# Patient Record
Sex: FEMALE | Race: WHITE | NOT HISPANIC OR LATINO | Employment: OTHER | URBAN - NONMETROPOLITAN AREA
[De-identification: names, ages, dates, MRNs, and addresses within clinical notes are randomized per-mention and may not be internally consistent; named-entity substitution may affect disease eponyms.]

---

## 2017-01-06 ENCOUNTER — OFFICE VISIT (OUTPATIENT)
Dept: CARDIOLOGY | Facility: CLINIC | Age: 82
End: 2017-01-06

## 2017-01-06 VITALS
DIASTOLIC BLOOD PRESSURE: 71 MMHG | WEIGHT: 158.6 LBS | SYSTOLIC BLOOD PRESSURE: 124 MMHG | OXYGEN SATURATION: 90 % | BODY MASS INDEX: 28.1 KG/M2 | HEART RATE: 66 BPM | HEIGHT: 63 IN

## 2017-01-06 DIAGNOSIS — I25.10 CORONARY ARTERY DISEASE INVOLVING NATIVE CORONARY ARTERY OF NATIVE HEART WITHOUT ANGINA PECTORIS: Primary | ICD-10-CM

## 2017-01-06 DIAGNOSIS — J44.9 CHRONIC OBSTRUCTIVE PULMONARY DISEASE, UNSPECIFIED COPD TYPE (HCC): ICD-10-CM

## 2017-01-06 DIAGNOSIS — I10 ESSENTIAL HYPERTENSION: ICD-10-CM

## 2017-01-06 DIAGNOSIS — E78.2 MIXED HYPERLIPIDEMIA: ICD-10-CM

## 2017-01-06 DIAGNOSIS — Z95.0 PACEMAKER: ICD-10-CM

## 2017-01-06 PROCEDURE — 99212 OFFICE O/P EST SF 10 MIN: CPT | Performed by: INTERNAL MEDICINE

## 2017-01-06 NOTE — MR AVS SNAPSHOT
Cici Gooded   1/6/2017 12:20 PM   Office Visit    Dept Phone:  [phone]   Encounter #:  [id number]    Provider:  Tejinder Cash MD   Department:  [facility] CARDIOLOGY                Your Full Care Plan              Today's Medication Changes          These changes are accurate as of: 1/6/17  1:28 PM.  If you have any questions, ask your nurse or doctor.               Stop taking medication(s)listed here:     amLODIPine 5 MG tablet   Commonly known as:  NORVASC   Stopped by:  Tejinder Cash MD                      Your Updated Medication List          This list is accurate as of: 1/6/17  1:28 PM.  Always use your most recent med list.                aspirin  MG tablet       * budesonide 0.25 MG/2ML nebulizer solution   Commonly known as:  PULMICORT       * PULMICORT FLEXHALER 90 MCG/ACT inhaler   Generic drug:  budesonide   Inhale 1 puff 2 (Two) Times a Day.       bumetanide 0.5 MG tablet   Commonly known as:  BUMEX   Take 1 tablet by mouth daily.       CALCIUM + D PO       cetirizine 10 MG tablet   Commonly known as:  zyrTEC   Take 1 tablet by mouth daily.       EYE VITAMINS PO       * gabapentin 100 MG capsule   Commonly known as:  NEURONTIN   Take 3 capsules by mouth every night.       * gabapentin 300 MG capsule   Commonly known as:  NEURONTIN   Take 1 capsule by mouth every night.       meclizine 25 MG tablet   Commonly known as:  ANTIVERT       montelukast 10 MG tablet   Commonly known as:  SINGULAIR   Take 1 tablet by mouth Every Night.       MULTI-VITAMIN DAILY PO       nitroglycerin 0.4 MG SL tablet   Commonly known as:  NITROSTAT       pravastatin 40 MG tablet   Commonly known as:  PRAVACHOL       * Notice:  This list has 4 medication(s) that are the same as other medications prescribed for you. Read the directions carefully, and ask your doctor or other care provider to review them with you.            Medications to be Given to You by a  "Medical Professional     Due       Frequency    11/1/2016 carbamide peroxide (DEBROX) 6.5 % otic solution 5 drop  2 Times Daily      Instructions     None    Patient Instructions History      Upcoming Appointments     Visit Type Date Time Department    FOLLOW UP 1/6/2017 12:20 PM [facility]    LAB 1/12/2017  8:50 AM [facility] CARDIOLOGY [facility]    FOLLOW UP 1/13/2017  9:45 AM [facility] CARDIOLOG[facility]    FOLLOW UP 1/17/2017  9:15 AM [facility] CARDIOLOGY [facility]      MyChart Signup     Our records indicate that you have declined [facility] MyChart signup. If you would like to sign up for [employer], please email [email or web address] or call [phone] to obtain an activation code.             Other Info from Your Visit           Your Appointments     Jan 12, 2017  8:50 AM EST   Lab with LABWORK, CARD COR   [facility] CARDIOLOGY (--)    [address] [place] [address]   [phone]            Jan 13, 2017  9:45 AM EST   Follow Up with PACEMAKER COR CARD   [facility] CARDIOLOGY (--)    [address] [place] [address]   [phone]           Arrive 15 minutes prior to appointment.            Jan 17, 2017  9:15 AM EST   Follow Up with Emily Cleary MD   [facility] CARDIOLOGY (--)    [address] [place] [address]   [phone]           Arrive 15 minutes prior to appointment.              Allergies     Amoxil [Amoxicillin]      Codeine      Penicillins      Tetracyclines & Related        Reason for Visit     Follow-up     Hypotension     Fatigue           Vital Signs     Blood Pressure Pulse Height Weight Oxygen Saturation Body Mass Index    124/71 (BP Location: Right arm) 66 63\" (160 cm) 158 lb 9.6 oz (71.9 kg) 90% 28.09 kg/m2    Smoking Status                   Never Smoker             "

## 2017-01-06 NOTE — PROGRESS NOTES
Subjective   Cici Veliz is a 89 y.o. female.     Chief Complaint   Patient presents with   • Follow-up   • Hypotension   • Fatigue       History of Present Illness     Cici is a pleasant 89-year-old woman who presents for an acute appointment due to continued episodes of hypotension and near syncope.  She is typically followed by Dr. Cleary however, he is not available today and as such she presented here for an acute appointment.  She notes that her blood pressures have been persistently low with systolics less than 90 and with this she has associated near syncope.  She currently denies any angina or anginal-like symptoms.  She denies any heart failure symptoms.  She denies any palpitations, near syncope or syncopal symptoms.  She did undergo cardiac catheterization in 2015 which was relatively unremarkable.  She denies any heart failure symptoms.  She denies any palpitations or errol syncope.    The following portions of the patient's history were reviewed and updated as appropriate: allergies, current medications, past family history, past medical history, past social history, past surgical history and problem list.    Review of Systems   Constitutional: Positive for activity change and fatigue. Negative for appetite change.   HENT: Positive for congestion. Negative for tinnitus.    Eyes: Negative for visual disturbance.   Respiratory: Positive for cough, chest tightness and shortness of breath.    Cardiovascular: Positive for leg swelling. Negative for chest pain and palpitations.   Gastrointestinal: Negative for blood in stool, nausea and vomiting.   Endocrine: Negative for cold intolerance, heat intolerance and polyuria.   Genitourinary: Positive for dysuria.   Musculoskeletal: Negative for myalgias and neck pain.   Skin: Negative for rash.   Neurological: Positive for weakness. Negative for dizziness, syncope and light-headedness.   Hematological: Does not bruise/bleed easily.    Psychiatric/Behavioral: Negative for confusion and sleep disturbance.       Objective   Physical Exam   Constitutional: She is oriented to person, place, and time. She appears well-developed and well-nourished. No distress.   HENT:   Head: Normocephalic and atraumatic.   Nose: Nose normal.   Mouth/Throat: Oropharynx is clear and moist.   Eyes: Conjunctivae and EOM are normal. Right eye exhibits no discharge. Left eye exhibits no discharge. No scleral icterus.   Neck: Normal range of motion. No hepatojugular reflux and no JVD present. Carotid bruit is not present. No tracheal deviation present.   Cardiovascular: Normal rate, regular rhythm, S1 normal, S2 normal and intact distal pulses.  PMI is not displaced.  Exam reveals no gallop, no S3, no S4 and no friction rub.    No murmur heard.  Pulmonary/Chest: Effort normal and breath sounds normal. No accessory muscle usage. No respiratory distress. She has no wheezes. She has no rales. She exhibits no tenderness.   Abdominal: Soft. Bowel sounds are normal. She exhibits no distension. There is no tenderness.   Musculoskeletal: Normal range of motion. She exhibits no edema or tenderness.       Vascular Status -  Her exam exhibits no right foot edema. Her exam exhibits no left foot edema.  Neurological: She is alert and oriented to person, place, and time. No cranial nerve deficit. Coordination normal.   Skin: Skin is warm and dry. She is not diaphoretic.   Nursing note and vitals reviewed.      Procedures    Assessment/Plan   Hypotension  At this point in time I have simply gone ahead and discontinued her amlodipine.  I've asked her to maintain a home blood pressure diary.  This appears to be a relatively chronic problem.  I have asked her to follow-up with her primary cardiologist.    In regard to her history of bradycardia and known coronary artery disease as well as hyperlipidemia again, she is typically followed by Dr. Cleary and has a pending appointment with him.   Again, as she is not having any symptoms regarding these medical problems at this time I have deferred further care of them to her primary cardiologist Dr. Cleary

## 2017-01-12 ENCOUNTER — LAB (OUTPATIENT)
Dept: CARDIOLOGY | Facility: CLINIC | Age: 82
End: 2017-01-12

## 2017-01-12 DIAGNOSIS — I50.30 CONGESTIVE HEART FAILURE WITH LV DIASTOLIC DYSFUNCTION, NYHA CLASS 1 (HCC): ICD-10-CM

## 2017-01-12 DIAGNOSIS — E78.2 MIXED HYPERLIPIDEMIA: ICD-10-CM

## 2017-01-12 LAB
ALBUMIN SERPL-MCNC: 4.3 G/DL (ref 3.4–4.8)
ALBUMIN/GLOB SERPL: 1.5 G/DL (ref 1.5–2.5)
ALP SERPL-CCNC: 79 U/L (ref 46–116)
ALT SERPL W P-5'-P-CCNC: 10 U/L (ref 10–36)
ANION GAP SERPL CALCULATED.3IONS-SCNC: 5.7 MMOL/L (ref 3.6–11.2)
AST SERPL-CCNC: 21 U/L (ref 10–30)
BASOPHILS # BLD AUTO: 0.02 10*3/MM3 (ref 0–0.3)
BASOPHILS NFR BLD AUTO: 0.3 % (ref 0–2)
BILIRUB SERPL-MCNC: 0.4 MG/DL (ref 0.2–1.8)
BNP SERPL-MCNC: 304 PG/ML (ref 0–100)
BUN BLD-MCNC: 34 MG/DL (ref 7–21)
BUN/CREAT SERPL: 21.3 (ref 7–25)
CALCIUM SPEC-SCNC: 9.9 MG/DL (ref 7.7–10)
CHLORIDE SERPL-SCNC: 109 MMOL/L (ref 99–112)
CHOLEST SERPL-MCNC: 241 MG/DL (ref 0–200)
CK SERPL-CCNC: 69 U/L (ref 24–173)
CO2 SERPL-SCNC: 29.3 MMOL/L (ref 24.3–31.9)
CREAT BLD-MCNC: 1.6 MG/DL (ref 0.43–1.29)
DEPRECATED RDW RBC AUTO: 46.2 FL (ref 37–54)
EOSINOPHIL # BLD AUTO: 0.12 10*3/MM3 (ref 0–0.7)
EOSINOPHIL NFR BLD AUTO: 1.7 % (ref 0–7)
ERYTHROCYTE [DISTWIDTH] IN BLOOD BY AUTOMATED COUNT: 14.5 % (ref 11.5–14.5)
GFR SERPL CREATININE-BSD FRML MDRD: 30 ML/MIN/1.73
GLOBULIN UR ELPH-MCNC: 2.9 GM/DL
GLUCOSE BLD-MCNC: 98 MG/DL (ref 70–110)
HCT VFR BLD AUTO: 43 % (ref 37–47)
HDLC SERPL-MCNC: 60 MG/DL (ref 60–100)
HGB BLD-MCNC: 13.1 G/DL (ref 12–16)
IMM GRANULOCYTES # BLD: 0 10*3/MM3 (ref 0–0.03)
IMM GRANULOCYTES NFR BLD: 0 % (ref 0–0.5)
LDLC SERPL CALC-MCNC: 165 MG/DL (ref 0–100)
LDLC/HDLC SERPL: 2.74 {RATIO}
LYMPHOCYTES # BLD AUTO: 1.5 10*3/MM3 (ref 1–3)
LYMPHOCYTES NFR BLD AUTO: 20.8 % (ref 16–46)
MCH RBC QN AUTO: 27.3 PG (ref 27–33)
MCHC RBC AUTO-ENTMCNC: 30.5 G/DL (ref 33–37)
MCV RBC AUTO: 89.6 FL (ref 80–94)
MONOCYTES # BLD AUTO: 0.66 10*3/MM3 (ref 0.1–0.9)
MONOCYTES NFR BLD AUTO: 9.2 % (ref 0–12)
NEUTROPHILS # BLD AUTO: 4.91 10*3/MM3 (ref 1.4–6.5)
NEUTROPHILS NFR BLD AUTO: 68 % (ref 40–75)
OSMOLALITY SERPL CALC.SUM OF ELEC: 294.4 MOSM/KG (ref 273–305)
PLATELET # BLD AUTO: 228 10*3/MM3 (ref 130–400)
PMV BLD AUTO: 11.3 FL (ref 6–10)
POTASSIUM BLD-SCNC: 5.5 MMOL/L (ref 3.5–5.3)
PROT SERPL-MCNC: 7.2 G/DL (ref 6–8)
RBC # BLD AUTO: 4.8 10*6/MM3 (ref 4.2–5.4)
SODIUM BLD-SCNC: 144 MMOL/L (ref 135–153)
TRIGL SERPL-MCNC: 82 MG/DL (ref 0–150)
VLDLC SERPL-MCNC: 16.4 MG/DL
WBC NRBC COR # BLD: 7.21 10*3/MM3 (ref 4.5–12.5)

## 2017-01-12 PROCEDURE — 80061 LIPID PANEL: CPT | Performed by: INTERNAL MEDICINE

## 2017-01-12 PROCEDURE — 80053 COMPREHEN METABOLIC PANEL: CPT | Performed by: INTERNAL MEDICINE

## 2017-01-12 PROCEDURE — 83880 ASSAY OF NATRIURETIC PEPTIDE: CPT | Performed by: INTERNAL MEDICINE

## 2017-01-12 PROCEDURE — 82550 ASSAY OF CK (CPK): CPT | Performed by: INTERNAL MEDICINE

## 2017-01-12 PROCEDURE — 85025 COMPLETE CBC W/AUTO DIFF WBC: CPT | Performed by: INTERNAL MEDICINE

## 2017-01-13 ENCOUNTER — HOSPITAL ENCOUNTER (OUTPATIENT)
Facility: HOSPITAL | Age: 82
Setting detail: OBSERVATION
Discharge: HOME | End: 2017-01-14
Attending: EMERGENCY MEDICINE | Admitting: INTERNAL MEDICINE

## 2017-01-13 ENCOUNTER — OFFICE VISIT (OUTPATIENT)
Dept: CARDIOLOGY | Facility: CLINIC | Age: 82
End: 2017-01-13

## 2017-01-13 ENCOUNTER — APPOINTMENT (OUTPATIENT)
Dept: GENERAL RADIOLOGY | Facility: HOSPITAL | Age: 82
End: 2017-01-13

## 2017-01-13 DIAGNOSIS — I49.5 SSS (SICK SINUS SYNDROME) (HCC): ICD-10-CM

## 2017-01-13 DIAGNOSIS — I20.8 ANGINA AT REST (HCC): Primary | ICD-10-CM

## 2017-01-13 LAB
ALBUMIN SERPL-MCNC: 4 G/DL (ref 3.4–4.8)
ALBUMIN/GLOB SERPL: 1.5 G/DL (ref 1.5–2.5)
ALP SERPL-CCNC: 72 U/L (ref 46–116)
ALT SERPL W P-5'-P-CCNC: 13 U/L (ref 10–36)
ANION GAP SERPL CALCULATED.3IONS-SCNC: 9.6 MMOL/L (ref 3.6–11.2)
APTT PPP: 25.2 SECONDS (ref 24.4–31)
AST SERPL-CCNC: 27 U/L (ref 10–30)
BASOPHILS # BLD AUTO: 0.02 10*3/MM3 (ref 0–0.3)
BASOPHILS NFR BLD AUTO: 0.3 % (ref 0–2)
BILIRUB SERPL-MCNC: 0.2 MG/DL (ref 0.2–1.8)
BUN BLD-MCNC: 33 MG/DL (ref 7–21)
BUN/CREAT SERPL: 23.4 (ref 7–25)
CALCIUM SPEC-SCNC: 9.1 MG/DL (ref 7.7–10)
CHLORIDE SERPL-SCNC: 106 MMOL/L (ref 99–112)
CO2 SERPL-SCNC: 27.4 MMOL/L (ref 24.3–31.9)
CREAT BLD-MCNC: 1.41 MG/DL (ref 0.43–1.29)
DEPRECATED RDW RBC AUTO: 46.4 FL (ref 37–54)
EOSINOPHIL # BLD AUTO: 0.17 10*3/MM3 (ref 0–0.7)
EOSINOPHIL NFR BLD AUTO: 2.7 % (ref 0–7)
ERYTHROCYTE [DISTWIDTH] IN BLOOD BY AUTOMATED COUNT: 14.4 % (ref 11.5–14.5)
GFR SERPL CREATININE-BSD FRML MDRD: 35 ML/MIN/1.73
GLOBULIN UR ELPH-MCNC: 2.6 GM/DL
GLUCOSE BLD-MCNC: 126 MG/DL (ref 70–110)
HCT VFR BLD AUTO: 37.9 % (ref 37–47)
HGB BLD-MCNC: 12.4 G/DL (ref 12–16)
IMM GRANULOCYTES # BLD: 0.01 10*3/MM3 (ref 0–0.03)
IMM GRANULOCYTES NFR BLD: 0.2 % (ref 0–0.5)
INR PPP: 0.92 (ref 0.8–1.1)
LIPASE SERPL-CCNC: 45 U/L (ref 13–60)
LYMPHOCYTES # BLD AUTO: 2.13 10*3/MM3 (ref 1–3)
LYMPHOCYTES NFR BLD AUTO: 33.9 % (ref 16–46)
MCH RBC QN AUTO: 28.9 PG (ref 27–33)
MCHC RBC AUTO-ENTMCNC: 32.7 G/DL (ref 33–37)
MCV RBC AUTO: 88.3 FL (ref 80–94)
MONOCYTES # BLD AUTO: 0.75 10*3/MM3 (ref 0.1–0.9)
MONOCYTES NFR BLD AUTO: 11.9 % (ref 0–12)
NEUTROPHILS # BLD AUTO: 3.2 10*3/MM3 (ref 1.4–6.5)
NEUTROPHILS NFR BLD AUTO: 51 % (ref 40–75)
OSMOLALITY SERPL CALC.SUM OF ELEC: 293.8 MOSM/KG (ref 273–305)
PLATELET # BLD AUTO: 177 10*3/MM3 (ref 130–400)
PMV BLD AUTO: 10.7 FL (ref 6–10)
POTASSIUM BLD-SCNC: 4.2 MMOL/L (ref 3.5–5.3)
PROT SERPL-MCNC: 6.6 G/DL (ref 6–8)
PROTHROMBIN TIME: 10.4 SECONDS (ref 9.8–11.9)
RBC # BLD AUTO: 4.29 10*6/MM3 (ref 4.2–5.4)
SODIUM BLD-SCNC: 143 MMOL/L (ref 135–153)
TROPONIN I SERPL-MCNC: 0.06 NG/ML
WBC NRBC COR # BLD: 6.28 10*3/MM3 (ref 4.5–12.5)

## 2017-01-13 PROCEDURE — 99284 EMERGENCY DEPT VISIT MOD MDM: CPT

## 2017-01-13 PROCEDURE — 85610 PROTHROMBIN TIME: CPT | Performed by: EMERGENCY MEDICINE

## 2017-01-13 PROCEDURE — 93005 ELECTROCARDIOGRAM TRACING: CPT | Performed by: EMERGENCY MEDICINE

## 2017-01-13 PROCEDURE — 85730 THROMBOPLASTIN TIME PARTIAL: CPT | Performed by: EMERGENCY MEDICINE

## 2017-01-13 PROCEDURE — 71010 XR CHEST 1 VW: CPT | Performed by: RADIOLOGY

## 2017-01-13 PROCEDURE — 83690 ASSAY OF LIPASE: CPT | Performed by: EMERGENCY MEDICINE

## 2017-01-13 PROCEDURE — 80053 COMPREHEN METABOLIC PANEL: CPT | Performed by: EMERGENCY MEDICINE

## 2017-01-13 PROCEDURE — 84484 ASSAY OF TROPONIN QUANT: CPT | Performed by: EMERGENCY MEDICINE

## 2017-01-13 PROCEDURE — 85025 COMPLETE CBC W/AUTO DIFF WBC: CPT | Performed by: EMERGENCY MEDICINE

## 2017-01-13 PROCEDURE — 71010 HC CHEST PA OR AP: CPT

## 2017-01-13 PROCEDURE — 93010 ELECTROCARDIOGRAM REPORT: CPT | Performed by: INTERNAL MEDICINE

## 2017-01-13 PROCEDURE — 93288 INTERROG EVL PM/LDLS PM IP: CPT | Performed by: INTERNAL MEDICINE

## 2017-01-13 NOTE — IP AVS SNAPSHOT
AFTER VISIT SUMMARY             Cici Veliz           About your hospitalization     You were admitted on:  January 14, 2017 You last received care in the:  [address]       Procedures & Surgeries         Medications    If you or your caregiver advised us that you are currently taking a medication and that medication is marked below as “Resume”, this simply indicates that we have reviewed those medications to make sure our new therapy recommendations do not interfere.  If you have concerns about medications other than those new ones which we are prescribing today, please consult the physician who prescribed them (or your primary physician).  Our review of your home medications is not meant to indicate that we are directing their use.             Your Medications      START taking these medications     amLODIPine 5 MG tablet   Take 1 tablet by mouth Daily.   Last time this was given:  1/14/2017  2:47 PM   Commonly known as:  NORVASC           metoprolol tartrate 25 MG tablet   Take 1 tablet by mouth Every 12 (Twelve) Hours.   Last time this was given:  1/14/2017  2:46 PM   Commonly known as:  LOPRESSOR             CHANGE how you take these medications     gabapentin 300 MG capsule   Take 1 capsule by mouth every night.   According to our records, you may have been taking this medication differently.   Commonly known as:  NEURONTIN   What changed:    - when to take this  - reasons to take this             CONTINUE taking these medications     aspirin  MG tablet   Take 325 mg by mouth Daily. Typically takes in the morning but has one last night as well.   Last time this was given:  1/14/2017  2:46 PM           calcium carbonate-cholecalciferol 500-400 MG-UNIT tablet tablet   Take 1 tablet by mouth 2 (Two) Times a Day.           cetirizine 10 MG tablet   Take 1 tablet by mouth daily.   Last time this was given:  1/14/2017  2:46 PM   Commonly known as:  zyrTEC           EYE VITAMINS &  MINERALS PO   Take 1 tablet by mouth Every Night.           montelukast 10 MG tablet   Take 1 tablet by mouth Every Night.   Commonly known as:  SINGULAIR           multivitamin tablet tablet   Take 1 tablet by mouth Daily.   Last time this was given:  1/14/2017  2:46 PM           nitroglycerin 0.4 MG SL tablet   Place 0.4 mg under the tongue every 5 (five) minutes as needed for chest pain. Take no more than 3 doses in 15 minutes.   Last time this was given:  1/14/2017  1:02 AM   Commonly known as:  NITROSTAT           PULMICORT FLEXHALER 90 MCG/ACT inhaler   Inhale 1 puff 2 (Two) Times a Day.   Generic drug:  budesonide                Where to Get Your Medications      You can get these medications from any pharmacy     Bring a paper prescription for each of these medications     amLODIPine 5 MG tablet    metoprolol tartrate 25 MG tablet                  Your Medications      Your Medication List           Morning Noon Evening Bedtime As Needed    amLODIPine 5 MG tablet   Take 1 tablet by mouth Daily.   Commonly known as:  NORVASC                                   aspirin  MG tablet   Take 325 mg by mouth Daily. Typically takes in the morning but has one last night as well.                                   calcium carbonate-cholecalciferol 500-400 MG-UNIT tablet tablet   Take 1 tablet by mouth 2 (Two) Times a Day.                                      cetirizine 10 MG tablet   Take 1 tablet by mouth daily.   Commonly known as:  zyrTEC                                   EYE VITAMINS & MINERALS PO   Take 1 tablet by mouth Every Night.                                   gabapentin 300 MG capsule   Take 1 capsule by mouth every night.   Commonly known as:  NEURONTIN                                   metoprolol tartrate 25 MG tablet   Take 1 tablet by mouth Every 12 (Twelve) Hours.   Commonly known as:  LOPRESSOR                                      montelukast 10 MG tablet   Take 1 tablet by mouth Every Night.    Commonly known as:  SINGULAIR                                   multivitamin tablet tablet   Take 1 tablet by mouth Daily.                                   nitroglycerin 0.4 MG SL tablet   Place 0.4 mg under the tongue every 5 (five) minutes as needed for chest pain. Take no more than 3 doses in 15 minutes.   Commonly known as:  NITROSTAT                                   PULMICORT FLEXHALER 90 MCG/ACT inhaler   Inhale 1 puff 2 (Two) Times a Day.   Generic drug:  budesonide                                               Instructions for After Discharge        Activity Instructions     Activity as Tolerated           Diet Instructions     Cardiac Diet           Discharge References/Attachments     ANGINA PECTORIS, EASY-TO-READ (ENGLISH)       Follow-ups for After Discharge        Follow-up Information     Follow up with Emily Cleary MD .    Specialty:  Cardiology    Contact information:    [address] [place] [address]  [phone]        Referrals and Follow-ups to Schedule     Follow-Up    As directed    Follow up with PCP Dr. Cleary as previously scheduled on 1/17/17.             Scheduled Appointments     Jan 17, 2017  9:15 AM EST   Follow Up with Emily Cleary MD   [facility] CARDIOLOGY (--)    [address] [place] [address]   [phone]           Arrive 15 minutes prior to appointment.              MyChart Signup     Our records indicate that you have declined [facility] signup. If you would like to sign up for [employer] [email or web address], please email [email or web address] or call [phone] to obtain an activation code.         Summary of Your Hospitalization        Reason for Hospitalization     Your primary diagnosis was:  Angina At Rest    Your diagnoses also included:  Chronic Back Pain, Ckd (Chronic Kidney Disease) Stage 3, Gfr 30-59 Ml/Min, Heart Failure, Elevated Troponin Level, Chronic Airway Obstruction, High Blood Pressure, High Cholesterol Or  Triglycerides, Hyperglycemia      Care Providers     Provider Service Role Specialty    Lisa Whitt,  Medicine Attending Provider Hospitalist    Lisa Whitt, DO Medicine Consulting Physician  Hospitalist    Chung Rivas MD Cardiology Consulting Physician  Cardiology       Your Allergies  Date Reviewed: 1/14/2017    Allergen Reactions    Amoxil (Amoxicillin) Not Noted         Bee Venom Not Noted         Codeine Not Noted         Penicillins Not Noted         Tetracyclines & Related Not Noted      Pending Labs     Order Current Status    Streptococcus Pneumoniae Ag In process      Patient Belongings Returned     Document Return of Belongings Flowsheet     Were the patient bedside belongings sent home?   Yes   Belongings Retrieved from Security & Sent Home   N/A    Belongings Sent to Safe   --   Medications Retrieved from Pharmacy & Sent Home   N/A              More Information      Angina Pectoris  Angina pectoris is a very bad feeling in the chest, neck, or arm. Your doctor may call it angina. There are four types of angina. Angina is caused by a lack of blood in the middle and thickest layer of the heart wall (myocardium). Angina may feel like a crushing or squeezing pain in the chest. It may feel like tightness or heavy pressure in the chest. Some people say it feels like gas, heartburn, or indigestion. Some people have symptoms other than pain. These include:  · Shortness of breath.  · Cold sweats.  · Feeling sick to your stomach (nausea).  · Feeling light-headed.  Many women have chest discomfort and some of the other symptoms. However, women often have different symptoms, such as:  · Feeling tired (fatigue).  · Feeling nervous for no reason.  · Feeling weak for no reason.  · Dizziness or fainting.  Women may have angina without any symptoms.  HOME CARE  · Take medicines only as told by your doctor.  · Take care of other health issues as told by your doctor. These include:    High blood pressure  (hypertension).    Diabetes.  · Follow a heart-healthy diet. Your doctor can help you to choose healthy food options and make changes.  · Talk to your doctor to learn more about healthy cooking methods and use them. These include:    Roasting.    Grilling.    Broiling.    Baking.    Poaching.    Steaming.    Stir-frying.  · Follow an exercise program approved by your doctor.  · Keep a healthy weight. Lose weight as told by your doctor.  · Rest when you are tired.  · Learn to manage stress.  · Do not use any tobacco, such as cigarettes, chewing tobacco, or electronic cigarettes. If you need help quitting, ask your doctor.  · If you drink alcohol, and your doctor says it is okay, limit yourself to no more than 1 drink per day. One drink equals 12 ounces of beer, 5 ounces of wine, or 1½ ounces of hard liquor.  · Stop illegal drug use.  · Keep all follow-up visits as told by your doctor. This is important.  Do not take these medicines unless your doctor says that you can:  · Nonsteroidal anti-inflammatory drugs (NSAIDs). These include:    Ibuprofen.    Naproxen.    Celecoxib.  · Vitamin supplements that have vitamin A, vitamin E, or both.  · Hormone therapy that contains estrogen with or without progestin.  GET HELP RIGHT AWAY IF:  · You have pain in your chest, neck, arm, jaw, stomach, or back that:    Lasts more than a few minutes.    Comes back.    Does not get better after you take medicine under your tongue (sublingual nitroglycerin).  · You have any of these symptoms for no reason:    Gas, heartburn, or indigestion.    Sweating a lot.    Shortness of breath or trouble breathing.    Feeling sick to your stomach or throwing up.    Feeling more tired than usual.    Feeling nervous or worrying more than usual.    Feeling weak.    Diarrhea.  · You are suddenly dizzy or light-headed.  · You faint or pass out.  These symptoms may be an emergency. Do not wait to see if the symptoms will go away. Get medical help right  away. Call your local emergency services (911 in the [place] Do not drive yourself to the hospital.     This information is not intended to replace advice given to you by your health care provider. Make sure you discuss any questions you have with your health care provider.     Document Released: 06/05/2009 Document Revised: 05/03/2016 Document Reviewed: 04/21/2015  [employer] Patient Education ©2016 Elsevier Inc.            SYMPTOMS OF A STROKE    Call 911 or have someone take you to the Emergency Department if you have any of the following:    · Sudden numbness or weakness of your face, arm or leg especially on one side of the body  · Sudden confusion, diffiiculty speaking or trouble understanding   · Changes in your vision or loss of sight in one eye  · Sudden severe headache with no known cause  · sudden dizziness, trouble walking, loss of balance or coordination    It is important to seek emergency care right away if you have further stroke symptoms. If you get emergency help quickly, the powerful clot-dissolving medicines can reduce the disabilities caused by a stroke.     For more information:    American Stroke Association  1-600-6-STROKE  www.strokeassociation.org           IF YOU SMOKE OR USE TOBACCO PLEASE READ THE FOLLOWING:    Why is smoking bad for me?  Smoking increases the risk of heart disease, lung disease, vascular disease, stroke, and cancer.     If you smoke, STOP!    If you would like more information on quitting smoking, please visit the [employer] website: www.[employer]/TrialReach/healthier-together/smoke   This link will provide additional resources including the QUIT line and the Beat the Pack support groups.     For more information:    American Cancer Society  (948) 411-6527    American Heart Association  1-142.378.4541               YOU ARE THE MOST IMPORTANT FACTOR IN YOUR RECOVERY.     Follow all instructions carefully.     I have reviewed my discharge  instructions with my nurse, including the following information, if applicable:     Information about my illness and diagnosis   Follow up appointments (including lab draws)   Wound Care   Equipment Needs   Medications (new and continuing) along with side effects   Preventative information such as vaccines and smoking cessations   Diet   Pain   I know when to contact my Doctor's office or seek emergency care      I want my nurse to describe the side effects of my medications: YES NO   If the answer is no, I understand the side effects of my medications: YES NO   My nurse described the side effects of my medications in a way that I could understand: YES NO   I have taken my personal belongings and my own medications with me at discharge: YES NO            I have received this information and my questions have been answered. I have discussed any concerns I see with this plan with the nurse or physician. I understand these instructions.    Signature of Patient or Responsible Person: _____________________________________    Date: _________________  Time: __________________    Signature of Healthcare Provider: _______________________________________  Date: _________________  Time: __________________

## 2017-01-13 NOTE — MR AVS SNAPSHOT
Cici Veliz   1/13/2017 9:45 AM   Appointment    Dept Phone:  [phone]   Encounter #:  [id number]    Provider:  ARIE BYERS   Department:  [facility] CARDIOLOGY                Your Full Care Plan              Your Updated Medication List          This list is accurate as of: 1/13/17 11:47 AM.  Always use your most recent med list.                aspirin  MG tablet       * budesonide 0.25 MG/2ML nebulizer solution   Commonly known as:  PULMICORT       * PULMICORT FLEXHALER 90 MCG/ACT inhaler   Generic drug:  budesonide   Inhale 1 puff 2 (Two) Times a Day.       bumetanide 0.5 MG tablet   Commonly known as:  BUMEX   Take 1 tablet by mouth daily.       CALCIUM + D PO       cetirizine 10 MG tablet   Commonly known as:  zyrTEC   Take 1 tablet by mouth daily.       EYE VITAMINS PO       * gabapentin 100 MG capsule   Commonly known as:  NEURONTIN   Take 3 capsules by mouth every night.       * gabapentin 300 MG capsule   Commonly known as:  NEURONTIN   Take 1 capsule by mouth every night.       meclizine 25 MG tablet   Commonly known as:  ANTIVERT       montelukast 10 MG tablet   Commonly known as:  SINGULAIR   Take 1 tablet by mouth Every Night.       MULTI-VITAMIN DAILY PO       nitroglycerin 0.4 MG SL tablet   Commonly known as:  NITROSTAT       pravastatin 40 MG tablet   Commonly known as:  PRAVACHOL       * Notice:  This list has 4 medication(s) that are the same as other medications prescribed for you. Read the directions carefully, and ask your doctor or other care provider to review them with you.            Medications to be Given to You by a Medical Professional     Due       Frequency    11/1/2016 carbamide peroxide (DEBROX) 6.5 % otic solution 5 drop  2 Times Daily      Instructions     None    Patient Instructions History      Upcoming Appointments     Visit Type Date Time Department    FOLLOW UP 1/13/2017  9:45 AM [facility] C[facility]IOLOGY [facility] [place]  UP 1/17/2017  9:15 AM [facility] CARDIOLOGY [facility] [place] Signup     Our records indicate that you have declined [facility] Hosp[facility] signup. If you would like to sign up for Pural[employer]hart, please email [email or web address] or call [phone] to obtain an activation code.             Other Info from Your Visit           Your Appointments     Jan 17, 2017  9:15 AM EST   Follow Up with Emily Cleary MD   [facility] CARDIOLOGY (--)    [address] [place] [address]   [phone]           Arrive 15 minutes prior to appointment.              Allergies     Amoxil [Amoxicillin]      Codeine      Penicillins      Tetracyclines & Related        Vital Signs     Smoking Status                   Never Smoker

## 2017-01-13 NOTE — Clinical Note
Level of Care: Telemetry [5]   Admitting Physician: TIANA JORGENSEN [[id number]]   Attending Physician: TIANA JORGENSEN [[id number]]   Patient Class: Inpatient [101]

## 2017-01-14 ENCOUNTER — APPOINTMENT (OUTPATIENT)
Dept: CARDIOLOGY | Facility: HOSPITAL | Age: 82
End: 2017-01-14
Attending: INTERNAL MEDICINE

## 2017-01-14 ENCOUNTER — APPOINTMENT (OUTPATIENT)
Dept: NUCLEAR MEDICINE | Facility: HOSPITAL | Age: 82
End: 2017-01-14
Attending: INTERNAL MEDICINE

## 2017-01-14 VITALS
BODY MASS INDEX: 29.44 KG/M2 | OXYGEN SATURATION: 92 % | HEART RATE: 73 BPM | HEIGHT: 62 IN | SYSTOLIC BLOOD PRESSURE: 142 MMHG | TEMPERATURE: 97.9 F | RESPIRATION RATE: 20 BRPM | DIASTOLIC BLOOD PRESSURE: 78 MMHG | WEIGHT: 160 LBS

## 2017-01-14 PROBLEM — I20.8 ANGINA AT REST (HCC): Status: ACTIVE | Noted: 2017-01-14

## 2017-01-14 PROBLEM — R73.9 HYPERGLYCEMIA: Status: ACTIVE | Noted: 2017-01-14

## 2017-01-14 PROBLEM — R77.8 TROPONIN LEVEL ELEVATED: Status: ACTIVE | Noted: 2017-01-14

## 2017-01-14 LAB
ALBUMIN SERPL-MCNC: 3.9 G/DL (ref 3.4–4.8)
ALBUMIN/GLOB SERPL: 1.4 G/DL (ref 1.5–2.5)
ALP SERPL-CCNC: 71 U/L (ref 46–116)
ALT SERPL W P-5'-P-CCNC: 8 U/L (ref 10–36)
ANION GAP SERPL CALCULATED.3IONS-SCNC: 8.2 MMOL/L (ref 3.6–11.2)
AST SERPL-CCNC: 25 U/L (ref 10–30)
BASOPHILS # BLD AUTO: 0.01 10*3/MM3 (ref 0–0.3)
BASOPHILS NFR BLD AUTO: 0.2 % (ref 0–2)
BH CV ECHO MEAS - % IVS THICK: -2.4 %
BH CV ECHO MEAS - % LVPW THICK: 14.3 %
BH CV ECHO MEAS - ACS: 1.1 CM
BH CV ECHO MEAS - AO ROOT AREA (BSA CORRECTED): 1.5
BH CV ECHO MEAS - AO ROOT AREA: 5.2 CM^2
BH CV ECHO MEAS - AO ROOT DIAM: 2.6 CM
BH CV ECHO MEAS - BSA(HAYCOCK): 1.8 M^2
BH CV ECHO MEAS - BSA: 1.8 M^2
BH CV ECHO MEAS - BZI_BMI: 28.5 KILOGRAMS/M^2
BH CV ECHO MEAS - BZI_METRIC_HEIGHT: 160 CM
BH CV ECHO MEAS - BZI_METRIC_WEIGHT: 73 KG
BH CV ECHO MEAS - CONTRAST EF 4CH: 60 ML/M^2
BH CV ECHO MEAS - EDV(CUBED): 81 ML
BH CV ECHO MEAS - EDV(MOD-SP4): 40 ML
BH CV ECHO MEAS - EDV(TEICH): 84.3 ML
BH CV ECHO MEAS - EF(CUBED): 71.1 %
BH CV ECHO MEAS - EF(MOD-SP4): 60 %
BH CV ECHO MEAS - EF(TEICH): 63.1 %
BH CV ECHO MEAS - ESV(CUBED): 23.4 ML
BH CV ECHO MEAS - ESV(MOD-SP4): 16 ML
BH CV ECHO MEAS - ESV(TEICH): 31.1 ML
BH CV ECHO MEAS - FS: 33.9 %
BH CV ECHO MEAS - IVS/LVPW: 1.2
BH CV ECHO MEAS - IVSD: 1.5 CM
BH CV ECHO MEAS - IVSS: 1.5 CM
BH CV ECHO MEAS - LA DIMENSION: 3.5 CM
BH CV ECHO MEAS - LA/AO: 1.4
BH CV ECHO MEAS - LV DIASTOLIC VOL/BSA (35-75): 22.7 ML/M^2
BH CV ECHO MEAS - LV MASS(C)D: 237.3 GRAMS
BH CV ECHO MEAS - LV MASS(C)DI: 134.6 GRAMS/M^2
BH CV ECHO MEAS - LV MASS(C)S: 146 GRAMS
BH CV ECHO MEAS - LV MASS(C)SI: 82.8 GRAMS/M^2
BH CV ECHO MEAS - LV SYSTOLIC VOL/BSA (12-30): 9.1 ML/M^2
BH CV ECHO MEAS - LVIDD: 4.3 CM
BH CV ECHO MEAS - LVIDS: 2.9 CM
BH CV ECHO MEAS - LVLD AP4: 5.7 CM
BH CV ECHO MEAS - LVLS AP4: 4.6 CM
BH CV ECHO MEAS - LVOT AREA (M): 2.8 CM^2
BH CV ECHO MEAS - LVOT AREA: 2.9 CM^2
BH CV ECHO MEAS - LVOT DIAM: 1.9 CM
BH CV ECHO MEAS - LVPWD: 1.3 CM
BH CV ECHO MEAS - LVPWS: 1.5 CM
BH CV ECHO MEAS - MV DEC SLOPE: 311.1 CM/SEC^2
BH CV ECHO MEAS - MV E MAX VEL: 91.8 CM/SEC
BH CV ECHO MEAS - MV P1/2T MAX VEL: 91.8 CM/SEC
BH CV ECHO MEAS - MV P1/2T: 86.4 MSEC
BH CV ECHO MEAS - MVA P1/2T LCG: 2.4 CM^2
BH CV ECHO MEAS - MVA(P1/2T): 2.5 CM^2
BH CV ECHO MEAS - RAP SYSTOLE: 10 MMHG
BH CV ECHO MEAS - RVDD: 1.8 CM
BH CV ECHO MEAS - RVSP: 54.2 MMHG
BH CV ECHO MEAS - SI(CUBED): 32.7 ML/M^2
BH CV ECHO MEAS - SI(MOD-SP4): 13.6 ML/M^2
BH CV ECHO MEAS - SI(TEICH): 30.1 ML/M^2
BH CV ECHO MEAS - SV(CUBED): 57.6 ML
BH CV ECHO MEAS - SV(MOD-SP4): 24 ML
BH CV ECHO MEAS - SV(TEICH): 53.1 ML
BH CV ECHO MEAS - TR MAX VEL: 332.5 CM/SEC
BH CV NUCLEAR PRIOR STUDY: 1
BH CV STRESS BP STAGE 1: NORMAL
BH CV STRESS COMMENTS STAGE 1: NORMAL
BH CV STRESS DOSE REGADENOSON STAGE 1: 0.4
BH CV STRESS DURATION MIN STAGE 1: 0
BH CV STRESS DURATION SEC STAGE 1: 15
BH CV STRESS HR STAGE 1: 65
BH CV STRESS PROTOCOL 1: NORMAL
BH CV STRESS RECOVERY BP: NORMAL MMHG
BH CV STRESS RECOVERY HR: 60 BPM
BH CV STRESS STAGE 1: 1
BILIRUB SERPL-MCNC: 0.3 MG/DL (ref 0.2–1.8)
BUN BLD-MCNC: 30 MG/DL (ref 7–21)
BUN/CREAT SERPL: 21.9 (ref 7–25)
CALCIUM SPEC-SCNC: 10 MG/DL (ref 7.7–10)
CHLORIDE SERPL-SCNC: 110 MMOL/L (ref 99–112)
CHOLEST SERPL-MCNC: 202 MG/DL (ref 0–200)
CK MB SERPL-CCNC: 3 NG/ML (ref 0–5)
CK MB SERPL-CCNC: 3.49 NG/ML (ref 0–5)
CK MB SERPL-CCNC: 3.97 NG/ML (ref 0–5)
CK MB SERPL-RTO: 3.2 % (ref 0–3)
CK MB SERPL-RTO: 3.5 % (ref 0–3)
CK MB SERPL-RTO: 4.1 % (ref 0–3)
CK SERPL-CCNC: 101 U/L (ref 24–173)
CK SERPL-CCNC: 93 U/L (ref 24–173)
CK SERPL-CCNC: 97 U/L (ref 24–173)
CO2 SERPL-SCNC: 26.8 MMOL/L (ref 24.3–31.9)
CREAT BLD-MCNC: 1.37 MG/DL (ref 0.43–1.29)
DEPRECATED RDW RBC AUTO: 45.1 FL (ref 37–54)
EOSINOPHIL # BLD AUTO: 0.16 10*3/MM3 (ref 0–0.7)
EOSINOPHIL NFR BLD AUTO: 2.5 % (ref 0–7)
ERYTHROCYTE [DISTWIDTH] IN BLOOD BY AUTOMATED COUNT: 14.2 % (ref 11.5–14.5)
GFR SERPL CREATININE-BSD FRML MDRD: 36 ML/MIN/1.73
GLOBULIN UR ELPH-MCNC: 2.7 GM/DL
GLUCOSE BLD-MCNC: 98 MG/DL (ref 70–110)
HBA1C MFR BLD: 5.4 % (ref 4.5–5.7)
HCT VFR BLD AUTO: 38.3 % (ref 37–47)
HDLC SERPL-MCNC: 56 MG/DL (ref 60–100)
HGB BLD-MCNC: 12.1 G/DL (ref 12–16)
HOLD SPECIMEN: NORMAL
HOLD SPECIMEN: NORMAL
IMM GRANULOCYTES # BLD: 0.01 10*3/MM3 (ref 0–0.03)
IMM GRANULOCYTES NFR BLD: 0.2 % (ref 0–0.5)
L PNEUMO1 AG UR QL IA: NEGATIVE
LDLC SERPL CALC-MCNC: 136 MG/DL (ref 0–100)
LDLC/HDLC SERPL: 2.43 {RATIO}
LV EF NUC BP: 56 %
LYMPHOCYTES # BLD AUTO: 1.92 10*3/MM3 (ref 1–3)
LYMPHOCYTES NFR BLD AUTO: 30.6 % (ref 16–46)
M PNEUMO IGM SER QL: NEGATIVE
MAXIMAL PREDICTED HEART RATE: 129 BPM
MCH RBC QN AUTO: 27.9 PG (ref 27–33)
MCHC RBC AUTO-ENTMCNC: 31.6 G/DL (ref 33–37)
MCV RBC AUTO: 88.2 FL (ref 80–94)
MONOCYTES # BLD AUTO: 0.77 10*3/MM3 (ref 0.1–0.9)
MONOCYTES NFR BLD AUTO: 12.3 % (ref 0–12)
MYOGLOBIN SERPL-MCNC: 146 NG/ML (ref 0–109)
MYOGLOBIN SERPL-MCNC: 173 NG/ML (ref 0–109)
NEUTROPHILS # BLD AUTO: 3.41 10*3/MM3 (ref 1.4–6.5)
NEUTROPHILS NFR BLD AUTO: 54.2 % (ref 40–75)
OSMOLALITY SERPL CALC.SUM OF ELEC: 294.9 MOSM/KG (ref 273–305)
PERCENT MAX PREDICTED HR: 59.69 %
PLATELET # BLD AUTO: 198 10*3/MM3 (ref 130–400)
PMV BLD AUTO: 10.8 FL (ref 6–10)
POTASSIUM BLD-SCNC: 4.2 MMOL/L (ref 3.5–5.3)
PROT SERPL-MCNC: 6.6 G/DL (ref 6–8)
RBC # BLD AUTO: 4.34 10*6/MM3 (ref 4.2–5.4)
SODIUM BLD-SCNC: 145 MMOL/L (ref 135–153)
STRESS BASELINE BP: NORMAL MMHG
STRESS BASELINE HR: 65 BPM
STRESS PERCENT HR: 70 %
STRESS POST PEAK BP: NORMAL MMHG
STRESS POST PEAK HR: 77 BPM
STRESS TARGET HR: 110 BPM
T4 FREE SERPL-MCNC: 0.87 NG/DL (ref 0.89–1.76)
TRIGL SERPL-MCNC: 50 MG/DL (ref 0–150)
TROPONIN I SERPL-MCNC: 0.09 NG/ML
TROPONIN I SERPL-MCNC: 0.12 NG/ML
TROPONIN I SERPL-MCNC: 0.14 NG/ML
TROPONIN I SERPL-MCNC: 0.15 NG/ML
TSH SERPL DL<=0.05 MIU/L-ACNC: 1.61 MIU/ML (ref 0.55–4.78)
VLDLC SERPL-MCNC: 10 MG/DL
WBC NRBC COR # BLD: 6.28 10*3/MM3 (ref 4.5–12.5)
WHOLE BLOOD HOLD SPECIMEN: NORMAL
WHOLE BLOOD HOLD SPECIMEN: NORMAL

## 2017-01-14 PROCEDURE — G0378 HOSPITAL OBSERVATION PER HR: HCPCS

## 2017-01-14 PROCEDURE — 82553 CREATINE MB FRACTION: CPT | Performed by: INTERNAL MEDICINE

## 2017-01-14 PROCEDURE — 86403 PARTICLE AGGLUT ANTBDY SCRN: CPT | Performed by: INTERNAL MEDICINE

## 2017-01-14 PROCEDURE — 78452 HT MUSCLE IMAGE SPECT MULT: CPT | Performed by: INTERNAL MEDICINE

## 2017-01-14 PROCEDURE — 25010000002 HEPARIN (PORCINE) PER 1000 UNITS: Performed by: INTERNAL MEDICINE

## 2017-01-14 PROCEDURE — 0 TECHNETIUM SESTAMIBI: Performed by: INTERNAL MEDICINE

## 2017-01-14 PROCEDURE — 78452 HT MUSCLE IMAGE SPECT MULT: CPT

## 2017-01-14 PROCEDURE — A9500 TC99M SESTAMIBI: HCPCS | Performed by: INTERNAL MEDICINE

## 2017-01-14 PROCEDURE — 25010000002 AMINOPHYLLINE PER 250 MG: Performed by: INTERNAL MEDICINE

## 2017-01-14 PROCEDURE — 93017 CV STRESS TEST TRACING ONLY: CPT

## 2017-01-14 PROCEDURE — 83874 ASSAY OF MYOGLOBIN: CPT | Performed by: INTERNAL MEDICINE

## 2017-01-14 PROCEDURE — 83036 HEMOGLOBIN GLYCOSYLATED A1C: CPT | Performed by: INTERNAL MEDICINE

## 2017-01-14 PROCEDURE — 93018 CV STRESS TEST I&R ONLY: CPT | Performed by: INTERNAL MEDICINE

## 2017-01-14 PROCEDURE — 84443 ASSAY THYROID STIM HORMONE: CPT | Performed by: INTERNAL MEDICINE

## 2017-01-14 PROCEDURE — 96374 THER/PROPH/DIAG INJ IV PUSH: CPT

## 2017-01-14 PROCEDURE — 80061 LIPID PANEL: CPT | Performed by: INTERNAL MEDICINE

## 2017-01-14 PROCEDURE — 85025 COMPLETE CBC W/AUTO DIFF WBC: CPT | Performed by: INTERNAL MEDICINE

## 2017-01-14 PROCEDURE — 93306 TTE W/DOPPLER COMPLETE: CPT | Performed by: INTERNAL MEDICINE

## 2017-01-14 PROCEDURE — 99235 HOSP IP/OBS SAME DATE MOD 70: CPT | Performed by: INTERNAL MEDICINE

## 2017-01-14 PROCEDURE — 84484 ASSAY OF TROPONIN QUANT: CPT | Performed by: EMERGENCY MEDICINE

## 2017-01-14 PROCEDURE — 93005 ELECTROCARDIOGRAM TRACING: CPT | Performed by: INTERNAL MEDICINE

## 2017-01-14 PROCEDURE — 80053 COMPREHEN METABOLIC PANEL: CPT | Performed by: INTERNAL MEDICINE

## 2017-01-14 PROCEDURE — 84484 ASSAY OF TROPONIN QUANT: CPT | Performed by: INTERNAL MEDICINE

## 2017-01-14 PROCEDURE — 82550 ASSAY OF CK (CPK): CPT | Performed by: INTERNAL MEDICINE

## 2017-01-14 PROCEDURE — 87449 NOS EACH ORGANISM AG IA: CPT | Performed by: INTERNAL MEDICINE

## 2017-01-14 PROCEDURE — 99204 OFFICE O/P NEW MOD 45 MIN: CPT | Performed by: INTERNAL MEDICINE

## 2017-01-14 PROCEDURE — 93306 TTE W/DOPPLER COMPLETE: CPT

## 2017-01-14 PROCEDURE — 25010000002 HEPARIN (PORCINE) PER 1000 UNITS

## 2017-01-14 PROCEDURE — 84439 ASSAY OF FREE THYROXINE: CPT | Performed by: INTERNAL MEDICINE

## 2017-01-14 PROCEDURE — 25010000002 REGADENOSON 0.4 MG/5ML SOLUTION: Performed by: INTERNAL MEDICINE

## 2017-01-14 RX ORDER — MONTELUKAST SODIUM 10 MG/1
10 TABLET ORAL NIGHTLY
Status: DISCONTINUED | OUTPATIENT
Start: 2017-01-14 | End: 2017-01-14 | Stop reason: HOSPADM

## 2017-01-14 RX ORDER — METOPROLOL TARTRATE 5 MG/5ML
2.5 INJECTION INTRAVENOUS ONCE
Status: COMPLETED | OUTPATIENT
Start: 2017-01-14 | End: 2017-01-14

## 2017-01-14 RX ORDER — ASPIRIN 325 MG
325 TABLET, DELAYED RELEASE (ENTERIC COATED) ORAL DAILY
Status: CANCELLED | OUTPATIENT
Start: 2017-01-14

## 2017-01-14 RX ORDER — AMINOPHYLLINE DIHYDRATE 25 MG/ML
100 INJECTION, SOLUTION INTRAVENOUS ONCE
Status: COMPLETED | OUTPATIENT
Start: 2017-01-14 | End: 2017-01-14

## 2017-01-14 RX ORDER — MULTIVITAMIN
1 TABLET ORAL DAILY
COMMUNITY
End: 2017-11-30 | Stop reason: SDUPTHER

## 2017-01-14 RX ORDER — FAMOTIDINE 20 MG/1
20 TABLET, FILM COATED ORAL 2 TIMES DAILY
Status: DISCONTINUED | OUTPATIENT
Start: 2017-01-14 | End: 2017-01-14 | Stop reason: HOSPADM

## 2017-01-14 RX ORDER — MULTIVITAMIN
1 TABLET ORAL DAILY
Status: DISCONTINUED | OUTPATIENT
Start: 2017-01-14 | End: 2017-01-14 | Stop reason: HOSPADM

## 2017-01-14 RX ORDER — ASPIRIN 81 MG/1
81 TABLET ORAL DAILY
Status: DISCONTINUED | OUTPATIENT
Start: 2017-01-14 | End: 2017-01-14 | Stop reason: HOSPADM

## 2017-01-14 RX ORDER — AMLODIPINE BESYLATE 5 MG/1
5 TABLET ORAL
Status: DISCONTINUED | OUTPATIENT
Start: 2017-01-14 | End: 2017-01-14 | Stop reason: HOSPADM

## 2017-01-14 RX ORDER — NITROGLYCERIN 0.4 MG/1
0.4 TABLET SUBLINGUAL
Status: CANCELLED | OUTPATIENT
Start: 2017-01-14

## 2017-01-14 RX ORDER — NITROGLYCERIN 0.4 MG/1
0.4 TABLET SUBLINGUAL
Status: DISCONTINUED | OUTPATIENT
Start: 2017-01-14 | End: 2017-01-14

## 2017-01-14 RX ORDER — ASPIRIN 325 MG
325 TABLET ORAL DAILY
Status: DISCONTINUED | OUTPATIENT
Start: 2017-01-14 | End: 2017-01-14

## 2017-01-14 RX ORDER — PROMETHAZINE HYDROCHLORIDE 12.5 MG/1
12.5 TABLET ORAL EVERY 6 HOURS PRN
Status: DISCONTINUED | OUTPATIENT
Start: 2017-01-14 | End: 2017-01-14 | Stop reason: HOSPADM

## 2017-01-14 RX ORDER — GABAPENTIN 300 MG/1
300 CAPSULE ORAL NIGHTLY PRN
Status: DISCONTINUED | OUTPATIENT
Start: 2017-01-14 | End: 2017-01-14 | Stop reason: HOSPADM

## 2017-01-14 RX ORDER — I-VITE, TAB 1000-60-2MG (60/BT) 300MCG-200
1 TAB ORAL NIGHTLY
Status: DISCONTINUED | OUTPATIENT
Start: 2017-01-14 | End: 2017-01-14 | Stop reason: HOSPADM

## 2017-01-14 RX ORDER — BUDESONIDE 0.5 MG/2ML
0.5 INHALANT ORAL
Status: DISCONTINUED | OUTPATIENT
Start: 2017-01-14 | End: 2017-01-14

## 2017-01-14 RX ORDER — SODIUM CHLORIDE 0.9 % (FLUSH) 0.9 %
1-10 SYRINGE (ML) INJECTION AS NEEDED
Status: DISCONTINUED | OUTPATIENT
Start: 2017-01-14 | End: 2017-01-14 | Stop reason: HOSPADM

## 2017-01-14 RX ORDER — NITROGLYCERIN 0.4 MG/1
0.4 TABLET SUBLINGUAL
Status: DISCONTINUED | OUTPATIENT
Start: 2017-01-14 | End: 2017-01-14 | Stop reason: HOSPADM

## 2017-01-14 RX ORDER — AMLODIPINE BESYLATE 5 MG/1
5 TABLET ORAL
Qty: 30 TABLET | Refills: 0 | Status: SHIPPED | OUTPATIENT
Start: 2017-01-14 | End: 2017-03-24 | Stop reason: SDUPTHER

## 2017-01-14 RX ORDER — BUDESONIDE 0.5 MG/2ML
0.5 INHALANT ORAL
Status: DISCONTINUED | OUTPATIENT
Start: 2017-01-14 | End: 2017-01-14 | Stop reason: HOSPADM

## 2017-01-14 RX ORDER — HEPARIN SODIUM 5000 [USP'U]/ML
INJECTION, SOLUTION INTRAVENOUS; SUBCUTANEOUS
Status: COMPLETED
Start: 2017-01-14 | End: 2017-01-14

## 2017-01-14 RX ORDER — HEPARIN SODIUM 5000 [USP'U]/ML
5000 INJECTION, SOLUTION INTRAVENOUS; SUBCUTANEOUS EVERY 12 HOURS
Status: DISCONTINUED | OUTPATIENT
Start: 2017-01-14 | End: 2017-01-14 | Stop reason: HOSPADM

## 2017-01-14 RX ORDER — BUDESONIDE 0.5 MG/2ML
0.5 INHALANT ORAL
Status: CANCELLED | OUTPATIENT
Start: 2017-01-14

## 2017-01-14 RX ORDER — CETIRIZINE HYDROCHLORIDE 10 MG/1
5 TABLET ORAL DAILY
Status: DISCONTINUED | OUTPATIENT
Start: 2017-01-14 | End: 2017-01-14 | Stop reason: HOSPADM

## 2017-01-14 RX ADMIN — ASPIRIN 325 MG: 325 TABLET ORAL at 07:23

## 2017-01-14 RX ADMIN — HEPARIN SODIUM 5000 UNITS: 5000 INJECTION, SOLUTION INTRAVENOUS; SUBCUTANEOUS at 16:41

## 2017-01-14 RX ADMIN — Medication 1 TABLET: at 14:46

## 2017-01-14 RX ADMIN — METOPROLOL TARTRATE 2.5 MG: 5 INJECTION INTRAVENOUS at 07:23

## 2017-01-14 RX ADMIN — CALCIUM CARBONATE-VITAMIN D TAB 500 MG-200 UNIT 500 MG: 500-200 TAB at 14:46

## 2017-01-14 RX ADMIN — HEPARIN SODIUM 5000 UNITS: 5000 INJECTION, SOLUTION INTRAVENOUS; SUBCUTANEOUS at 04:45

## 2017-01-14 RX ADMIN — FAMOTIDINE 20 MG: 20 TABLET, FILM COATED ORAL at 07:23

## 2017-01-14 RX ADMIN — ASPIRIN 81 MG: 81 TABLET ORAL at 14:46

## 2017-01-14 RX ADMIN — NITROGLYCERIN 0.5 INCH: 20 OINTMENT TOPICAL at 02:47

## 2017-01-14 RX ADMIN — Medication 1 DOSE: at 13:35

## 2017-01-14 RX ADMIN — CALCIUM CARBONATE-VITAMIN D TAB 500 MG-200 UNIT 500 MG: 500-200 TAB at 16:41

## 2017-01-14 RX ADMIN — REGADENOSON 0.4 MG: 0.08 INJECTION, SOLUTION INTRAVENOUS at 13:35

## 2017-01-14 RX ADMIN — Medication 1 DOSE: at 11:50

## 2017-01-14 RX ADMIN — AMINOPHYLLINE 100 MG: 25 INJECTION, SOLUTION INTRAVENOUS at 13:39

## 2017-01-14 RX ADMIN — AMLODIPINE BESYLATE 5 MG: 5 TABLET ORAL at 14:47

## 2017-01-14 RX ADMIN — CETIRIZINE HYDROCHLORIDE 5 MG: 10 TABLET ORAL at 14:46

## 2017-01-14 RX ADMIN — FAMOTIDINE 20 MG: 20 TABLET, FILM COATED ORAL at 16:41

## 2017-01-14 RX ADMIN — NITROGLYCERIN 0.4 MG: 0.4 TABLET SUBLINGUAL at 01:02

## 2017-01-14 RX ADMIN — METOPROLOL TARTRATE 25 MG: 25 TABLET, FILM COATED ORAL at 14:46
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
## 2017-01-14 NOTE — PROGRESS NOTES
Upon reviewing patients prior to admission medications  (PTA Meds) and renal function, cetirizine 10 mg daily has been renally adjusted to 5 mg daily with an estimated CrCl 25.6 ml/min according to literature recommendations.     Thank you,  Snow Parker. Cedric, [facility]  01/14/17  9:11 AM

## 2017-01-14 NOTE — PROGRESS NOTES
Discharge Planning Assessment   Fabrice     Patient Name: Cici Veliz  MRN: [id number]  Today's Date: 1/14/2017    Admit Date: 1/13/2017          Discharge Needs Assessment       01/14/17 1127    Living Environment    Lives With alone    Quality Of Family Relationships supportive    Able to Return to Prior Living Arrangements yes    Discharge Needs Assessment    Equipment Currently Used at Home respiratory supplies   Pt states that she does have nebulizer that she does not use.     Transportation Available car   Pt's family plans to provide transportation for pt at discharge.             Discharge Plan       01/14/17 1129    Case Management/Social Work Plan    Plan Pt lives at home and plans to return home at discharge. Pt does not have home health services. Pt's daughter at bedside, states that she does not think that pt is eating enough at home. SS explained Meals on Wheels. SS will call on Monday to place pt on the waiting list. SS will follow and assist as needed.     Patient/Family In Agreement With Plan yes        Discharge Placement     No information found                Demographic Summary       01/14/17 1127    Referral Information    Referral Source nursing    Reason For Consult discharge planning            Functional Status     None            Psychosocial     None            Abuse/Neglect     None            Legal       01/14/17 1127    Legal    Legal Comments SS spoke with pt on this date. Pt's son Carson Veliz is POA.             Substance Abuse     None            Patient Forms     None          Colleen Billy

## 2017-01-14 NOTE — DISCHARGE SUMMARY
"Date of Admission: 1/13/2017    Date of Discharge:  1/14/2017    PCP: Emily Cleary MD    Admission Diagnosis:   Chest pain with elevated troponin  Essential HTN with elevated BP  Recent near syncope  Hyperlipidemia  CKD III  Diastolic CHF 2/2 hypertrophic cardiomyopathy-compensated  Hyperglycemia  COPD  Chronic back pain      Discharge Diagnosis:   Chest pain  ASCVD with hx of stent placement  Indeterminate range troponin elevation  SSS s/p dual-chamber pacemaker placement  Labile HTN  CKD III  Diastolic CHF-compensated  Recent near syncope  COPD-stable with no acute exacerbation  Hyperlipidemia  Hyperglycemia with HgbA1c of 5.4  Chronic back pain    Procedures Performed:  1/14/17: Nuclear stress test  · Myocardial perfusion imaging indicates a small-sized infarct located in the lateral wall with no significant ischemia noted.  · Left ventricular ejection fraction is normal (Calculated EF = 56%). with mild anterior wall dyskinesis.  · Impressions are consistent with an intermediate risk study.  · There is no prior study available for comparison.     Consults:   Consults     Date and Time Order Name Status Description    1/14/2017 0248 Inpatient Consult to Cardiology      1/14/2017 0130 Hospitalist (on-call MD unless specified)              History of Present Illness:  Cici Veliz is a 89 y.o. female who presented to [facility] ED with CC of 1-2 week hx of waxing and waning jaw and neck pain that worsened with even slight exertion. She reported radiating pain to her back and left chest. She reported associated dyspnea and chills as well. She noted that these symptoms were similar to the symptoms she experienced after her \"second heart attack.\"     In the ED, pt's BP was noted to be elevated. EKG was obtained revealing sinus rhythm with ST depression and T wave inversions. Initial troponin was in the indeterminate range. CXR was unremarkable. Nitropaste was applied in the ED.        Hospital Course  Cici DELONG" Jose Alfredo was admitted to the telemetry floor for further evaluation and treatment. Cardiology was consulted for further input.     Serial CE's were followed and remained in the indeterminate range but did trend downward. TSH was normal. Echo was obtained revealing mild LVH, EF of 46-50%, mild AS, mild TR and moderate pulmonary HTN.     Pt was evaluated by cardiology who added metoprolol and Norvasc for pt's uncontrolled HTN. Pt's chest and jaw pain resolved and cardiology recommended to proceed with a nuclear stress test. Nuclear stress test was performed on 1/14 revealing a small-sized infarct in the lateral wall with no evidence of ischemia and findings consistent with an intermediate risk study. Based on these findings, cardiology recommended to proceed with medical management.     Ms. Veliz was seen and examined following her stress test. Her BP had improved from upon admission and her symptoms had resolved. She felt ready for discharge and was deemed medically stable for discharge after discussion with cardiology. She was given prescriptions for Norvasc and metoprolol. She was instructed to follow up with her PCP Dr. Cleary as previously scheduled on 1/17/17.       Pertinent Test Results:   Transthoracic echocardiogram  · Left ventricular wall thickness is consistent with mild concentric hypertrophy.  · Left ventricular wall segments contract abnormally. Refer to wall scoring diagram for more information.  · Left ventricular function is mildly decreased.  · Estimated EF appears to be in the range of 46 - 50%.  · The aortic valve is abnormal in structure. There is calcification of the left coronary cusp and right coronary cusp present. Mild aortic valve stenosis is present  · The mitral valve is abnormal in structure. Mitral annular calcification is present. The calculated mitral valve morphology score is 8.  · The tricuspid valve is abnormal. Mild tricuspid valve regurgitation is present. Estimated right  ventricular systolic pressure from tricuspid regurgitation is moderately elevated (45-55 mmHg)  · There is no evidence of pericardial effusion.    Condition on Discharge:  Stable    Vital Signs  Vitals:    01/14/17 1527   BP: 142/78   Pulse: 73   Resp: 20   Temp: 97.9 °F (36.6 °C)   SpO2: 92%       Physical Exam:  General:    Awake, alert, in no acute distress   Heart:      Normal S1 and S2. Regular rate and rhythm. (+) MIGUEL   Lungs:     Respirations regular, even and unlabored. Lungs clear to auscultation B/L. No wheezes, rales or rhonchi.   Abdomen:   Soft and nontender. No guarding, rebound tenderness or  organomegaly noted. Bowel sounds present x 4.   Extremities:  No clubbing, cyanosis or edema noted. Moves UE and LE equally B/L. (+) varicose veins with TTP of RLE     Discharge Disposition:   home      Discharge Medications:   Cici Veliz   Home Medication Instructions PHILIP:[phone]    Printed on:01/14/17 1549   Medication Information                      amLODIPine (NORVASC) 5 MG tablet  Take 1 tablet by mouth Daily.             aspirin  MG tablet  Take 325 mg by mouth Daily. Typically takes in the morning but has one last night as well.             calcium carbonate-cholecalciferol 500-400 MG-UNIT tablet tablet  Take 1 tablet by mouth 2 (Two) Times a Day.             cetirizine (ZyrTEC) 10 MG tablet  Take 1 tablet by mouth daily.             gabapentin (NEURONTIN) 300 MG capsule  Take 1 capsule by mouth every night.             metoprolol tartrate (LOPRESSOR) 25 MG tablet  Take 1 tablet by mouth Every 12 (Twelve) Hours.             montelukast (SINGULAIR) 10 MG tablet  Take 1 tablet by mouth Every Night.             Multiple Vitamins-Minerals (EYE VITAMINS & MINERALS PO)  Take 1 tablet by mouth Every Night.             multivitamin (DAILY RITIKA) tablet tablet  Take 1 tablet by mouth Daily.             nitroglycerin (NITROSTAT) 0.4 MG SL tablet  Place 0.4 mg under the tongue every 5 (five) minutes  as needed for chest pain. Take no more than 3 doses in 15 minutes.             PULMICORT FLEXHALER 90 MCG/ACT inhaler  Inhale 1 puff 2 (Two) Times a Day.                   Discharge Diet:   cardiac diet       Dietary Orders            Start     Ordered    01/14/17 1438  Diet Regular; Cardiac  Diet Effective Now     Question Answer Comment   Diet Texture / Consistency Regular    Common Modifiers Cardiac        01/14/17 1438          Activity at Discharge:  activity as tolerated    Follow-up Appointments:  Referrals and Follow-ups to Schedule     Follow-Up    As directed    Follow up with PCP Dr. Cleary as previously scheduled on 1/17/17.             Follow-up Information     Follow up with Emily Cleary MD .    Specialty:  Cardiology    Contact information:    [address]  [place] [address]  [phone]          Scheduled Appointments     Jan 17, 2017  9:15 AM EST   Follow Up with Emily Cleary MD   [facility] CARDIOLOGY (--)    [address] [place]  [place] [address]   [phone]           Arrive 15 minutes prior to appointment.                    Test Results Pending at Discharge:  None     Bunny Miguel DO  01/14/17  5:41 PM

## 2017-01-14 NOTE — PLAN OF CARE
Problem: Patient Care Overview (Adult)  Goal: Plan of Care Review  Outcome: Ongoing (interventions implemented as appropriate)  Goal: Adult Individualization and Mutuality  Outcome: Ongoing (interventions implemented as appropriate)  Goal: Discharge Needs Assessment  Outcome: Ongoing (interventions implemented as appropriate)    Problem: Pain, Acute (Adult)  Goal: Identify Related Risk Factors and Signs and Symptoms  Outcome: Ongoing (interventions implemented as appropriate)

## 2017-01-14 NOTE — PLAN OF CARE
Problem: Pain, Acute (Adult)  Goal: Acceptable Pain Control/Comfort Level  Outcome: Ongoing (interventions implemented as appropriate)

## 2017-01-16 LAB — S PNEUM AG SPEC QL LA: NEGATIVE

## 2017-01-17 ENCOUNTER — OFFICE VISIT (OUTPATIENT)
Dept: CARDIOLOGY | Facility: CLINIC | Age: 82
End: 2017-01-17

## 2017-01-17 VITALS
SYSTOLIC BLOOD PRESSURE: 132 MMHG | BODY MASS INDEX: 29.66 KG/M2 | HEIGHT: 62 IN | DIASTOLIC BLOOD PRESSURE: 58 MMHG | WEIGHT: 161.2 LBS | OXYGEN SATURATION: 97 % | HEART RATE: 61 BPM

## 2017-01-17 DIAGNOSIS — I50.30 CONGESTIVE HEART FAILURE WITH LV DIASTOLIC DYSFUNCTION, NYHA CLASS 1 (HCC): ICD-10-CM

## 2017-01-17 DIAGNOSIS — I10 ESSENTIAL HYPERTENSION: ICD-10-CM

## 2017-01-17 DIAGNOSIS — Z95.0 PACEMAKER: ICD-10-CM

## 2017-01-17 DIAGNOSIS — I42.2: ICD-10-CM

## 2017-01-17 DIAGNOSIS — R73.9 HYPERGLYCEMIA: ICD-10-CM

## 2017-01-17 DIAGNOSIS — I25.10 CORONARY ARTERY DISEASE INVOLVING NATIVE CORONARY ARTERY OF NATIVE HEART WITHOUT ANGINA PECTORIS: ICD-10-CM

## 2017-01-17 DIAGNOSIS — E21.3 HYPERPARATHYROIDISM (HCC): ICD-10-CM

## 2017-01-17 DIAGNOSIS — N19 RENAL FAILURE: ICD-10-CM

## 2017-01-17 DIAGNOSIS — E78.2 MIXED HYPERLIPIDEMIA: Primary | ICD-10-CM

## 2017-01-17 DIAGNOSIS — I38 VALVULAR HEART DISEASE: ICD-10-CM

## 2017-01-17 DIAGNOSIS — J44.9 CHRONIC OBSTRUCTIVE PULMONARY DISEASE, UNSPECIFIED COPD TYPE (HCC): ICD-10-CM

## 2017-01-17 DIAGNOSIS — N18.30 CKD (CHRONIC KIDNEY DISEASE) STAGE 3, GFR 30-59 ML/MIN (HCC): ICD-10-CM

## 2017-01-17 DIAGNOSIS — I50.32: ICD-10-CM

## 2017-01-17 PROCEDURE — 99214 OFFICE O/P EST MOD 30 MIN: CPT | Performed by: INTERNAL MEDICINE

## 2017-01-17 RX ORDER — BUMETANIDE 0.5 MG/1
0.5 TABLET ORAL DAILY PRN
Status: ON HOLD | COMMUNITY
End: 2017-03-06

## 2017-01-17 RX ORDER — PRAVASTATIN SODIUM 40 MG
40 TABLET ORAL DAILY
Qty: 90 TABLET | Refills: 1 | Status: SHIPPED | OUTPATIENT
Start: 2017-01-17 | End: 2017-02-24 | Stop reason: ALTCHOICE

## 2017-01-17 RX ORDER — BUDESONIDE 90 UG/1
1 AEROSOL, POWDER RESPIRATORY (INHALATION) 2 TIMES DAILY
Qty: 2 INHALER | Refills: 2 | Status: SHIPPED | OUTPATIENT
Start: 2017-01-17 | End: 2017-02-16 | Stop reason: SDUPTHER
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
## 2017-01-17 NOTE — PROGRESS NOTES
subjective     Chief Complaint   Patient presents with   • Chest Pain     pt recently in [facility] [place] for angina   • Congestive Heart Failure   • Hyperlipidemia   • Hypertension     History of Present Illness     CAD  Patient has known coronary artery disease status post RCA and circumflex stent in 2015.  Patient had an episode of chest pain she went to the emergency room and was evaluated.  She underwent a stress test and echocardiogram.  Stress test was normal with no evidence of exercise-induced myocardial ischemia echocardiogram according to her was also normal.  Patient is doing very well is totally asymptomatic and is here for further evaluation.    Hypertension  Patient states that Dr. Cash had stopped her Norvasc.  Her blood pressure went quite high and Norvasc had to be restarted when she was in the hospital.  Currently she is taking Norvasc.    Hyperlipidemia  Patient has significant hyperlipidemia lab work show both cholesterol and triglyceride including LDL elevated.  She has not been taking any statin for a few months.  Patient used to take Pravachol.  She was instructed to restart Pravachol 40 mg daily.    Poor memory  Patient states that she is having difficulty remembering things.  She is 89 years old taking care of herself and is doing remarkably well but she is having some problems with the memory now.  Patient is willing to start to medications.    Renal failure  Patient's creatinine has been elevated.  She is requiring low-dose Bumex therapy.  Patient was advised to avoid NSAIDs will check renal functions again and may need to hold off on Bumex.    Patient Active Problem List   Diagnosis   • Spinal stenosis, degenerative disc disease, back pain*   • Deformity of the right Sternoclavicular joint*   • COPD (chronic obstructive pulmonary disease)   • Essential hypertension   • Hyperlipidemia   • CAD, status post RCA and circumflex stents last coronary angiography 2015 no significant disease*   •  Pacemaker*   • hx of Myocardial infarction*   • Thoracic back pain*   • Neck pain*   • Valvular heart disease mild mitral regurgitation not significant*   • Congestive heart failure with LV diastolic dysfunction, NYHA class 2*   • Atopic rhinitis   • Bilateral impacted cerumen   • Hyperparathyroidism status post parathyroidectomy   • Angina at rest   • Chronic back pain   • CKD (chronic kidney disease) stage 3, GFR 30-59 ml/min   • Diastolic heart failure secondary to hypertrophic cardiomyopathy   • Troponin level elevated   • Hyperglycemia   • Renal failure       Social History   Substance Use Topics   • Smoking status: Never Smoker   • Smokeless tobacco: Never Used   • Alcohol use No       Allergies   Allergen Reactions   • Amoxil [Amoxicillin]    • Bee Venom    • Codeine    • Penicillins    • Tetracyclines & Related          Current Outpatient Prescriptions:   •  amLODIPine (NORVASC) 5 MG tablet, Take 1 tablet by mouth Daily., Disp: 30 tablet, Rfl: 0  •  aspirin  MG tablet, Take 325 mg by mouth Daily. Typically takes in the morning but has one last night as well., Disp: , Rfl:   •  bumetanide (BUMEX) 0.5 MG tablet, Take 0.5 mg by mouth Daily As Needed., Disp: , Rfl:   •  calcium carbonate-cholecalciferol 500-400 MG-UNIT tablet tablet, Take 1 tablet by mouth 2 (Two) Times a Day., Disp: , Rfl:   •  cetirizine (ZyrTEC) 10 MG tablet, Take 1 tablet by mouth daily., Disp: 90 tablet, Rfl: 0  •  gabapentin (NEURONTIN) 300 MG capsule, Take 1 capsule by mouth every night. (Patient taking differently: Take 300 mg by mouth At Night As Needed.), Disp: 90 capsule, Rfl: 1  •  metoprolol tartrate (LOPRESSOR) 25 MG tablet, Take 1 tablet by mouth Every 12 (Twelve) Hours., Disp: 60 tablet, Rfl: 0  •  montelukast (SINGULAIR) 10 MG tablet, Take 1 tablet by mouth Every Night., Disp: 90 tablet, Rfl: 2  •  Multiple Vitamins-Minerals (EYE VITAMINS & MINERALS PO), Take 1 tablet by mouth Every Night., Disp: , Rfl:   •  multivitamin  "(DAILY RITIKA) tablet tablet, Take 1 tablet by mouth Daily., Disp: , Rfl:   •  nitroglycerin (NITROSTAT) 0.4 MG SL tablet, Place 0.4 mg under the tongue every 5 (five) minutes as needed for chest pain. Take no more than 3 doses in 15 minutes., Disp: , Rfl:   •  PULMICORT FLEXHALER 90 MCG/ACT inhaler, Inhale 1 puff 2 (Two) Times a Day., Disp: 2 inhaler, Rfl: 2  •  Memantine HCl-Donepezil HCl 7 & 14 & 21 &28 -10 MG capsule extended-release 24 hour therapy, Take 1 capsule by mouth Daily., Disp: 28 each, Rfl: 0  •  pravastatin (PRAVACHOL) 40 MG tablet, Take 1 tablet by mouth Daily., Disp: 90 tablet, Rfl: 1      The following portions of the patient's history were reviewed and updated as appropriate: allergies, current medications, past family history, past medical history, past social history, past surgical history and problem list.    Review of Systems   Constitution: Positive for weakness and malaise/fatigue.   HENT: Negative.    Eyes: Negative.    Cardiovascular: Negative.    Respiratory: Negative.    Hematologic/Lymphatic: Negative.    Skin: Negative.    Musculoskeletal: Positive for arthritis, back pain, neck pain and stiffness.   Gastrointestinal: Negative.    Genitourinary: Negative.           Objective:     Visit Vitals   • /58 (BP Location: Left arm, Patient Position: Sitting)   • Pulse 61   • Ht 62\" (157.5 cm)   • Wt 161 lb 3.2 oz (73.1 kg)   • SpO2 97%   • BMI 29.48 kg/m2     Physical Exam   Constitutional: She appears well-developed and well-nourished.   HENT:   Head: Normocephalic and atraumatic.   Mouth/Throat: Oropharynx is clear and moist.   Eyes: Conjunctivae and EOM are normal. Pupils are equal, round, and reactive to light. No scleral icterus.   Neck: Normal range of motion. Neck supple. No JVD present. No tracheal deviation present. No thyromegaly present.   Cardiovascular: Normal rate, regular rhythm, normal heart sounds and intact distal pulses.  Exam reveals no friction rub.    No murmur " heard.  Pulmonary/Chest: Effort normal and breath sounds normal. No respiratory distress. She has no wheezes. She has no rales. She exhibits no tenderness.   Abdominal: Soft. Bowel sounds are normal. She exhibits no distension and no mass. There is no tenderness. There is no rebound and no guarding.   Musculoskeletal: Normal range of motion. She exhibits no edema, tenderness or deformity.   Lymphadenopathy:     She has no cervical adenopathy.   Neurological: She is alert. She has normal reflexes. No cranial nerve deficit. She exhibits normal muscle tone. Coordination normal.   Skin: Skin is warm and dry.   Psychiatric: She has a normal mood and affect. Her behavior is normal. Judgment and thought content normal.         Lab Review  Lab Results   Component Value Date     01/14/2017    K 4.2 01/14/2017     01/14/2017    BUN 30 (H) 01/14/2017    CREATININE 1.37 (H) 01/14/2017    GLUCOSE 98 01/14/2017    CALCIUM 10.0 01/14/2017    ALT 8 (L) 01/14/2017    ALKPHOS 71 01/14/2017    LABIL2 1.4 (L) 01/14/2017     Lab Results   Component Value Date    CKTOTAL 97 01/14/2017     Lab Results   Component Value Date    WBC 6.28 01/14/2017    HGB 12.1 01/14/2017    HCT 38.3 01/14/2017     01/14/2017     Lab Results   Component Value Date    INR 0.92 01/13/2017    INR 0.95 04/18/2015    INR 0.94 04/26/2014     No results found for: MG  Lab Results   Component Value Date    TSH 1.613 01/14/2017     Lab Results   Component Value Date    .0 (H) 01/12/2017     Lab Results   Component Value Date    CHLPL 156 05/09/2016     Lab Results   Component Value Date    CHOL 202 (H) 01/14/2017    TRIG 50 01/14/2017    HDL 56 (L) 01/14/2017    LDLCALC 136 (H) 01/14/2017    VLDL 10 01/14/2017    LDLHDL 2.43 01/14/2017         Procedures     I personally viewed and interpreted the patient's LAB data         Assessment:     1. Mixed hyperlipidemia    2. Valvular heart disease mild mitral regurgitation not significant*    3.  Essential hypertension    4. Diastolic heart failure secondary to hypertrophic cardiomyopathy, chronic    5. Congestive heart failure with LV diastolic dysfunction, NYHA class 2*    6. Coronary artery disease involving native coronary artery of native heart without angina pectoris    7. Chronic obstructive pulmonary disease, unspecified COPD type    8. Hyperparathyroidism status post parathyroidectomy    9. CKD (chronic kidney disease) stage 3, GFR 30-59 ml/min    10. Pacemaker*    11. Hyperglycemia    12. Renal failure          Plan:      Hyperlipidemia  Patient was advised to restart Pravachol 40 mg daily.  Aggressive risk factor modification discussed and explained to the patient.    Coronary artery disease  Recent stress test and echocardiograms normal patient will continue current medications.    Renal failure  Patient will take Bumex 0.5 only on when necessary basis and avoid diuretics as much as possible.  She will not take any NSAIDs or other nephrotoxic drugs.    Blood pressure is doing very well with the restart of Norvasc.    Refills of medications were given.  Follow-up scheduled with lab work.          Return in about 3 months (around 4/17/2017).

## 2017-01-17 NOTE — MR AVS SNAPSHOT
Cici DELONG Jose Alfredo   1/17/2017 9:15 AM   Office Visit    Dept Phone:  [phone]   Encounter #:  [id number]    Provider:  Emily Cleary MD   Department:  [facility] CARDIOLOGY                Your Full Care Plan              Today's Medication Changes          These changes are accurate as of: 1/17/17 10:05 AM.  If you have any questions, ask your nurse or doctor.               New Medication(s)Ordered:     Memantine HCl-Donepezil HCl 7 & 14 & 21 &28 -10 MG capsule extended-release 24 hour therapy   Take 1 capsule by mouth Daily.   Started by:  Emily Cleary MD       pravastatin 40 MG tablet   Commonly known as:  PRAVACHOL   Take 1 tablet by mouth Daily.   Started by:  Emily Cleary MD            Where to Get Your Medications      These medications were sent to [facility] - [place] - [address] - [phone]  - [phone] FX  [address] [place] [address]     Phone:  [phone]     pravastatin 40 MG tablet    PULMICORT FLEXHALER 90 MCG/ACT inhaler         Information about where to get these medications is not yet available     ! Ask your nurse or doctor about these medications     Memantine HCl-Donepezil HCl 7 & 14 & 21 &28 -10 MG capsule extended-release 24 hour therapy                  Your Updated Medication List          This list is accurate as of: 1/17/17 10:05 AM.  Always use your most recent med list.                amLODIPine 5 MG tablet   Commonly known as:  NORVASC   Take 1 tablet by mouth Daily.       aspirin  MG tablet       bumetanide 0.5 MG tablet   Commonly known as:  BUMEX       calcium carbonate-cholecalciferol 500-400 MG-UNIT tablet tablet       cetirizine 10 MG tablet   Commonly known as:  zyrTEC   Take 1 tablet by mouth daily.       EYE VITAMINS & MINERALS PO       gabapentin 300 MG capsule   Commonly known as:  NEURONTIN   Take 1 capsule by mouth every night.       Memantine HCl-Donepezil HCl  7 & 14 & 21 &28 -10 MG capsule extended-release 24 hour therapy   Take 1 capsule by mouth Daily.       metoprolol tartrate 25 MG tablet   Commonly known as:  LOPRESSOR   Take 1 tablet by mouth Every 12 (Twelve) Hours.       montelukast 10 MG tablet   Commonly known as:  SINGULAIR   Take 1 tablet by mouth Every Night.       multivitamin tablet tablet       nitroglycerin 0.4 MG SL tablet   Commonly known as:  NITROSTAT       pravastatin 40 MG tablet   Commonly known as:  PRAVACHOL   Take 1 tablet by mouth Daily.       PULMICORT FLEXHALER 90 MCG/ACT inhaler   Generic drug:  budesonide   Inhale 1 puff 2 (Two) Times a Day.               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, chronic     ICD-10-CM: I50.32, I42.2  ICD-9-CM: 428.32     Congestive heart failure with LV diastolic dysfunction, NYHA class 1     ICD-10-CM: I50.30  ICD-9-CM: 428.30, 428.0     Coronary artery disease involving native coronary artery of native heart without angina pectoris     ICD-10-CM: I25.10  ICD-9-CM: 414.01     Chronic obstructive pulmonary disease, unspecified COPD type     ICD-10-CM: J44.9  ICD-9-CM: 496     Hyperparathyroidism     ICD-10-CM: E21.3  ICD-9-CM: 252.00     CKD (chronic kidney disease) stage 3, GFR 30-59 ml/min     ICD-10-CM: N18.3  ICD-9-CM: 585.3     Pacemaker     ICD-10-CM: Z95.0  ICD-9-CM: V45.01     Hyperglycemia     ICD-10-CM: R73.9  ICD-9-CM: 790.29       Instructions     None    Patient Instructions History      Upcoming Appointments     Visit Type Date Time Department    FOLLOW UP 1/17/2017  9:15 AM E CARDIOLOGY RAY    FOLLOW UP 4/4/2017  9:30 AM [facility]      MyChart Signup     Our records indicate that you have declined [facility] MyChart signup. If you would like to sign up for [facility], please email [email or web address] or call [phone] to obtain an activation code.             Other Info from Your Visit           Your Appointments     Apr 04, 2017  9:30 AM EDT   Follow Up with Emily Cleary MD   [facility] CARDIOLOGY (--)     [address] [place] [address] [phone]           Arrive 15 minutes prior to appointment.              Allergies     Amoxil [Amoxicillin]      Bee Venom Allergy     Codeine      Penicillins      Tetracyclines & Related        Reason for Visit     Chest Pain pt recently in [facility] for angina    Congestive Heart Failure     Hyperlipidemia     Hypertension           Vital Signs     Blood Pressure Pulse Height Weight Oxygen Saturation Body Mass Index    132/58 (BP Location: Left arm, Patient Position: Sitting) 61 62\" (157.5 cm) 161 lb 3.2 oz (73.1 kg) 97% 29.48 kg/m2    Smoking Status                   Never Smoker           Problems and Diagnoses Noted     Coronary heart disease    CKD (chronic kidney disease) stage 3, GFR 30-59 ml/min    Heart failure    Chronic airway obstruction    Heart failure    High blood pressure    Hyperglycemia    High cholesterol or triglycerides    Parathyroid hormone excess    Pacemaker    Valvular heart disease        "

## 2017-01-18 ENCOUNTER — TELEPHONE (OUTPATIENT)
Dept: CARDIOLOGY | Facility: CLINIC | Age: 82
End: 2017-01-18

## 2017-01-18 NOTE — TELEPHONE ENCOUNTER
Informed pt that Dr. Cleary advises you to start taking a blood thinner such as eliquis 2.5 bid and to make appointment with Dr. Cleary as soon as possible to discuss changes. She states she will come in tomorrow and see Dr. Cleary to discuss options.

## 2017-01-19 ENCOUNTER — OFFICE VISIT (OUTPATIENT)
Dept: CARDIOLOGY | Facility: CLINIC | Age: 82
End: 2017-01-19

## 2017-01-19 VITALS
HEART RATE: 84 BPM | BODY MASS INDEX: 29.66 KG/M2 | DIASTOLIC BLOOD PRESSURE: 60 MMHG | SYSTOLIC BLOOD PRESSURE: 120 MMHG | WEIGHT: 161.2 LBS | HEIGHT: 62 IN | OXYGEN SATURATION: 99 %

## 2017-01-19 DIAGNOSIS — I25.10 CORONARY ARTERY DISEASE INVOLVING NATIVE CORONARY ARTERY OF NATIVE HEART WITHOUT ANGINA PECTORIS: ICD-10-CM

## 2017-01-19 DIAGNOSIS — N18.30 CKD (CHRONIC KIDNEY DISEASE) STAGE 3, GFR 30-59 ML/MIN (HCC): ICD-10-CM

## 2017-01-19 DIAGNOSIS — I10 ESSENTIAL HYPERTENSION: ICD-10-CM

## 2017-01-19 DIAGNOSIS — I48.0 PAROXYSMAL ATRIAL FIBRILLATION (HCC): ICD-10-CM

## 2017-01-19 DIAGNOSIS — E78.2 MIXED HYPERLIPIDEMIA: Primary | ICD-10-CM

## 2017-01-19 PROCEDURE — 99213 OFFICE O/P EST LOW 20 MIN: CPT | Performed by: INTERNAL MEDICINE

## 2017-01-19 RX ORDER — ASPIRIN 81 MG/1
81 TABLET, CHEWABLE ORAL ONCE
Status: DISCONTINUED | OUTPATIENT
Start: 2017-01-19 | End: 2017-01-19

## 2017-01-19 NOTE — PROGRESS NOTES
subjective     Chief Complaint   Patient presents with   • Hypertension   • Coronary Artery Disease   • Congestive Heart Failure     History of Present Illness  Paroxysmal atrial flutter fibrillation  Patient had pacemaker checked and was found to have frequent short runs of atrial flutter fibrillation.  Patient is not aware of most of the attacks but she states that sometimes when she is resting she can feel her heart running away and it lasts for more than an hour some time  She has no neurological symptoms and no complaints.    CAD  Patient has known coronary artery disease status post RCA and circumflex stent in 2015.  Patient had an episode of chest pain she went to the emergency room and was evaluated. She underwent a stress test and echocardiogram. Stress test was normal with no evidence of exercise-induced myocardial ischemia echocardiogram according to her was also normal. Patient is doing very well is totally asymptomatic and is here for further evaluation.     Ordering physician: Chung Rivas MD Study date: 1/14/17   Patient Information   Name MRN Description   Cici Veliz [id number] 89 y.o. Female   Interpretation Summary   · Myocardial perfusion imaging indicates a small-sized infarct located in the lateral wall with no significant ischemia noted.  · Left ventricular ejection fraction is normal (Calculated EF = 56%). with mild anterior wall dyskinesis.  · Impressions are consistent with an intermediate risk study.  · There is no prior study available for comparison.           Hypertension  Patient states that Dr. Cash had stopped her Norvasc. Her blood pressure went quite high and Norvasc had to be restarted when she was in the hospital. Currently she is taking Norvasc.     Hyperlipidemia  Patient has significant hyperlipidemia lab work show both cholesterol and triglyceride including LDL elevated.  She has not been taking any statin for a few months.  Patient used to take Pravachol. She was  instructed to restart Pravachol 40 mg daily.     Poor memory  Patient states that she is having difficulty remembering things. She is 89 years old taking care of herself and is doing remarkably well but she is having some problems with the memory now. Patient is willing to start to medications.     Renal failure  Patient's creatinine has been elevated. She is requiring low-dose Bumex therapy.  Patient was advised to avoid NSAIDs will check renal functions again and may need to hold off on Bumex.  Patient Active Problem List   Diagnosis   • Spinal stenosis, degenerative disc disease, back pain*   • Deformity of the right Sternoclavicular joint*   • COPD (chronic obstructive pulmonary disease)   • Essential hypertension   • Hyperlipidemia   • CAD, status post RCA and circumflex stents last coronary angiography 2015 no significant disease*   • Pacemaker*   • hx of Myocardial infarction*   • Thoracic back pain*   • Neck pain*   • Valvular heart disease mild mitral regurgitation not significant*   • Congestive heart failure with LV diastolic dysfunction, NYHA class 2*   • Atopic rhinitis   • Bilateral impacted cerumen   • Hyperparathyroidism status post parathyroidectomy   • Angina at rest   • Chronic back pain   • CKD (chronic kidney disease) stage 3, GFR 30-59 ml/min   • Diastolic heart failure secondary to hypertrophic cardiomyopathy   • Troponin level elevated   • Hyperglycemia   • Paroxysmal atrial fibrillation       Social History   Substance Use Topics   • Smoking status: Never Smoker   • Smokeless tobacco: Never Used   • Alcohol use No       Allergies   Allergen Reactions   • Amoxil [Amoxicillin]    • Bee Venom    • Codeine    • Penicillins    • Tetracyclines & Related          Current Outpatient Prescriptions:   •  amLODIPine (NORVASC) 5 MG tablet, Take 1 tablet by mouth Daily., Disp: 30 tablet, Rfl: 0  •  bumetanide (BUMEX) 0.5 MG tablet, Take 0.5 mg by mouth Daily As Needed., Disp: , Rfl:   •  calcium  carbonate-cholecalciferol 500-400 MG-UNIT tablet tablet, Take 1 tablet by mouth 2 (Two) Times a Day., Disp: , Rfl:   •  cetirizine (ZyrTEC) 10 MG tablet, Take 1 tablet by mouth daily., Disp: 90 tablet, Rfl: 0  •  gabapentin (NEURONTIN) 300 MG capsule, Take 1 capsule by mouth every night. (Patient taking differently: Take 300 mg by mouth At Night As Needed.), Disp: 90 capsule, Rfl: 1  •  Memantine HCl-Donepezil HCl 7 & 14 & 21 &28 -10 MG capsule extended-release 24 hour therapy, Take 1 capsule by mouth Daily., Disp: 28 each, Rfl: 0  •  metoprolol tartrate (LOPRESSOR) 25 MG tablet, Take 1 tablet by mouth Every 12 (Twelve) Hours., Disp: 60 tablet, Rfl: 0  •  montelukast (SINGULAIR) 10 MG tablet, Take 1 tablet by mouth Every Night., Disp: 90 tablet, Rfl: 2  •  Multiple Vitamins-Minerals (EYE VITAMINS & MINERALS PO), Take 1 tablet by mouth Every Night., Disp: , Rfl:   •  multivitamin (DAILY RITIKA) tablet tablet, Take 1 tablet by mouth Daily., Disp: , Rfl:   •  nitroglycerin (NITROSTAT) 0.4 MG SL tablet, Place 0.4 mg under the tongue every 5 (five) minutes as needed for chest pain. Take no more than 3 doses in 15 minutes., Disp: , Rfl:   •  pravastatin (PRAVACHOL) 40 MG tablet, Take 1 tablet by mouth Daily., Disp: 90 tablet, Rfl: 1  •  PULMICORT FLEXHALER 90 MCG/ACT inhaler, Inhale 1 puff 2 (Two) Times a Day., Disp: 2 inhaler, Rfl: 2  •  apixaban (ELIQUIS) 2.5 MG tablet tablet, Take 1 tablet by mouth 2 (Two) Times a Day., Disp: 60 tablet, Rfl: 2  •  aspirin 81 MG tablet, Take 1 tablet by mouth Daily., Disp: 30 tablet, Rfl: 11      The following portions of the patient's history were reviewed and updated as appropriate: allergies, current medications, past family history, past medical history, past social history, past surgical history and problem list.    Review of Systems   Constitution: Negative.   HENT: Negative.    Eyes: Negative.    Cardiovascular: Positive for chest pain and palpitations.   Respiratory: Negative.   "  Hematologic/Lymphatic: Negative.    Musculoskeletal: Positive for arthritis.   Gastrointestinal: Negative.    Neurological: Negative.    Psychiatric/Behavioral: The patient is nervous/anxious.           Objective:     Visit Vitals   • /60 (BP Location: Right arm, Patient Position: Sitting)   • Pulse 84   • Ht 62\" (157.5 cm)   • Wt 161 lb 3.2 oz (73.1 kg)   • SpO2 99%   • BMI 29.48 kg/m2     Physical Exam   Constitutional: She appears well-developed and well-nourished.   HENT:   Head: Normocephalic and atraumatic.   Mouth/Throat: Oropharynx is clear and moist.   Eyes: Conjunctivae and EOM are normal. Pupils are equal, round, and reactive to light. No scleral icterus.   Neck: Normal range of motion. Neck supple. No JVD present. No tracheal deviation present. No thyromegaly present.   Cardiovascular: Normal rate, regular rhythm, normal heart sounds and intact distal pulses.  Exam reveals no friction rub.    No murmur heard.  Pulmonary/Chest: Effort normal and breath sounds normal. No respiratory distress. She has no wheezes. She has no rales. She exhibits no tenderness.   Abdominal: Soft. Bowel sounds are normal. She exhibits no distension and no mass. There is no tenderness. There is no rebound and no guarding.   Musculoskeletal: Normal range of motion. She exhibits no edema, tenderness or deformity.   Lymphadenopathy:     She has no cervical adenopathy.   Neurological: She is alert. She has normal reflexes. No cranial nerve deficit. She exhibits normal muscle tone. Coordination normal.   Skin: Skin is warm and dry.   Psychiatric: She has a normal mood and affect. Her behavior is normal. Judgment and thought content normal.         Lab Review  Lab Results   Component Value Date     01/14/2017    K 4.2 01/14/2017     01/14/2017    BUN 30 (H) 01/14/2017    CREATININE 1.37 (H) 01/14/2017    GLUCOSE 98 01/14/2017    CALCIUM 10.0 01/14/2017    ALT 8 (L) 01/14/2017    ALKPHOS 71 01/14/2017    LABIL2 1.4 " (L) 01/14/2017     Lab Results   Component Value Date    CKTOTAL 97 01/14/2017     Lab Results   Component Value Date    WBC 6.28 01/14/2017    HGB 12.1 01/14/2017    HCT 38.3 01/14/2017     01/14/2017     Lab Results   Component Value Date    INR 0.92 01/13/2017    INR 0.95 04/18/2015    INR 0.94 04/26/2014     No results found for: MG  Lab Results   Component Value Date    TSH 1.613 01/14/2017     Lab Results   Component Value Date    .0 (H) 01/12/2017     Lab Results   Component Value Date    CHLPL 156 05/09/2016     Lab Results   Component Value Date    CHOL 202 (H) 01/14/2017    TRIG 50 01/14/2017    HDL 56 (L) 01/14/2017    LDLCALC 136 (H) 01/14/2017    VLDL 10 01/14/2017    LDLHDL 2.43 01/14/2017         Procedures     I personally viewed and interpreted the patient's LAB data         Assessment:     1. Mixed hyperlipidemia    2. Essential hypertension    3. CKD (chronic kidney disease) stage 3, GFR 30-59 ml/min    4. Paroxysmal atrial fibrillation    5. Coronary artery disease involving native coronary artery of native heart without angina pectoris          Plan:      Patient was advised to DC aspirin  She will take eliquis 2.5 twice a day  Patient will take Pravachol 40 mg daily for hyperlipidemia    Risks for anticoagulation were discussed and explained.  His [id number] VASC score is 5.    No Follow-up on file.

## 2017-01-19 NOTE — MR AVS SNAPSHOT
Cici Gooded   1/19/2017 1:15 PM   Office Visit    Dept Phone:  [phone]   Encounter #:  [id number]    Provider:  Emily Cleary MD   Department:  [facility] CARDIOLOGY                Your Full Care Plan              Today's Medication Changes          These changes are accurate as of: 1/19/17  2:14 PM.  If you have any questions, ask your nurse or doctor.               New Medication(s)Ordered:     apixaban 2.5 MG tablet tablet   Commonly known as:  ELIQUIS   Take 1 tablet by mouth 2 (Two) Times a Day.   Started by:  Emily Cleary MD         Medication(s)that have changed:     aspirin 81 MG tablet   Take 1 tablet by mouth Daily.   What changed:  Another medication with the same name was removed. Continue taking this medication, and follow the directions you see here.   Changed by:  Emily Cleary MD            Where to Get Your Medications      These medications were sent to [facility] - [place] - [address] - [phone] [facility] [phone] FX  [address] [place] [address]     Phone:  [phone]     apixaban 2.5 MG tablet tablet                  Your Updated Medication List          This list is accurate as of: 1/19/17  2:14 PM.  Always use your most recent med list.                amLODIPine 5 MG tablet   Commonly known as:  NORVASC   Take 1 tablet by mouth Daily.       apixaban 2.5 MG tablet tablet   Commonly known as:  ELIQUIS   Take 1 tablet by mouth 2 (Two) Times a Day.       aspirin 81 MG tablet       bumetanide 0.5 MG tablet   Commonly known as:  BUMEX       calcium carbonate-cholecalciferol 500-400 MG-UNIT tablet tablet       cetirizine 10 MG tablet   Commonly known as:  zyrTEC   Take 1 tablet by mouth daily.       EYE VITAMINS & MINERALS PO       gabapentin 300 MG capsule   Commonly known as:  NEURONTIN   Take 1 capsule by mouth every night.       Memantine HCl-Donepezil HCl 7 & 14 & 21 &28 -10 MG capsule  extended-release 24 hour therapy   Take 1 capsule by mouth Daily.       metoprolol tartrate 25 MG tablet   Commonly known as:  LOPRESSOR   Take 1 tablet by mouth Every 12 (Twelve) Hours.       montelukast 10 MG tablet   Commonly known as:  SINGULAIR   Take 1 tablet by mouth Every Night.       multivitamin tablet tablet       nitroglycerin 0.4 MG SL tablet   Commonly known as:  NITROSTAT       pravastatin 40 MG tablet   Commonly known as:  PRAVACHOL   Take 1 tablet by mouth Daily.       PULMICORT FLEXHALER 90 MCG/ACT inhaler   Generic drug:  budesonide   Inhale 1 puff 2 (Two) Times a Day.               You Were Diagnosed With        Codes Comments    Mixed hyperlipidemia    -  Primary ICD-10-CM: E78.2  ICD-9-CM: 272.2     Essential hypertension     ICD-10-CM: I10  ICD-9-CM: 401.9     CKD (chronic kidney disease) stage 3, GFR 30-59 ml/min     ICD-10-CM: N18.3  ICD-9-CM: 585.3     Paroxysmal atrial fibrillation     ICD-10-CM: I48.0  ICD-9-CM: 427.31     Coronary artery disease involving native coronary artery of native heart without angina pectoris     ICD-10-CM: I25.10  ICD-9-CM: 414.01       Medications to be Given to You by a Medical Professional       Instructions     None    Patient Instructions History      Upcoming Appointments     Visit Type Date Time Department    FOLLOW UP 1/19/2017  1:15 PM [facility] CARDIOLOGY [facility]    FOLLOW UP 4/4/2017  9:30 AM [facility] CARDIOLOGY R[facility]    PACEMAKER CHECK 6/30/2017 10:30 AM [facility] CARDIOLOGY RAY      MyChart Signup     Our records indicate that you have declined [facility] signup. If you would like to sign up for [facility] [employer], please email [facility] at [email or web address] or call [phone] to obtain an activation code.             Other Info from Your Visit           Your Appointments     Apr 04, 2017  9:30 AM EDT   Follow Up with Emily Cleary MD   [facility] CARDIOLOGY (--)    [address] [place] [address]   [phone]           Arrive  "15 minutes prior to appointment.            Jun 30, 2017 10:30 AM EDT   PACEMAKER CHECK with PACEMAKER COR CARD   [facility] CARDIOLOGY (--)    [address] [place] [address] [phone]              Allergies     Amoxil [Amoxicillin]      Bee Venom Allergy     Codeine      Penicillins      Tetracyclines & Related        Reason for Visit     Hypertension     Coronary Artery Disease     Congestive Heart Failure           Vital Signs     Blood Pressure Pulse Height Weight Oxygen Saturation Body Mass Index    120/60 (BP Location: Right arm, Patient Position: Sitting) 84 62\" (157.5 cm) 161 lb 3.2 oz (73.1 kg) 99% 29.48 kg/m2    Smoking Status                   Never Smoker           Problems and Diagnoses Noted     Coronary heart disease    CKD (chronic kidney disease) stage 3, GFR 30-59 ml/min    High blood pressure    High cholesterol or triglycerides    Atrial fibrillation (irregular heartbeat)        "

## 2017-01-22 ENCOUNTER — APPOINTMENT (OUTPATIENT)
Dept: CT IMAGING | Facility: HOSPITAL | Age: 82
End: 2017-01-22

## 2017-01-22 ENCOUNTER — APPOINTMENT (OUTPATIENT)
Dept: GENERAL RADIOLOGY | Facility: HOSPITAL | Age: 82
End: 2017-01-22

## 2017-01-22 ENCOUNTER — HOSPITAL ENCOUNTER (EMERGENCY)
Facility: HOSPITAL | Age: 82
Discharge: HOME | End: 2017-01-22
Attending: FAMILY MEDICINE | Admitting: FAMILY MEDICINE

## 2017-01-22 VITALS
DIASTOLIC BLOOD PRESSURE: 83 MMHG | BODY MASS INDEX: 28.35 KG/M2 | WEIGHT: 160 LBS | SYSTOLIC BLOOD PRESSURE: 117 MMHG | HEART RATE: 75 BPM | HEIGHT: 63 IN | OXYGEN SATURATION: 98 % | TEMPERATURE: 98.3 F | RESPIRATION RATE: 15 BRPM

## 2017-01-22 DIAGNOSIS — R53.1 GENERALIZED WEAKNESS: Primary | ICD-10-CM

## 2017-01-22 LAB
ALBUMIN SERPL-MCNC: 4.2 G/DL (ref 3.4–4.8)
ALBUMIN/GLOB SERPL: 1.6 G/DL (ref 1.5–2.5)
ALP SERPL-CCNC: 79 U/L (ref 46–116)
ALT SERPL W P-5'-P-CCNC: 15 U/L (ref 10–36)
AMPHET+METHAMPHET UR QL: NEGATIVE
AMYLASE SERPL-CCNC: 98 U/L (ref 28–100)
ANION GAP SERPL CALCULATED.3IONS-SCNC: 10.6 MMOL/L (ref 3.6–11.2)
APTT PPP: 26.7 SECONDS (ref 24.4–31)
AST SERPL-CCNC: 24 U/L (ref 10–30)
BARBITURATES UR QL SCN: NEGATIVE
BASOPHILS # BLD AUTO: 0.01 10*3/MM3 (ref 0–0.3)
BASOPHILS NFR BLD AUTO: 0.1 % (ref 0–2)
BENZODIAZ UR QL SCN: NEGATIVE
BILIRUB SERPL-MCNC: 0.3 MG/DL (ref 0.2–1.8)
BILIRUB UR QL STRIP: NEGATIVE
BUN BLD-MCNC: 23 MG/DL (ref 7–21)
BUN/CREAT SERPL: 19.2 (ref 7–25)
CALCIUM SPEC-SCNC: 9.7 MG/DL (ref 7.7–10)
CANNABINOIDS SERPL QL: NEGATIVE
CHLORIDE SERPL-SCNC: 107 MMOL/L (ref 99–112)
CLARITY UR: ABNORMAL
CO2 SERPL-SCNC: 27.4 MMOL/L (ref 24.3–31.9)
COCAINE UR QL: NEGATIVE
COLOR UR: YELLOW
CREAT BLD-MCNC: 1.2 MG/DL (ref 0.43–1.29)
CRP SERPL-MCNC: <0.5 MG/DL (ref 0–0.99)
D-LACTATE SERPL-SCNC: 0.9 MMOL/L (ref 0.5–2)
DEPRECATED RDW RBC AUTO: 45.4 FL (ref 37–54)
EOSINOPHIL # BLD AUTO: 0.14 10*3/MM3 (ref 0–0.7)
EOSINOPHIL NFR BLD AUTO: 2 % (ref 0–7)
ERYTHROCYTE [DISTWIDTH] IN BLOOD BY AUTOMATED COUNT: 14.2 % (ref 11.5–14.5)
GFR SERPL CREATININE-BSD FRML MDRD: 42 ML/MIN/1.73
GLOBULIN UR ELPH-MCNC: 2.7 GM/DL
GLUCOSE BLD-MCNC: 108 MG/DL (ref 70–110)
GLUCOSE UR STRIP-MCNC: NEGATIVE MG/DL
HCT VFR BLD AUTO: 40.6 % (ref 37–47)
HGB BLD-MCNC: 12.8 G/DL (ref 12–16)
HGB UR QL STRIP.AUTO: NEGATIVE
IMM GRANULOCYTES # BLD: 0.01 10*3/MM3 (ref 0–0.03)
IMM GRANULOCYTES NFR BLD: 0.1 % (ref 0–0.5)
INR PPP: 0.94 (ref 0.8–1.1)
KETONES UR QL STRIP: NEGATIVE
LEUKOCYTE ESTERASE UR QL STRIP.AUTO: NEGATIVE
LIPASE SERPL-CCNC: 43 U/L (ref 13–60)
LYMPHOCYTES # BLD AUTO: 1.5 10*3/MM3 (ref 1–3)
LYMPHOCYTES NFR BLD AUTO: 21.6 % (ref 16–46)
MCH RBC QN AUTO: 28 PG (ref 27–33)
MCHC RBC AUTO-ENTMCNC: 31.5 G/DL (ref 33–37)
MCV RBC AUTO: 88.8 FL (ref 80–94)
METHADONE UR QL SCN: NEGATIVE
MONOCYTES # BLD AUTO: 0.67 10*3/MM3 (ref 0.1–0.9)
MONOCYTES NFR BLD AUTO: 9.7 % (ref 0–12)
NEUTROPHILS # BLD AUTO: 4.6 10*3/MM3 (ref 1.4–6.5)
NEUTROPHILS NFR BLD AUTO: 66.5 % (ref 40–75)
NITRITE UR QL STRIP: NEGATIVE
OPIATES UR QL: NEGATIVE
OSMOLALITY SERPL CALC.SUM OF ELEC: 292.9 MOSM/KG (ref 273–305)
OXYCODONE UR QL SCN: NEGATIVE
PCP UR QL SCN: NEGATIVE
PH UR STRIP.AUTO: 8.5 [PH] (ref 5–8)
PLATELET # BLD AUTO: 194 10*3/MM3 (ref 130–400)
PMV BLD AUTO: 10.5 FL (ref 6–10)
POTASSIUM BLD-SCNC: 4.5 MMOL/L (ref 3.5–5.3)
PROPOXYPH UR QL: NEGATIVE
PROT SERPL-MCNC: 6.9 G/DL (ref 6–8)
PROT UR QL STRIP: NEGATIVE
PROTHROMBIN TIME: 10.6 SECONDS (ref 9.8–11.9)
RBC # BLD AUTO: 4.57 10*6/MM3 (ref 4.2–5.4)
SODIUM BLD-SCNC: 145 MMOL/L (ref 135–153)
SP GR UR STRIP: 1.01 (ref 1–1.03)
TROPONIN I SERPL-MCNC: 0.05 NG/ML
UROBILINOGEN UR QL STRIP: ABNORMAL
WBC NRBC COR # BLD: 6.93 10*3/MM3 (ref 4.5–12.5)

## 2017-01-22 PROCEDURE — 80307 DRUG TEST PRSMV CHEM ANLYZR: CPT | Performed by: FAMILY MEDICINE

## 2017-01-22 PROCEDURE — 82150 ASSAY OF AMYLASE: CPT | Performed by: FAMILY MEDICINE

## 2017-01-22 PROCEDURE — 36415 COLL VENOUS BLD VENIPUNCTURE: CPT

## 2017-01-22 PROCEDURE — 83605 ASSAY OF LACTIC ACID: CPT | Performed by: FAMILY MEDICINE

## 2017-01-22 PROCEDURE — 80053 COMPREHEN METABOLIC PANEL: CPT | Performed by: FAMILY MEDICINE

## 2017-01-22 PROCEDURE — 85025 COMPLETE CBC W/AUTO DIFF WBC: CPT | Performed by: FAMILY MEDICINE

## 2017-01-22 PROCEDURE — 85610 PROTHROMBIN TIME: CPT | Performed by: FAMILY MEDICINE

## 2017-01-22 PROCEDURE — 86140 C-REACTIVE PROTEIN: CPT | Performed by: FAMILY MEDICINE

## 2017-01-22 PROCEDURE — 84484 ASSAY OF TROPONIN QUANT: CPT | Performed by: FAMILY MEDICINE

## 2017-01-22 PROCEDURE — 93005 ELECTROCARDIOGRAM TRACING: CPT | Performed by: FAMILY MEDICINE

## 2017-01-22 PROCEDURE — 99284 EMERGENCY DEPT VISIT MOD MDM: CPT

## 2017-01-22 PROCEDURE — 81003 URINALYSIS AUTO W/O SCOPE: CPT | Performed by: FAMILY MEDICINE

## 2017-01-22 PROCEDURE — 87086 URINE CULTURE/COLONY COUNT: CPT | Performed by: FAMILY MEDICINE

## 2017-01-22 PROCEDURE — 87040 BLOOD CULTURE FOR BACTERIA: CPT | Performed by: FAMILY MEDICINE

## 2017-01-22 PROCEDURE — 83690 ASSAY OF LIPASE: CPT | Performed by: FAMILY MEDICINE

## 2017-01-22 PROCEDURE — 93010 ELECTROCARDIOGRAM REPORT: CPT | Performed by: INTERNAL MEDICINE

## 2017-01-22 PROCEDURE — 85730 THROMBOPLASTIN TIME PARTIAL: CPT | Performed by: FAMILY MEDICINE

## 2017-01-22 RX ORDER — SODIUM CHLORIDE 0.9 % (FLUSH) 0.9 %
10 SYRINGE (ML) INJECTION AS NEEDED
Status: DISCONTINUED | OUTPATIENT
Start: 2017-01-22 | End: 2017-01-22 | Stop reason: HOSPADM

## 2017-01-22 NOTE — ED NOTES
EKG performed by [facility] at 1723 shown to Dr. thacker.     Aretha Preciado  01/22/17 1725

## 2017-01-22 NOTE — ED NOTES
Patient daughter reports that she is on her way to  patient     Aidee Bergeron, RN  01/22/17 8083
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
## 2017-01-22 NOTE — ED NOTES
Patient refuses all radiology tests. Patient is alert and oriented. Patient reports that she feels fine and that she only came to the ER because her daughter asked her to. MD made aware. Pt to be discharged home with daughter     Aidee Jennifer Bergeron RN  01/22/17 5905

## 2017-01-23 ENCOUNTER — OFFICE VISIT (OUTPATIENT)
Dept: CARDIOLOGY | Facility: CLINIC | Age: 82
End: 2017-01-23

## 2017-01-23 VITALS
BODY MASS INDEX: 27.93 KG/M2 | HEIGHT: 63 IN | DIASTOLIC BLOOD PRESSURE: 58 MMHG | SYSTOLIC BLOOD PRESSURE: 118 MMHG | OXYGEN SATURATION: 97 % | WEIGHT: 157.6 LBS | HEART RATE: 60 BPM

## 2017-01-23 DIAGNOSIS — Z95.0 PACEMAKER: ICD-10-CM

## 2017-01-23 DIAGNOSIS — Z79.01 CHRONIC ANTICOAGULATION: ICD-10-CM

## 2017-01-23 DIAGNOSIS — I10 ESSENTIAL HYPERTENSION: ICD-10-CM

## 2017-01-23 DIAGNOSIS — I48.0 PAROXYSMAL ATRIAL FIBRILLATION (HCC): ICD-10-CM

## 2017-01-23 DIAGNOSIS — R19.7 DIARRHEA IN ADULT PATIENT: Primary | ICD-10-CM

## 2017-01-23 DIAGNOSIS — E78.2 MIXED HYPERLIPIDEMIA: ICD-10-CM

## 2017-01-23 PROBLEM — I20.8 ANGINA AT REST (HCC): Status: RESOLVED | Noted: 2017-01-14 | Resolved: 2017-01-23

## 2017-01-23 PROBLEM — R77.8 TROPONIN LEVEL ELEVATED: Status: RESOLVED | Noted: 2017-01-14 | Resolved: 2017-01-23

## 2017-01-23 PROCEDURE — 99213 OFFICE O/P EST LOW 20 MIN: CPT | Performed by: INTERNAL MEDICINE

## 2017-01-23 NOTE — MR AVS SNAPSHOT
Cici BALJEET Veliz   1/23/2017 10:45 AM   Office Visit    Dept Phone:  [phone]   Encounter #:  [id number]    Provider:  Emily Cleary MD   Department:  [facility] CARDIOLOGY                Your Full Care Plan              Your Updated Medication List          This list is accurate as of: 1/23/17 11:58 AM.  Always use your most recent med list.                amLODIPine 5 MG tablet   Commonly known as:  NORVASC   Take 1 tablet by mouth Daily.       apixaban 2.5 MG tablet tablet   Commonly known as:  ELIQUIS   Take 1 tablet by mouth 2 (Two) Times a Day.       aspirin 81 MG tablet       bumetanide 0.5 MG tablet   Commonly known as:  BUMEX       calcium carbonate-cholecalciferol 500-400 MG-UNIT tablet tablet       cetirizine 10 MG tablet   Commonly known as:  zyrTEC   Take 1 tablet by mouth daily.       EYE VITAMINS & MINERALS PO       gabapentin 300 MG capsule   Commonly known as:  NEURONTIN   Take 1 capsule by mouth every night.       Memantine HCl-Donepezil HCl 7 & 14 & 21 &28 -10 MG capsule extended-release 24 hour therapy   Take 1 capsule by mouth Daily.       metoprolol tartrate 25 MG tablet   Commonly known as:  LOPRESSOR   Take 1 tablet by mouth Every 12 (Twelve) Hours.       montelukast 10 MG tablet   Commonly known as:  SINGULAIR   Take 1 tablet by mouth Every Night.       multivitamin tablet tablet       nitroglycerin 0.4 MG SL tablet   Commonly known as:  NITROSTAT       pravastatin 40 MG tablet   Commonly known as:  PRAVACHOL   Take 1 tablet by mouth Daily.       PULMICORT FLEXHALER 90 MCG/ACT inhaler   Generic drug:  budesonide   Inhale 1 puff 2 (Two) Times a Day.               Instructions     None    Patient Instructions History      Upcoming Appointments     Visit Type Date Time Department    FOLLOW UP 1/23/2017 10:45 AM E CARDIOLOGY RAY    FOLLOW UP 4/4/2017  9:30 AM E CARDIOLOGY RAY    PACEMAKER CHECK 6/30/2017 10:30 AM E CARDIOLOGY  "[facility] Signup     Our records indicate that you have declined [facility]. If you would like to sign up for [facility], please email [email or web address] or call [phone] to obtain an activation code.             Other Info from Your Visit           Your Appointments     Apr 04, 2017  9:30 AM EDT   Follow Up with Emily Cleary MD   [facility] CARDIOLOGY (--)    [address] [address]   [phone]           Arrive 15 minutes prior to appointment.            Jun 30, 2017 10:30 AM EDT   PACEMAKER CHECK with PACEMAKER [facility] CARD   [facility] CARDIOLOGY (--)    [address]  [facility] [address]   [phone]              Allergies     Amoxil [Amoxicillin]      Bee Venom Allergy     Codeine      Penicillins      Tetracyclines & Related        Reason for Visit     Follow-up PT WAS AT [facility] YESTERDAY     Weakness - Generalized           Vital Signs     Blood Pressure Pulse Height Weight Oxygen Saturation Body Mass Index    118/58 (BP Location: Right arm, Patient Position: Sitting) 60 63\" (160 cm) 157 lb 9.6 oz (71.5 kg) 97% 27.92 kg/m2    Smoking Status                   Never Smoker           Problems and Diagnoses Noted     Diarrhea in adult patient        "

## 2017-01-23 NOTE — PROGRESS NOTES
subjective     Chief Complaint   Patient presents with   • Follow-up     PT WAS AT [facility] YESTERDAY    • Weakness - Generalized     History of Present Illness    Patient states that the she developed loose bowel movements after Pravachol therapy.  According to her symptoms and happened a few years ago.  It is not actually diarrhea but some loose bowel movements.  She stopped the Pravachol and went to the emergency room.  Apparently in the emergency room today she did not mention about her bowel movements because there is no mention about bowel movement in the ER records.  Patient insists that she mentioned to them and there was the sole reason why she went there.  Emergency room record indicates fatigue and generalized weakness.    According to the patient they wanted a chest x-ray and MRI of the brain patient absolutely refused  She is feeling better bowel movements have improved as she insisted that the Pravachol therapy and does not wish to take statin therapy at all.    GI workup was discussed with the patient patient declined    Chronic anticoagulation  She was recently started on low-dose eliquis  She states that she is not having any problem with eliquis she is still taking it.    Memory loss  Patient complained of memory loss on the last visit she was started on namzaric low-dose with a titration kit.  Patient does not wish to start it.  She is afraid of the side effects.      Patient Active Problem List   Diagnosis   • Spinal stenosis, degenerative disc disease, back pain*   • Deformity of the right Sternoclavicular joint*   • COPD (chronic obstructive pulmonary disease)   • Essential hypertension   • Mixed hyperlipidemia   • CAD, status post RCA and circumflex stents last coronary angiography 2015 no significant disease*   • Pacemaker*   • hx of Myocardial infarction*   • Thoracic back pain*   • Neck pain*   • Valvular heart disease mild mitral regurgitation not significant*   • Congestive heart  failure with LV diastolic dysfunction, NYHA class 2*   • Atopic rhinitis   • Hyperparathyroidism status post parathyroidectomy   • Chronic back pain   • CKD (chronic kidney disease) stage 3, GFR 30-59 ml/min   • Diastolic heart failure secondary to hypertrophic cardiomyopathy   • Hyperglycemia   • Paroxysmal atrial fibrillation   • Diarrhea in adult patient   • Chronic anticoagulation       Social History   Substance Use Topics   • Smoking status: Never Smoker   • Smokeless tobacco: Never Used   • Alcohol use No       Allergies   Allergen Reactions   • Amoxil [Amoxicillin]    • Bee Venom    • Codeine    • Penicillins    • Tetracyclines & Related          Current Outpatient Prescriptions:   •  amLODIPine (NORVASC) 5 MG tablet, Take 1 tablet by mouth Daily., Disp: 30 tablet, Rfl: 0  •  apixaban (ELIQUIS) 2.5 MG tablet tablet, Take 1 tablet by mouth 2 (Two) Times a Day., Disp: 60 tablet, Rfl: 2  •  aspirin 81 MG tablet, Take 1 tablet by mouth Daily., Disp: 30 tablet, Rfl: 11  •  bumetanide (BUMEX) 0.5 MG tablet, Take 0.5 mg by mouth Daily As Needed., Disp: , Rfl:   •  calcium carbonate-cholecalciferol 500-400 MG-UNIT tablet tablet, Take 1 tablet by mouth 2 (Two) Times a Day., Disp: , Rfl:   •  cetirizine (ZyrTEC) 10 MG tablet, Take 1 tablet by mouth daily., Disp: 90 tablet, Rfl: 0  •  gabapentin (NEURONTIN) 300 MG capsule, Take 1 capsule by mouth every night. (Patient taking differently: Take 300 mg by mouth At Night As Needed.), Disp: 90 capsule, Rfl: 1  •  Memantine HCl-Donepezil HCl 7 & 14 & 21 &28 -10 MG capsule extended-release 24 hour therapy, Take 1 capsule by mouth Daily., Disp: 28 each, Rfl: 0  •  metoprolol tartrate (LOPRESSOR) 25 MG tablet, Take 1 tablet by mouth Every 12 (Twelve) Hours., Disp: 60 tablet, Rfl: 0  •  montelukast (SINGULAIR) 10 MG tablet, Take 1 tablet by mouth Every Night., Disp: 90 tablet, Rfl: 2  •  Multiple Vitamins-Minerals (EYE VITAMINS & MINERALS PO), Take 1 tablet by mouth Every  "Night., Disp: , Rfl:   •  multivitamin (DAILY RITIKA) tablet tablet, Take 1 tablet by mouth Daily., Disp: , Rfl:   •  nitroglycerin (NITROSTAT) 0.4 MG SL tablet, Place 0.4 mg under the tongue every 5 (five) minutes as needed for chest pain. Take no more than 3 doses in 15 minutes., Disp: , Rfl:   •  pravastatin (PRAVACHOL) 40 MG tablet, Take 1 tablet by mouth Daily., Disp: 90 tablet, Rfl: 1  •  PULMICORT FLEXHALER 90 MCG/ACT inhaler, Inhale 1 puff 2 (Two) Times a Day., Disp: 2 inhaler, Rfl: 2  No current facility-administered medications for this visit.       The following portions of the patient's history were reviewed and updated as appropriate: allergies, current medications, past family history, past medical history, past social history, past surgical history and problem list.    Review of Systems   Constitution: Negative.   HENT: Negative.    Eyes: Negative.    Cardiovascular: Negative.    Respiratory: Negative.    Hematologic/Lymphatic: Negative.    Musculoskeletal: Negative.    Gastrointestinal: Positive for diarrhea. Negative for bloating and abdominal pain.   Neurological: Negative.           Objective:     Visit Vitals   • /58 (BP Location: Right arm, Patient Position: Sitting)   • Pulse 60   • Ht 63\" (160 cm)   • Wt 157 lb 9.6 oz (71.5 kg)   • SpO2 97%   • BMI 27.92 kg/m2     Physical Exam   Constitutional: She appears well-developed and well-nourished.   HENT:   Head: Normocephalic and atraumatic.   Mouth/Throat: Oropharynx is clear and moist.   Eyes: Conjunctivae and EOM are normal. Pupils are equal, round, and reactive to light. No scleral icterus.   Neck: Normal range of motion. Neck supple. No JVD present. No tracheal deviation present. No thyromegaly present.   Cardiovascular: Normal rate, regular rhythm, normal heart sounds and intact distal pulses.  Exam reveals no friction rub.    No murmur heard.  Pulmonary/Chest: Effort normal and breath sounds normal. No respiratory distress. She has no " wheezes. She has no rales. She exhibits no tenderness.   Abdominal: Soft. Bowel sounds are normal. She exhibits no distension and no mass. There is no tenderness. There is no rebound and no guarding.   Musculoskeletal: Normal range of motion. She exhibits no edema, tenderness or deformity.   Lymphadenopathy:     She has no cervical adenopathy.   Neurological: She is alert. She has normal reflexes. No cranial nerve deficit. She exhibits normal muscle tone. Coordination normal.   Skin: Skin is warm and dry.   Psychiatric: She has a normal mood and affect. Her behavior is normal. Judgment and thought content normal.         Lab Review  Lab Results   Component Value Date     01/22/2017    K 4.5 01/22/2017     01/22/2017    BUN 23 (H) 01/22/2017    CREATININE 1.20 01/22/2017    GLUCOSE 108 01/22/2017    CALCIUM 9.7 01/22/2017    ALT 15 01/22/2017    ALKPHOS 79 01/22/2017    LABIL2 1.6 01/22/2017     Lab Results   Component Value Date    CKTOTAL 97 01/14/2017     Lab Results   Component Value Date    WBC 6.93 01/22/2017    HGB 12.8 01/22/2017    HCT 40.6 01/22/2017     01/22/2017     Lab Results   Component Value Date    INR 0.94 01/22/2017    INR 0.92 01/13/2017    INR 0.95 04/18/2015     No results found for: MG  Lab Results   Component Value Date    TSH 1.613 01/14/2017     Lab Results   Component Value Date    .0 (H) 01/12/2017     Lab Results   Component Value Date    CHLPL 156 05/09/2016     Lab Results   Component Value Date    CHOL 202 (H) 01/14/2017    TRIG 50 01/14/2017    HDL 56 (L) 01/14/2017    LDLCALC 136 (H) 01/14/2017    VLDL 10 01/14/2017    LDLHDL 2.43 01/14/2017         Procedures     I personally viewed and interpreted the patient's LAB data         Assessment:     1. Diarrhea in adult patient    2. Paroxysmal atrial fibrillation    3. Pacemaker*    4. Essential hypertension    5. Chronic anticoagulation    6. Mixed hyperlipidemia          Plan:      Patient is having loose  bowel movements but no actual diarrhea according to her.  There is no abdominal discomfort and patient is not willing to undergo GI workup.  Colonoscopy was recommended patient declined    Patient insists that that is due to Pravachol therapy which she has stopped it and is much better.  She wants to stay off of any statin therapy.    Memory loss  Patient never started namzaric and does not wish to start    Chronic anticoagulation  She is tolerating eliquis therapy very well without any problems.  Overall patient is doing well  Risks emphasized  Follow-up scheduled              No Follow-up on file.

## 2017-01-24 LAB — BACTERIA SPEC AEROBE CULT: NORMAL

## 2017-01-27 LAB
BACTERIA SPEC AEROBE CULT: NORMAL
BACTERIA SPEC AEROBE CULT: NORMAL

## 2017-02-16 RX ORDER — BUDESONIDE 90 UG/1
1 AEROSOL, POWDER RESPIRATORY (INHALATION) 2 TIMES DAILY
Qty: 2 INHALER | Refills: 2 | Status: SHIPPED | OUTPATIENT
Start: 2017-02-16 | End: 2017-09-18

## 2017-02-23 ENCOUNTER — TELEPHONE (OUTPATIENT)
Dept: CARDIOLOGY | Facility: CLINIC | Age: 82
End: 2017-02-23

## 2017-02-23 NOTE — TELEPHONE ENCOUNTER
----- Message from Emily Cleary MD sent at 2/21/2017 10:08 AM EST -----  No except eliquis Pradaxa  Xarelto or Coumadin  ----- Message -----     From: Radha Smith MA     Sent: 2/21/2017   9:29 AM       To: Emily Cleary MD    Pt states that she will start taking crestor but can not take coumadin. She states that she does not drive and would have no way to get INR checked and could not stick her finger herself at home. Is there any other option for her anticoag therapy?    ----- Message -----     From: Emily Cleary MD     Sent: 2/20/2017  11:25 AM       To: Radha Smith MA    She can take Crestor 5 mg daily.  She can take Coumadin  ----- Message -----     From: Radha Smith MA     Sent: 2/20/2017   9:40 AM       To: Emily Cleary MD    Pt states that eliquis is to expensive. Requesting to take something else or get samples?    Pt also states that she has not been taking pravastatin because it makes her sick. Wants to know if she can take anything else?

## 2017-02-24 ENCOUNTER — TELEPHONE (OUTPATIENT)
Dept: CARDIOLOGY | Facility: CLINIC | Age: 82
End: 2017-02-24

## 2017-02-24 RX ORDER — ROSUVASTATIN CALCIUM 5 MG/1
5 TABLET, COATED ORAL DAILY
Qty: 90 TABLET | Refills: 0 | Status: SHIPPED | OUTPATIENT
Start: 2017-02-24 | End: 2017-06-07 | Stop reason: SDUPTHER

## 2017-03-05 ENCOUNTER — APPOINTMENT (OUTPATIENT)
Dept: GENERAL RADIOLOGY | Facility: HOSPITAL | Age: 82
End: 2017-03-05

## 2017-03-05 ENCOUNTER — HOSPITAL ENCOUNTER (INPATIENT)
Facility: HOSPITAL | Age: 82
LOS: 2 days | Discharge: HOME HEALTH | End: 2017-03-11
Attending: EMERGENCY MEDICINE | Admitting: INTERNAL MEDICINE

## 2017-03-05 DIAGNOSIS — R07.2 PRECORDIAL PAIN: ICD-10-CM

## 2017-03-05 DIAGNOSIS — I25.110 CORONARY ARTERY DISEASE INVOLVING NATIVE CORONARY ARTERY OF NATIVE HEART WITH UNSTABLE ANGINA PECTORIS (HCC): ICD-10-CM

## 2017-03-05 DIAGNOSIS — R07.9 CHEST PAIN OF UNCERTAIN ETIOLOGY: Primary | ICD-10-CM

## 2017-03-05 DIAGNOSIS — I20.0 UNSTABLE ANGINA (HCC): ICD-10-CM

## 2017-03-05 LAB
ALBUMIN SERPL-MCNC: 3.9 G/DL (ref 3.4–4.8)
ALBUMIN/GLOB SERPL: 1.6 G/DL (ref 1.5–2.5)
ALP SERPL-CCNC: 71 U/L (ref 35–104)
ALT SERPL W P-5'-P-CCNC: 12 U/L (ref 10–36)
ANION GAP SERPL CALCULATED.3IONS-SCNC: 5.8 MMOL/L (ref 3.6–11.2)
AST SERPL-CCNC: 24 U/L (ref 10–30)
BASOPHILS # BLD AUTO: 0.01 10*3/MM3 (ref 0–0.3)
BASOPHILS NFR BLD AUTO: 0.2 % (ref 0–2)
BILIRUB SERPL-MCNC: 0.2 MG/DL (ref 0.2–1.8)
BNP SERPL-MCNC: 262 PG/ML (ref 0–100)
BUN BLD-MCNC: 31 MG/DL (ref 7–21)
BUN/CREAT SERPL: 26.1 (ref 7–25)
CALCIUM SPEC-SCNC: 9.6 MG/DL (ref 7.7–10)
CHLORIDE SERPL-SCNC: 107 MMOL/L (ref 99–112)
CK MB SERPL-CCNC: 2.16 NG/ML (ref 0–5)
CK MB SERPL-RTO: 2.7 % (ref 0–3)
CK SERPL-CCNC: 80 U/L (ref 24–173)
CO2 SERPL-SCNC: 27.2 MMOL/L (ref 24.3–31.9)
CREAT BLD-MCNC: 1.19 MG/DL (ref 0.43–1.29)
DEPRECATED RDW RBC AUTO: 46.1 FL (ref 37–54)
EOSINOPHIL # BLD AUTO: 0.11 10*3/MM3 (ref 0–0.7)
EOSINOPHIL NFR BLD AUTO: 1.9 % (ref 0–7)
ERYTHROCYTE [DISTWIDTH] IN BLOOD BY AUTOMATED COUNT: 14.2 % (ref 11.5–14.5)
GFR SERPL CREATININE-BSD FRML MDRD: 43 ML/MIN/1.73
GLOBULIN UR ELPH-MCNC: 2.5 GM/DL
GLUCOSE BLD-MCNC: 110 MG/DL (ref 70–110)
HCT VFR BLD AUTO: 37 % (ref 37–47)
HGB BLD-MCNC: 11.7 G/DL (ref 12–16)
IMM GRANULOCYTES # BLD: 0.01 10*3/MM3 (ref 0–0.03)
IMM GRANULOCYTES NFR BLD: 0.2 % (ref 0–0.5)
LYMPHOCYTES # BLD AUTO: 1.78 10*3/MM3 (ref 1–3)
LYMPHOCYTES NFR BLD AUTO: 31.4 % (ref 16–46)
MCH RBC QN AUTO: 28.1 PG (ref 27–33)
MCHC RBC AUTO-ENTMCNC: 31.6 G/DL (ref 33–37)
MCV RBC AUTO: 88.9 FL (ref 80–94)
MONOCYTES # BLD AUTO: 0.82 10*3/MM3 (ref 0.1–0.9)
MONOCYTES NFR BLD AUTO: 14.5 % (ref 0–12)
MYOGLOBIN SERPL-MCNC: 70 NG/ML (ref 0–109)
NEUTROPHILS # BLD AUTO: 2.94 10*3/MM3 (ref 1.4–6.5)
NEUTROPHILS NFR BLD AUTO: 51.8 % (ref 40–75)
OSMOLALITY SERPL CALC.SUM OF ELEC: 286.6 MOSM/KG (ref 273–305)
PLATELET # BLD AUTO: 201 10*3/MM3 (ref 130–400)
PMV BLD AUTO: 10 FL (ref 6–10)
POTASSIUM BLD-SCNC: 4.6 MMOL/L (ref 3.5–5.3)
PROT SERPL-MCNC: 6.4 G/DL (ref 6–8)
RBC # BLD AUTO: 4.16 10*6/MM3 (ref 4.2–5.4)
SODIUM BLD-SCNC: 140 MMOL/L (ref 135–153)
TROPONIN I SERPL-MCNC: 0.05 NG/ML
WBC NRBC COR # BLD: 5.67 10*3/MM3 (ref 4.5–12.5)

## 2017-03-05 PROCEDURE — 99285 EMERGENCY DEPT VISIT HI MDM: CPT

## 2017-03-05 PROCEDURE — G0378 HOSPITAL OBSERVATION PER HR: HCPCS

## 2017-03-05 PROCEDURE — 82550 ASSAY OF CK (CPK): CPT | Performed by: EMERGENCY MEDICINE

## 2017-03-05 PROCEDURE — 71010 XR CHEST 1 VW: CPT | Performed by: RADIOLOGY

## 2017-03-05 PROCEDURE — 96374 THER/PROPH/DIAG INJ IV PUSH: CPT

## 2017-03-05 PROCEDURE — 82553 CREATINE MB FRACTION: CPT | Performed by: EMERGENCY MEDICINE

## 2017-03-05 PROCEDURE — 84484 ASSAY OF TROPONIN QUANT: CPT | Performed by: EMERGENCY MEDICINE

## 2017-03-05 PROCEDURE — 83874 ASSAY OF MYOGLOBIN: CPT | Performed by: EMERGENCY MEDICINE

## 2017-03-05 PROCEDURE — 93010 ELECTROCARDIOGRAM REPORT: CPT | Performed by: INTERNAL MEDICINE

## 2017-03-05 PROCEDURE — 93005 ELECTROCARDIOGRAM TRACING: CPT | Performed by: EMERGENCY MEDICINE

## 2017-03-05 PROCEDURE — 96375 TX/PRO/DX INJ NEW DRUG ADDON: CPT

## 2017-03-05 PROCEDURE — 25010000002 MORPHINE SULFATE (PF) 2 MG/ML SOLUTION: Performed by: EMERGENCY MEDICINE

## 2017-03-05 PROCEDURE — 85025 COMPLETE CBC W/AUTO DIFF WBC: CPT | Performed by: EMERGENCY MEDICINE

## 2017-03-05 PROCEDURE — 25010000002 ONDANSETRON PER 1 MG: Performed by: EMERGENCY MEDICINE

## 2017-03-05 PROCEDURE — 80053 COMPREHEN METABOLIC PANEL: CPT | Performed by: EMERGENCY MEDICINE

## 2017-03-05 PROCEDURE — 83880 ASSAY OF NATRIURETIC PEPTIDE: CPT | Performed by: EMERGENCY MEDICINE

## 2017-03-05 PROCEDURE — 71010 HC CHEST PA OR AP: CPT

## 2017-03-05 RX ORDER — SODIUM CHLORIDE 0.9 % (FLUSH) 0.9 %
10 SYRINGE (ML) INJECTION AS NEEDED
Status: DISCONTINUED | OUTPATIENT
Start: 2017-03-05 | End: 2017-03-11

## 2017-03-05 RX ORDER — ONDANSETRON 2 MG/ML
4 INJECTION INTRAMUSCULAR; INTRAVENOUS ONCE
Status: COMPLETED | OUTPATIENT
Start: 2017-03-05 | End: 2017-03-05

## 2017-03-05 RX ORDER — MORPHINE SULFATE 2 MG/ML
2 INJECTION, SOLUTION INTRAMUSCULAR; INTRAVENOUS ONCE
Status: COMPLETED | OUTPATIENT
Start: 2017-03-05 | End: 2017-03-05

## 2017-03-05 RX ADMIN — ONDANSETRON 4 MG: 2 INJECTION INTRAMUSCULAR; INTRAVENOUS at 22:24

## 2017-03-05 RX ADMIN — NITROGLYCERIN 1 INCH: 20 OINTMENT TOPICAL at 21:17

## 2017-03-05 RX ADMIN — MORPHINE SULFATE 2 MG: 2 INJECTION, SOLUTION INTRAMUSCULAR; INTRAVENOUS at 22:23

## 2017-03-06 ENCOUNTER — APPOINTMENT (OUTPATIENT)
Dept: CARDIOLOGY | Facility: HOSPITAL | Age: 82
End: 2017-03-06
Attending: INTERNAL MEDICINE

## 2017-03-06 PROBLEM — R00.1 BRADYCARDIA: Status: ACTIVE | Noted: 2017-03-06

## 2017-03-06 PROBLEM — E66.9 MILD OBESITY: Status: ACTIVE | Noted: 2017-03-06

## 2017-03-06 PROBLEM — G62.9 NEUROPATHY: Status: ACTIVE | Noted: 2017-03-06

## 2017-03-06 PROBLEM — I67.9 CEREBROVASCULAR DISEASE: Status: ACTIVE | Noted: 2017-03-06

## 2017-03-06 LAB
BH CV ECHO MEAS - % IVS THICK: 15.8 %
BH CV ECHO MEAS - % LVPW THICK: 135 %
BH CV ECHO MEAS - BSA(HAYCOCK): 1.8 M^2
BH CV ECHO MEAS - BSA: 1.8 M^2
BH CV ECHO MEAS - BZI_BMI: 28.5 KILOGRAMS/M^2
BH CV ECHO MEAS - BZI_METRIC_HEIGHT: 160 CM
BH CV ECHO MEAS - BZI_METRIC_WEIGHT: 73 KG
BH CV ECHO MEAS - CONTRAST EF 4CH: 70.1 ML/M^2
BH CV ECHO MEAS - EDV(CUBED): 132.4 ML
BH CV ECHO MEAS - EDV(MOD-SP4): 87 ML
BH CV ECHO MEAS - EDV(TEICH): 123.6 ML
BH CV ECHO MEAS - EF(CUBED): 80.9 %
BH CV ECHO MEAS - EF(MOD-SP4): 70.1 %
BH CV ECHO MEAS - EF(TEICH): 73.2 %
BH CV ECHO MEAS - ESV(CUBED): 25.2 ML
BH CV ECHO MEAS - ESV(MOD-SP4): 26 ML
BH CV ECHO MEAS - ESV(TEICH): 33.1 ML
BH CV ECHO MEAS - FS: 42.4 %
BH CV ECHO MEAS - IVS/LVPW: 1.9
BH CV ECHO MEAS - IVSD: 1.4 CM
BH CV ECHO MEAS - IVSS: 1.6 CM
BH CV ECHO MEAS - LV DIASTOLIC VOL/BSA (35-75): 49.3 ML/M^2
BH CV ECHO MEAS - LV MASS(C)D: 204 GRAMS
BH CV ECHO MEAS - LV MASS(C)DI: 115.7 GRAMS/M^2
BH CV ECHO MEAS - LV MASS(C)S: 184.7 GRAMS
BH CV ECHO MEAS - LV MASS(C)SI: 104.7 GRAMS/M^2
BH CV ECHO MEAS - LV SYSTOLIC VOL/BSA (12-30): 14.7 ML/M^2
BH CV ECHO MEAS - LVIDD: 5.1 CM
BH CV ECHO MEAS - LVIDS: 2.9 CM
BH CV ECHO MEAS - LVLD AP4: 7.2 CM
BH CV ECHO MEAS - LVLS AP4: 6 CM
BH CV ECHO MEAS - LVPWD: 0.73 CM
BH CV ECHO MEAS - LVPWS: 1.7 CM
BH CV ECHO MEAS - RVDD: 1.3 CM
BH CV ECHO MEAS - SI(CUBED): 60.8 ML/M^2
BH CV ECHO MEAS - SI(MOD-SP4): 34.6 ML/M^2
BH CV ECHO MEAS - SI(TEICH): 51.3 ML/M^2
BH CV ECHO MEAS - SV(CUBED): 107.1 ML
BH CV ECHO MEAS - SV(MOD-SP4): 61 ML
BH CV ECHO MEAS - SV(TEICH): 90.5 ML
CK MB SERPL-CCNC: 2 NG/ML (ref 0–5)
CK MB SERPL-CCNC: 2.02 NG/ML (ref 0–5)
CK MB SERPL-CCNC: 2.21 NG/ML (ref 0–5)
CK MB SERPL-RTO: 2.9 % (ref 0–3)
CK MB SERPL-RTO: 2.9 % (ref 0–3)
CK MB SERPL-RTO: 3.1 % (ref 0–3)
CK SERPL-CCNC: 66 U/L (ref 24–173)
CK SERPL-CCNC: 69 U/L (ref 24–173)
CK SERPL-CCNC: 75 U/L (ref 24–173)
LV EF 2D ECHO EST: 65 %
MAGNESIUM SERPL-MCNC: 1.9 MG/DL (ref 1.7–2.6)
MYOGLOBIN SERPL-MCNC: 65 NG/ML (ref 0–109)
MYOGLOBIN SERPL-MCNC: 72 NG/ML (ref 0–109)
MYOGLOBIN SERPL-MCNC: 73 NG/ML (ref 0–109)
TROPONIN I SERPL-MCNC: 0.04 NG/ML
TROPONIN I SERPL-MCNC: 0.05 NG/ML
TROPONIN I SERPL-MCNC: 0.06 NG/ML
TROPONIN I SERPL-MCNC: 0.07 NG/ML

## 2017-03-06 PROCEDURE — 94799 UNLISTED PULMONARY SVC/PX: CPT

## 2017-03-06 PROCEDURE — G0378 HOSPITAL OBSERVATION PER HR: HCPCS

## 2017-03-06 PROCEDURE — 82553 CREATINE MB FRACTION: CPT | Performed by: INTERNAL MEDICINE

## 2017-03-06 PROCEDURE — 82550 ASSAY OF CK (CPK): CPT | Performed by: INTERNAL MEDICINE

## 2017-03-06 PROCEDURE — 93005 ELECTROCARDIOGRAM TRACING: CPT | Performed by: INTERNAL MEDICINE

## 2017-03-06 PROCEDURE — 93010 ELECTROCARDIOGRAM REPORT: CPT | Performed by: INTERNAL MEDICINE

## 2017-03-06 PROCEDURE — 84484 ASSAY OF TROPONIN QUANT: CPT | Performed by: INTERNAL MEDICINE

## 2017-03-06 PROCEDURE — 83874 ASSAY OF MYOGLOBIN: CPT | Performed by: INTERNAL MEDICINE

## 2017-03-06 PROCEDURE — 99223 1ST HOSP IP/OBS HIGH 75: CPT | Performed by: INTERNAL MEDICINE

## 2017-03-06 PROCEDURE — 94640 AIRWAY INHALATION TREATMENT: CPT

## 2017-03-06 PROCEDURE — 83735 ASSAY OF MAGNESIUM: CPT | Performed by: INTERNAL MEDICINE

## 2017-03-06 PROCEDURE — 93308 TTE F-UP OR LMTD: CPT | Performed by: INTERNAL MEDICINE

## 2017-03-06 PROCEDURE — 93308 TTE F-UP OR LMTD: CPT

## 2017-03-06 PROCEDURE — 99222 1ST HOSP IP/OBS MODERATE 55: CPT | Performed by: INTERNAL MEDICINE

## 2017-03-06 RX ORDER — ISOSORBIDE MONONITRATE 60 MG/1
60 TABLET, EXTENDED RELEASE ORAL
Status: DISCONTINUED | OUTPATIENT
Start: 2017-03-06 | End: 2017-03-11

## 2017-03-06 RX ORDER — CARVEDILOL 6.25 MG/1
6.25 TABLET ORAL EVERY 12 HOURS SCHEDULED
Status: DISCONTINUED | OUTPATIENT
Start: 2017-03-06 | End: 2017-03-11

## 2017-03-06 RX ORDER — ASPIRIN 325 MG
325 TABLET ORAL DAILY
Status: DISCONTINUED | OUTPATIENT
Start: 2017-03-06 | End: 2017-03-10

## 2017-03-06 RX ORDER — MONTELUKAST SODIUM 10 MG/1
10 TABLET ORAL NIGHTLY
Status: DISCONTINUED | OUTPATIENT
Start: 2017-03-06 | End: 2017-03-11 | Stop reason: HOSPADM

## 2017-03-06 RX ORDER — NITROGLYCERIN 0.4 MG/1
0.4 TABLET SUBLINGUAL
Status: DISCONTINUED | OUTPATIENT
Start: 2017-03-06 | End: 2017-03-10

## 2017-03-06 RX ORDER — AMLODIPINE BESYLATE 5 MG/1
5 TABLET ORAL
Status: DISCONTINUED | OUTPATIENT
Start: 2017-03-06 | End: 2017-03-06

## 2017-03-06 RX ORDER — SODIUM CHLORIDE 0.9 % (FLUSH) 0.9 %
1-10 SYRINGE (ML) INJECTION AS NEEDED
Status: DISCONTINUED | OUTPATIENT
Start: 2017-03-06 | End: 2017-03-11

## 2017-03-06 RX ORDER — GABAPENTIN 300 MG/1
300 CAPSULE ORAL NIGHTLY PRN
Status: DISCONTINUED | OUTPATIENT
Start: 2017-03-06 | End: 2017-03-06

## 2017-03-06 RX ORDER — CETIRIZINE HYDROCHLORIDE 10 MG/1
10 TABLET ORAL DAILY
Status: DISCONTINUED | OUTPATIENT
Start: 2017-03-06 | End: 2017-03-07

## 2017-03-06 RX ORDER — BUDESONIDE 0.5 MG/2ML
0.5 INHALANT ORAL
Status: DISCONTINUED | OUTPATIENT
Start: 2017-03-06 | End: 2017-03-11

## 2017-03-06 RX ORDER — ROSUVASTATIN CALCIUM 10 MG/1
5 TABLET, COATED ORAL NIGHTLY
Status: DISCONTINUED | OUTPATIENT
Start: 2017-03-06 | End: 2017-03-11 | Stop reason: HOSPADM

## 2017-03-06 RX ORDER — I-VITE, TAB 1000-60-2MG (60/BT) 300MCG-200
1 TAB ORAL NIGHTLY
Status: DISCONTINUED | OUTPATIENT
Start: 2017-03-06 | End: 2017-03-11 | Stop reason: HOSPADM

## 2017-03-06 RX ORDER — MULTIVITAMIN
1 TABLET ORAL DAILY
Status: DISCONTINUED | OUTPATIENT
Start: 2017-03-06 | End: 2017-03-11 | Stop reason: HOSPADM

## 2017-03-06 RX ADMIN — Medication 1 TABLET: at 09:16

## 2017-03-06 RX ADMIN — I-VITE, TAB 1000-60-2MG (60/BT) 1 TABLET: TAB at 20:40

## 2017-03-06 RX ADMIN — CALCIUM CARBONATE-VITAMIN D TAB 500 MG-200 UNIT 500 MG: 500-200 TAB at 09:16

## 2017-03-06 RX ADMIN — CALCIUM CARBONATE-VITAMIN D TAB 500 MG-200 UNIT 500 MG: 500-200 TAB at 17:35

## 2017-03-06 RX ADMIN — CETIRIZINE HYDROCHLORIDE 10 MG: 10 TABLET ORAL at 09:16

## 2017-03-06 RX ADMIN — MONTELUKAST SODIUM 10 MG: 10 TABLET ORAL at 20:40

## 2017-03-06 RX ADMIN — ISOSORBIDE MONONITRATE 60 MG: 60 TABLET, EXTENDED RELEASE ORAL at 12:30

## 2017-03-06 RX ADMIN — BUDESONIDE 0.5 MG: 0.5 INHALANT RESPIRATORY (INHALATION) at 18:31

## 2017-03-06 RX ADMIN — CARVEDILOL 6.25 MG: 6.25 TABLET, FILM COATED ORAL at 12:30

## 2017-03-06 RX ADMIN — CARVEDILOL 6.25 MG: 6.25 TABLET, FILM COATED ORAL at 20:40

## 2017-03-06 RX ADMIN — APIXABAN 2.5 MG: 2.5 TABLET, FILM COATED ORAL at 02:30

## 2017-03-06 RX ADMIN — ASPIRIN 325 MG: 325 TABLET ORAL at 09:16

## 2017-03-06 RX ADMIN — BUDESONIDE 0.5 MG: 0.5 INHALANT RESPIRATORY (INHALATION) at 06:56

## 2017-03-06 RX ADMIN — APIXABAN 2.5 MG: 2.5 TABLET, FILM COATED ORAL at 09:16

## 2017-03-06 RX ADMIN — APIXABAN 2.5 MG: 2.5 TABLET, FILM COATED ORAL at 20:40
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
## 2017-03-06 NOTE — ED NOTES
THIS NURSE WAS AT BEDSIDE TALKING WITH PT PT STARTED COMPLAINING OF CHEST PAIN AND GRABBED JAW AND CHEST MD MADE AWARE ORDERS RECIEVED     Mary Mendiola RN  03/05/17 [id number]

## 2017-03-06 NOTE — PLAN OF CARE
Problem: Patient Care Overview (Adult)  Goal: Plan of Care Review  Outcome: Ongoing (interventions implemented as appropriate)    Problem: Pain, Acute (Adult)  Goal: Identify Related Risk Factors and Signs and Symptoms  Outcome: Outcome(s) achieved Date Met:  03/06/17  Goal: Acceptable Pain Control/Comfort Level  Outcome: Ongoing (interventions implemented as appropriate)    Problem: Fall Risk (Adult)  Goal: Identify Related Risk Factors and Signs and Symptoms  Outcome: Ongoing (interventions implemented as appropriate)  Goal: Absence of Falls  Outcome: Ongoing (interventions implemented as appropriate)
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
## 2017-03-06 NOTE — PROGRESS NOTES
Discharge Planning Assessment   Fabrice     Patient Name: Cici Veliz  MRN: [id number]  Today's Date: 3/6/2017    Admit Date: 3/5/2017    Discharge Needs Assessment       03/06/17 1422    Living Environment    Lives With alone   Pt lives at home alone.    Quality Of Family Relationships involved;supportive    Able to Return to Prior Living Arrangements yes    Discharge Needs Assessment    Outpatient/Agency/Support Group Needs --   PCP is Dr. Cleary. Pt utilizes [facility] for prescriptions.     Community Agency Name(S) --   Pt does not utilize home health services.     Equipment Currently Used at Home respiratory supplies;commode;shower chair;walker, standard;wheelchair    Equipment Needed After Discharge none    Transportation Available car;family or friend will provide    Discharge Disposition home or self-care      Discharge Plan       03/06/17 1423    Case Management/Social Work Plan    Plan Pt lives at home alone and plans to return home at discharge. Pt does not utilize home health services. Pt utilizes a nebulizer, bed side commode, shower chair, standard walker, and wheelchair. SS will follow and assist as needed with discharge planning.    Patient/Family In Agreement With Plan yes     Demographic Summary       03/06/17 1421    Referral Information    Admission Type observation    Referral Source nursing    Reason For Consult discharge planning      Legal       03/06/17 1421    Legal    Legal Comments Pt's POA is her son, Carson. Pt states having an advance directive.      Josephine Storm

## 2017-03-06 NOTE — ED NOTES
PT STATES THAT SHE STARTED HAVING PAIN IN HER JAW AROUND 6:45 PM AND THEN WENT INTO HER CHEST AND WAS CONTINOUS AROUND 7:30 PM PT STATES THAT THIS IS HOW HER CHEST PAIN STARTS. PT DENIES NAUSEA AND VOMITING PT STATES THAT AT THE WORST THE PAIN WAS A 7 BUT NOW LYING ON THE STRETCHER SHE ISN'T HAVING ANY PAIN      Mary Mendiola RN  03/05/17 2050
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
## 2017-03-06 NOTE — PROGRESS NOTES
Follow-up on the history and physical done by Dr. Nickerson.  Troponins minimally elevated but these have been elevated chronically.  It is actually lower than it was in January.  Patient is pain-free at current time.  Patient does get jaw pain she relates this during exertion and not during eating.  She has no true jaw claudication type symptoms.  She wanted to go to get a cardiac catheterization Mickie by Dr. Orellana discussed with Mickie and after discussing the case apparently Mickie decided not to pursue this.  Recommended medical management.  This being the case medications are being adjusted, patient however is being kept nothing by mouth after midnight in case a cardiac catheterization needs to be done.  She did have a recent equivocal stress test but a cardiac catheterization within the last 2 years that did not show any significantly occlusive disease.  Patient is on aspirin, Eliquis, nitrate, statin, beta blocker.  Will follow blood pressure at times getting low I think she is at maximum medical therapy.  I will check a sedimentation rate in the a.m.  Chest x-ray negative.    Vitals noted, wide blood pressure fluctuations, telemetry is paced, lungs are clear, heart regular rhythm without murmur, abdomen is soft benign, TMJ joints nontender, no palpable temporal artery cords.  Abdomen is soft and benign.    EKG paced

## 2017-03-07 LAB
ALBUMIN SERPL-MCNC: 3.4 G/DL (ref 3.4–4.8)
ALBUMIN/GLOB SERPL: 1.5 G/DL (ref 1.5–2.5)
ALP SERPL-CCNC: 62 U/L (ref 35–104)
ALT SERPL W P-5'-P-CCNC: 12 U/L (ref 10–36)
ANION GAP SERPL CALCULATED.3IONS-SCNC: 3.5 MMOL/L (ref 3.6–11.2)
AST SERPL-CCNC: 23 U/L (ref 10–30)
BASOPHILS # BLD AUTO: 0.01 10*3/MM3 (ref 0–0.3)
BASOPHILS NFR BLD AUTO: 0.1 % (ref 0–2)
BILIRUB SERPL-MCNC: 0.3 MG/DL (ref 0.2–1.8)
BUN BLD-MCNC: 24 MG/DL (ref 7–21)
BUN/CREAT SERPL: 21.2 (ref 7–25)
CALCIUM SPEC-SCNC: 8.8 MG/DL (ref 7.7–10)
CHLORIDE SERPL-SCNC: 109 MMOL/L (ref 99–112)
CHOLEST SERPL-MCNC: 138 MG/DL (ref 0–200)
CK MB SERPL-CCNC: 1.23 NG/ML (ref 0–5)
CK MB SERPL-RTO: 2.2 % (ref 0–3)
CK SERPL-CCNC: 57 U/L (ref 24–173)
CO2 SERPL-SCNC: 26.5 MMOL/L (ref 24.3–31.9)
CREAT BLD-MCNC: 1.13 MG/DL (ref 0.43–1.29)
DEPRECATED RDW RBC AUTO: 45.8 FL (ref 37–54)
EOSINOPHIL # BLD AUTO: 0.18 10*3/MM3 (ref 0–0.7)
EOSINOPHIL NFR BLD AUTO: 2.7 % (ref 0–7)
ERYTHROCYTE [DISTWIDTH] IN BLOOD BY AUTOMATED COUNT: 14.2 % (ref 11.5–14.5)
ERYTHROCYTE [SEDIMENTATION RATE] IN BLOOD: 16 MM/HR (ref 0–30)
GFR SERPL CREATININE-BSD FRML MDRD: 45 ML/MIN/1.73
GLOBULIN UR ELPH-MCNC: 2.2 GM/DL
GLUCOSE BLD-MCNC: 90 MG/DL (ref 70–110)
HCT VFR BLD AUTO: 34.4 % (ref 37–47)
HCT VFR BLD AUTO: 35.9 % (ref 37–47)
HDLC SERPL-MCNC: 43 MG/DL (ref 60–100)
HGB BLD-MCNC: 10.6 G/DL (ref 12–16)
HGB BLD-MCNC: 11.5 G/DL (ref 12–16)
IMM GRANULOCYTES # BLD: 0.01 10*3/MM3 (ref 0–0.03)
IMM GRANULOCYTES NFR BLD: 0.1 % (ref 0–0.5)
LDLC SERPL CALC-MCNC: 83 MG/DL (ref 0–100)
LDLC/HDLC SERPL: 1.93 {RATIO}
LYMPHOCYTES # BLD AUTO: 1.65 10*3/MM3 (ref 1–3)
LYMPHOCYTES NFR BLD AUTO: 24.7 % (ref 16–46)
MCH RBC QN AUTO: 27.8 PG (ref 27–33)
MCHC RBC AUTO-ENTMCNC: 30.8 G/DL (ref 33–37)
MCV RBC AUTO: 90.3 FL (ref 80–94)
MONOCYTES # BLD AUTO: 0.76 10*3/MM3 (ref 0.1–0.9)
MONOCYTES NFR BLD AUTO: 11.4 % (ref 0–12)
NEUTROPHILS # BLD AUTO: 4.07 10*3/MM3 (ref 1.4–6.5)
NEUTROPHILS NFR BLD AUTO: 61 % (ref 40–75)
OSMOLALITY SERPL CALC.SUM OF ELEC: 281.1 MOSM/KG (ref 273–305)
PLATELET # BLD AUTO: 186 10*3/MM3 (ref 130–400)
PMV BLD AUTO: 10.8 FL (ref 6–10)
POTASSIUM BLD-SCNC: 5.1 MMOL/L (ref 3.5–5.3)
PROT SERPL-MCNC: 5.6 G/DL (ref 6–8)
RBC # BLD AUTO: 3.81 10*6/MM3 (ref 4.2–5.4)
SODIUM BLD-SCNC: 139 MMOL/L (ref 135–153)
TRIGL SERPL-MCNC: 59 MG/DL (ref 0–150)
TROPONIN I SERPL-MCNC: 0.03 NG/ML
VLDLC SERPL-MCNC: 11.8 MG/DL
WBC NRBC COR # BLD: 6.68 10*3/MM3 (ref 4.5–12.5)

## 2017-03-07 PROCEDURE — 85025 COMPLETE CBC W/AUTO DIFF WBC: CPT | Performed by: INTERNAL MEDICINE

## 2017-03-07 PROCEDURE — 93005 ELECTROCARDIOGRAM TRACING: CPT | Performed by: INTERNAL MEDICINE

## 2017-03-07 PROCEDURE — 94799 UNLISTED PULMONARY SVC/PX: CPT

## 2017-03-07 PROCEDURE — 99233 SBSQ HOSP IP/OBS HIGH 50: CPT | Performed by: INTERNAL MEDICINE

## 2017-03-07 PROCEDURE — 93010 ELECTROCARDIOGRAM REPORT: CPT | Performed by: INTERNAL MEDICINE

## 2017-03-07 PROCEDURE — 85014 HEMATOCRIT: CPT | Performed by: INTERNAL MEDICINE

## 2017-03-07 PROCEDURE — 82553 CREATINE MB FRACTION: CPT | Performed by: INTERNAL MEDICINE

## 2017-03-07 PROCEDURE — 82550 ASSAY OF CK (CPK): CPT | Performed by: INTERNAL MEDICINE

## 2017-03-07 PROCEDURE — G0378 HOSPITAL OBSERVATION PER HR: HCPCS

## 2017-03-07 PROCEDURE — 80053 COMPREHEN METABOLIC PANEL: CPT | Performed by: INTERNAL MEDICINE

## 2017-03-07 PROCEDURE — 85652 RBC SED RATE AUTOMATED: CPT | Performed by: INTERNAL MEDICINE

## 2017-03-07 PROCEDURE — 84484 ASSAY OF TROPONIN QUANT: CPT | Performed by: INTERNAL MEDICINE

## 2017-03-07 PROCEDURE — 85018 HEMOGLOBIN: CPT | Performed by: INTERNAL MEDICINE

## 2017-03-07 PROCEDURE — 99232 SBSQ HOSP IP/OBS MODERATE 35: CPT | Performed by: INTERNAL MEDICINE

## 2017-03-07 PROCEDURE — 80061 LIPID PANEL: CPT | Performed by: INTERNAL MEDICINE

## 2017-03-07 RX ORDER — SODIUM CHLORIDE 9 MG/ML
INJECTION, SOLUTION INTRAVENOUS
Status: DISPENSED
Start: 2017-03-07 | End: 2017-03-08

## 2017-03-07 RX ORDER — CETIRIZINE HYDROCHLORIDE 10 MG/1
5 TABLET ORAL DAILY
Status: DISCONTINUED | OUTPATIENT
Start: 2017-03-08 | End: 2017-03-11 | Stop reason: HOSPADM

## 2017-03-07 RX ADMIN — BUDESONIDE 0.5 MG: 0.5 INHALANT RESPIRATORY (INHALATION) at 07:00

## 2017-03-07 RX ADMIN — CARVEDILOL 6.25 MG: 6.25 TABLET, FILM COATED ORAL at 19:26

## 2017-03-07 RX ADMIN — I-VITE, TAB 1000-60-2MG (60/BT) 1 TABLET: TAB at 19:56

## 2017-03-07 RX ADMIN — CALCIUM CARBONATE-VITAMIN D TAB 500 MG-200 UNIT 500 MG: 500-200 TAB at 17:42

## 2017-03-07 RX ADMIN — Medication 1 TABLET: at 08:31

## 2017-03-07 RX ADMIN — APIXABAN 2.5 MG: 2.5 TABLET, FILM COATED ORAL at 08:31

## 2017-03-07 RX ADMIN — BUDESONIDE 0.5 MG: 0.5 INHALANT RESPIRATORY (INHALATION) at 18:54

## 2017-03-07 RX ADMIN — CARVEDILOL 6.25 MG: 6.25 TABLET, FILM COATED ORAL at 08:31

## 2017-03-07 RX ADMIN — MONTELUKAST SODIUM 10 MG: 10 TABLET ORAL at 19:56

## 2017-03-07 RX ADMIN — ASPIRIN 325 MG: 325 TABLET ORAL at 08:35

## 2017-03-07 RX ADMIN — CETIRIZINE HYDROCHLORIDE 10 MG: 10 TABLET ORAL at 08:31

## 2017-03-07 RX ADMIN — NITROGLYCERIN 0.5 INCH: 20 OINTMENT TOPICAL at 20:22

## 2017-03-07 RX ADMIN — CALCIUM CARBONATE-VITAMIN D TAB 500 MG-200 UNIT 500 MG: 500-200 TAB at 08:31

## 2017-03-07 RX ADMIN — ISOSORBIDE MONONITRATE 60 MG: 60 TABLET, EXTENDED RELEASE ORAL at 08:31

## 2017-03-07 NOTE — PLAN OF CARE
Problem: Patient Care Overview (Adult)  Goal: Plan of Care Review  Outcome: Ongoing (interventions implemented as appropriate)  Goal: Adult Individualization and Mutuality  Outcome: Ongoing (interventions implemented as appropriate)  Goal: Discharge Needs Assessment  Outcome: Ongoing (interventions implemented as appropriate)    Problem: Pain, Acute (Adult)  Goal: Acceptable Pain Control/Comfort Level  Outcome: Ongoing (interventions implemented as appropriate)    Problem: Fall Risk (Adult)  Goal: Identify Related Risk Factors and Signs and Symptoms  Outcome: Ongoing (interventions implemented as appropriate)  Goal: Absence of Falls  Outcome: Ongoing (interventions implemented as appropriate)

## 2017-03-07 NOTE — NURSING NOTE
Patient's daughter at bedside requesting to get patient out of bed for walking. Spoke with Sheree Delgado states to keep patient NPO and do not get her out of bed for walking until seen by MD.

## 2017-03-07 NOTE — PROGRESS NOTES
LOS: 0 days     Name: Cici Veliz  Age/Sex: 89 y.o. female  :  1925        PCP: Emily Cleary MD  REF: Emily Cleary, *    Principal Problem:    Chest pain    Chief complaint: Follow-up of chest and jaw pain.    Subjective  The patient is a 89-year-old white female with a history of paroxysmal atrial fibrillation on Eliquis for stroke prevention, coronary artery disease status post stent placement in the past, COPD, hypertension, dyslipidemia, sick sinus syndrome status post pacemaker placement and chronic kidney disease who comes to the hospital for an episode of chest pain. According to the patient she's been having intermittent episodes of chest pain over the last year that usually lasts a few seconds and resolve spontaneously however her last month he's been getting more frequent and are now occurring daily. There are also lasting for significantly longer periods of time. According to the patient she started having chest pain yesterday that was midsternal, oppressive, radiated to her jaw and was 8/10 on the pain scale. She reports associated dizziness and shortness of breath. She states the pain occurred at rest and denied any ameliorating or exacerbating factors. She was therefore brought to the ER for further evaluation. Serial troponins were noted to be minimally elevated and lower than the patient's baseline. However the patient continues to complain of chest pain today. I review of her EKGs showed progressive T-wave inversion and mild ST depression in lateral leads that is not present on admission. The patient was recently admitted approximately one month ago with similar complaints. She underwent stress test which showed a small sized lateral infarct with no evidence of ischemia.      Interval History: Patient had some chest pains and troponins history but feels better today.  She denies any significant dyspnea.    Review of Systems   Constitution: Negative for chills and  "fever.   HENT: Negative for headaches, nosebleeds and sore throat.    Cardiovascular: Positive for chest pain.   Respiratory: Negative for cough, hemoptysis and wheezing.    Gastrointestinal: Negative for abdominal pain, hematemesis, hematochezia, melena, nausea and vomiting.   Genitourinary: Negative for dysuria and hematuria.       Vital Signs  Temp:  [97.7 °F (36.5 °C)-98.2 °F (36.8 °C)] 98.2 °F (36.8 °C)  Heart Rate:  [60-74] 74  Resp:  [16-20] 16  BP: ()/(59-83) 135/83  Vital Signs (last 72 hrs)       03/04 0700  -  03/05 0659 03/05 0700  -  03/06 0659 03/06 0700  -  03/07 0659 03/07 0700  -  03/07 1328   Most Recent    Temp (°F)   97.5 -  98.3    97.7 -  98      98.2     98.2 (36.8)    Heart Rate   60 -  70    60 -  68      74     74    Resp   16 -  (!)71    18 -  20      16     16    BP   (!)80/48 -  (!) 192/80    96/59 -  154/63      135/83     135/83    SpO2 (%)   95 -  100    98 -  99      97     97        Body mass index is 28.61 kg/(m^2).    Intake/Output Summary (Last 24 hours) at 03/07/17 1328  Last data filed at 03/07/17 0700   Gross per 24 hour   Intake    480 ml   Output   1125 ml   Net   -645 ml     Objective:  Vital signs: (most recent): Blood pressure 135/83, pulse 74, temperature 98.2 °F (36.8 °C), temperature source Axillary, resp. rate 16, height 63\" (160 cm), weight 161 lb 8 oz (73.3 kg), SpO2 97 %.  No fever.                  Physical Exam:     General Appearance:    Alert, cooperative, in no acute distress   Head:    Normocephalic, without obvious abnormality, atraumatic   Eyes:            Conjunctivae and sclerae normal, no   icterus, no pallor, corneas clear.   Ears:    Ears appear intact with no abnormalities noted   Throat:   No oral lesions, no thrush, oral mucosa moist   Neck:   No adenopathy, supple, trachea midline, no thyromegaly, no   carotid bruit, no JVD   Back:     No kyphosis present, no scoliosis present, no skin lesions,      erythema or scars, no tenderness to " percussion or                   palpation,   range of motion normal   Lungs:     Clear to auscultation,respirations regular, even and                  unlabored    Heart:    Regular rhythm and normal rate, normal S1 and S2, no            murmur, no gallop, no rub, no click   Chest Wall:    No abnormalities observed   Abdomen:     Normal bowel sounds, no masses, no organomegaly, soft        non-tender, non-distended, no guarding, no rebound                tenderness   Rectal:     Deferred   Extremities:   Moves all extremities well, no edema, no cyanosis, no             redness   Pulses:   Pulses palpable 1+ and equal bilaterally   Skin:   No bleeding, bruising or rash       Neurologic:   Cranial nerves 2 - 12 grossly intact, sensation intact, DTR       present and equal bilaterally          Procedures    Results Review:     Results from last 7 days  Lab Units 03/07/17  0524 03/05/17  2117   WBC 10*3/mm3 6.68 5.67   HEMOGLOBIN g/dL 10.6* 11.7*   PLATELETS 10*3/mm3 186 201       Results from last 7 days  Lab Units 03/07/17  0524 03/05/17  2117   SODIUM mmol/L 139 140   POTASSIUM mmol/L 5.1 4.6   CHLORIDE mmol/L 109 107   TOTAL CO2 mmol/L 26.5 27.2   BUN mg/dL 24* 31*   CREATININE mg/dL 1.13 1.19   CALCIUM mg/dL 8.8 9.6   GLUCOSE mg/dL 90 110   ALT (SGPT) U/L 12 12   AST (SGOT) U/L 23 24       Results from last 7 days  Lab Units 03/06/17  1635 03/06/17  0849 03/06/17  0516 03/06/17  0308 03/05/17  2117   CK TOTAL U/L  --  66 75 69 80   TROPONIN I ng/mL 0.038 0.048* 0.063* 0.066* 0.046*   CKMB ng/mL  --  2.02 2.21 2.00 2.16   MYOGLOBIN ng/mL  --  65.0 72.0 73.0 70.0     Lab Results   Component Value Date    INR 0.94 01/22/2017    INR 0.92 01/13/2017    INR 0.95 04/18/2015    INR 0.94 04/26/2014     Lab Results   Component Value Date    MG 1.9 03/06/2017     Lab Results   Component Value Date    TSH 1.613 01/14/2017    CHLPL 156 05/09/2016    TRIG 59 03/07/2017    HDL 43 (L) 03/07/2017    LDL 81 05/09/2016      Lab  Results   Component Value Date    .0 (H) 03/05/2017    .0 (H) 01/12/2017     (H) 04/26/2014         ECG          ECG/EMG Results (last 24 hours)     Procedure Component Value Units Date/Time    Adult Transthoracic Echo Limited [[id number]] Collected:  03/06/17 1327     BSA 1.8 m^2 Updated:  03/06/17 1429      CV ECHO ROSALBA - RVDD 1.3 cm      IVSd 1.4 cm      IVSs 1.6 cm      LVIDd 5.1 cm      LVIDs 2.9 cm      LVPWd 0.73 cm       CV ECHO ROSALBA - LVPWS 1.7 cm      IVS/LVPW 1.9      FS 42.4 %      EDV(Teich) 123.6 ml      ESV(Teich) 33.1 ml      EF(Teich) 73.2 %     Narrative:         · The study is technically difficult for diagnosis. Limited study fro EF.  · Left ventricular function is normal. Estimated EF = 65%.  · Left ventricular wall thickness is consistent with mild concentric   hypertrophy.  · There is mild noncoronary cusp left coronary cusp right coronary cusp   calcification present within the aortic valve.  · Left atrial cavity size is moderate-to-severely dilated.  · There is no evidence of pericardial effusion.     Compared to the study of 1/14/2017, the EF appears improved from previous.          Cardiac catheterization on 4/20/2015 revealed:  FINDINGS IN DETAIL:  LEFT MAIN CORONARY ARTERY: The left main coronary is a large vessel which  bifurcates into LAD and circumflex arteries. The left main coronary is free of  atherosclerotic disease.   LEFT ANTERIOR DESCENDING ARTERY: The left anterior descending artery is a  moderate size vessel which reaches the apex of the ventricle and gives rise to  a moderate-sized diagonal branch. There is a 30% lesion in the midportion of  the LAD.   CIRCUMFLEX ARTERY: Circumflex artery is a large vessel which gives rise to 1  large obtuse marginal branch. There are previously implanted stents in the  proximal portion of the circumflex artery extending into the first obtuse  marginal branch. There is no significant in-stent restenosis noted.   RIGHT  CORONARY ARTERY: The right coronary artery is a large vessel which gives  rise to the posterior descending artery and several posterolateral branches.  There is a previously implanted stent in the proximal portion of the right  coronary artery. There is an area of 30% in-stent restenosis.      FINAL IMPRESSION AND PLAN: Overall, it is my impression that the patient does  not have hemodynamically significant coronary artery disease. She will undergo  further risk factor modification as appropriate.     Lexiscan sestamibi study on 1/14/2017 revealed:  · Myocardial perfusion imaging indicates a small-sized infarct located in the lateral wall with no significant ischemia noted.  · Left ventricular ejection fraction is normal (Calculated EF = 56%). with mild anterior wall dyskinesis.  · Impressions are consistent with an intermediate risk study.  · There is no prior study available for comparison.        Echo   Lab Results   Component Value Date    ECHOEFEST 65 03/06/2017          I reviewed the patient's new clinical results.    Telemetry: AV Paced rhythm in the 60s.       Medication Review:     apixaban 2.5 mg Oral Q12H   aspirin 325 mg Oral Daily   budesonide 0.5 mg Nebulization BID - RT   calcium-vitamin D 500 mg Oral BID   carvedilol 6.25 mg Oral Q12H   cetirizine 10 mg Oral Daily   I-shakir 1 tablet Oral Nightly   isosorbide mononitrate 60 mg Oral Q24H   montelukast 10 mg Oral Nightly   multivitamin 1 tablet Oral Daily   rosuvastatin 5 mg Oral Nightly          Assessment:    1. 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  2. Mild elevation of troponin up to 0.06 which is trending down.  3. ASCVD, status post previous stenting of the left circumflex and RCA with last data catheterization on 4/20/2015 by Dr. Cash revealing patent stents in the left circumflex and about 30% in-stent restenosis of the RCA and about 30% stenosis in the mid LAD  4. Chronic kidney disease  with an estimated GFR in the 40s.  5. Essential hypertension, controlled.  6. Paroxysmal atrial fibrillation, currently in a V sequential paced rhythm.  7. Chronic oral anticoagulation with Eliquis with no bleeding issues.  Patient received a dose early this morning.    Recommendations:    1. Given her age, I have discussed with her about the options of continued optimization of medical therapy with addition of Ranexa in addition to the beta that was started yesterday and nitrates and see how she does versus further cardiac evaluation with cardiac catheterization.  I also noted the discussion Dr. Orellana had with Dr. Angulo who also recommended continued medical therapy as well.  Patient prefers to continue to try medical therapy for now and see how she does.  If she continues to have recurrent anginal symptoms then we'll consider cardiac catheterization.   2. Add Ranexa 500 mg by mouth twice a day.  3. Decrease aspirin to 162 mg daily.      I discussed the patients findings and my recommendations with patient.        BLAIR Harry  03/07/17  1:28 PM    Dragon disclaimer:  Much of this encounter note is an electronic transcription/translation of spoken language to printed text. The electronic translation of spoken language may permit erroneous, or at times, nonsensical words or phrases to be inadvertently transcribed; Although I have reviewed the note for such errors, some may still exist.

## 2017-03-07 NOTE — PROGRESS NOTES
"  I have seen the patient in conjunction with Trina MCCURDY       History of presenting illness:  Patient states that she is feeling okay.  Her blood pressure was reported as low earlier but this was probably artifactual.  She was asymptomatic with this.  She states that earlier this morning she did have some jaw pain more described as just an \"ache\" that occurred at rest and she really did not know how long exactly lasted.  Later today however she did get up and go to the bathroom and came back to bed and she states she did not feel the pain then.  This previously had been an exertional type pain.  She has tolerated a diet today.  She's having no abdominal pain and no edema that she knows of.    Vital Signs  Temp:  [97.7 °F (36.5 °C)-98.2 °F (36.8 °C)] 97.8 °F (36.6 °C)  Heart Rate:  [60-74] 65  Resp:  [16-20] 18  BP: ()/(44-83) 100/72  Body mass index is 28.61 kg/(m^2).      Intake/Output Summary (Last 24 hours) at 03/07/17 1715  Last data filed at 03/07/17 0700   Gross per 24 hour   Intake    480 ml   Output    825 ml   Net   -345 ml     Intake & Output (last 3 days)       03/04 0701 - 03/05 0700 03/05 0701 - 03/06 0700 03/06 0701 - 03/07 0700 03/07 0701 - 03/08 0700    P.O.  120 1080     Total Intake(mL/kg)  120 (1.6) 1080 (14.7)     Urine (mL/kg/hr)  250 1925 (1.1)     Total Output   250 1925      Net   -130 -845              Unmeasured Urine Occurrence   1 x           Physical exam:  Physical Exam   Constitutional: Well-developed, well-nourished.  Pleasant.  No distress  Head: Normocephalic and atraumatic.  Hearing is intact, mucous membranes are moist.  No trigger points on her mental area this seemed to reproduce her pain, speech is normal  Eyes:  Pupils are equal, round, . No scleral icterus.   Neck: Normal range of motion. Neck supple.  No JVD is noted   Cardiovascular: Normal rate, regular rhythm and normal heart sounds.  No murmur rub or gallop  Pulmonary/Chest: Bilateral breath sounds no rhonchus rales " or wheezing heard somewhat diminished posteriorly at the bases  Abdominal: Soft, flat, bowel sounds are active, nondistended nontender.  No peritoneal signs   Musculoskeletal: Strength is symmetric, no joint effusions noted.  No edema.  Neurological: Nonfocal, alert.    Skin: Skin is warm and dry.   Psychiatric:  Normal mood and affect.     Telemetry: paced, reviewed    Results Review:  Lab Results   Component Value Date    WBC 6.68 03/07/2017    HGB 10.6 (L) 03/07/2017    HCT 34.4 (L) 03/07/2017    MCV 90.3 03/07/2017     03/07/2017     Lab Results   Component Value Date    GLUCOSE 90 03/07/2017    BUN 24 (H) 03/07/2017    CREATININE 1.13 03/07/2017    EGFRIFNONA 45 (L) 03/07/2017    EGFRIFAFRI  09/16/2016      Comment:      <15 Indicative of kidney failure.    BCR 21.2 03/07/2017    CO2 26.5 03/07/2017    CALCIUM 8.8 03/07/2017    ALBUMIN 3.40 03/07/2017    LABIL2 1.5 03/07/2017    AST 23 03/07/2017    ALT 12 03/07/2017       Imaging Results (last 72 hours)     Procedure Component Value Units Date/Time    XR Chest 1 View [[id number]] Collected:  03/06/17 0752     Updated:  03/06/17 0754    Narrative:       EXAMINATION:  XR CHEST 1 VW-      CLINICAL INDICATION:     cp     TECHNIQUE:  XR CHEST 1 VW-       COMPARISON: January 13, 2017      FINDINGS:   Coarse interstitial markings suggestive of chronic interstitial lung  disease.   Heart size is stable.   No pneumothorax.   No pleural effusion.   Bony and soft tissue structures are unremarkable.            Impression:       Stable radiographic appearance of the chest.     This report was finalized on 3/6/2017 7:52 AM by Dr. Ranjith Mejias MD.              Discussed with Dr. Rivas      Assessment/Plan     Principal Problem:    Chest pain/jaw pain.  Sedimentation rate was normal.  There was an episode where her blood pressure was read as a low however recheck this was not confirmed.  Per nursing he was about the same in each arm.  Ranexa is to be added by  cardiology.  Depending on how she does overnight if the pain has subsided with the Ranexa would consider discharge tomorrow.  Patient otherwise is on aspirin, beta blocker, Imdur, statin.    Hemoglobin drift, possibly dilutional but she is off fluids now however her BUN is coming down.  I am however recheck an H&H and she is fully anticoagulated on Eliquis.    Coronary artery disease status post stent placement with cardiac catheterization less than 2 years ago showing insignificant disease and stents patent.    Paroxysmal atrial fibrillation with underlying paced rhythm now, on Eliquis for stroke prevention.        Hamilton Chacon MD  03/07/17  5:15 PM

## 2017-03-07 NOTE — PROGRESS NOTES
Addendum, discussed with cardiology, patient did have some recurrent jaw pain when she got up to the restroom after my note was finished.  Her systolic blood pressure still 105, she has not been started on Ranexa as of yet and he is going to start this.

## 2017-03-07 NOTE — PROGRESS NOTES
Pharmacy Renal Dosing Service Note:    Based on Ms. Veliz's estimated ClCr of 28 ml/min (using serum Cr of 1.13 mg/dL), her cetirizine has been adjusted to 5 mg daily.      Thank you.  Jessica Loo, Pharm.D.  3/7/2017  3:50 PM

## 2017-03-08 LAB
ANION GAP SERPL CALCULATED.3IONS-SCNC: 2.3 MMOL/L (ref 3.6–11.2)
APTT PPP: 27.3 SECONDS (ref 24.4–31)
APTT PPP: 47.5 SECONDS (ref 24.4–31)
BASOPHILS # BLD AUTO: 0.01 10*3/MM3 (ref 0–0.3)
BASOPHILS # BLD AUTO: 0.02 10*3/MM3 (ref 0–0.3)
BASOPHILS NFR BLD AUTO: 0.2 % (ref 0–2)
BASOPHILS NFR BLD AUTO: 0.3 % (ref 0–2)
BUN BLD-MCNC: 22 MG/DL (ref 7–21)
BUN/CREAT SERPL: 21.4 (ref 7–25)
CALCIUM SPEC-SCNC: 8.7 MG/DL (ref 7.7–10)
CHLORIDE SERPL-SCNC: 109 MMOL/L (ref 99–112)
CO2 SERPL-SCNC: 26.7 MMOL/L (ref 24.3–31.9)
CREAT BLD-MCNC: 1.03 MG/DL (ref 0.43–1.29)
DEPRECATED RDW RBC AUTO: 45.7 FL (ref 37–54)
DEPRECATED RDW RBC AUTO: 45.8 FL (ref 37–54)
EOSINOPHIL # BLD AUTO: 0.16 10*3/MM3 (ref 0–0.7)
EOSINOPHIL # BLD AUTO: 0.16 10*3/MM3 (ref 0–0.7)
EOSINOPHIL NFR BLD AUTO: 2.6 % (ref 0–7)
EOSINOPHIL NFR BLD AUTO: 2.8 % (ref 0–7)
ERYTHROCYTE [DISTWIDTH] IN BLOOD BY AUTOMATED COUNT: 14.2 % (ref 11.5–14.5)
ERYTHROCYTE [DISTWIDTH] IN BLOOD BY AUTOMATED COUNT: 14.2 % (ref 11.5–14.5)
GFR SERPL CREATININE-BSD FRML MDRD: 50 ML/MIN/1.73
GLUCOSE BLD-MCNC: 97 MG/DL (ref 70–110)
HCT VFR BLD AUTO: 34.8 % (ref 37–47)
HCT VFR BLD AUTO: 38.6 % (ref 37–47)
HGB BLD-MCNC: 10.7 G/DL (ref 12–16)
HGB BLD-MCNC: 12.1 G/DL (ref 12–16)
IMM GRANULOCYTES # BLD: 0 10*3/MM3 (ref 0–0.03)
IMM GRANULOCYTES # BLD: 0.01 10*3/MM3 (ref 0–0.03)
IMM GRANULOCYTES NFR BLD: 0 % (ref 0–0.5)
IMM GRANULOCYTES NFR BLD: 0.2 % (ref 0–0.5)
INR PPP: 0.97 (ref 0.8–1.1)
LYMPHOCYTES # BLD AUTO: 1.4 10*3/MM3 (ref 1–3)
LYMPHOCYTES # BLD AUTO: 1.95 10*3/MM3 (ref 1–3)
LYMPHOCYTES NFR BLD AUTO: 22.5 % (ref 16–46)
LYMPHOCYTES NFR BLD AUTO: 34 % (ref 16–46)
MCH RBC QN AUTO: 28 PG (ref 27–33)
MCH RBC QN AUTO: 28.2 PG (ref 27–33)
MCHC RBC AUTO-ENTMCNC: 30.7 G/DL (ref 33–37)
MCHC RBC AUTO-ENTMCNC: 31.3 G/DL (ref 33–37)
MCV RBC AUTO: 90 FL (ref 80–94)
MCV RBC AUTO: 91.1 FL (ref 80–94)
MONOCYTES # BLD AUTO: 0.63 10*3/MM3 (ref 0.1–0.9)
MONOCYTES # BLD AUTO: 0.67 10*3/MM3 (ref 0.1–0.9)
MONOCYTES NFR BLD AUTO: 10.1 % (ref 0–12)
MONOCYTES NFR BLD AUTO: 11.7 % (ref 0–12)
NEUTROPHILS # BLD AUTO: 2.94 10*3/MM3 (ref 1.4–6.5)
NEUTROPHILS # BLD AUTO: 4 10*3/MM3 (ref 1.4–6.5)
NEUTROPHILS NFR BLD AUTO: 51.1 % (ref 40–75)
NEUTROPHILS NFR BLD AUTO: 64.5 % (ref 40–75)
OSMOLALITY SERPL CALC.SUM OF ELEC: 278.9 MOSM/KG (ref 273–305)
PLATELET # BLD AUTO: 181 10*3/MM3 (ref 130–400)
PLATELET # BLD AUTO: 181 10*3/MM3 (ref 130–400)
PMV BLD AUTO: 10.6 FL (ref 6–10)
PMV BLD AUTO: 10.8 FL (ref 6–10)
POTASSIUM BLD-SCNC: 4.9 MMOL/L (ref 3.5–5.3)
PROTHROMBIN TIME: 10.7 SECONDS (ref 9.8–11.9)
RBC # BLD AUTO: 3.82 10*6/MM3 (ref 4.2–5.4)
RBC # BLD AUTO: 4.29 10*6/MM3 (ref 4.2–5.4)
SODIUM BLD-SCNC: 138 MMOL/L (ref 135–153)
TROPONIN I SERPL-MCNC: 0.03 NG/ML
TROPONIN I SERPL-MCNC: 0.03 NG/ML
WBC NRBC COR # BLD: 5.74 10*3/MM3 (ref 4.5–12.5)
WBC NRBC COR # BLD: 6.21 10*3/MM3 (ref 4.5–12.5)

## 2017-03-08 PROCEDURE — 97116 GAIT TRAINING THERAPY: CPT

## 2017-03-08 PROCEDURE — G8979 MOBILITY GOAL STATUS: HCPCS

## 2017-03-08 PROCEDURE — 97162 PT EVAL MOD COMPLEX 30 MIN: CPT

## 2017-03-08 PROCEDURE — 85610 PROTHROMBIN TIME: CPT | Performed by: INTERNAL MEDICINE

## 2017-03-08 PROCEDURE — 80048 BASIC METABOLIC PNL TOTAL CA: CPT | Performed by: INTERNAL MEDICINE

## 2017-03-08 PROCEDURE — 85730 THROMBOPLASTIN TIME PARTIAL: CPT | Performed by: INTERNAL MEDICINE

## 2017-03-08 PROCEDURE — G8978 MOBILITY CURRENT STATUS: HCPCS

## 2017-03-08 PROCEDURE — 84484 ASSAY OF TROPONIN QUANT: CPT | Performed by: INTERNAL MEDICINE

## 2017-03-08 PROCEDURE — 99233 SBSQ HOSP IP/OBS HIGH 50: CPT | Performed by: INTERNAL MEDICINE

## 2017-03-08 PROCEDURE — 94799 UNLISTED PULMONARY SVC/PX: CPT

## 2017-03-08 PROCEDURE — G0378 HOSPITAL OBSERVATION PER HR: HCPCS

## 2017-03-08 PROCEDURE — 85025 COMPLETE CBC W/AUTO DIFF WBC: CPT | Performed by: INTERNAL MEDICINE

## 2017-03-08 PROCEDURE — 97530 THERAPEUTIC ACTIVITIES: CPT

## 2017-03-08 PROCEDURE — 25010000002 HEPARIN (PORCINE) PER 1000 UNITS: Performed by: INTERNAL MEDICINE

## 2017-03-08 PROCEDURE — 99232 SBSQ HOSP IP/OBS MODERATE 35: CPT | Performed by: INTERNAL MEDICINE

## 2017-03-08 RX ORDER — HEPARIN SODIUM 5000 [USP'U]/ML
30 INJECTION, SOLUTION INTRAVENOUS; SUBCUTANEOUS AS NEEDED
Status: DISCONTINUED | OUTPATIENT
Start: 2017-03-08 | End: 2017-03-10

## 2017-03-08 RX ORDER — DIAZEPAM 2 MG/1
2 TABLET ORAL ONCE
Status: CANCELLED | OUTPATIENT
Start: 2017-03-09

## 2017-03-08 RX ORDER — RANOLAZINE 500 MG/1
500 TABLET, EXTENDED RELEASE ORAL EVERY 12 HOURS SCHEDULED
Status: DISCONTINUED | OUTPATIENT
Start: 2017-03-08 | End: 2017-03-11 | Stop reason: HOSPADM

## 2017-03-08 RX ORDER — MECLIZINE HCL 12.5 MG/1
12.5 TABLET ORAL 2 TIMES DAILY PRN
Status: DISCONTINUED | OUTPATIENT
Start: 2017-03-08 | End: 2017-03-11

## 2017-03-08 RX ADMIN — CETIRIZINE HYDROCHLORIDE 5 MG: 10 TABLET ORAL at 12:38

## 2017-03-08 RX ADMIN — HEPARIN SODIUM 2150 UNITS: 5000 INJECTION, SOLUTION INTRAVENOUS; SUBCUTANEOUS at 22:29

## 2017-03-08 RX ADMIN — ASPIRIN 325 MG: 325 TABLET ORAL at 12:37

## 2017-03-08 RX ADMIN — CALCIUM CARBONATE-VITAMIN D TAB 500 MG-200 UNIT 500 MG: 500-200 TAB at 16:44

## 2017-03-08 RX ADMIN — HEPARIN SODIUM 12 UNITS/KG/HR: 10000 INJECTION, SOLUTION INTRAVENOUS at 13:52

## 2017-03-08 RX ADMIN — ROSUVASTATIN CALCIUM 5 MG: 10 TABLET, COATED ORAL at 20:00

## 2017-03-08 RX ADMIN — I-VITE, TAB 1000-60-2MG (60/BT) 1 TABLET: TAB at 20:05

## 2017-03-08 RX ADMIN — NITROGLYCERIN 0.5 INCH: 20 OINTMENT TOPICAL at 20:05

## 2017-03-08 RX ADMIN — NITROGLYCERIN 0.5 INCH: 20 OINTMENT TOPICAL at 05:07

## 2017-03-08 RX ADMIN — CALCIUM CARBONATE-VITAMIN D TAB 500 MG-200 UNIT 500 MG: 500-200 TAB at 12:37

## 2017-03-08 RX ADMIN — MONTELUKAST SODIUM 10 MG: 10 TABLET ORAL at 20:05

## 2017-03-08 RX ADMIN — RANOLAZINE 500 MG: 500 TABLET, FILM COATED, EXTENDED RELEASE ORAL at 15:33

## 2017-03-08 RX ADMIN — BUDESONIDE 0.5 MG: 0.5 INHALANT RESPIRATORY (INHALATION) at 06:20

## 2017-03-08 RX ADMIN — CARVEDILOL 6.25 MG: 6.25 TABLET, FILM COATED ORAL at 20:04

## 2017-03-08 RX ADMIN — BUDESONIDE 0.5 MG: 0.5 INHALANT RESPIRATORY (INHALATION) at 19:40

## 2017-03-08 RX ADMIN — NITROGLYCERIN 0.5 INCH: 20 OINTMENT TOPICAL at 16:44

## 2017-03-08 RX ADMIN — Medication 1 TABLET: at 12:36

## 2017-03-08 RX ADMIN — ISOSORBIDE MONONITRATE 60 MG: 60 TABLET, EXTENDED RELEASE ORAL at 12:38

## 2017-03-08 RX ADMIN — RANOLAZINE 500 MG: 500 TABLET, FILM COATED, EXTENDED RELEASE ORAL at 20:05

## 2017-03-08 RX ADMIN — CARVEDILOL 6.25 MG: 6.25 TABLET, FILM COATED ORAL at 12:40

## 2017-03-08 NOTE — PROGRESS NOTES
LOS: 0 days     Name: Cici Veliz  Age/Sex: 89 y.o. female  :  1925        PCP: Emily Cleary MD  REF: Emily Cleary, *    Principal Problem:    Chest pain        Subjective  The patient is a 89-year-old white female with a history of paroxysmal atrial fibrillation on Eliquis for stroke prevention, coronary artery disease status post stent placement in the past, COPD, hypertension, dyslipidemia, sick sinus syndrome status post pacemaker placement and chronic kidney disease who comes to the hospital for an episode of chest pain. According to the patient she's been having intermittent episodes of chest pain over the last year that usually lasts a few seconds and resolve spontaneously however her last month he's been getting more frequent and are now occurring daily. There are also lasting for significantly longer periods of time. According to the patient she started having chest pain yesterday that was midsternal, oppressive, radiated to her jaw and was 8/10 on the pain scale. She reports associated dizziness and shortness of breath. She states the pain occurred at rest and denied any ameliorating or exacerbating factors. She was therefore brought to the ER for further evaluation. Serial troponins were noted to be minimally elevated and lower than the patient's baseline. However the patient continues to complain of chest pain today. I review of her EKGs showed progressive T-wave inversion and mild ST depression in lateral leads that is not present on admission. The patient was recently admitted approximately one month ago with similar complaints. She underwent stress test which showed a small sized lateral infarct with no evidence of ischemia.      Interval History: Patient continues to have some intermittent episodes of jaw pain and some chest pain especially when she rolls over in bed.  She currently denies any chest pain or jaw pain or shortness of breath.  She complains of dizziness  on moving her head at times but denies any ringing in the ears.  Denies any focal neurological symptoms.  Denies any headaches.    ROS    Vital Signs  Temp:  [97.8 °F (36.6 °C)-98.4 °F (36.9 °C)] 98 °F (36.7 °C)  Heart Rate:  [60-74] 63  Resp:  [16-20] 20  BP: ()/(44-83) 117/64  Vital Signs (last 72 hrs)       03/05 0700  -  03/06 0659 03/06 0700  -  03/07 0659 03/07 0700  -  03/08 0659 03/08 0700  -  03/08 1134   Most Recent    Temp (°F) 97.5 -  98.3    97.7 -  98    97.8 -  98.4       98 (36.7)    Heart Rate 60 -  70    60 -  71    60 -  74       63    Resp 16 -  (!)71    16 -  20    16 -  20       20    BP (!)80/48 -  (!) 192/80    96/59 -  154/63    (!)65/44 -  164/63       117/64    SpO2 (%) 95 -  100    97 -  99    95 -  99       99        Body mass index is 28.2 kg/(m^2).    Intake/Output Summary (Last 24 hours) at 03/08/17 1134  Last data filed at 03/08/17 0900   Gross per 24 hour   Intake      0 ml   Output   1825 ml   Net  -1825 ml     Objective        Physical Exam:     General Appearance:    Alert, cooperative, in no acute distress   Head:    Normocephalic, without obvious abnormality, atraumatic   Eyes:            Conjunctivae and sclerae normal, no   icterus, no pallor, corneas clear, PERRLA   Ears:    Ears appear intact with no abnormalities noted   Throat:   No oral lesions, no thrush, oral mucosa moist   Neck:   No adenopathy, supple, trachea midline, no thyromegaly, no   carotid bruit, no JVD   Lungs:     Clear to auscultation,respirations regular, even and                  unlabored    Heart:    Regular rhythm and normal rate, normal S1 and S2, no            murmur, no gallop, no rub, no click   Chest Wall:    No abnormalities observed   Abdomen:     Normal bowel sounds, no masses, no organomegaly, soft        non-tender, non-distended, no guarding, no rebound                tenderness   Rectal:     Deferred   Extremities:   Moves all extremities well, no edema, no cyanosis, no              redness   Pulses:   Pulses palpable and equal bilaterally   Skin:   No bleeding, bruising or rash       Neurologic:   Cranial nerves 2 - 12 grossly intact, sensation intact, DTR       present and equal bilaterally          Procedures    Results Review:     Results from last 7 days  Lab Units 03/08/17  0136 03/07/17  1737 03/07/17  0524 03/05/17  2117   WBC 10*3/mm3 5.74  --  6.68 5.67   HEMOGLOBIN g/dL 10.7* 11.5* 10.6* 11.7*   PLATELETS 10*3/mm3 181  --  186 201       Results from last 7 days  Lab Units 03/08/17  0136 03/07/17  0524 03/05/17  2117   SODIUM mmol/L 138 139 140   POTASSIUM mmol/L 4.9 5.1 4.6   CHLORIDE mmol/L 109 109 107   TOTAL CO2 mmol/L 26.7 26.5 27.2   BUN mg/dL 22* 24* 31*   CREATININE mg/dL 1.03 1.13 1.19   CALCIUM mg/dL 8.7 8.8 9.6   GLUCOSE mg/dL 97 90 110   ALT (SGPT) U/L  --  12 12   AST (SGOT) U/L  --  23 24       Results from last 7 days  Lab Units 03/08/17  0815 03/08/17  0136 03/07/17  1939 03/06/17  1635 03/06/17  0849 03/06/17  0516 03/06/17  0308 03/05/17  2117   CK TOTAL U/L  --   --  57  --  66 75 69 80   TROPONIN I ng/mL 0.034 0.034 0.029 0.038 0.048* 0.063* 0.066* 0.046*   CKMB ng/mL  --   --  1.23  --  2.02 2.21 2.00 2.16   MYOGLOBIN ng/mL  --   --   --   --  65.0 72.0 73.0 70.0     Lab Results   Component Value Date    INR 0.94 01/22/2017    INR 0.92 01/13/2017    INR 0.95 04/18/2015    INR 0.94 04/26/2014     Lab Results   Component Value Date    MG 1.9 03/06/2017     Lab Results   Component Value Date    TSH 1.613 01/14/2017    CHLPL 156 05/09/2016    TRIG 59 03/07/2017    HDL 43 (L) 03/07/2017    LDL 81 05/09/2016      Lab Results   Component Value Date    .0 (H) 03/05/2017    .0 (H) 01/12/2017     (H) 04/26/2014       Echo   Lab Results   Component Value Date    ECHOEFEST 65 03/06/2017        I reviewed the patient's new clinical results.    Telemetry: Paced in sinus rhythm in the 60s and 70s.       Medication Review:     apixaban 2.5 mg Oral Q12H    aspirin 325 mg Oral Daily   budesonide 0.5 mg Nebulization BID - RT   calcium-vitamin D 500 mg Oral BID   carvedilol 6.25 mg Oral Q12H   cetirizine 5 mg Oral Daily   I-shakir 1 tablet Oral Nightly   isosorbide mononitrate 60 mg Oral Q24H   montelukast 10 mg Oral Nightly   multivitamin 1 tablet Oral Daily   nitroglycerin 0.5 inch Topical Q8H   rosuvastatin 5 mg Oral Nightly            Assessment:  1. 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  2. Mild elevation of troponin up to 0.06 which is trending down.  3. ASCVD, status post previous stenting of the left circumflex and RCA with last data catheterization on 4/20/2015 by Dr. Cash revealing patent stents in the left circumflex and about 30% in-stent restenosis of the RCA and about 30% stenosis in the mid LAD  4. Chronic kidney disease with an estimated GFR in the 40s.  5. Essential hypertension, controlled.  6. Paroxysmal atrial fibrillation, currently in a V sequential paced rhythm.  7. Chronic oral anticoagulation with Eliquis with no bleeding issues. Patient received a dose early this morning.     Recommendations:    1.  I discussed with Ms. Veliz and her daughter about continued observation versus proceeding with cardiac catheterization.  Since we just started her on Ranexa yesterday, I told him that he may want to wait at least another day to see its full effect and if she continues to have recurrent chest and jaw pain, then may have to consider cardiac catheterization.  She is agreeable to this plan.  We'll go ahead and hold her Eliquis in anticipation of cardiac catheterization.      I discussed the patients findings and my recommendations with patient and family        BLAIR Harry  03/08/17  11:34 AM    Dragon disclaimer:  Much of this encounter note is an electronic transcription/translation of spoken language to printed text. The electronic translation of spoken language may permit erroneous, or  at times, nonsensical words or phrases to be inadvertently transcribed; Although I have reviewed the note for such errors, some may still exist.

## 2017-03-08 NOTE — PLAN OF CARE
Problem: Inpatient Physical Therapy  Goal: Bed Mobility Goal LTG- PT  Outcome: Ongoing (interventions implemented as appropriate)    03/08/17 1751   Bed Mobility PT LTG   Bed Mobility PT LTG, Date Established 03/08/17   Bed Mobility PT LTG, Time to Achieve 2 - 3 days   Bed Mobility PT LTG, Activity Type all bed mobility   Bed Mobility PT LTG, [place] Level supervision required   Bed Mobility PT Goal LTG, Assist Device bed rails       Goal: Transfer Training Goal 1 LTG- PT  Outcome: Ongoing (interventions implemented as appropriate)    03/08/17 1751   Transfer Training PT LTG   Transfer Training PT LTG, Date Established 03/08/17   Transfer Training PT LTG, Time to Achieve 3 days   Transfer Training PT LTG, Activity Type all transfers   Transfer Training PT LTG, [place] Level supervision required   Transfer Training PT LTG, Assist Device walker, rolling       Goal: Gait Training Goal LTG- PT  Outcome: Ongoing (interventions implemented as appropriate)    03/08/17 1751   Gait Training PT LTG   Gait Training Goal PT LTG, Date Established 03/08/17   Gait Training Goal PT LTG, Time to Achieve 3 days   Gait Training Goal PT LTG, [place] Level supervision required   Gait Training Goal PT LTG, Assist Device walker, rolling   Gait Training Goal PT LTG, Distance to Achieve 200

## 2017-03-08 NOTE — NURSING NOTE
After reviewing Dr. Rivas note and patient C/O chest pain. Dr. Rivas on unit and request Eliquis to not be given due to possible heart cath in am if patient continues to c/o chest pain. Patient at this time is not experiencing chest pain. Daughter at bedside.  Will Continue to observe.
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
## 2017-03-08 NOTE — PROGRESS NOTES
Assisted by: Serene MCCURDY    History of present illness: Patient states she thinks she is feeling better today.  Some of this history was from the patient's daughter who I discussed with earlier who is here.  Patient is having short-term memory problems which not necessarily new however daughter states she thinks the patient still having some jaw pain at times.  Daughter thinks this does happen with exertion as does the patient.  Patient does state however that she thinks today and has done better.  Ranexa has been started by cardiology.  Daughter states this jaw discomfort the patient has a similar to previous anginal equivalent that she had prior to her MI.  Patient denies any associated shortness of breath.  Intensity now the jaw pain is 0/10.  When this happened she describes this is more value ache, sometimes pressure.  Patient is having no abdominal pain she is tolerating her diet well no nausea no vomiting and no extremity edema.    Vital signs:  117/64, 20, 63, 98.  Mood is good, affect is normal, skin warm and dry.  Face is symmetric.  Pupils are equal no scleral icterus.  Hearing is intact but diminished.  Swallowing is intact.  No triggerpoints in the jaw noted.  Lungs have bilateral breath sounds throughout rhonchus rales wheezing heart currently regular rate and rhythm  /6 MIGUEL over the midsternal area.  Abdomen soft benign by some doctor no organomegaly appreciated shows a edema strength is symmetric.  No skin rashes are noted.  She is alert exam nonfocal neurologically    Laboratory data reviewed    History was from the patient and the patient's daughter    D/W  with Dr. Rivas    Chest x-ray negative    Telemetry reviewed, sinus/paced    Assessment and plan: Jaw pain that may be anginal equivalent.  Ranexa was added by cardiology.  I've consult to physical therapy for ambulation.  If her jaw discomfort continues I feel that most likely the patient will require cardiac catheterization with the above  history.  Patient did have gray zone troponins that normalized however that had been gray zone before when she had a stress test that was negative.    Paroxysmal atrial fibrillation on long-term Eliquis therapy.  Eliquis has been held in preparation for possible cardiac catheterization and patient is on IV heparin.    Patient had had a hemoglobin drift but overall stable from yesterday a.m.  Monitor.    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  This is a chronic problem, I suggested she walks uses assistive devices.    High risk due to advanced age

## 2017-03-09 ENCOUNTER — APPOINTMENT (OUTPATIENT)
Dept: CT IMAGING | Facility: HOSPITAL | Age: 82
End: 2017-03-09

## 2017-03-09 PROBLEM — R07.9 CHEST PAIN OF UNCERTAIN ETIOLOGY: Status: ACTIVE | Noted: 2017-03-09

## 2017-03-09 LAB
APTT PPP: 27.5 SECONDS (ref 24.4–31)
APTT PPP: >100 SECONDS (ref 24.4–31)
GLUCOSE BLDC GLUCOMTR-MCNC: 91 MG/DL (ref 70–130)

## 2017-03-09 PROCEDURE — 70486 CT MAXILLOFACIAL W/O DYE: CPT

## 2017-03-09 PROCEDURE — 25010000002 HEPARIN (PORCINE) PER 1000 UNITS: Performed by: INTERNAL MEDICINE

## 2017-03-09 PROCEDURE — 82962 GLUCOSE BLOOD TEST: CPT

## 2017-03-09 PROCEDURE — 85730 THROMBOPLASTIN TIME PARTIAL: CPT | Performed by: INTERNAL MEDICINE

## 2017-03-09 PROCEDURE — 94799 UNLISTED PULMONARY SVC/PX: CPT

## 2017-03-09 PROCEDURE — 99232 SBSQ HOSP IP/OBS MODERATE 35: CPT | Performed by: INTERNAL MEDICINE

## 2017-03-09 PROCEDURE — 99222 1ST HOSP IP/OBS MODERATE 55: CPT | Performed by: INTERNAL MEDICINE

## 2017-03-09 PROCEDURE — 70486 CT MAXILLOFACIAL W/O DYE: CPT | Performed by: RADIOLOGY

## 2017-03-09 PROCEDURE — 99233 SBSQ HOSP IP/OBS HIGH 50: CPT | Performed by: INTERNAL MEDICINE

## 2017-03-09 RX ORDER — DIPHENHYDRAMINE HCL 25 MG
25 CAPSULE ORAL ONCE
Status: DISCONTINUED | OUTPATIENT
Start: 2017-03-10 | End: 2017-03-11

## 2017-03-09 RX ADMIN — CALCIUM CARBONATE-VITAMIN D TAB 500 MG-200 UNIT 500 MG: 500-200 TAB at 17:37

## 2017-03-09 RX ADMIN — CARVEDILOL 6.25 MG: 6.25 TABLET, FILM COATED ORAL at 21:33

## 2017-03-09 RX ADMIN — NITROGLYCERIN 0.5 INCH: 20 OINTMENT TOPICAL at 14:21

## 2017-03-09 RX ADMIN — MONTELUKAST SODIUM 10 MG: 10 TABLET ORAL at 21:33

## 2017-03-09 RX ADMIN — BUDESONIDE 0.5 MG: 0.5 INHALANT RESPIRATORY (INHALATION) at 06:52

## 2017-03-09 RX ADMIN — I-VITE, TAB 1000-60-2MG (60/BT) 1 TABLET: TAB at 21:31

## 2017-03-09 RX ADMIN — NITROGLYCERIN 0.5 INCH: 20 OINTMENT TOPICAL at 21:34

## 2017-03-09 RX ADMIN — HEPARIN SODIUM 12 UNITS/KG/HR: 10000 INJECTION, SOLUTION INTRAVENOUS at 17:42

## 2017-03-09 RX ADMIN — RANOLAZINE 500 MG: 500 TABLET, FILM COATED, EXTENDED RELEASE ORAL at 21:33

## 2017-03-09 RX ADMIN — ASPIRIN 325 MG: 325 TABLET ORAL at 17:38

## 2017-03-09 RX ADMIN — ISOSORBIDE MONONITRATE 60 MG: 60 TABLET, EXTENDED RELEASE ORAL at 17:37

## 2017-03-09 RX ADMIN — Medication 1 TABLET: at 17:41

## 2017-03-09 NOTE — NURSING NOTE
Phoned Dr. Mcdowell on call for Cardiology concerning patient anticipation of cardiac cath this am per Dr. Rivas. Patient is noted with Heparin infusion at the current time. Dr. Mcdowell states since patient may receive cardiac cath this morning that Heparin infusion needs to be Held at 6am for precaution. Will continue to observe.

## 2017-03-09 NOTE — CONSULTS
Chief complaint:  Chest pain or if history of present illness:  Cici is a very pleasant 89-year-old woman who presents with recurrent chest pressure and jaw discomfort.  She notes that she initially presented in 2009 with a myocardial infarction and underwent drug-eluting stent implantation to her circumflex at that time.  In 2012 the circumflex stent thrombosed and she underwent a second stent implantation to the same vessel at that time.  She also underwent placement of an ostial RCA stenting as well.  She continued to do well until April 2015 when she presented with chest discomfort radiating to her jaw.  At that time she underwent cardiac catheterization which revealed minimal in-stent restenosis and otherwise unremarkable coronary arteries.  She did well until January 2017 at which time she presented with recurrent symptoms and was hospitalized for.  She did undergo a stress Cardiolite test which was relatively unremarkable and as such was treated medically.  She has states that she continues to wake At night and have recurrent chest pain radiating to her jaws which seems to be made somewhat better when she holds her jaw.  However, she also notes that there are episodes of exertional chest pressure.  In all, she states that her symptoms are debilitating and that she absolutely wants her symptoms to resolve.    Her 14 point review of systems is negative as was otherwise mentioned in the history of present illness.    Past medical history is notable for coronary disease as noted above  She status post pacemaker implantation in 2010  She has history of hypertension which has history of hypertrophic cardiopathy myopathy  She has history of hyperlipidemia  She has history of hyperparathyroidism  To his history of gastritis  She is history of hyperlipidemia    Current medications:  Aspirin 325 mg daily  Pulmicort  Vitamin D  Coreg 6.25 mg twice a day  Zyrtec  Multiple vitamins daily  Imdur 60 mg daily  Singulair  10 mg daily  Multiple vitamins daily  Nitrostat when necessary  Ranexa 500 mg twice a day  Crestor 5 mg daily    She has allergies to:  Amoxicillin  Codeine  Erythromycin  Penicillin  Prednisone  Sulfa drugs  Tetracycline    Social history is notable for no significant tobacco alcohol or illicit drug use    Family history is notable for mother father, 2 brothers and 2 sisters all with premature coronary disease    Current physical examination:  Blood pressure is 118/71 with a heart rate of 66 and respiratory rate of 18  In general she is an elderly female appearing younger than her stated age in no apparent distress, alert and oriented ×3  HEENT exam reveals her oral mucosa to be normal.  She has no significant JVD or hepatojugular reflux.  She has no carotid bruits noted.  Chest is symmetric  Lungs are clear to auscultation with good air movement bilaterally.  There are no crackles or wheezes noted.  Cardiac exam reveals an intact PMI with a regular rate and rhythm.  There is a normal S1 and S2.  There is no S3 or S4.  There are no murmurs rubs or bruits.  Abdominal exam reveals a soft belly which is nontender with normal bowel sounds and no hepatosplenomegaly.  She has no masses noted.  Extremities no clubbing cyanosis or edema  Peripheral pulses are intact  Gait is normal  Muscle skeletal exam is normal    Current laboratory data:  Serial cardiac enzymes are normal with a total CK of less than 80 and CK-MB that is essentially not measurable.  Her serial troponins have been slightly in the indeterminate range at 0.063 as a maximum  CMP is relatively unremarkable  CBC is normal  Echocardiogram is relatively unremarkable  EKG demonstrates T-wave inversions in the anterolateral leads more consistent with ischemia then LVH.    Final impression and plan:  In total Cici presents with essentially disabling chest discomfort.  I have had a relatively lengthy discussion with her regarding the fact that I thought her  symptoms were more atypical than typical and as well as the fact that she had a recent normal stress test and a recent relatively unremarkable cardiac catheterization.  Nonetheless, she is relatively convinced that her symptoms are identical to her presentation with coronary artery disease and her ECG again is not completely normal.  Further, again, she has had multiple hospitalizations for this chest discomfort and I do not feel that we will reasonably proceed with her medical care until coronary artery disease is completely excluded as a cause of her symptoms.  I have discussed the risks and benefits of proceeding with cardiac catheterization to include the risk of heart attack, stroke and death.  She understands these risks and agrees to proceed.  We will plan for cardiac catheterization in the morning.

## 2017-03-09 NOTE — PROGRESS NOTES
Discharge Planning Assessment   Fabrice     Patient Name: Cici Veliz  MRN: [id number]  Today's Date: 3/9/2017    Admit Date: 3/5/2017    Discharge Plan       03/09/17 1131    Case Management/Social Work Plan    Plan SS spoke to pt and pt's son and daughter on this date. Pt and pt's family were requesting options to receive assistance for pt at home. SS educated them on home health service as well as the [facility] and [facility]. Pt's family request home health to be arranged at discharge and any other assistance programs pt qualifies for. SS contacted [facility] per Karthikeyan who states he will get in contact with pt's family and educate them on their options and what services [facility] provides. SS emailed [facility] per Aleida Cho and requested she screen pt for possible [facility] admit. Home health to be arranged at discharge with MD order. SS will continue to follow and assist as needed with discharge planning.     Patient/Family In Agreement With Plan yes     Josephine Storm

## 2017-03-09 NOTE — PROGRESS NOTES
LOS: 0 days     Name: Cici Veliz  Age/Sex: 89 y.o. female  :  1925        PCP: Emily Cleary MD  REF: Emily Cleary, *    Principal Problem:    Chest pain        Subjective  The patient is a 89-year-old white female with a history of paroxysmal atrial fibrillation on Eliquis for stroke prevention, coronary artery disease status post stent placement in the past, COPD, hypertension, dyslipidemia, sick sinus syndrome status post pacemaker placement and chronic kidney disease who comes to the hospital for an episode of chest pain. According to the patient she's been having intermittent episodes of chest pain over the last year that usually lasts a few seconds and resolve spontaneously however her last month he's been getting more frequent and are now occurring daily. There are also lasting for significantly longer periods of time. According to the patient she started having chest pain yesterday that was midsternal, oppressive, radiated to her jaw and was 8/10 on the pain scale. She reports associated dizziness and shortness of breath. She states the pain occurred at rest and denied any ameliorating or exacerbating factors. She was therefore brought to the ER for further evaluation. Serial troponins were noted to be minimally elevated and lower than the patient's baseline. However the patient continues to complain of chest pain today. I review of her EKGs showed progressive T-wave inversion and mild ST depression in lateral leads that is not present on admission. The patient was recently admitted approximately one month ago with similar complaints. She underwent stress test which showed a small sized lateral infarct with no evidence of      Interval History: Patient had some chest pain and off for last night but none this morning.    ROS    Vital Signs  Temp:  [97.7 °F (36.5 °C)-98.2 °F (36.8 °C)] 97.7 °F (36.5 °C)  Heart Rate:  [59-72] 72  Resp:  [18-20] 20  BP: (118-175)/(60-67)  118/64  Vital Signs (last 72 hrs)       03/06 0700  -  03/07 0659 03/07 0700  -  03/08 0659 03/08 0700  -  03/09 0659 03/09 0700  -  03/09 0812   Most Recent    Temp (°F) 97.7 -  98    97.8 -  98.4    97.7 -  98.2       97.7 (36.5)    Heart Rate 60 -  71    60 -  74    59 -  72       72    Resp 16 -  20    16 -  20    18 -  20       20    BP 96/59 -  154/63    (!)65/44 -  164/63    118/64 -  175/67       118/64    SpO2 (%) 97 -  99    95 -  99    96 -  99      98     98        Body mass index is 27.94 kg/(m^2).    Intake/Output Summary (Last 24 hours) at 03/09/17 0812  Last data filed at 03/09/17 0354   Gross per 24 hour   Intake    600 ml   Output   1450 ml   Net   -850 ml     Objective        Physical Exam:     General Appearance:    Alert, cooperative, in no acute distress   Head:    Normocephalic, without obvious abnormality, atraumatic   Eyes:            Conjunctivae and sclerae normal, no   icterus, no pallor, corneas clear.   Ears:    Ears appear intact with no abnormalities noted   Throat:   No oral lesions, no thrush, oral mucosa moist   Neck:   No adenopathy, supple, trachea midline, no thyromegaly, no   carotid bruit, no JVD   Back:     No kyphosis present, no scoliosis present, no skin lesions,      erythema or scars, no tenderness to percussion or                   palpation,   range of motion normal   Lungs:     Clear to auscultation,respirations regular, even and                  unlabored    Heart:    Regular rhythm and normal rate, normal S1 and S2, no            murmur, no gallop, no rub, no click   Chest Wall:    No abnormalities observed   Abdomen:     Normal bowel sounds, no masses, no organomegaly, soft        non-tender, non-distended, no guarding, no rebound                tenderness   Rectal:     Deferred   Extremities:   Moves all extremities well, no edema, no cyanosis, no             redness   Pulses:   Pulses palpable and equal bilaterally   Skin:   No bleeding, bruising or rash               Procedures    Results Review:     Results from last 7 days  Lab Units 03/08/17  1314 03/08/17  0136 03/07/17  1737 03/07/17  0524 03/05/17  2117   WBC 10*3/mm3 6.21 5.74  --  6.68 5.67   HEMOGLOBIN g/dL 12.1 10.7* 11.5* 10.6* 11.7*   PLATELETS 10*3/mm3 181 181  --  186 201       Results from last 7 days  Lab Units 03/08/17  0136 03/07/17  0524 03/05/17  2117   SODIUM mmol/L 138 139 140   POTASSIUM mmol/L 4.9 5.1 4.6   CHLORIDE mmol/L 109 109 107   TOTAL CO2 mmol/L 26.7 26.5 27.2   BUN mg/dL 22* 24* 31*   CREATININE mg/dL 1.03 1.13 1.19   CALCIUM mg/dL 8.7 8.8 9.6   GLUCOSE mg/dL 97 90 110   ALT (SGPT) U/L  --  12 12   AST (SGOT) U/L  --  23 24       Results from last 7 days  Lab Units 03/08/17  0815 03/08/17  0136 03/07/17  1939 03/06/17  1635 03/06/17  0849 03/06/17  0516 03/06/17  0308 03/05/17  2117   CK TOTAL U/L  --   --  57  --  66 75 69 80   TROPONIN I ng/mL 0.034 0.034 0.029 0.038 0.048* 0.063* 0.066* 0.046*   CKMB ng/mL  --   --  1.23  --  2.02 2.21 2.00 2.16   MYOGLOBIN ng/mL  --   --   --   --  65.0 72.0 73.0 70.0     Lab Results   Component Value Date    INR 0.97 03/08/2017    INR 0.94 01/22/2017    INR 0.92 01/13/2017    INR 0.95 04/18/2015    INR 0.94 04/26/2014     Lab Results   Component Value Date    MG 1.9 03/06/2017     Lab Results   Component Value Date    TSH 1.613 01/14/2017    CHLPL 156 05/09/2016    TRIG 59 03/07/2017    HDL 43 (L) 03/07/2017    LDL 81 05/09/2016      Lab Results   Component Value Date    .0 (H) 03/05/2017    .0 (H) 01/12/2017     (H) 04/26/2014       Echo   Lab Results   Component Value Date    ECHOEFEST 65 03/06/2017        I reviewed the patient's new clinical results.    Telemetry: A rhythm and paced rhythm.       Medication Review:     aspirin 325 mg Oral Daily   budesonide 0.5 mg Nebulization BID - RT   calcium-vitamin D 500 mg Oral BID   carvedilol 6.25 mg Oral Q12H   cetirizine 5 mg Oral Daily   I-shakir 1 tablet Oral Nightly   isosorbide  mononitrate 60 mg Oral Q24H   montelukast 10 mg Oral Nightly   multivitamin 1 tablet Oral Daily   nitroglycerin 0.5 inch Topical Q8H   ranolazine 500 mg Oral Q12H   rosuvastatin 5 mg Oral Nightly         heparin (porcine) 12 Units/kg/hr Last Rate: Stopped (03/09/17 0603)       Assessment:  1. Chest pains with some features of CCS class III to IV angina.  2. Mild elevation of troponin up to 0.06 which has trended down.    3. ASCVD, status post previous stenting of the left circumflex and RCA with last  catheterization on 4/20/2015 by Dr. Cash revealing patent stents in the left circumflex and about 30% in-stent restenosis of the RCA and about 30% stenosis in the mid LAD  4. Chronic kidney disease, improved.  5. Essential hypertension, controlled.  6. Paroxysmal atrial fibrillation, currently in a AV sequential paced rhythm.  7. Chronic oral and cognition with Eliquis which is on hold in anticipation of cardiac catheterization.      Recommendations:    1. I like to get a second opinion from Dr. Dr. Cash was done her previous cardiac catheterization about 2 years ago to see if she would need a repeat cardiac catheterization at this time given her age and having only nonobstructive disease 2 years ago.  I've discussed with Dr. Cash about this and he is going to see her today and give his opinion and  if we decide to go with cardiac catheterization, he has agreed to do this.  I have discussed this with Ms Veliz and her son and daughter and they're agreeable with this plan.    I discussed the patients findings and my recommendations with patient and family        BLAIR Harry  03/09/17  8:12 AM    Dragon disclaimer:  Much of this encounter note is an electronic transcription/translation of spoken language to printed text. The electronic translation of spoken language may permit erroneous, or at times, nonsensical words or phrases to be inadvertently transcribed; Although I have reviewed the note for  such errors, some may still exist.

## 2017-03-09 NOTE — PROGRESS NOTES
"Assisted by: Colleen RN    Son was also present.  Some of this history was from him.    History of present illness: Patient states she is actually been doing very well today she was up ambulating with physical therapy and did well.  No jaw pain however she then got up later and went to the bathroom and again developed jaw pain bilaterally in the mandibular area.  This was bilateral and felt like a \"throbbing\".  Similar in character to her previous anginal equivalent before stent.  It lasted about 4 minutes and went away.  She denies any associated symptoms with this.  She does state that sometimes she will put her hands over this area with pressure and this will help.  She eats without any symptoms.  She still has her own teeth.    Vital signs: Blood pressure 120/70, respiratory 20, pulse 70, temperature 97.9  Lungs have bilateral breath sounds are clear throughout no rhonchus rales wheezing heard heart regular in rhythm no definite murmur gallop heard today  Abdomen soft benign bowel sounds are active  She was edema strength is symmetric skin warm and dry.  Mood is good neurologically nonfocal    Laboratory data was reviewed  Chest x-ray on the fifth was negative    Echo with normal EF now    Mandibular pain, etiology is uncertain, some typical and some atypical features in that it is typical that it happens with exertion that atypical that pressure tends to help this.  Her enzymes were in the gray zone and trended downward.  EKG nonspecific earlier in the stay.  Ranexa was added.  She is still having some pain and most likely she is going to have a repeat cardiac catheterization.  We are awaiting consult from interventional cardiology.  I'm going to go ahead and CT her facial bones.    Atrial fibrillation, paroxysmal, pacemaker is in and mostly sinus rhythm.  She is on Eliquis long-term for . prophylaxis, currently on IV heparin however in preparation for possible cardiac catheterization.    Stress disposition.  " Patient wishes to live alone again at home however son would like her not to be alone.  We discussed options and the options we discussed was getting home health to evaluate her to help with activities of daily living if she needs them.    Hypertension, tolerating the Ranexa, will follow.

## 2017-03-09 NOTE — THERAPY DISCHARGE NOTE
Acute Care - Physical Therapy Treatment Note/Discharge  ROSEMARIE Warrior     Patient Name: Cici Veliz  : 1925  MRN: [id number]  Today's Date: 3/9/2017  Onset of Illness/Injury or Date of Surgery Date: 17  Date of Referral to PT: 17  Referring Physician: Dr. Chacon    Admit Date: 3/5/2017    Visit Dx:    ICD-10-CM ICD-9-CM   1. Chest pain of uncertain etiology R07.89 786.59   2. Unstable angina I20.0 411.1   3. Precordial pain R07.2 786.51     Patient Active Problem List   Diagnosis   • Spinal stenosis, degenerative disc disease, back pain*   • Deformity of the right Sternoclavicular joint*   • COPD (chronic obstructive pulmonary disease)   • Essential hypertension   • Mixed hyperlipidemia   • CAD, status post RCA and circumflex stents last coronary angiography  no significant disease*   • Pacemaker*   • hx of Myocardial infarction*   • Thoracic back pain*   • Neck pain*   • Valvular heart disease mild mitral regurgitation not significant*   • Congestive heart failure with LV diastolic dysfunction, NYHA class 2*   • Atopic rhinitis   • Hyperparathyroidism status post parathyroidectomy   • Chronic back pain   • CKD (chronic kidney disease) stage 3, GFR 30-59 ml/min   • Diastolic heart failure secondary to hypertrophic cardiomyopathy   • Hyperglycemia   • Paroxysmal atrial fibrillation   • Diarrhea in adult patient   • Chronic anticoagulation   • Chest pain   • Coronary artery disease   • PAF (paroxysmal atrial fibrillation)   • Hyperlipidemia   • Bradycardia   • Neuropathy   • Cerebrovascular disease   • Mild obesity   • Chest pain of uncertain etiology       Physical Therapy Education     Title: PT OT SLP Therapies (Done)     Topic: Physical Therapy (Done)     Point: Mobility training (Done)    Learning Progress Summary    Learner Readiness Method Response Comment Documented by Status   Patient Acceptance E JOSSELIN  BC 17 9707 Done               Point: Home exercise program (Done)    Learning  Progress Summary    Learner Readiness Method Response Comment Documented by Status   Patient Acceptance E [facility] 03/08/17 1749 Done               Point: Body mechanics (Done)    Learning Progress Summary    Learner Readiness Method Response Comment Documented by Status   Patient Acceptance E [facility] 03/08/17 1749 Done               Point: Precautions (Done)    Learning Progress Summary    Learner Readiness Method Response Comment Documented by Status   Patient Acceptance E [facility] 03/08/17 1749 Done                      User Key     Initials Effective Dates Name Provider Type Discipline    BC 03/14/16 -  Sugar Fontana, PT Physical Therapist PT                    IP PT Goals       03/08/17 1751          Bed Mobility PT LTG    Bed Mobility PT LTG, Date Established 03/08/17  -BC      Bed Mobility PT LTG, Time to Achieve 2 - 3 days  -BC      Bed Mobility PT LTG, Activity Type all bed mobility  -BC      Bed Mobility PT LTG, [place] Level supervision required  -BC      Bed Mobility PT Goal  LTG, Assist Device bed rails  -BC      Transfer Training PT LTG    Transfer Training PT LTG, Date Established 03/08/17  -BC      Transfer Training PT LTG, Time to Achieve 3 days  -BC      Transfer Training PT LTG, Activity Type all transfers  -BC      Transfer Training PT LTG, [place] Level supervision required  -BC      Transfer Training PT LTG, Assist Device walker, rolling  -BC      Gait Training PT LTG    Gait Training Goal PT LTG, Date Established 03/08/17  -BC      Gait Training Goal PT LTG, Time to Achieve 3 days  -BC      Gait Training Goal PT LTG, [place] Level supervision required  -BC      Gait Training Goal PT LTG, Assist Device walker, rolling  -BC      Gait Training Goal PT LTG, Distance to Achieve 200  -BC        User Key  (r) = Recorded By, (t) = Taken By, (c) = Cosigned By    Initials Name Provider Type    BC Sugar Fontana, PT Physical Therapist              Adult Rehabilitation Note       03/09/17  1006          Rehab Assessment/Intervention    Treatment Not Performed other (see comments)  -BC      Treatment Not Performed, Comment Pt. walking with family in hallway, no problems with this. Will consult amb team to assist family  -BC      Recorded by [BC] Sugar Fontana PT        User Key  (r) = Recorded By, (t) = Taken By, (c) = Cosigned By    Initials Name Effective Dates    BC Sugar Fontana PT 03/14/16 -           PT Recommendation and Plan  Planned Therapy Interventions: balance training, bed mobility training, gait training, home exercise program, patient/family education, strengthening, transfer training  PT Frequency: 3-5 times/wk, per priority policy             Outcome Measures       03/08/17 1700          How much help from another person do you currently need...    Turning from your back to your side while in flat bed without using bedrails? 4  -BC      Moving from lying on back to sitting on the side of a flat bed without bedrails? 3  -BC      Moving to and from a bed to a chair (including a wheelchair)? 3  -BC      Standing up from a chair using your arms (e.g., wheelchair, bedside chair)? 4  -BC      Climbing 3-5 steps with a railing? 3  -BC      To walk in hospital room? 4  -BC      AM-PAC 6 Clicks Score 21  -BC      Functional Assessment    Outcome Measure Options AM-PAC 6 Clicks Basic Mobility (PT)  -BC        User Key  (r) = Recorded By, (t) = Taken By, (c) = Cosigned By    Initials Name Provider Type    BC Sugar Fontana PT Physical Therapist           Time Calculation:       Therapy Charges for Today     Code Description Service Date Service Provider Modifiers Qty    70082206390 HC PT MOBILITY CURRENT 3/8/2017 Sugar Fontana, PT GP, CJ 1    32549369180 HC PT MOBILITY PROJECTED 3/8/2017 Sugar Fontana PT GP, CI 1    28108877573 HC GAIT TRAINING EA 15 MIN 3/8/2017 Sugar Fontana, PT GP 1    96794759709 HC PT THERAPEUTIC ACT EA 15 MIN 3/8/2017 Sugar Fontana, PT GP 1     32080607450 HC PT THER SUPP EA 15 MIN 3/8/2017 Sugar Fontana, PT GP 2    30518160292 HC PT EVAL MOD COMPLEXITY 2 3/8/2017 Sugar Fontana, PT GP 1          PT G-Codes  Outcome Measure Options: AM-PAC 6 Clicks Basic Mobility (PT)  Score: 21  Functional Limitation: Mobility: Walking and moving around  Mobility: Walking and Moving Around Current Status (): At least 20 percent but less than 40 percent impaired, limited or restricted  Mobility: Walking and Moving Around Goal Status (): At least 1 percent but less than 20 percent impaired, limited or restricted         Sugar Fontana, PT  3/9/2017

## 2017-03-10 LAB
ACT BLD: 173 SECONDS (ref 82–152)
ACT BLD: 183 SECONDS (ref 82–152)
ACT BLD: 199 SECONDS (ref 82–152)
ACT BLD: 224 SECONDS (ref 82–152)
ACT BLD: 296 SECONDS (ref 82–152)
ANION GAP SERPL CALCULATED.3IONS-SCNC: 4.6 MMOL/L (ref 3.6–11.2)
APTT PPP: 44.2 SECONDS (ref 24.4–31)
BASOPHILS # BLD AUTO: 0.01 10*3/MM3 (ref 0–0.3)
BASOPHILS NFR BLD AUTO: 0.2 % (ref 0–2)
BUN BLD-MCNC: 25 MG/DL (ref 7–21)
BUN/CREAT SERPL: 18.5 (ref 7–25)
CALCIUM SPEC-SCNC: 9 MG/DL (ref 7.7–10)
CHLORIDE SERPL-SCNC: 106 MMOL/L (ref 99–112)
CO2 SERPL-SCNC: 26.4 MMOL/L (ref 24.3–31.9)
CREAT BLD-MCNC: 1.35 MG/DL (ref 0.43–1.29)
DEPRECATED RDW RBC AUTO: 44.8 FL (ref 37–54)
EOSINOPHIL # BLD AUTO: 0.12 10*3/MM3 (ref 0–0.7)
EOSINOPHIL NFR BLD AUTO: 2.2 % (ref 0–7)
ERYTHROCYTE [DISTWIDTH] IN BLOOD BY AUTOMATED COUNT: 14.1 % (ref 11.5–14.5)
GFR SERPL CREATININE-BSD FRML MDRD: 37 ML/MIN/1.73
GLUCOSE BLD-MCNC: 108 MG/DL (ref 70–110)
HCT VFR BLD AUTO: 33.9 % (ref 37–47)
HGB BLD-MCNC: 10.6 G/DL (ref 12–16)
IMM GRANULOCYTES # BLD: 0 10*3/MM3 (ref 0–0.03)
IMM GRANULOCYTES NFR BLD: 0 % (ref 0–0.5)
LYMPHOCYTES # BLD AUTO: 1.76 10*3/MM3 (ref 1–3)
LYMPHOCYTES NFR BLD AUTO: 31.5 % (ref 16–46)
MCH RBC QN AUTO: 27.8 PG (ref 27–33)
MCHC RBC AUTO-ENTMCNC: 31.3 G/DL (ref 33–37)
MCV RBC AUTO: 89 FL (ref 80–94)
MONOCYTES # BLD AUTO: 0.93 10*3/MM3 (ref 0.1–0.9)
MONOCYTES NFR BLD AUTO: 16.7 % (ref 0–12)
NEUTROPHILS # BLD AUTO: 2.76 10*3/MM3 (ref 1.4–6.5)
NEUTROPHILS NFR BLD AUTO: 49.4 % (ref 40–75)
OSMOLALITY SERPL CALC.SUM OF ELEC: 278.7 MOSM/KG (ref 273–305)
PLATELET # BLD AUTO: 180 10*3/MM3 (ref 130–400)
PMV BLD AUTO: 10.6 FL (ref 6–10)
POTASSIUM BLD-SCNC: 4.5 MMOL/L (ref 3.5–5.3)
RBC # BLD AUTO: 3.81 10*6/MM3 (ref 4.2–5.4)
SODIUM BLD-SCNC: 137 MMOL/L (ref 135–153)
WBC NRBC COR # BLD: 5.58 10*3/MM3 (ref 4.5–12.5)

## 2017-03-10 PROCEDURE — 25010000002 HEPARIN (PORCINE) PER 1000 UNITS: Performed by: INTERNAL MEDICINE

## 2017-03-10 PROCEDURE — C1725 CATH, TRANSLUMIN NON-LASER: HCPCS | Performed by: INTERNAL MEDICINE

## 2017-03-10 PROCEDURE — 25010000002 FENTANYL CITRATE (PF) 100 MCG/2ML SOLUTION: Performed by: INTERNAL MEDICINE

## 2017-03-10 PROCEDURE — C9600 PERC DRUG-EL COR STENT SING: HCPCS | Performed by: INTERNAL MEDICINE

## 2017-03-10 PROCEDURE — 25010000002 MIDAZOLAM PER 1 MG: Performed by: INTERNAL MEDICINE

## 2017-03-10 PROCEDURE — 94799 UNLISTED PULMONARY SVC/PX: CPT

## 2017-03-10 PROCEDURE — B2111ZZ FLUOROSCOPY OF MULTIPLE CORONARY ARTERIES USING LOW OSMOLAR CONTRAST: ICD-10-PCS | Performed by: INTERNAL MEDICINE

## 2017-03-10 PROCEDURE — C1894 INTRO/SHEATH, NON-LASER: HCPCS | Performed by: INTERNAL MEDICINE

## 2017-03-10 PROCEDURE — 80048 BASIC METABOLIC PNL TOTAL CA: CPT | Performed by: INTERNAL MEDICINE

## 2017-03-10 PROCEDURE — 25010000002 HYDRALAZINE PER 20 MG

## 2017-03-10 PROCEDURE — C1769 GUIDE WIRE: HCPCS | Performed by: INTERNAL MEDICINE

## 2017-03-10 PROCEDURE — 85730 THROMBOPLASTIN TIME PARTIAL: CPT | Performed by: INTERNAL MEDICINE

## 2017-03-10 PROCEDURE — 93454 CORONARY ARTERY ANGIO S&I: CPT | Performed by: INTERNAL MEDICINE

## 2017-03-10 PROCEDURE — 0 IOPAMIDOL PER 1 ML: Performed by: INTERNAL MEDICINE

## 2017-03-10 PROCEDURE — 93005 ELECTROCARDIOGRAM TRACING: CPT | Performed by: INTERNAL MEDICINE

## 2017-03-10 PROCEDURE — 25010000002 HYDRALAZINE PER 20 MG: Performed by: INTERNAL MEDICINE

## 2017-03-10 PROCEDURE — 92928 PRQ TCAT PLMT NTRAC ST 1 LES: CPT | Performed by: INTERNAL MEDICINE

## 2017-03-10 PROCEDURE — 0270346 DILATION OF CORONARY ARTERY, ONE ARTERY, BIFURCATION, WITH DRUG-ELUTING INTRALUMINAL DEVICE, PERCUTANEOUS APPROACH: ICD-10-PCS | Performed by: INTERNAL MEDICINE

## 2017-03-10 PROCEDURE — 4A023N7 MEASUREMENT OF CARDIAC SAMPLING AND PRESSURE, LEFT HEART, PERCUTANEOUS APPROACH: ICD-10-PCS | Performed by: INTERNAL MEDICINE

## 2017-03-10 PROCEDURE — 25010000002 DIPHENHYDRAMINE PER 50 MG: Performed by: INTERNAL MEDICINE

## 2017-03-10 PROCEDURE — 25010000002 FENTANYL CITRATE (PF) 100 MCG/2ML SOLUTION

## 2017-03-10 PROCEDURE — 85347 COAGULATION TIME ACTIVATED: CPT

## 2017-03-10 PROCEDURE — 85025 COMPLETE CBC W/AUTO DIFF WBC: CPT | Performed by: INTERNAL MEDICINE

## 2017-03-10 PROCEDURE — C1874 STENT, COATED/COV W/DEL SYS: HCPCS | Performed by: INTERNAL MEDICINE

## 2017-03-10 PROCEDURE — C1887 CATHETER, GUIDING: HCPCS | Performed by: INTERNAL MEDICINE

## 2017-03-10 PROCEDURE — 99232 SBSQ HOSP IP/OBS MODERATE 35: CPT | Performed by: INTERNAL MEDICINE

## 2017-03-10 DEVICE — XIENCE ALPINE EVEROLIMUS ELUTING CORONARY STENT SYSTEM 3.00 MM X 18 MM / RAPID-EXCHANGE
Type: IMPLANTABLE DEVICE | Status: FUNCTIONAL
Brand: XIENCE ALPINE

## 2017-03-10 RX ORDER — DILTIAZEM HYDROCHLORIDE 5 MG/ML
INJECTION INTRAVENOUS AS NEEDED
Status: DISCONTINUED | OUTPATIENT
Start: 2017-03-10 | End: 2017-03-10 | Stop reason: HOSPADM

## 2017-03-10 RX ORDER — SODIUM CHLORIDE 9 MG/ML
INJECTION, SOLUTION INTRAVENOUS CONTINUOUS PRN
Status: DISCONTINUED | OUTPATIENT
Start: 2017-03-10 | End: 2017-03-10 | Stop reason: HOSPADM

## 2017-03-10 RX ORDER — CLOPIDOGREL BISULFATE 75 MG/1
75 TABLET ORAL DAILY
Status: DISCONTINUED | OUTPATIENT
Start: 2017-03-11 | End: 2017-03-11 | Stop reason: HOSPADM

## 2017-03-10 RX ORDER — SODIUM CHLORIDE 9 MG/ML
100 INJECTION, SOLUTION INTRAVENOUS CONTINUOUS
Status: DISCONTINUED | OUTPATIENT
Start: 2017-03-10 | End: 2017-03-11 | Stop reason: HOSPADM

## 2017-03-10 RX ORDER — HYDRALAZINE HYDROCHLORIDE 20 MG/ML
10 INJECTION INTRAMUSCULAR; INTRAVENOUS
Status: DISCONTINUED | OUTPATIENT
Start: 2017-03-10 | End: 2017-03-11

## 2017-03-10 RX ORDER — ASPIRIN 325 MG
325 TABLET, DELAYED RELEASE (ENTERIC COATED) ORAL DAILY
Status: DISCONTINUED | OUTPATIENT
Start: 2017-03-10 | End: 2017-03-11 | Stop reason: HOSPADM

## 2017-03-10 RX ORDER — AMLODIPINE BESYLATE 5 MG/1
5 TABLET ORAL
Status: DISCONTINUED | OUTPATIENT
Start: 2017-03-10 | End: 2017-03-11 | Stop reason: HOSPADM

## 2017-03-10 RX ORDER — CLOPIDOGREL BISULFATE 75 MG/1
600 TABLET ORAL ONCE
Status: COMPLETED | OUTPATIENT
Start: 2017-03-10 | End: 2017-03-10

## 2017-03-10 RX ORDER — ATORVASTATIN CALCIUM 40 MG/1
40 TABLET, FILM COATED ORAL NIGHTLY
Status: DISCONTINUED | OUTPATIENT
Start: 2017-03-10 | End: 2017-03-10

## 2017-03-10 RX ORDER — MIDAZOLAM HYDROCHLORIDE 1 MG/ML
1 INJECTION INTRAMUSCULAR; INTRAVENOUS ONCE
Status: COMPLETED | OUTPATIENT
Start: 2017-03-10 | End: 2017-03-10

## 2017-03-10 RX ORDER — HYDRALAZINE HYDROCHLORIDE 20 MG/ML
INJECTION INTRAMUSCULAR; INTRAVENOUS
Status: COMPLETED
Start: 2017-03-10 | End: 2017-03-10

## 2017-03-10 RX ORDER — HYDRALAZINE HYDROCHLORIDE 20 MG/ML
10 INJECTION INTRAMUSCULAR; INTRAVENOUS ONCE
Status: COMPLETED | OUTPATIENT
Start: 2017-03-10 | End: 2017-03-10

## 2017-03-10 RX ORDER — CLOPIDOGREL BISULFATE 75 MG/1
TABLET ORAL AS NEEDED
Status: DISCONTINUED | OUTPATIENT
Start: 2017-03-10 | End: 2017-03-10

## 2017-03-10 RX ORDER — MIDAZOLAM HYDROCHLORIDE 1 MG/ML
1 INJECTION INTRAMUSCULAR; INTRAVENOUS
Status: DISCONTINUED | OUTPATIENT
Start: 2017-03-10 | End: 2017-03-11

## 2017-03-10 RX ORDER — FENTANYL CITRATE 50 UG/ML
INJECTION, SOLUTION INTRAMUSCULAR; INTRAVENOUS
Status: COMPLETED
Start: 2017-03-10 | End: 2017-03-10

## 2017-03-10 RX ORDER — FENTANYL CITRATE 50 UG/ML
INJECTION, SOLUTION INTRAMUSCULAR; INTRAVENOUS AS NEEDED
Status: DISCONTINUED | OUTPATIENT
Start: 2017-03-10 | End: 2017-03-10 | Stop reason: HOSPADM

## 2017-03-10 RX ORDER — HEPARIN SODIUM 1000 [USP'U]/ML
INJECTION, SOLUTION INTRAVENOUS; SUBCUTANEOUS AS NEEDED
Status: DISCONTINUED | OUTPATIENT
Start: 2017-03-10 | End: 2017-03-10 | Stop reason: HOSPADM

## 2017-03-10 RX ORDER — HYDROCODONE BITARTRATE AND ACETAMINOPHEN 7.5; 325 MG/1; MG/1
1 TABLET ORAL EVERY 4 HOURS PRN
Status: DISCONTINUED | OUTPATIENT
Start: 2017-03-10 | End: 2017-03-10

## 2017-03-10 RX ORDER — FENTANYL CITRATE 50 UG/ML
25 INJECTION, SOLUTION INTRAMUSCULAR; INTRAVENOUS ONCE
Status: COMPLETED | OUTPATIENT
Start: 2017-03-10 | End: 2017-03-10

## 2017-03-10 RX ORDER — LIDOCAINE HYDROCHLORIDE 20 MG/ML
INJECTION, SOLUTION INFILTRATION; PERINEURAL AS NEEDED
Status: DISCONTINUED | OUTPATIENT
Start: 2017-03-10 | End: 2017-03-10 | Stop reason: HOSPADM

## 2017-03-10 RX ORDER — MIDAZOLAM HYDROCHLORIDE 1 MG/ML
INJECTION INTRAMUSCULAR; INTRAVENOUS AS NEEDED
Status: DISCONTINUED | OUTPATIENT
Start: 2017-03-10 | End: 2017-03-10 | Stop reason: HOSPADM

## 2017-03-10 RX ORDER — DIPHENHYDRAMINE HYDROCHLORIDE 50 MG/ML
INJECTION INTRAMUSCULAR; INTRAVENOUS AS NEEDED
Status: DISCONTINUED | OUTPATIENT
Start: 2017-03-10 | End: 2017-03-10 | Stop reason: HOSPADM

## 2017-03-10 RX ORDER — NITROGLYCERIN 5 MG/ML
INJECTION, SOLUTION INTRAVENOUS AS NEEDED
Status: DISCONTINUED | OUTPATIENT
Start: 2017-03-10 | End: 2017-03-10 | Stop reason: HOSPADM

## 2017-03-10 RX ORDER — FENTANYL CITRATE 50 UG/ML
25 INJECTION, SOLUTION INTRAMUSCULAR; INTRAVENOUS
Status: DISCONTINUED | OUTPATIENT
Start: 2017-03-10 | End: 2017-03-11

## 2017-03-10 RX ADMIN — BUDESONIDE 0.5 MG: 0.5 INHALANT RESPIRATORY (INHALATION) at 06:32

## 2017-03-10 RX ADMIN — RANOLAZINE 500 MG: 500 TABLET, FILM COATED, EXTENDED RELEASE ORAL at 09:00

## 2017-03-10 RX ADMIN — Medication 1 TABLET: at 09:00

## 2017-03-10 RX ADMIN — ASPIRIN 325 MG: 325 TABLET ORAL at 09:00

## 2017-03-10 RX ADMIN — CALCIUM CARBONATE-VITAMIN D TAB 500 MG-200 UNIT 500 MG: 500-200 TAB at 21:54

## 2017-03-10 RX ADMIN — ASPIRIN 325 MG: 325 TABLET, COATED ORAL at 18:37

## 2017-03-10 RX ADMIN — HYDRALAZINE HYDROCHLORIDE 10 MG: 20 INJECTION INTRAMUSCULAR; INTRAVENOUS at 18:33

## 2017-03-10 RX ADMIN — SODIUM CHLORIDE 100 ML/HR: 9 INJECTION, SOLUTION INTRAVENOUS at 18:45

## 2017-03-10 RX ADMIN — NITROGLYCERIN 0.5 INCH: 20 OINTMENT TOPICAL at 06:03

## 2017-03-10 RX ADMIN — MIDAZOLAM HYDROCHLORIDE 1 MG: 1 INJECTION, SOLUTION INTRAMUSCULAR; INTRAVENOUS at 18:33

## 2017-03-10 RX ADMIN — HEPARIN SODIUM 2150 UNITS: 5000 INJECTION, SOLUTION INTRAVENOUS; SUBCUTANEOUS at 02:20

## 2017-03-10 RX ADMIN — FENTANYL CITRATE 25 MCG: 50 INJECTION, SOLUTION INTRAMUSCULAR; INTRAVENOUS at 19:24

## 2017-03-10 RX ADMIN — CARVEDILOL 6.25 MG: 6.25 TABLET, FILM COATED ORAL at 09:00

## 2017-03-10 RX ADMIN — ISOSORBIDE MONONITRATE 60 MG: 60 TABLET, EXTENDED RELEASE ORAL at 09:00

## 2017-03-10 RX ADMIN — FENTANYL CITRATE 25 MCG: 50 INJECTION, SOLUTION INTRAMUSCULAR; INTRAVENOUS at 20:57

## 2017-03-10 RX ADMIN — I-VITE, TAB 1000-60-2MG (60/BT) 1 TABLET: TAB at 20:46

## 2017-03-10 RX ADMIN — CARVEDILOL 6.25 MG: 6.25 TABLET, FILM COATED ORAL at 20:46

## 2017-03-10 RX ADMIN — BUDESONIDE 0.5 MG: 0.5 INHALANT RESPIRATORY (INHALATION) at 19:31

## 2017-03-10 RX ADMIN — MIDAZOLAM HYDROCHLORIDE 1 MG: 1 INJECTION, SOLUTION INTRAMUSCULAR; INTRAVENOUS at 20:03

## 2017-03-10 RX ADMIN — CLOPIDOGREL 600 MG: 75 TABLET, FILM COATED ORAL at 18:37

## 2017-03-10 RX ADMIN — HEPARIN SODIUM 16 UNITS/KG/HR: 10000 INJECTION, SOLUTION INTRAVENOUS at 07:22

## 2017-03-10 RX ADMIN — RANOLAZINE 500 MG: 500 TABLET, FILM COATED, EXTENDED RELEASE ORAL at 20:46

## 2017-03-10 RX ADMIN — FENTANYL CITRATE 25 MCG: 50 INJECTION, SOLUTION INTRAMUSCULAR; INTRAVENOUS at 18:33

## 2017-03-10 RX ADMIN — HYDRALAZINE HYDROCHLORIDE 10 MG: 20 INJECTION INTRAMUSCULAR; INTRAVENOUS at 19:23

## 2017-03-10 RX ADMIN — AMLODIPINE BESYLATE 5 MG: 5 TABLET ORAL at 21:54

## 2017-03-10 RX ADMIN — MONTELUKAST SODIUM 10 MG: 10 TABLET ORAL at 20:47

## 2017-03-10 NOTE — PROGRESS NOTES
Discharge Planning Assessment   Fabrice     Patient Name: Cici Veliz  MRN: [id number]  Today's Date: 3/10/2017    Admit Date: 3/5/2017    Discharge Needs Assessment     Pt lives at home alone. Pt's son and daughter state they are going to try and work it out to where one of them can be with her as often as possible. Pt and pt's children were interested in [facility]. SS contacted [facility] per Karthikeyan who states he will get in contact with pt and pt's family and educate them on the services he offers. Pt's family request home health to be arranged at discharge. Home health to be arranged with MD order. SS will continue to follow and assist as needed with discharge planning.      Discharge Plan       03/10/17 4370    Case Management/Social Work Plan    Patient/Family In Agreement With Plan yes     Josephine Storm

## 2017-03-10 NOTE — PROGRESS NOTES
LOS: 1 day   Patient Care Team:  Emily Cleary MD as PCP - General (Cardiology)  Donita Crum MD as Consulting Physician (Hand Surgery)  Lane Cervantes MD as Consulting Physician (Ophthalmology)  Eder Scott DMD (General Practice)  Shy Brown MD as Consulting Physician (Dermatology)      Subjective     Admission information:    The patient is a 89-year-old white female with a history of paroxysmal atrial fibrillation on Eliquis for stroke prevention, coronary artery disease status post stent placement in the past, COPD, hypertension, dyslipidemia, sick sinus syndrome status post pacemaker placement and chronic kidney disease who comes to the hospital for an episode of chest pain. According to the patient she's been having intermittent episodes of chest pain over the last year that usually lasts a few seconds and resolve spontaneously however her last month he's been getting more frequent and are now occurring daily. There are also lasting for significantly longer periods of time. According to the patient she started having chest pain yesterday that was midsternal, oppressive, radiated to her jaw and was 8/10 on the pain scale. She reports associated dizziness and shortness of breath. She states the pain occurred at rest and denied any ameliorating or exacerbating factors. She was therefore brought to the ER for further evaluation. Serial troponins were noted to be minimally elevated and lower than the patient's baseline. However the patient continues to complain of chest pain today. I review of her EKGs showed progressive T-wave inversion and mild ST depression in lateral leads that is not present on admission. The patient was recently admitted approximately one month ago with similar complaints. She underwent stress test which showed a small sized lateral infarct with no evidence of ischemia.     Interval History:     According to the patient she has continued to have  intermittent episodes of chest pain that is similar in nature to the initial one on admission.  She also complains of some shortness of breath but denies any palpitations.    History taken from: patient    Vital Signs  Temp:  [97.8 °F (36.6 °C)-98.4 °F (36.9 °C)] 97.8 °F (36.6 °C)  Heart Rate:  [60-72] 62  Resp:  [18-20] 18  BP: (114-159)/(50-72) 147/72    Physical Exam:     Physical Exam   Constitutional: She is oriented to person, place, and time. She appears well-developed and well-nourished.   Elderly white female laying comfortably on bed.   HENT:   Mouth/Throat: Oropharynx is clear and moist.   Eyes: EOM are normal. Pupils are equal, round, and reactive to light.   Neck: Neck supple. No JVD present. No tracheal deviation present. No thyromegaly present.   Cardiovascular: Normal rate, regular rhythm, S1 normal and S2 normal.  Exam reveals no gallop and no friction rub.    No murmur heard.  Pulmonary/Chest: Effort normal and breath sounds normal. No respiratory distress. She has no wheezes. She has no rales.   Abdominal: Soft. Bowel sounds are normal. She exhibits no mass. There is no tenderness.   Musculoskeletal: Normal range of motion. She exhibits no edema.   Lymphadenopathy:     She has no cervical adenopathy.   Neurological: She is alert and oriented to person, place, and time.   Skin: Skin is warm and dry. No rash noted.   Psychiatric: She has a normal mood and affect.       Results Review:     I reviewed the patient's new clinical results.  I reviewed the patient's new imaging results and agree with the interpretation.  I reviewed the patient's other test results and agree with the interpretation  I personally viewed and interpreted the patient's EKG/Telemetry data    Medication:  Scheduled Meds:  aspirin 325 mg Oral Daily   budesonide 0.5 mg Nebulization BID - RT   calcium-vitamin D 500 mg Oral BID   carvedilol 6.25 mg Oral Q12H   cetirizine 5 mg Oral Daily   diphenhydrAMINE 25 mg Oral Once   I-shakir 1  tablet Oral Nightly   isosorbide mononitrate 60 mg Oral Q24H   montelukast 10 mg Oral Nightly   multivitamin 1 tablet Oral Daily   nitroglycerin 0.5 inch Topical Q8H   ranolazine 500 mg Oral Q12H   rosuvastatin 5 mg Oral Nightly     Continuous Infusions:  heparin (porcine) 12 Units/kg/hr Last Rate: 16 Units/kg/hr (03/10/17 0722)     PRN Meds:.heparin (porcine)  •  meclizine  •  nitroglycerin  •  sodium chloride  •  Insert peripheral IV **AND** sodium chloride    Telemetry: Atrial pacing the 60s.      Assessment/Plan     1. Chest pain: Patient with chest pain concerning for coronary artery disease. According to the patient the current chest pain is similar to previous chest pains during heart attacks. She also has mild troponin elevation and EKG changes concerning for ischemia.  She does have a recent stress test which showed no evidence of ischemia approximate one month ago and a cardiac catheterization showing nonobstructive coronary artery disease 2 years ago.     2. ASCVD: Patient with history of ASCVD currently on aspirin and Crestor.  Not on a beta blocker and use an unclear reason and not on an ACE inhibitor due to chronic kidney disease.     3. Hypertension: Patient with history of hypertension which is mildly uncontrolled at this point.     4. Systolic heart failure: Patient with a history of systolic dysfunction with an ejection fraction of 65% on last echocardiogram.He appears to be well compensated however.    5.  Paroxysmal atrial fibrillation: Patient with a history of paroxysmal atrial fibrillation currently maintaining atrial paced rhythm.  She is on Eliquis for stroke prevention.     Plan:     1. Chest pain: The patient has been managed medically for the last few days and continues to complain of severe chest pain.  She did present with lateral ST depression that was not present on previous EKGs.  However her troponins have remained basically normal on the last several days.  Due to patient's  persistent chest pain) regarding her tachycardia is adamant he wants a cardiac catheterization done.  This is to be done by Dr. Cash today.     2. ASCVD: The patient is currently on aspirin, beta blocker, statin and isosorbide mononitrate.  At this point we'll continue current regimen.     3. Hypertension: The patient remains mildly hypertensive on current regimen.  Her renal function is borderline abnormal.  At this point will start on amlodipine 5 mg daily.     4. Systolic heart failure: Patient with a history of systolic heart failure with last echocardiogram showing a normal ejection fraction.  The patient is currently on a beta blocker.  Will hold off on ACE inhibitor due to abnormal renal function.  May consider starting if her renal function improves.      Kedar Montiel MD  03/10/17  10:24 AM

## 2017-03-10 NOTE — NURSING NOTE
"Spoke to Dr. Cash concerning pt's heparin d/t heart cath. MD stated the pt will not have heart cath until this afternoon. \"Keep heparin running and pt may have a light breakfast.\"  "

## 2017-03-11 VITALS
RESPIRATION RATE: 19 BRPM | HEART RATE: 94 BPM | TEMPERATURE: 98.4 F | SYSTOLIC BLOOD PRESSURE: 137 MMHG | OXYGEN SATURATION: 97 % | BODY MASS INDEX: 28.42 KG/M2 | WEIGHT: 160.4 LBS | DIASTOLIC BLOOD PRESSURE: 85 MMHG | HEIGHT: 63 IN

## 2017-03-11 LAB
ANION GAP SERPL CALCULATED.3IONS-SCNC: 3.4 MMOL/L (ref 3.6–11.2)
BUN BLD-MCNC: 19 MG/DL (ref 7–21)
BUN/CREAT SERPL: 16.7 (ref 7–25)
CALCIUM SPEC-SCNC: 8.4 MG/DL (ref 7.7–10)
CHLORIDE SERPL-SCNC: 110 MMOL/L (ref 99–112)
CO2 SERPL-SCNC: 23.6 MMOL/L (ref 24.3–31.9)
CREAT BLD-MCNC: 1.14 MG/DL (ref 0.43–1.29)
DEPRECATED RDW RBC AUTO: 45.7 FL (ref 37–54)
ERYTHROCYTE [DISTWIDTH] IN BLOOD BY AUTOMATED COUNT: 14.3 % (ref 11.5–14.5)
GFR SERPL CREATININE-BSD FRML MDRD: 45 ML/MIN/1.73
GLUCOSE BLD-MCNC: 123 MG/DL (ref 70–110)
HCT VFR BLD AUTO: 33.3 % (ref 37–47)
HGB BLD-MCNC: 10.5 G/DL (ref 12–16)
MCH RBC QN AUTO: 28.2 PG (ref 27–33)
MCHC RBC AUTO-ENTMCNC: 31.5 G/DL (ref 33–37)
MCV RBC AUTO: 89.3 FL (ref 80–94)
OSMOLALITY SERPL CALC.SUM OF ELEC: 277.4 MOSM/KG (ref 273–305)
PLATELET # BLD AUTO: 171 10*3/MM3 (ref 130–400)
PMV BLD AUTO: 10.8 FL (ref 6–10)
POTASSIUM BLD-SCNC: 4.9 MMOL/L (ref 3.5–5.3)
RBC # BLD AUTO: 3.73 10*6/MM3 (ref 4.2–5.4)
SODIUM BLD-SCNC: 137 MMOL/L (ref 135–153)
WBC NRBC COR # BLD: 7.27 10*3/MM3 (ref 4.5–12.5)

## 2017-03-11 PROCEDURE — 93010 ELECTROCARDIOGRAM REPORT: CPT | Performed by: INTERNAL MEDICINE

## 2017-03-11 PROCEDURE — 85027 COMPLETE CBC AUTOMATED: CPT | Performed by: INTERNAL MEDICINE

## 2017-03-11 PROCEDURE — 94799 UNLISTED PULMONARY SVC/PX: CPT

## 2017-03-11 PROCEDURE — 93005 ELECTROCARDIOGRAM TRACING: CPT | Performed by: INTERNAL MEDICINE

## 2017-03-11 PROCEDURE — 80048 BASIC METABOLIC PNL TOTAL CA: CPT | Performed by: INTERNAL MEDICINE

## 2017-03-11 PROCEDURE — 99238 HOSP IP/OBS DSCHRG MGMT 30/<: CPT | Performed by: INTERNAL MEDICINE

## 2017-03-11 RX ORDER — ASPIRIN 325 MG
325 TABLET, DELAYED RELEASE (ENTERIC COATED) ORAL DAILY
Qty: 30 TABLET | Refills: 11 | Status: SHIPPED | OUTPATIENT
Start: 2017-03-11 | End: 2017-09-18

## 2017-03-11 RX ORDER — CLOPIDOGREL BISULFATE 75 MG/1
75 TABLET ORAL DAILY
Qty: 30 TABLET | Refills: 11 | Status: SHIPPED | OUTPATIENT
Start: 2017-03-11 | End: 2017-09-18

## 2017-03-11 RX ADMIN — CALCIUM CARBONATE-VITAMIN D TAB 500 MG-200 UNIT 500 MG: 500-200 TAB at 08:43

## 2017-03-11 RX ADMIN — CETIRIZINE HYDROCHLORIDE 5 MG: 10 TABLET ORAL at 08:42

## 2017-03-11 RX ADMIN — AMLODIPINE BESYLATE 5 MG: 5 TABLET ORAL at 08:42

## 2017-03-11 RX ADMIN — CLOPIDOGREL 75 MG: 75 TABLET, FILM COATED ORAL at 08:41

## 2017-03-11 RX ADMIN — ASPIRIN 325 MG: 325 TABLET, COATED ORAL at 08:41

## 2017-03-11 RX ADMIN — BUDESONIDE 0.5 MG: 0.5 INHALANT RESPIRATORY (INHALATION) at 07:02

## 2017-03-11 RX ADMIN — Medication 1 TABLET: at 08:42

## 2017-03-11 RX ADMIN — RANOLAZINE 500 MG: 500 TABLET, FILM COATED, EXTENDED RELEASE ORAL at 08:41

## 2017-03-11 NOTE — PROGRESS NOTES
Discussed with cardiology Dr. Cash.  Patient did require stent and he has accepted the patient onto his service.

## 2017-03-11 NOTE — PROGRESS NOTES
Discharge Planning Assessment   Fabrice     Patient Name: Cici Veliz  MRN: [id number]  Today's Date: 3/11/2017    Admit Date: 3/5/2017          Discharge Plan       03/11/17 0906    Final Note    Final Note SS received a consult to arrange home health.  SS spoke with pt's daughter, who states they feel home health would assist with checking on her mother while she is at work.  SS informed the pt's daughter, home health only comes the home for a short period of time a few days a week.  Pt's daughter does not feel comfortable with her mother being home alone for long periods of time and states she has a family member coming from out of town to stay for a few days.  [facility] was contacted on 03/10/17 to discuss services.  Pt's daughter is agreeable to [facility].  SS faxed information to [facility] at [phone].  Nurse please call report to [facility]  at [phone].  No other needs at this time.               Expected Discharge Date and Time     Expected Discharge Date Expected Discharge Time    Mar 11, 2017         Tuyet Contreras

## 2017-03-11 NOTE — DISCHARGE PLACEMENT REQUEST
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  Version 2 of 2          LOS: 1 day   Patient Care Team:  Emily Cleary MD as PCP - General (Cardiology)  Donita Crum MD as Consulting Physician (Hand Surgery)  Lane Cervantes MD as Consulting Physician (Ophthalmology)  Eder Scott DMD (General Practice)  Shy Brown MD as Consulting Physician (Dermatology)      Subjective     Admission information:    The patient is a 89-year-old white female with a history of paroxysmal atrial fibrillation on Eliquis for stroke prevention, coronary artery disease status post stent placement in the past, COPD, hypertension, dyslipidemia, sick sinus syndrome status post pacemaker placement and chronic kidney disease who comes to the hospital for an episode of chest pain. According to the patient she's been having intermittent episodes of chest pain over the last year that usually lasts a few seconds and resolve spontaneously however her last month he's been getting more frequent and are now occurring daily. There are also lasting for significantly longer periods of time. According to the patient she started having chest pain yesterday that was midsternal, oppressive,  radiated to her jaw and was 8/10 on the pain scale. She reports associated dizziness and shortness of breath. She states the pain occurred at rest and denied any ameliorating or exacerbating factors. She was therefore brought to the ER for further evaluation. Serial troponins were noted to be minimally elevated and lower than the patient's baseline. However the patient continues to complain of chest pain today. I review of her EKGs showed progressive T-wave inversion and mild ST depression in lateral leads that is not present on admission. The patient was recently admitted approximately one month ago with similar complaints. She underwent stress test which showed a small sized lateral infarct with no evidence of ischemia.     Interval History:   to the patient she has continued to have intermittent episodes of chest pain that is similar in nature to the initial one on admission.  She also complains of some shortness of breath but denies any palpitations.    History taken from: patient    Vital Signs  Temp:  [97.8 °F (36.6 °C)-98.4 °F (36.9 °C)] 97.8 °F (36.6 °C)  Heart Rate:  [60-72] 62  Resp:  [18-20] 18  BP: (114-159)/(50-72) 147/72    Physical Exam:     Physical Exam   Constitutional: She is oriented to person, place, and time. She appears well-developed and well-nourished.   Elderly white female laying comfortably on bed.   HENT:   Mouth/Throat: Oropharynx is clear and moist.   Eyes: EOM are normal. Pupils are equal, round, and reactive to light.   Neck: Neck supple. No JVD present. No tracheal deviation present. No thyromegaly present.   Cardiovascular: Normal rate, regular rhythm, S1 normal and S2 normal.  Exam reveals no gallop and no friction rub.  murmur heard.  Pulmonary/Chest: Effort normal and breath sounds normal. No respiratory distress. She has no wheezes. She has no rales.   Abdominal: Soft. Bowel sounds are normal. She exhibits no mass. There is no tenderness.   Musculoskeletal: Normal range of  motion. She exhibits no edema.   Lymphadenopathy:     She has no cervical adenopathy.   Neurological: She is alert and oriented to person, place, and time.   Skin: Skin is warm and dry. No rash noted.   Psychiatric: She has a normal mood and affect.       Results Review:     I reviewed the patient's new clinical results.  I reviewed the patient's new imaging results and agree with the interpretation.  I reviewed the patient's other test results and agree with the interpretation  I personally viewed and interpreted the patient's EKG/Telemetry data    Medication:  Scheduled Meds:  aspirin 325 mg Oral Daily   budesonide 0.5 mg Nebulization BID - RT   calcium-vitamin D 500 mg Oral BID   carvedilol 6.25 mg Oral Q12H   cetirizine 5 mg Oral Daily   diphenhydrAMINE 25 mg Oral Once   I-shakir 1 tablet Oral Nightly   isosorbide mononitrate 60 mg Oral Q24H   montelukast 10 mg Oral Nightly   multivitamin 1 tablet Oral Daily   nitroglycerin 0.5 inch Topical Q8H   ranolazine 500 mg Oral Q12H   rosuvastatin 5 mg Oral Nightly     Continuous Infusions:  heparin (porcine) 12 Units/kg/hr Last Rate: 16 Units/kg/hr (03/10/17 0722)     PRN Meds:.heparin (porcine)  •  meclizine  •  nitroglycerin  •  sodium chloride  •  Insert peripheral IV **AND** sodium chloride    Telemetry: Atrial pacing the 60s.      Assessment&#47;Plan     1. Chest pain: Patient with chest pain concerning for coronary artery disease. According to the patient the current chest pain is similar to previous chest pains during heart attacks. She also has mild troponin elevation and EKG changes concerning for ischemia.  She does have a recent stress test which showed no evidence of ischemia approximate one month ago and a cardiac catheterization showing nonobstructive coronary artery disease 2 years ago.     2. ASCVD: Patient with history of ASCVD currently on aspirin and Crestor.  Not on a beta blocker and use an unclear reason and not on an ACE inhibitor due to chronic  kidney disease.     3. Hypertension: Patient with history of hypertension which is mildly uncontrolled at this point.     4. Systolic heart failure: Patient with a history of systolic dysfunction with an ejection fraction of 65% on last echocardiogram.He appears to be well compensated however.    5.  Paroxysmal atrial fibrillation: Patient with a history of paroxysmal atrial fibrillation currently maintaining atrial paced rhythm.  She is on Eliquis for stroke prevention.     Plan:     1. Chest pain: The patient has been managed medically for the last few days and continues to complain of severe chest pain.  She did present with lateral ST depression that was not present on previous EKGs.  However her troponins have remained basically normal on the last several days.  Due to patient's persistent chest pain) regarding her tachycardia is adamant he wants a cardiac catheterization done.  This is to be done by Dr. Cash today.     2. ASCVD: The patient is currently on aspirin, beta blocker, statin and isosorbide mononitrate.  At this point we'll continue current regimen.     3. Hypertension: The patient remains mildly hypertensive on current regimen.  Her renal function is borderline abnormal.  At this point will start on amlodipine 5 mg daily.     4. Systolic heart failure: Patient with a history of systolic heart failure with last echocardiogram showing a normal ejection fraction.  The patient is currently on a beta blocker.  Will hold off on ACE inhibitor due to abnormal renal function.  May consider starting if her renal function improves.      Kedar Montiel MD  03/10/17  10:24 AM       Electronically signed by Kedar Montiel MD at 3/10/2017 10:35 AM      Kedar Montiel MD at 3/10/2017 10:23 AM  Version 1 of 2          LOS: 1 day   Patient Care Team:  Emily Cleary MD as PCP - General (Cardiology)  Donita Crum MD as Consulting Physician (Hand  Surgery)  Lane Cervantes MD as Consulting Physician (Ophthalmology)  Eder Scott DMD (General Practice)  Shy Brown MD as Consulting Physician (Dermatology)      Subjective     Admission information:    The patient is a 89-year-old white female with a history of paroxysmal atrial fibrillation on Eliquis for stroke prevention, coronary artery disease status post stent placement in the past, COPD, hypertension, dyslipidemia, sick sinus syndrome status post pacemaker placement and chronic kidney disease who comes to the hospital for an episode of chest pain. According to the patient she's been having intermittent episodes of chest pain over the last year that usually lasts a few seconds and resolve spontaneously however her last month he's been getting more frequent and are now occurring daily. There are also lasting for significantly longer periods of time. According to the patient she started having chest pain yesterday that was midsternal, oppressive, radiated to her jaw and was 8/10 on the pain scale. She reports associated dizziness and shortness of breath. She states the pain occurred at rest and denied any ameliorating or exacerbating factors. She was therefore brought to the ER for further evaluation. Serial troponins were noted to be minimally elevated and lower than the patient's baseline. However the patient continues to complain of chest pain today. I review of her EKGs showed progressive T-wave inversion and mild ST depression in lateral leads that is not present on admission. The patient was recently admitted approximately one month ago with similar complaints. She underwent stress test which showed a small sized lateral infarct with no evidence of ischemia.     Interval History:   to the patient she has continued to have intermittent episodes of chest pain that is similar in nature to the initial one on admission.  She also complains of some shortness of breath but denies any  palpitations.    History taken from: patient    Vital Signs  Temp:  [97.8 °F (36.6 °C)-98.4 °F (36.9 °C)] 97.8 °F (36.6 °C)  Heart Rate:  [60-72] 62  Resp:  [18-20] 18  BP: (114-159)/(50-72) 147/72    Physical Exam:     Physical Exam   Constitutional: She is oriented to person, place, and time. She appears well-developed and well-nourished.   Elderly white female laying comfortably on bed.   HENT:   Mouth/Throat: Oropharynx is clear and moist.   Eyes: EOM are normal. Pupils are equal, round, and reactive to light.   Neck: Neck supple. No JVD present. No tracheal deviation present. No thyromegaly present.   Cardiovascular: Normal rate, regular rhythm, S1 normal and S2 normal.  Exam reveals no gallop and no friction rub.  murmur heard.  Pulmonary/Chest: Effort normal and breath sounds normal. No respiratory distress. She has no wheezes. She has no rales.   Abdominal: Soft. Bowel sounds are normal. She exhibits no mass. There is no tenderness.   Musculoskeletal: Normal range of motion. She exhibits no edema.   Lymphadenopathy:     She has no cervical adenopathy.   Neurological: She is alert and oriented to person, place, and time.   Skin: Skin is warm and dry. No rash noted.   Psychiatric: She has a normal mood and affect.       Results Review:     I reviewed the patient's new clinical results.  I reviewed the patient's new imaging results and agree with the interpretation.  I reviewed the patient's other test results and agree with the interpretation  I personally viewed and interpreted the patient's EKG/Telemetry data    Medication:  Scheduled Meds:  aspirin 325 mg Oral Daily   budesonide 0.5 mg Nebulization BID - RT   calcium-vitamin D 500 mg Oral BID   carvedilol 6.25 mg Oral Q12H   cetirizine 5 mg Oral Daily   diphenhydrAMINE 25 mg Oral Once   I-shakir 1 tablet Oral Nightly   isosorbide mononitrate 60 mg Oral Q24H   montelukast 10 mg Oral Nightly   multivitamin 1 tablet Oral Daily   nitroglycerin 0.5 inch Topical  Q8H   ranolazine 500 mg Oral Q12H   rosuvastatin 5 mg Oral Nightly     Continuous Infusions:  heparin (porcine) 12 Units/kg/hr Last Rate: 16 Units/kg/hr (03/10/17 0722)     PRN Meds:.heparin (porcine)  •  meclizine  •  nitroglycerin  •  sodium chloride  •  Insert peripheral IV **AND** sodium chloride    Telemetry: Atrial pacing the 60s.      Assessment&#47;Plan     1. Chest pain: Patient with chest pain concerning for coronary artery disease. According to the patient the current chest pain is similar to previous chest pains during heart attacks. She also has mild troponin elevation and EKG changes concerning for ischemia.  She does have a recent stress test which showed no evidence of ischemia approximate one month ago and a cardiac catheterization showing nonobstructive coronary artery disease 2 years ago.     2. ASCVD: Patient with history of ASCVD currently on aspirin and Crestor.  Not on a beta blocker and use an unclear reason and not on an ACE inhibitor due to chronic kidney disease.     3. Hypertension: Patient with history of hypertension which is mildly uncontrolled at this point.     4. Systolic heart failure: Patient with a history of systolic dysfunction with an ejection fraction of 65% on last echocardiogram.He appears to be well compensated however.     Plan:     1. Chest pain: The patient has been managed medically for the last few days and continues to complain of severe chest pain.  She did present with lateral ST depression that was not present on previous EKGs.  However her troponins have remained basically normal on the last several days.  Due to patient's persistent chest pain) regarding her tachycardia is adamant he wants a cardiac catheterization done.  This is to be done by Dr. Cash today.     2. ASCVD: The patient is currently on aspirin, beta blocker, statin and isosorbide mononitrate.  At this point we'll continue current regimen.     3. Hypertension: The patient remains mildly  hypertensive on current regimen.  Her renal function is borderline abnormal.  At this point will start on amlodipine 5 mg daily.     4. Systolic heart failure: Patient with a history of systolic heart failure with last echocardiogram showing a normal ejection fraction.  The patient is currently on a beta blocker.  Will hold off on ACE inhibitor due to abnormal renal function.  May consider starting if her renal function improves.      Kedar Montiel MD  03/10/17  10:24 AM       Electronically signed by Kedar Montiel MD at 3/10/2017 10:34 AM      Hamilton Chacon MD at 3/10/2017  7:22 PM  Version 1 of 1         Discussed with cardiology Dr. Cash.  Patient did require stent and he has accepted the patient onto his service.     Electronically signed by Hamilton Chacon MD at 3/10/2017  7:22 PM             Discharge Summary      Tejinder Cash MD at 3/11/2017  6:53 AM          Chief complaint:  Chest pain    History of present illness:  Cici is a pleasant 89-year-old woman who presents with a history of known coronary artery disease and recurrent chest pain.  She has had multiple recurrent hospitalizations over the past several months for recurrent symptoms.  She presented with identical symptoms to her prior myocardial infarction.    Hospital course:  The patient underwent an initial evaluation and medical therapy but continued to have significant symptoms.  She was consulted by both myself and Dr. Rivas and together we felt that there was a high likelihood of significant disease.  As such, she did undergo cardiac catheterization which revealed severe disease involving the proximal right coronary artery.  She underwent drug-eluting stent implantation to this vessel.  She was observed overnight did well and is being discharged home.    Discharge diagnoses:  Coronary artery disease  Hypertension  Hyperlipidemia    Discharge medications:  Norvasc 5 mg daily  Aspirin 325 mg  daily  Pulmicort  Vitamin D  Coreg 6.25 mg twice a day  Zyrtec 5 mg daily  Plavix 75 mg daily  Singulair 10 mg daily  Multiple vitamins daily  Crestor 5 mg daily    Discharge instructions:  The patient has been counseled regarding the importance of dual antiplatelet therapy.  Given that she is in normal sinus rhythm she will hold her Eliquis for the following 4 weeks  The patient will take her medications as prescribed  The patient will follow-up with me in 2 weeks  The patient will follow-up with Dr. Cleary in 4 weeks.         Electronically signed by Tejinder Cash MD at 3/11/2017  7:13 AM        Discharge Order     Start     Ordered    17  Discharge patient  Once     Expected Discharge Date:  17    Discharge Disposition:  Home or Self Care        17 07         [facility]  [address] [place] [id number]  Phone: [phone]  Fax:         Patient:     Cici Veliz MRN: [id number]   [address] [facility] [address] : 1925  SSN:    Phone: [phone] Sex: F      INSURANCE PA[employer]AN [employer] # SUBSCRIBER ID   Primary:  Secondary: MEDICARE ANTHEM BLUE CROSS [id number]  [id number]    [id number]  [id number]   Admitting Diagnosis: Chest pain [R07.9]  Order Date: Mar 11, 2017               Inpatient Consult to Case Management     (Order ID: [id number])   Diagnosis:       Priority: Routine Expected Date:   Expiration Date:        Interval:  Count:    Reason for Consult? please assess for home health     Specimen Type:   Specimen Source:   Specimen Taken Date:   Specimen Taken Time:                         Authorizing Provider:Tejinder Cash MD  Order Entered By: Tejinder Cash MD 3/11/2017 7:11 AM     Electronically signed by: Tejinder Cash MD (NPI: [id number]) 3/11/2017 7:11 AM

## 2017-03-13 ENCOUNTER — TELEPHONE (OUTPATIENT)
Dept: CARDIOLOGY | Facility: CLINIC | Age: 82
End: 2017-03-13

## 2017-03-13 DIAGNOSIS — R05.9 COUGH: Primary | ICD-10-CM

## 2017-03-13 RX ORDER — ALBUTEROL SULFATE 90 UG/1
2 AEROSOL, METERED RESPIRATORY (INHALATION) 2 TIMES DAILY PRN
Qty: 6.77 G | Refills: 2 | Status: SHIPPED | OUTPATIENT
Start: 2017-03-13 | End: 2017-04-28 | Stop reason: SDDI

## 2017-03-13 NOTE — TELEPHONE ENCOUNTER
----- Message from Emily Cleary MD sent at 3/13/2017  3:46 PM EDT -----  Contact: [phone]  Pulmonary consult  ----- Message -----     From: Radha Smith MA     Sent: 3/13/2017   2:14 PM       To: Emily Cleary MD    Pt requesting neb tx or inhaler Daughter states that she has a lot of mucus drainage and congestion in chest also wants to be seen because she is currently taking antibiotic and doesn't feel it is helping.

## 2017-03-13 NOTE — TELEPHONE ENCOUNTER
----- Message from Emily Cleary MD sent at 3/13/2017  3:46 PM EDT -----  Contact: [id number]   Get a chest x-ray, PFT and Proventil HFA  ----- Message -----     From: Radha Smith MA     Sent: 3/13/2017   2:14 PM       To: Emily Cleary MD    Pt requesting neb tx or inhaler Daughter states that she has a lot of mucus drainage and congestion in chest also wants to be seen because she is currently taking antibiotic and doesn't feel it is helping.

## 2017-03-15 ENCOUNTER — HOSPITAL ENCOUNTER (EMERGENCY)
Facility: HOSPITAL | Age: 82
Discharge: HOME | End: 2017-03-15
Attending: EMERGENCY MEDICINE | Admitting: EMERGENCY MEDICINE

## 2017-03-15 ENCOUNTER — APPOINTMENT (OUTPATIENT)
Dept: CT IMAGING | Facility: HOSPITAL | Age: 82
End: 2017-03-15

## 2017-03-15 ENCOUNTER — APPOINTMENT (OUTPATIENT)
Dept: GENERAL RADIOLOGY | Facility: HOSPITAL | Age: 82
End: 2017-03-15

## 2017-03-15 VITALS
DIASTOLIC BLOOD PRESSURE: 66 MMHG | BODY MASS INDEX: 28 KG/M2 | HEIGHT: 63 IN | WEIGHT: 158 LBS | OXYGEN SATURATION: 97 % | HEART RATE: 63 BPM | RESPIRATION RATE: 16 BRPM | SYSTOLIC BLOOD PRESSURE: 167 MMHG | TEMPERATURE: 98.2 F

## 2017-03-15 DIAGNOSIS — S51.012A SKIN TEAR OF LEFT ELBOW WITHOUT COMPLICATION, INITIAL ENCOUNTER: ICD-10-CM

## 2017-03-15 DIAGNOSIS — S50.12XA CONTUSION OF LEFT ELBOW AND FOREARM, INITIAL ENCOUNTER: ICD-10-CM

## 2017-03-15 DIAGNOSIS — S00.83XA TRAUMATIC HEMATOMA OF FOREHEAD, INITIAL ENCOUNTER: Primary | ICD-10-CM

## 2017-03-15 PROCEDURE — 99284 EMERGENCY DEPT VISIT MOD MDM: CPT

## 2017-03-15 PROCEDURE — 70450 CT HEAD/BRAIN W/O DYE: CPT

## 2017-03-15 PROCEDURE — 73080 X-RAY EXAM OF ELBOW: CPT

## 2017-03-15 PROCEDURE — 70450 CT HEAD/BRAIN W/O DYE: CPT | Performed by: RADIOLOGY

## 2017-03-15 PROCEDURE — 73080 X-RAY EXAM OF ELBOW: CPT | Performed by: RADIOLOGY

## 2017-03-15 NOTE — ED NOTES
PATIENT REPORTS THAT SHE IS UTD ON THE FLU AND PNEUMONIA VACCINES     Katiana Hawkins, CALLI  03/15/17 [id number]

## 2017-03-15 NOTE — DISCHARGE INSTRUCTIONS

## 2017-03-15 NOTE — ED NOTES
PATIENT REPORTS THAT SHE HAS HAD CARDIAC STENTS PLACED Friday 3/10/17 BY DR SYLVESTER. MD AWARE AT BEDSIDE     Katiana Hawkins RN  03/15/17 9448

## 2017-03-16 ENCOUNTER — OFFICE VISIT (OUTPATIENT)
Dept: CARDIOLOGY | Facility: CLINIC | Age: 82
End: 2017-03-16

## 2017-03-16 VITALS
SYSTOLIC BLOOD PRESSURE: 102 MMHG | HEIGHT: 63 IN | OXYGEN SATURATION: 97 % | BODY MASS INDEX: 28.67 KG/M2 | DIASTOLIC BLOOD PRESSURE: 58 MMHG | HEART RATE: 72 BPM | WEIGHT: 161.8 LBS

## 2017-03-16 DIAGNOSIS — I48.0 PAF (PAROXYSMAL ATRIAL FIBRILLATION) (HCC): ICD-10-CM

## 2017-03-16 DIAGNOSIS — J20.9 ACUTE BRONCHITIS, UNSPECIFIED ORGANISM: Primary | ICD-10-CM

## 2017-03-16 DIAGNOSIS — E78.2 MIXED HYPERLIPIDEMIA: ICD-10-CM

## 2017-03-16 DIAGNOSIS — I50.32: ICD-10-CM

## 2017-03-16 DIAGNOSIS — H61.23 IMPACTED CERUMEN OF BOTH EARS: ICD-10-CM

## 2017-03-16 DIAGNOSIS — I25.10 CORONARY ARTERY DISEASE INVOLVING NATIVE CORONARY ARTERY OF NATIVE HEART WITHOUT ANGINA PECTORIS: ICD-10-CM

## 2017-03-16 DIAGNOSIS — I42.2: ICD-10-CM

## 2017-03-16 PROCEDURE — 99214 OFFICE O/P EST MOD 30 MIN: CPT | Performed by: INTERNAL MEDICINE

## 2017-03-16 RX ORDER — LEVOFLOXACIN 250 MG/1
250 TABLET ORAL DAILY
Qty: 7 TABLET | Refills: 0 | Status: SHIPPED | OUTPATIENT
Start: 2017-03-16 | End: 2017-03-23

## 2017-03-16 NOTE — PROGRESS NOTES
subjective     Chief Complaint   Patient presents with   • Fall     PT FELL YESTERDAY WENT TO ER    • Cough   • Shortness of Breath   • Dizziness     History of Present Illness  Patient is 89 years old white female who has known coronary artery disease.  She had a myocardial infarction in 1985.  In 2009 she had she had the stenting of the circumflex and RCA.  Recently in 2017 there was in-stent restenosis of the RCA lesion requiring angioplasty and stenting..  Patient has been doing very well now however she developed the acute bronchitis with severe cough.  During one of those coughing episodes she tripped and fell hitting her left forehead with large bruises.  Patient went to the emergency room where CT scan was obtained and was negative.    Patient is here in the office today complaining of being weak and tired fatigue  Still having a lot of cough.  No fever or chills    Paroxysmal atrial flutter fibrillation  Patient had pacemaker checked and was found to have frequent short runs of atrial flutter fibrillation.  Patient is not aware of most of the attacks but she states that sometimes when she is resting she can feel her heart running away and it lasts for more than an hour some time  She has no neurological symptoms and no complaints.    Hypertension  Patient states that Dr. Cash had stopped her Norvasc. Her blood pressure went quite high and Norvasc had to be restarted when she was in the hospital. Currently she is taking Norvasc.      Hyperlipidemia  Patient has significant hyperlipidemia lab work show both cholesterol and triglyceride including LDL elevated.  She has not been taking any statin for a few months.  Patient used to take Pravachol. She was instructed to restart Pravachol 40 mg daily.      Poor memory  Patient states that she is having difficulty remembering things. She is 89 years old taking care of herself and is doing remarkably well but she is having some problems with the memory now.  Patient is willing to start to medications.          Patient Active Problem List   Diagnosis   • Spinal stenosis, degenerative disc disease, back pain*   • Deformity of the right Sternoclavicular joint*   • COPD (chronic obstructive pulmonary disease)   • Essential hypertension   • Mixed hyperlipidemia   • CAD, status post RCA and circumflex stents, in-stent restenosis of RCA requiring stenting 2017 Dr. Cash    • Pacemaker*   • hx of Myocardial infarction*   • Thoracic back pain*   • Neck pain*   • Valvular heart disease mild mitral regurgitation not significant*   • Congestive heart failure with LV diastolic dysfunction, NYHA class 2*   • Atopic rhinitis   • Hyperparathyroidism status post parathyroidectomy   • Chronic back pain   • CKD (chronic kidney disease) stage 3, GFR 30-59 ml/min   • Diastolic heart failure secondary to hypertrophic cardiomyopathy   • Hyperglycemia   • Diarrhea in adult patient   • Coronary artery disease   • PAF (paroxysmal atrial fibrillation)   • Bradycardia   • Neuropathy   • Cerebrovascular disease   • Mild obesity   • Chest pain of uncertain etiology       Social History   Substance Use Topics   • Smoking status: Never Smoker   • Smokeless tobacco: Never Used   • Alcohol use No       Allergies   Allergen Reactions   • Amoxil [Amoxicillin]    • Bee Venom    • Codeine    • Erythromycin    • Lipitor [Atorvastatin]    • Penicillins    • Tetracyclines & Related    • Zetia [Ezetimibe]    • Zocor [Simvastatin]          Current Outpatient Prescriptions:   •  albuterol (PROVENTIL HFA;VENTOLIN HFA) 108 (90 BASE) MCG/ACT inhaler, Inhale 2 puffs 2 (Two) Times a Day As Needed for Wheezing., Disp: 6.77 g, Rfl: 2  •  amLODIPine (NORVASC) 5 MG tablet, Take 1 tablet by mouth Daily., Disp: 30 tablet, Rfl: 0  •  aspirin  MG tablet, Take 1 tablet by mouth Daily., Disp: 30 tablet, Rfl: 11  •  calcium carbonate-cholecalciferol 500-400 MG-UNIT tablet tablet, Take 1 tablet by mouth 2 (Two) Times a  Day., Disp: , Rfl:   •  cetirizine (ZyrTEC) 10 MG tablet, Take 1 tablet by mouth daily., Disp: 90 tablet, Rfl: 0  •  clopidogrel (PLAVIX) 75 MG tablet, Take 1 tablet by mouth Daily., Disp: 30 tablet, Rfl: 11  •  gabapentin (NEURONTIN) 300 MG capsule, Take 1 capsule by mouth every night. (Patient taking differently: Take 300 mg by mouth At Night As Needed.), Disp: 90 capsule, Rfl: 1  •  metoprolol tartrate (LOPRESSOR) 25 MG tablet, Take 1 tablet by mouth Every 12 (Twelve) Hours., Disp: 180 tablet, Rfl: 0  •  montelukast (SINGULAIR) 10 MG tablet, Take 1 tablet by mouth Every Night., Disp: 90 tablet, Rfl: 2  •  Multiple Vitamins-Minerals (EYE VITAMINS & MINERALS PO), Take 1 tablet by mouth Every Night., Disp: , Rfl:   •  multivitamin (DAILY RITIKA) tablet tablet, Take 1 tablet by mouth Daily., Disp: , Rfl:   •  PULMICORT FLEXHALER 90 MCG/ACT inhaler, Inhale 1 puff 2 (Two) Times a Day., Disp: 2 inhaler, Rfl: 2  •  rosuvastatin (CRESTOR) 5 MG tablet, Take 1 tablet by mouth Daily. (Patient taking differently: Take 5 mg by mouth Every Night.), Disp: 90 tablet, Rfl: 0  •  levoFLOXacin (LEVAQUIN) 250 MG tablet, Take 1 tablet by mouth Daily., Disp: 7 tablet, Rfl: 0      The following portions of the patient's history were reviewed and updated as appropriate: allergies, current medications, past family history, past medical history, past social history, past surgical history and problem list.    Review of Systems   Constitution: Positive for weakness and malaise/fatigue.   HENT: Positive for ear pain, hoarse voice and sore throat.    Eyes: Negative.    Cardiovascular: Negative.    Respiratory: Positive for cough, shortness of breath and sputum production. Negative for wheezing.    Skin: Negative.    Musculoskeletal: Negative.    Gastrointestinal: Negative.    Genitourinary: Negative.    Psychiatric/Behavioral: Negative.           Objective:     Visit Vitals   • /58 (BP Location: Left arm, Patient Position: Sitting)   •  "Pulse 72   • Ht 63.25\" (160.7 cm)   • Wt 161 lb 12.8 oz (73.4 kg)   • SpO2 97%   • BMI 28.44 kg/m2     Physical Exam   Constitutional: She appears well-developed and well-nourished.   HENT:   Head: Normocephalic and atraumatic.   Mouth/Throat: Oropharynx is clear and moist.   Throat is injected    Wax in both ears   Eyes: Conjunctivae and EOM are normal. Pupils are equal, round, and reactive to light. No scleral icterus.   Neck: Normal range of motion. Neck supple. No JVD present. No tracheal deviation present. No thyromegaly present.   Cardiovascular: Normal rate, regular rhythm, normal heart sounds and intact distal pulses.  Exam reveals no gallop and no friction rub.    No murmur heard.  Pulmonary/Chest: Effort normal and breath sounds normal. No respiratory distress. She has no wheezes. She has no rales. She exhibits no tenderness.   Abdominal: Soft. Bowel sounds are normal. She exhibits no distension and no mass. There is no tenderness. There is no rebound and no guarding.   Musculoskeletal: Normal range of motion. She exhibits no edema, tenderness or deformity.   Large bruise left forehead   Lymphadenopathy:     She has no cervical adenopathy.   Neurological: She is alert. She has normal reflexes. No cranial nerve deficit. She exhibits normal muscle tone. Coordination normal.   Skin: Skin is warm and dry.   Psychiatric: She has a normal mood and affect. Her behavior is normal. Judgment and thought content normal.         Lab Review  Lab Results   Component Value Date     03/11/2017    K 4.9 03/11/2017     03/11/2017    BUN 19 03/11/2017    CREATININE 1.14 03/11/2017    GLUCOSE 123 (H) 03/11/2017    CALCIUM 8.4 03/11/2017    ALT 12 03/07/2017    ALKPHOS 62 03/07/2017    LABIL2 1.5 03/07/2017     Lab Results   Component Value Date    CKTOTAL 57 03/07/2017     Lab Results   Component Value Date    WBC 7.27 03/11/2017    HGB 10.5 (L) 03/11/2017    HCT 33.3 (L) 03/11/2017     03/11/2017     Lab " Results   Component Value Date    INR 0.97 03/08/2017    INR 0.94 01/22/2017    INR 0.92 01/13/2017     Lab Results   Component Value Date    MG 1.9 03/06/2017     Lab Results   Component Value Date    TSH 1.613 01/14/2017     Lab Results   Component Value Date    .0 (H) 03/05/2017     Lab Results   Component Value Date    CHLPL 156 05/09/2016     Lab Results   Component Value Date    CHOL 138 03/07/2017    TRIG 59 03/07/2017    HDL 43 (L) 03/07/2017    LDLCALC 83 03/07/2017    VLDL 11.8 03/07/2017    LDLHDL 1.93 03/07/2017         Procedures   March 10, 2017 Dr. Cash   Prior to intervention there is severe in-stent restenosis of the proximal stent margin of a previously implanted stent. Postintervention this is reduced to 20% residual stenosis  I personally viewed and interpreted the patient's LAB data         Assessment:     1. Acute bronchitis, unspecified organism    2. Impacted cerumen of both ears    3. Coronary artery disease involving native coronary artery of native heart without angina pectoris    4. Diastolic heart failure secondary to hypertrophic cardiomyopathy, chronic    5. Mixed hyperlipidemia    6. PAF (paroxysmal atrial fibrillation)          Plan:      Blood pressure is running low and that might be causing her to be dizzy.  Patient was advised to DC amlodipine.  She will check her blood pressure closely.  Amlodipine may need to be resumed at lower dose.  Case was discussed with patient's daughter who is taking care of her at this time    Lipids are very well controlled    Coronary artery disease stable successful angioplasty and stenting    Fall precautions were discussed  She has large bruise but overall she seems to be doing very well.    Follow-up scheduled        No Follow-up on file.
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
## 2017-03-23 ENCOUNTER — OFFICE VISIT (OUTPATIENT)
Dept: CARDIOLOGY | Facility: CLINIC | Age: 82
End: 2017-03-23

## 2017-03-23 VITALS
HEIGHT: 63 IN | DIASTOLIC BLOOD PRESSURE: 62 MMHG | SYSTOLIC BLOOD PRESSURE: 152 MMHG | OXYGEN SATURATION: 94 % | BODY MASS INDEX: 29.32 KG/M2 | WEIGHT: 165.5 LBS | HEART RATE: 60 BPM

## 2017-03-23 DIAGNOSIS — R00.1 BRADYCARDIA: ICD-10-CM

## 2017-03-23 DIAGNOSIS — Z95.0 PACEMAKER: ICD-10-CM

## 2017-03-23 DIAGNOSIS — I10 ESSENTIAL HYPERTENSION: ICD-10-CM

## 2017-03-23 DIAGNOSIS — E78.2 MIXED HYPERLIPIDEMIA: ICD-10-CM

## 2017-03-23 DIAGNOSIS — I48.0 PAF (PAROXYSMAL ATRIAL FIBRILLATION) (HCC): ICD-10-CM

## 2017-03-23 DIAGNOSIS — I25.10 CORONARY ARTERY DISEASE INVOLVING NATIVE CORONARY ARTERY OF NATIVE HEART WITHOUT ANGINA PECTORIS: ICD-10-CM

## 2017-03-23 DIAGNOSIS — I25.110 CORONARY ARTERY DISEASE INVOLVING NATIVE CORONARY ARTERY OF NATIVE HEART WITH UNSTABLE ANGINA PECTORIS (HCC): ICD-10-CM

## 2017-03-23 DIAGNOSIS — R53.1 WEAKNESS: Primary | ICD-10-CM

## 2017-03-23 PROCEDURE — 99213 OFFICE O/P EST LOW 20 MIN: CPT | Performed by: INTERNAL MEDICINE

## 2017-03-23 PROCEDURE — 93000 ELECTROCARDIOGRAM COMPLETE: CPT | Performed by: INTERNAL MEDICINE

## 2017-03-23 NOTE — PROGRESS NOTES
Subjective   Cici Veliz is a 89 y.o. female.     Chief Complaint   Patient presents with   • Follow-up     HEART CATH FOLLOW UP   • Coronary Artery Disease       History of Present Illness     Cici is a very pleasant 89-year-old woman who presents for follow-up of known coronary artery disease.  She initially presented in 2009 with a myocardial infarction and underwent drug-eluting stent implantation to her circumflex that time.  In 2012 her stent thrombosed and she underwent a second stent implantation to the same vessel.  She also underwent placement of an ostial RCA stent as well.  She did have a heart catheterization in 2015 which revealed relatively unremarkable coronary anatomy.  She presented to the hospital in March 2017 with recurrent symptoms that she insisted were identical to her initial presentation with coronary artery disease.  She eventually underwent repeat cardiac catheterization which demonstrated severe disease involving the true ostium of the right coronary artery.  She underwent drug-eluting stent implantation to the site without complication and was discharged home.  She states that since her hospitalization she has had no further episodes of jaw discomfort.  She did trip and fall on a rug and had significant injuries to her face she states that she has been gradually recovering.  She has remained compliant on her Plavix and aspirin.  She denies any heart first symptoms, palpitations near syncope or syncopal symptoms.    The following portions of the patient's history were reviewed and updated as appropriate: allergies, current medications, past family history, past medical history, past social history, past surgical history and problem list.    Review of Systems   Constitutional: Negative for activity change, appetite change and fatigue.   HENT: Positive for congestion. Negative for tinnitus.    Eyes: Negative for visual disturbance.   Respiratory: Positive for cough and shortness of  breath. Negative for chest tightness.    Cardiovascular: Positive for leg swelling. Negative for chest pain and palpitations.   Gastrointestinal: Negative for blood in stool, nausea and vomiting.   Endocrine: Negative for cold intolerance, heat intolerance and polyuria.   Genitourinary: Negative for dysuria.   Musculoskeletal: Negative for myalgias and neck pain.   Skin: Negative for rash.   Neurological: Positive for dizziness and weakness. Negative for syncope and light-headedness.   Hematological: Does not bruise/bleed easily.   Psychiatric/Behavioral: Negative for confusion and sleep disturbance.       Objective   Physical Exam   Constitutional: She is oriented to person, place, and time. She appears well-developed and well-nourished. No distress.   HENT:   Head: Normocephalic and atraumatic.   Nose: Nose normal.   Mouth/Throat: Oropharynx is clear and moist.   There is diffuse ecchymosis especially on the left side of her forehead   Eyes: Conjunctivae and EOM are normal. Right eye exhibits no discharge. Left eye exhibits no discharge. No scleral icterus.   Neck: Normal range of motion. No hepatojugular reflux and no JVD present. Carotid bruit is not present. No tracheal deviation present.   Cardiovascular: Normal rate, regular rhythm, S1 normal, S2 normal and intact distal pulses.  PMI is not displaced.  Exam reveals no gallop, no S3, no S4 and no friction rub.    No murmur heard.  Pulmonary/Chest: Effort normal and breath sounds normal. No accessory muscle usage. No respiratory distress. She has no wheezes. She has no rales. She exhibits no tenderness.   Abdominal: Soft. Bowel sounds are normal. She exhibits no distension. There is no tenderness.   Musculoskeletal: Normal range of motion. She exhibits no edema or tenderness.   There is ecchymosis on the left arm.       Vascular Status -  Her exam exhibits no right foot edema. Her exam exhibits no left foot edema.  Neurological: She is alert and oriented to  person, place, and time. No cranial nerve deficit. Coordination normal.   Skin: Skin is warm and dry. She is not diaphoretic.   Nursing note and vitals reviewed.        ECG 12 Lead  Date/Time: 3/23/2017 9:28 AM  Performed by: MICHAEL SYLVESTER  Authorized by: MICHAEL SYLVESTER   Comments: EKG reveals normal sinus rhythm with an atrial paced ventricularly sensed complexes.  There are T-wave inversions noted in the lateral leads.  There are no acute or chronic ischemic changes noted.  These ST changes are chronic.            Assessment/Plan   Coronary Artery Disease.  I suspect that overall the patient's coronary artery disease is stable.  At this time I have not ordered any further cardiac testing.  I will work on risk factor modification as noted below.    Atrial Fibrillation  In regard to her history of paroxysmal atrial fibrillation, after she has one month out of her drug eluding stent implantation she can resume anticoagulants.  Of note, she is currently in sinus rhythm as noted above.  Additionally, her last pacemaker interrogation demonstrated extremely rare episodes of atrial fibrillation and again as such I feel that it is reasonable to simply keep her on dual antiplatelet therapy given her advanced age as well as a recent fall and her recent stent implantation.    Hypertension.  The home blood pressure diary suggests reasonable control of the patient's HTN.  At this time I will continue current medications as is.  I have asked them to maintain a blood pressure diary    Hyperlipidemia In regard to their history of hyperlipidemia, the most recent cholesterol panel demonstrated excellent control.  I will continue the current medications as is.    In short, it is been a pleasure to participate in Cici's care, from here now she will follow with her primary cardiologist Dr. Cleary.

## 2017-03-24 RX ORDER — AMLODIPINE BESYLATE 5 MG/1
5 TABLET ORAL
Qty: 90 TABLET | Refills: 0 | Status: SHIPPED | OUTPATIENT
Start: 2017-03-24 | End: 2017-06-15

## 2017-03-29 RX ORDER — MONTELUKAST SODIUM 10 MG/1
10 TABLET ORAL NIGHTLY
Qty: 90 TABLET | Refills: 2 | Status: SHIPPED | OUTPATIENT
Start: 2017-03-29 | End: 2019-02-06

## 2017-04-04 ENCOUNTER — OFFICE VISIT (OUTPATIENT)
Dept: CARDIOLOGY | Facility: CLINIC | Age: 82
End: 2017-04-04

## 2017-04-04 VITALS
HEART RATE: 61 BPM | DIASTOLIC BLOOD PRESSURE: 72 MMHG | BODY MASS INDEX: 28 KG/M2 | WEIGHT: 158 LBS | SYSTOLIC BLOOD PRESSURE: 97 MMHG | HEIGHT: 63 IN | OXYGEN SATURATION: 94 %

## 2017-04-04 DIAGNOSIS — E78.2 MIXED HYPERLIPIDEMIA: ICD-10-CM

## 2017-04-04 DIAGNOSIS — I10 ESSENTIAL HYPERTENSION: ICD-10-CM

## 2017-04-04 DIAGNOSIS — I25.10 CORONARY ARTERY DISEASE INVOLVING NATIVE CORONARY ARTERY OF NATIVE HEART WITHOUT ANGINA PECTORIS: Primary | ICD-10-CM

## 2017-04-04 PROCEDURE — 99214 OFFICE O/P EST MOD 30 MIN: CPT | Performed by: INTERNAL MEDICINE

## 2017-04-04 NOTE — PROGRESS NOTES
subjective     Chief Complaint   Patient presents with   • URI     congestion and coughing   • Hypertension   • Atrial Fibrillation     History of Present Illness    Patient is 89 years old white female who states that she has been having wheezing and coughing.  There has been no expectoration chest gets wheezy like asthma.  Patient has an appointment with Dr. Ramos next week.  Meanwhile she is using Pulmicort Flex inhaler, Zyrtec, Singulair and Proventil HFA.  She denies any fever or chills or expectoration.    Patient is 89 years old white female who has known coronary artery disease. She had a myocardial infarction in 1985. In 2009 she had she had the stenting of the circumflex and RCA. Recently in 2017 there was in-stent restenosis of the RCA lesion requiring angioplasty and stenting..  Patient has been doing very well now however she developed the acute bronchitis with severe cough. During one of those coughing episodes she tripped and fell hitting her left forehead with large bruises. Patient went to the emergency room where CT scan was obtained and was negative.    Paroxysmal atrial flutter fibrillation  Patient had pacemaker checked and was found to have frequent short runs of atrial flutter fibrillation.  Patient is not aware of most of the attacks but she states that sometimes when she is resting she can feel her heart running away and it lasts for more than an hour some time  She has no neurological symptoms and no complaints.     Hypertension  Patient states that Norvasc was initially stopped however her blood pressure went quite high and Norvasc had to be restarted when she was in the hospital. Currently she is taking Norvasc.      Hyperlipidemia  Patient has significant hyperlipidemia lab work show both cholesterol and triglyceride including LDL elevated.  She has not been taking any statin for a few months.  Patient used to take Pravachol. She was instructed to restart Pravachol 40 mg daily.      Poor  memory  Patient states that she is having difficulty remembering things. She is 89 years old taking care of herself and is doing remarkably well but she is having some problems with the memory now. Patient is willing to start to medications.      Patient Active Problem List   Diagnosis   • Spinal stenosis, degenerative disc disease, back pain*   • Deformity of the right Sternoclavicular joint*   • COPD (chronic obstructive pulmonary disease)   • Essential hypertension   • Mixed hyperlipidemia   • CAD, status post RCA and circumflex stents, in-stent restenosis of RCA requiring stenting 2017 Dr. Cash    • Pacemaker*   • hx of Myocardial infarction*   • Thoracic back pain*   • Neck pain*   • Valvular heart disease mild mitral regurgitation not significant*   • Congestive heart failure with LV diastolic dysfunction, NYHA class 2*   • Atopic rhinitis   • Hyperparathyroidism status post parathyroidectomy   • Chronic back pain   • CKD (chronic kidney disease) stage 3, GFR 30-59 ml/min   • Diastolic heart failure secondary to hypertrophic cardiomyopathy   • Hyperglycemia   • Diarrhea in adult patient   • Coronary artery disease   • PAF (paroxysmal atrial fibrillation)   • Bradycardia   • Neuropathy   • Cerebrovascular disease   • Mild obesity   • Chest pain of uncertain etiology       Social History   Substance Use Topics   • Smoking status: Never Smoker   • Smokeless tobacco: Never Used   • Alcohol use No       Allergies   Allergen Reactions   • Amoxil [Amoxicillin]    • Bee Venom    • Codeine    • Erythromycin    • Lipitor [Atorvastatin]    • Penicillins    • Tetracyclines & Related    • Zetia [Ezetimibe]    • Zocor [Simvastatin]          Current Outpatient Prescriptions:   •  albuterol (PROVENTIL HFA;VENTOLIN HFA) 108 (90 BASE) MCG/ACT inhaler, Inhale 2 puffs 2 (Two) Times a Day As Needed for Wheezing., Disp: 6.77 g, Rfl: 2  •  amLODIPine (NORVASC) 5 MG tablet, Take 1 tablet by mouth Daily., Disp: 90 tablet, Rfl: 0  •   aspirin  MG tablet, Take 1 tablet by mouth Daily., Disp: 30 tablet, Rfl: 11  •  calcium carbonate-cholecalciferol 500-400 MG-UNIT tablet tablet, Take 1 tablet by mouth 2 (Two) Times a Day., Disp: , Rfl:   •  cetirizine (ZyrTEC) 10 MG tablet, Take 1 tablet by mouth daily., Disp: 90 tablet, Rfl: 0  •  clopidogrel (PLAVIX) 75 MG tablet, Take 1 tablet by mouth Daily., Disp: 30 tablet, Rfl: 11  •  gabapentin (NEURONTIN) 300 MG capsule, Take 1 capsule by mouth every night. (Patient taking differently: Take 300 mg by mouth At Night As Needed.), Disp: 90 capsule, Rfl: 1  •  metoprolol tartrate (LOPRESSOR) 25 MG tablet, Take 1 tablet by mouth Every 12 (Twelve) Hours., Disp: 180 tablet, Rfl: 0  •  montelukast (SINGULAIR) 10 MG tablet, Take 1 tablet by mouth Every Night., Disp: 90 tablet, Rfl: 2  •  Multiple Vitamins-Minerals (EYE VITAMINS & MINERALS PO), Take 1 tablet by mouth Every Night., Disp: , Rfl:   •  multivitamin (DAILY RITIKA) tablet tablet, Take 1 tablet by mouth Daily., Disp: , Rfl:   •  PULMICORT FLEXHALER 90 MCG/ACT inhaler, Inhale 1 puff 2 (Two) Times a Day., Disp: 2 inhaler, Rfl: 2  •  rosuvastatin (CRESTOR) 5 MG tablet, Take 1 tablet by mouth Daily. (Patient taking differently: Take 5 mg by mouth Every Night.), Disp: 90 tablet, Rfl: 0      The following portions of the patient's history were reviewed and updated as appropriate: allergies, current medications, past family history, past medical history, past social history, past surgical history and problem list.    Review of Systems   Constitution: Positive for weakness and malaise/fatigue.   HENT: Positive for congestion.    Eyes: Negative.    Cardiovascular: Negative.  Negative for chest pain, claudication, cyanosis, dyspnea on exertion, irregular heartbeat, leg swelling, near-syncope, orthopnea, palpitations, paroxysmal nocturnal dyspnea and syncope.   Respiratory: Positive for cough, shortness of breath and wheezing. Negative for sputum production.   "  Hematologic/Lymphatic: Negative.    Skin: Negative.    Musculoskeletal: Positive for arthritis, back pain and myalgias.   Gastrointestinal: Negative.    Genitourinary: Negative.    Psychiatric/Behavioral: Positive for memory loss. The patient has insomnia and is nervous/anxious.           Objective:     BP 97/72 (BP Location: Left arm, Patient Position: Sitting)  Pulse 61  Ht 63\" (160 cm)  Wt 158 lb (71.7 kg)  SpO2 94%  BMI 27.99 kg/m2  Physical Exam   Constitutional: She appears well-developed and well-nourished.   HENT:   Head: Normocephalic and atraumatic.   Mouth/Throat: Oropharynx is clear and moist.   Eyes: Conjunctivae and EOM are normal. Pupils are equal, round, and reactive to light. No scleral icterus.   Neck: Normal range of motion. Neck supple. No JVD present. No tracheal deviation present. No thyromegaly present.   Cardiovascular: Normal rate, regular rhythm, normal heart sounds and intact distal pulses.  Exam reveals no friction rub.    No murmur heard.  Pulmonary/Chest: Effort normal. No respiratory distress. She has wheezes. She has no rales. She exhibits no tenderness.   Abdominal: Soft. Bowel sounds are normal. She exhibits no distension and no mass. There is no tenderness. There is no rebound and no guarding.   Musculoskeletal: Normal range of motion. She exhibits no edema, tenderness or deformity.   Lymphadenopathy:     She has no cervical adenopathy.   Neurological: She is alert. She has normal reflexes. No cranial nerve deficit. She exhibits normal muscle tone. Coordination normal.   Skin: Skin is warm and dry.   Psychiatric: She has a normal mood and affect. Her behavior is normal. Judgment and thought content normal.         Lab Review  Lab Results   Component Value Date     03/11/2017    K 4.9 03/11/2017     03/11/2017    BUN 19 03/11/2017    CREATININE 1.14 03/11/2017    GLUCOSE 123 (H) 03/11/2017    CALCIUM 8.4 03/11/2017    ALT 12 03/07/2017    ALKPHOS 62 03/07/2017    " LABIL2 1.5 03/07/2017     Lab Results   Component Value Date    CKTOTAL 57 03/07/2017     Lab Results   Component Value Date    WBC 7.27 03/11/2017    HGB 10.5 (L) 03/11/2017    HCT 33.3 (L) 03/11/2017     03/11/2017     Lab Results   Component Value Date    INR 0.97 03/08/2017    INR 0.94 01/22/2017    INR 0.92 01/13/2017     Lab Results   Component Value Date    MG 1.9 03/06/2017     Lab Results   Component Value Date    TSH 1.613 01/14/2017     Lab Results   Component Value Date    .0 (H) 03/05/2017     Lab Results   Component Value Date    CHLPL 156 05/09/2016     Lab Results   Component Value Date    CHOL 138 03/07/2017    TRIG 59 03/07/2017    HDL 43 (L) 03/07/2017    LDLCALC 83 03/07/2017    VLDL 11.8 03/07/2017    LDLHDL 1.93 03/07/2017         Procedures     I personally viewed and interpreted the patient's LAB data         Assessment:     1. Coronary artery disease involving native coronary artery of native heart without angina pectoris    2. Essential hypertension    3. Mixed hyperlipidemia          Plan:      Patient has known coronary artery disease requiring multiple coronary stents she is doing very well denies any chest pain or palpitations.  Her biggest problem is wheezing and coughing.  She is using inhalers and breathing treatments.  She was given an appointment with Dr. Ramos next week for his opinion.    Lipids are very well controlled with current medications.  She was advised to continue Crestor  Blood pressure is also very well controlled.  She was advised to check her blood pressure closely.  Blood pressure today is around 120/70 according to the patient at home is around 116 to 130 systolic.  Lab work will be checked again in 3 months        Return in about 3 months (around 7/4/2017).

## 2017-04-06 ENCOUNTER — TELEPHONE (OUTPATIENT)
Dept: CARDIOLOGY | Facility: CLINIC | Age: 82
End: 2017-04-06

## 2017-04-06 ENCOUNTER — OFFICE VISIT (OUTPATIENT)
Dept: PULMONOLOGY | Facility: CLINIC | Age: 82
End: 2017-04-06

## 2017-04-06 VITALS
BODY MASS INDEX: 28 KG/M2 | WEIGHT: 158 LBS | SYSTOLIC BLOOD PRESSURE: 178 MMHG | DIASTOLIC BLOOD PRESSURE: 76 MMHG | OXYGEN SATURATION: 98 % | HEART RATE: 65 BPM | TEMPERATURE: 97.6 F | HEIGHT: 63 IN

## 2017-04-06 DIAGNOSIS — J45.30 MILD PERSISTENT ASTHMA WITHOUT COMPLICATION: Primary | ICD-10-CM

## 2017-04-06 DIAGNOSIS — H61.23 BILATERAL IMPACTED CERUMEN: Primary | ICD-10-CM

## 2017-04-06 DIAGNOSIS — R42 DIZZINESS: ICD-10-CM

## 2017-04-06 PROCEDURE — 99204 OFFICE O/P NEW MOD 45 MIN: CPT | Performed by: INTERNAL MEDICINE

## 2017-04-06 NOTE — TELEPHONE ENCOUNTER
----- Message from Emily Cleary MD sent at 4/6/2017  3:27 PM EDT -----  That's okay  ----- Message -----     From: Radha Smith MA     Sent: 4/6/2017   2:48 PM       To: Emily Cleary MD    Pt requesting ENT referral for impacted wax and dizziness       Requesting Dr. Mccoy in [facility]
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
## 2017-04-13 ENCOUNTER — TELEPHONE (OUTPATIENT)
Dept: CARDIOLOGY | Facility: CLINIC | Age: 82
End: 2017-04-13

## 2017-04-13 RX ORDER — CYCLOBENZAPRINE HCL 5 MG
5 TABLET ORAL 2 TIMES DAILY PRN
Qty: 20 TABLET | Refills: 0 | OUTPATIENT
Start: 2017-04-13 | End: 2017-04-28 | Stop reason: SDDI

## 2017-04-13 NOTE — TELEPHONE ENCOUNTER
----- Message from Emily Cleary MD sent at 4/13/2017 11:01 AM EDT -----  Flexeril 5 mg.  Every 12 when necessary.  20 pills  ----- Message -----     From: Radha Smith MA     Sent: 4/13/2017  10:34 AM       To: Emily Cleary MD    Pt states she has pulled a muscle in her neck and shoulder. Requesting something for relief.

## 2017-04-22 ENCOUNTER — APPOINTMENT (OUTPATIENT)
Dept: GENERAL RADIOLOGY | Facility: HOSPITAL | Age: 82
End: 2017-04-22

## 2017-04-22 ENCOUNTER — APPOINTMENT (OUTPATIENT)
Dept: CT IMAGING | Facility: HOSPITAL | Age: 82
End: 2017-04-22

## 2017-04-22 ENCOUNTER — HOSPITAL ENCOUNTER (EMERGENCY)
Facility: HOSPITAL | Age: 82
Discharge: HOME | End: 2017-04-22
Attending: EMERGENCY MEDICINE | Admitting: EMERGENCY MEDICINE

## 2017-04-22 VITALS
RESPIRATION RATE: 18 BRPM | DIASTOLIC BLOOD PRESSURE: 68 MMHG | TEMPERATURE: 97.8 F | WEIGHT: 158 LBS | SYSTOLIC BLOOD PRESSURE: 174 MMHG | BODY MASS INDEX: 29.08 KG/M2 | HEART RATE: 68 BPM | OXYGEN SATURATION: 96 % | HEIGHT: 62 IN

## 2017-04-22 DIAGNOSIS — S09.90XA MINOR HEAD INJURY, INITIAL ENCOUNTER: ICD-10-CM
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
## 2017-04-22 DIAGNOSIS — S50.02XA TRAUMATIC HEMATOMA OF ELBOW, LEFT, INITIAL ENCOUNTER: ICD-10-CM

## 2017-04-22 PROCEDURE — 73080 X-RAY EXAM OF ELBOW: CPT

## 2017-04-22 PROCEDURE — 70450 CT HEAD/BRAIN W/O DYE: CPT | Performed by: RADIOLOGY

## 2017-04-22 PROCEDURE — 73080 X-RAY EXAM OF ELBOW: CPT | Performed by: RADIOLOGY

## 2017-04-22 PROCEDURE — 99283 EMERGENCY DEPT VISIT LOW MDM: CPT

## 2017-04-22 PROCEDURE — 70450 CT HEAD/BRAIN W/O DYE: CPT

## 2017-04-22 NOTE — ED NOTES
Patient resting in bed. NAD noted. Vital signs WDL. Respirations unlabored and equal bilateral. Patient A&Ox4. Family at bedside. Discharge and follow-up instructions reviewed. Patient and family verbalized understanding. Patient wheeled out to ED [place] in wheelchair by ED staff and left with daughter.      Sudha Retana RN  04/22/17 [id number]
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
## 2017-04-22 NOTE — ED NOTES
Patient states she has to urinate about every 2 hours and woke up to go to the bathroom. Patient states she was trying to put on her slippers when she lost her balance landing on her left elbow and hitting her head. Patient states her pain on the numeric pain scale is a 4/10.      Sudha Retana RN  04/22/17 1236

## 2017-04-24 ENCOUNTER — OFFICE VISIT (OUTPATIENT)
Dept: CARDIOLOGY | Facility: CLINIC | Age: 82
End: 2017-04-24

## 2017-04-24 VITALS
HEIGHT: 63 IN | HEART RATE: 74 BPM | WEIGHT: 155 LBS | OXYGEN SATURATION: 99 % | SYSTOLIC BLOOD PRESSURE: 160 MMHG | BODY MASS INDEX: 27.46 KG/M2 | DIASTOLIC BLOOD PRESSURE: 72 MMHG

## 2017-04-24 DIAGNOSIS — I50.30 CONGESTIVE HEART FAILURE WITH LV DIASTOLIC DYSFUNCTION, NYHA CLASS 1 (HCC): ICD-10-CM

## 2017-04-24 DIAGNOSIS — I38 VALVULAR HEART DISEASE: ICD-10-CM

## 2017-04-24 DIAGNOSIS — R00.1 BRADYCARDIA: ICD-10-CM

## 2017-04-24 DIAGNOSIS — I25.10 CORONARY ARTERY DISEASE INVOLVING NATIVE CORONARY ARTERY OF NATIVE HEART WITHOUT ANGINA PECTORIS: ICD-10-CM

## 2017-04-24 DIAGNOSIS — I48.0 PAF (PAROXYSMAL ATRIAL FIBRILLATION) (HCC): ICD-10-CM

## 2017-04-24 DIAGNOSIS — T07.XXXA MULTIPLE BRUISES: ICD-10-CM

## 2017-04-24 DIAGNOSIS — Z95.0 PACEMAKER: ICD-10-CM

## 2017-04-24 DIAGNOSIS — E78.2 MIXED HYPERLIPIDEMIA: ICD-10-CM

## 2017-04-24 DIAGNOSIS — I10 ESSENTIAL HYPERTENSION: Primary | ICD-10-CM

## 2017-04-24 PROCEDURE — 99213 OFFICE O/P EST LOW 20 MIN: CPT | Performed by: INTERNAL MEDICINE

## 2017-04-24 RX ORDER — LOSARTAN POTASSIUM 25 MG/1
25 TABLET ORAL DAILY
Qty: 90 TABLET | Refills: 1 | Status: SHIPPED | OUTPATIENT
Start: 2017-04-24 | End: 2017-06-09

## 2017-04-24 NOTE — PROGRESS NOTES
subjective     Chief Complaint   Patient presents with   • Follow-up     Hospital    • Fall     History of Present Illness    Patient states that she was getting out of bed she tripped and fell.  She hit her head does not on the head on the right side.  Bruise on the shoulder and elbow and the hip.  She went to the emergency room.  Workup was negative.  There was nothing broken.  She is doing very well at this time.  We are she is here for checkup because her blood pressure is also high.  Patient has large hematoma in the hip area and the elbow.  She denies any headaches no neurological deficit.      Patient Active Problem List   Diagnosis   • Spinal stenosis, degenerative disc disease, back pain*   • Deformity of the right Sternoclavicular joint*   • COPD (chronic obstructive pulmonary disease)   • Essential hypertension   • Mixed hyperlipidemia   • CAD, status post RCA and circumflex stents, in-stent restenosis of RCA requiring stenting 2017 Dr. Cash    • Pacemaker*   • hx of Myocardial infarction*   • Thoracic back pain*   • Neck pain*   • Valvular heart disease mild mitral regurgitation not significant*   • Congestive heart failure with LV diastolic dysfunction, NYHA class 2*   • Atopic rhinitis   • Hyperparathyroidism status post parathyroidectomy   • Chronic back pain   • CKD (chronic kidney disease) stage 3, GFR 30-59 ml/min   • Diastolic heart failure secondary to hypertrophic cardiomyopathy   • Hyperglycemia   • Diarrhea in adult patient   • Coronary artery disease   • PAF (paroxysmal atrial fibrillation)   • Bradycardia   • Neuropathy   • Cerebrovascular disease   • Mild obesity   • Chest pain of uncertain etiology       Social History   Substance Use Topics   • Smoking status: Never Smoker   • Smokeless tobacco: Never Used   • Alcohol use No       Allergies   Allergen Reactions   • Amoxil [Amoxicillin]    • Bee Venom    • Codeine    • Erythromycin    • Lipitor [Atorvastatin]    • Penicillins    •  Tetracyclines & Related    • Zetia [Ezetimibe]    • Zocor [Simvastatin]          Current Outpatient Prescriptions:   •  albuterol (PROVENTIL HFA;VENTOLIN HFA) 108 (90 BASE) MCG/ACT inhaler, Inhale 2 puffs 2 (Two) Times a Day As Needed for Wheezing., Disp: 6.77 g, Rfl: 2  •  amLODIPine (NORVASC) 5 MG tablet, Take 1 tablet by mouth Daily., Disp: 90 tablet, Rfl: 0  •  aspirin  MG tablet, Take 1 tablet by mouth Daily., Disp: 30 tablet, Rfl: 11  •  calcium carbonate-cholecalciferol 500-400 MG-UNIT tablet tablet, Take 1 tablet by mouth 2 (Two) Times a Day., Disp: , Rfl:   •  cetirizine (ZyrTEC) 10 MG tablet, Take 1 tablet by mouth daily., Disp: 90 tablet, Rfl: 0  •  clopidogrel (PLAVIX) 75 MG tablet, Take 1 tablet by mouth Daily., Disp: 30 tablet, Rfl: 11  •  cyclobenzaprine (FLEXERIL) 5 MG tablet, Take 1 tablet by mouth 2 (Two) Times a Day As Needed for Muscle Spasms., Disp: 20 tablet, Rfl: 0  •  gabapentin (NEURONTIN) 300 MG capsule, Take 1 capsule by mouth every night. (Patient taking differently: Take 300 mg by mouth At Night As Needed.), Disp: 90 capsule, Rfl: 1  •  metoprolol tartrate (LOPRESSOR) 25 MG tablet, Take 1 tablet by mouth Every 12 (Twelve) Hours., Disp: 180 tablet, Rfl: 0  •  montelukast (SINGULAIR) 10 MG tablet, Take 1 tablet by mouth Every Night., Disp: 90 tablet, Rfl: 2  •  Multiple Vitamins-Minerals (EYE VITAMINS & MINERALS PO), Take 1 tablet by mouth Every Night., Disp: , Rfl:   •  multivitamin (DAILY RITIKA) tablet tablet, Take 1 tablet by mouth Daily., Disp: , Rfl:   •  PULMICORT FLEXHALER 90 MCG/ACT inhaler, Inhale 1 puff 2 (Two) Times a Day., Disp: 2 inhaler, Rfl: 2  •  rosuvastatin (CRESTOR) 5 MG tablet, Take 1 tablet by mouth Daily. (Patient taking differently: Take 5 mg by mouth Every Night.), Disp: 90 tablet, Rfl: 0  •  losartan (COZAAR) 25 MG tablet, Take 1 tablet by mouth Daily., Disp: 90 tablet, Rfl: 1      The following portions of the patient's history were reviewed and updated as  "appropriate: allergies, current medications, past family history, past medical history, past social history, past surgical history and problem list.    Review of Systems   Constitution: Positive for malaise/fatigue.   HENT: Negative for headaches.    Eyes: Negative.  Negative for blurred vision, double vision, pain, photophobia and redness.   Cardiovascular: Negative for chest pain, claudication, cyanosis, dyspnea on exertion, irregular heartbeat, leg swelling, near-syncope, orthopnea, palpitations, paroxysmal nocturnal dyspnea and syncope.   Respiratory: Negative.    Skin: Positive for color change.   Musculoskeletal: Positive for joint pain.   Gastrointestinal: Negative.    Genitourinary: Negative.    Neurological: Positive for loss of balance, numbness and paresthesias. Negative for aphonia, brief paralysis, difficulty with concentration, disturbances in coordination, excessive daytime sleepiness, dizziness, focal weakness, light-headedness, seizures, sensory change, tremors and vertigo.          Objective:     /72 (BP Location: Right arm, Patient Position: Sitting, Cuff Size: Adult)  Pulse 74  Ht 63\" (160 cm)  Wt 155 lb (70.3 kg)  SpO2 99%  BMI 27.46 kg/m2  Physical Exam   Constitutional: She is oriented to person, place, and time. She appears well-developed and well-nourished.   HENT:   Head: Normocephalic and atraumatic.   Mouth/Throat: Oropharynx is clear and moist.   Eyes: Conjunctivae and EOM are normal. Pupils are equal, round, and reactive to light. No scleral icterus.   Neck: Normal range of motion. Neck supple. No JVD present. No tracheal deviation present. No thyromegaly present.   Cardiovascular: Normal rate, regular rhythm, normal heart sounds and intact distal pulses.  Exam reveals no friction rub.    No murmur heard.  Pulmonary/Chest: Effort normal and breath sounds normal. No respiratory distress. She has no wheezes. She has no rales. She exhibits no tenderness.   Abdominal: Soft. Bowel " sounds are normal. She exhibits no distension and no mass. There is no tenderness. There is no rebound and no guarding.   Musculoskeletal: Normal range of motion. She exhibits no edema, tenderness or deformity.   Large bruise left hip also hematoma left elbow.  Range of motion normal.  There is also a small bruise on the right shoulder   Lymphadenopathy:     She has no cervical adenopathy.   Neurological: She is alert and oriented to person, place, and time. She has normal reflexes. She displays normal reflexes. No cranial nerve deficit. She exhibits normal muscle tone. Coordination normal.   1 cm not in the left parietal area   Skin: Skin is warm and dry.   Psychiatric: She has a normal mood and affect. Her behavior is normal. Judgment and thought content normal.         Lab Review  Lab Results   Component Value Date     03/11/2017    K 4.9 03/11/2017     03/11/2017    BUN 19 03/11/2017    CREATININE 1.14 03/11/2017    GLUCOSE 123 (H) 03/11/2017    CALCIUM 8.4 03/11/2017    ALT 12 03/07/2017    ALKPHOS 62 03/07/2017    LABIL2 1.5 03/07/2017     Lab Results   Component Value Date    CKTOTAL 57 03/07/2017     Lab Results   Component Value Date    WBC 7.27 03/11/2017    HGB 10.5 (L) 03/11/2017    HCT 33.3 (L) 03/11/2017     03/11/2017     Lab Results   Component Value Date    INR 0.97 03/08/2017    INR 0.94 01/22/2017    INR 0.92 01/13/2017     Lab Results   Component Value Date    MG 1.9 03/06/2017     Lab Results   Component Value Date    TSH 1.613 01/14/2017     Lab Results   Component Value Date    .0 (H) 03/05/2017     Lab Results   Component Value Date    CHLPL 156 05/09/2016     Lab Results   Component Value Date    CHOL 138 03/07/2017    TRIG 59 03/07/2017    HDL 43 (L) 03/07/2017    LDLCALC 83 03/07/2017    VLDL 11.8 03/07/2017    LDLHDL 1.93 03/07/2017         Procedures     I personally viewed and interpreted the patient's LAB data         Assessment:     1. Essential hypertension     2. Bradycardia    3. Coronary artery disease involving native coronary artery of native heart without angina pectoris    4. Congestive heart failure with LV diastolic dysfunction, NYHA class 2*    5. Mixed hyperlipidemia    6. Pacemaker*    7. PAF (paroxysmal atrial fibrillation)    8. Valvular heart disease mild mitral regurgitation not significant*    9. Multiple bruises          Plan:      Blood pressure is uncontrolled and is running high.  Patient was advised to start losartan 25 mg daily    Emergency room records was reviewed.  X-rays were also reviewed  There is no evidence of fracture.  There is no neurological deficit.  She does seem to have large bruises  Patient was advised and fall precautions no change in therapy except addition of losartan.  Patient was advised to check blood pressure regularly.          No Follow-up on file.

## 2017-04-28 ENCOUNTER — OFFICE VISIT (OUTPATIENT)
Dept: CARDIOLOGY | Facility: CLINIC | Age: 82
End: 2017-04-28

## 2017-04-28 VITALS
OXYGEN SATURATION: 99 % | SYSTOLIC BLOOD PRESSURE: 156 MMHG | HEIGHT: 63 IN | HEART RATE: 60 BPM | WEIGHT: 156.2 LBS | DIASTOLIC BLOOD PRESSURE: 68 MMHG | BODY MASS INDEX: 27.68 KG/M2

## 2017-04-28 DIAGNOSIS — I48.0 PAF (PAROXYSMAL ATRIAL FIBRILLATION) (HCC): ICD-10-CM

## 2017-04-28 DIAGNOSIS — I50.30 CONGESTIVE HEART FAILURE WITH LV DIASTOLIC DYSFUNCTION, NYHA CLASS 1 (HCC): ICD-10-CM

## 2017-04-28 DIAGNOSIS — L03.114 CELLULITIS OF LEFT UPPER EXTREMITY: Primary | ICD-10-CM

## 2017-04-28 DIAGNOSIS — I25.10 CORONARY ARTERY DISEASE INVOLVING NATIVE CORONARY ARTERY OF NATIVE HEART WITHOUT ANGINA PECTORIS: ICD-10-CM

## 2017-04-28 PROCEDURE — 99213 OFFICE O/P EST LOW 20 MIN: CPT | Performed by: INTERNAL MEDICINE

## 2017-04-28 RX ORDER — AMOXICILLIN AND CLAVULANATE POTASSIUM 875; 125 MG/1; MG/1
1 TABLET, FILM COATED ORAL EVERY 12 HOURS SCHEDULED
Status: DISCONTINUED | OUTPATIENT
Start: 2017-04-28 | End: 2017-04-28

## 2017-04-28 RX ORDER — AMOXICILLIN AND CLAVULANATE POTASSIUM 875; 125 MG/1; MG/1
1 TABLET, FILM COATED ORAL 2 TIMES DAILY
Qty: 14 TABLET | Refills: 0 | Status: ON HOLD | OUTPATIENT
Start: 2017-04-28 | End: 2017-05-18

## 2017-04-28 RX ORDER — ALBUTEROL SULFATE 90 UG/1
2 AEROSOL, METERED RESPIRATORY (INHALATION) 2 TIMES DAILY PRN
Qty: 6.77 G | Refills: 2 | Status: SHIPPED | OUTPATIENT
Start: 2017-04-28 | End: 2017-09-25 | Stop reason: HOSPADM

## 2017-04-28 NOTE — PROGRESS NOTES
"subjective     Chief Complaint   Patient presents with   • Follow-up     S/P FALL    • Arm Pain     LEFT ARM BRUSING & PAIN   • Headache     History of Present Illness    Patient is 89 years old white female who had fallen on March 15, 2017 sustaining a hematoma of the forehead and contusion of left elbow and left forearm she also had large bruises on the left hip.  Later on she fell again on 4/22/2017 she again had traumatic hematoma of the left elbow and minor head injury.  Patient was then seen by me on 4/24/17 she was doing very well she had large contusion on the left hip area upper thigh and lower leg area.    Today patient is at the office stating that left elbow has an area about 2\" x 1\".  It is red and mildly warm and appear to be infected.  Patient is afebrile and is feeling okay.  There has been no new fall.    Patient Active Problem List   Diagnosis   • Spinal stenosis, degenerative disc disease, back pain*   • Deformity of the right Sternoclavicular joint*   • COPD (chronic obstructive pulmonary disease)   • Essential hypertension   • Mixed hyperlipidemia   • CAD, status post RCA and circumflex stents, in-stent restenosis of RCA requiring stenting 2017 Dr. Cash    • Pacemaker*   • hx of Myocardial infarction*   • Thoracic back pain*   • Neck pain*   • Valvular heart disease mild mitral regurgitation not significant*   • Congestive heart failure with LV diastolic dysfunction, NYHA class 2*   • Atopic rhinitis   • Hyperparathyroidism status post parathyroidectomy   • Chronic back pain   • CKD (chronic kidney disease) stage 3, GFR 30-59 ml/min   • Diastolic heart failure secondary to hypertrophic cardiomyopathy   • Hyperglycemia   • Diarrhea in adult patient   • Coronary artery disease   • PAF (paroxysmal atrial fibrillation)   • Bradycardia   • Neuropathy   • Cerebrovascular disease   • Mild obesity   • Chest pain of uncertain etiology   • Cellulitis of left upper extremity       Social History "   Substance Use Topics   • Smoking status: Never Smoker   • Smokeless tobacco: Never Used   • Alcohol use No       Allergies   Allergen Reactions   • Bee Venom    • Codeine    • Erythromycin    • Lipitor [Atorvastatin]    • Penicillins    • Tetracyclines & Related    • Zetia [Ezetimibe]    • Zocor [Simvastatin]          Current Outpatient Prescriptions:   •  amLODIPine (NORVASC) 5 MG tablet, Take 1 tablet by mouth Daily., Disp: 90 tablet, Rfl: 0  •  aspirin  MG tablet, Take 1 tablet by mouth Daily., Disp: 30 tablet, Rfl: 11  •  calcium carbonate-cholecalciferol 500-400 MG-UNIT tablet tablet, Take 1 tablet by mouth 2 (Two) Times a Day., Disp: , Rfl:   •  cetirizine (ZyrTEC) 10 MG tablet, Take 1 tablet by mouth daily., Disp: 90 tablet, Rfl: 0  •  clopidogrel (PLAVIX) 75 MG tablet, Take 1 tablet by mouth Daily., Disp: 30 tablet, Rfl: 11  •  losartan (COZAAR) 25 MG tablet, Take 1 tablet by mouth Daily., Disp: 90 tablet, Rfl: 1  •  metoprolol tartrate (LOPRESSOR) 25 MG tablet, Take 1 tablet by mouth Every 12 (Twelve) Hours., Disp: 180 tablet, Rfl: 0  •  montelukast (SINGULAIR) 10 MG tablet, Take 1 tablet by mouth Every Night., Disp: 90 tablet, Rfl: 2  •  Multiple Vitamins-Minerals (EYE VITAMINS & MINERALS PO), Take 1 tablet by mouth Every Night., Disp: , Rfl:   •  multivitamin (DAILY RITIKA) tablet tablet, Take 1 tablet by mouth Daily., Disp: , Rfl:   •  PULMICORT FLEXHALER 90 MCG/ACT inhaler, Inhale 1 puff 2 (Two) Times a Day., Disp: 2 inhaler, Rfl: 2  •  rosuvastatin (CRESTOR) 5 MG tablet, Take 1 tablet by mouth Daily. (Patient taking differently: Take 5 mg by mouth Every Night.), Disp: 90 tablet, Rfl: 0  •  albuterol (PROVENTIL HFA;VENTOLIN HFA) 108 (90 BASE) MCG/ACT inhaler, Inhale 2 puffs 2 (Two) Times a Day As Needed for Wheezing., Disp: 6.77 g, Rfl: 2  •  amoxicillin-clavulanate (AUGMENTIN) 875-125 MG per tablet, Take 1 tablet by mouth 2 (Two) Times a Day., Disp: 14 tablet, Rfl: 0  •  gabapentin (NEURONTIN)  "300 MG capsule, Take 1 capsule by mouth every night. (Patient taking differently: Take 300 mg by mouth At Night As Needed.), Disp: 90 capsule, Rfl: 1      The following portions of the patient's history were reviewed and updated as appropriate: allergies, current medications, past family history, past medical history, past social history, past surgical history and problem list.    Review of Systems   Constitution: Positive for malaise/fatigue.   HENT: Negative.    Cardiovascular: Negative for chest pain, claudication, cyanosis, irregular heartbeat and near-syncope.   Respiratory: Negative for cough, hemoptysis, shortness of breath and sleep disturbances due to breathing.    Skin:        Bruising is overall getting better   Musculoskeletal: Positive for arthritis, myalgias and stiffness.        Patient complains of redness raised area of warmth and tenderness left elbow   Gastrointestinal: Negative.    Psychiatric/Behavioral: The patient is nervous/anxious.           Objective:     /68 (BP Location: Right arm, Patient Position: Sitting)  Pulse 60  Ht 63\" (160 cm)  Wt 156 lb 3.2 oz (70.9 kg)  SpO2 99%  BMI 27.67 kg/m2  Physical Exam   Constitutional: She appears well-developed and well-nourished.   HENT:   Head: Normocephalic and atraumatic.   Mouth/Throat: Oropharynx is clear and moist.   Eyes: Conjunctivae and EOM are normal. Pupils are equal, round, and reactive to light. No scleral icterus.   Neck: Normal range of motion. Neck supple. No JVD present. No tracheal deviation present. No thyromegaly present.   Cardiovascular: Normal rate, regular rhythm, normal heart sounds and intact distal pulses.  Exam reveals no friction rub.    No murmur heard.  Pulmonary/Chest: Effort normal and breath sounds normal. No respiratory distress. She has no wheezes. She has no rales. She exhibits no tenderness.   Abdominal: Soft. Bowel sounds are normal. She exhibits no distension and no mass. There is no tenderness. There " "is no rebound and no guarding.   Musculoskeletal: Normal range of motion. She exhibits no edema, tenderness or deformity.   Left elbow is bruised.  Contusion is fading and changing color however there is an area about 3\" x 1\" on the forearm just below the elbow appears to be tender red and warm suggesting cellulitis   Lymphadenopathy:     She has no cervical adenopathy.   Neurological: She is alert. She has normal reflexes. No cranial nerve deficit. She exhibits normal muscle tone. Coordination normal.   Skin: Skin is warm and dry.   Psychiatric: She has a normal mood and affect. Her behavior is normal. Judgment and thought content normal.         Lab Review  Lab Results   Component Value Date     03/11/2017    K 4.9 03/11/2017     03/11/2017    BUN 19 03/11/2017    CREATININE 1.14 03/11/2017    GLUCOSE 123 (H) 03/11/2017    CALCIUM 8.4 03/11/2017    ALT 12 03/07/2017    ALKPHOS 62 03/07/2017    LABIL2 1.5 03/07/2017     Lab Results   Component Value Date    CKTOTAL 57 03/07/2017     Lab Results   Component Value Date    WBC 7.27 03/11/2017    HGB 10.5 (L) 03/11/2017    HCT 33.3 (L) 03/11/2017     03/11/2017     Lab Results   Component Value Date    INR 0.97 03/08/2017    INR 0.94 01/22/2017    INR 0.92 01/13/2017     Lab Results   Component Value Date    MG 1.9 03/06/2017     Lab Results   Component Value Date    TSH 1.613 01/14/2017     Lab Results   Component Value Date    .0 (H) 03/05/2017     Lab Results   Component Value Date    CHLPL 156 05/09/2016     Lab Results   Component Value Date    CHOL 138 03/07/2017    TRIG 59 03/07/2017    HDL 43 (L) 03/07/2017    LDLCALC 83 03/07/2017    VLDL 11.8 03/07/2017    LDLHDL 1.93 03/07/2017         Procedures     I personally viewed and interpreted the patient's LAB data         Assessment:     1. Cellulitis of left upper extremity    2. Coronary artery disease involving native coronary artery of native heart without angina pectoris    3. " Congestive heart failure with LV diastolic dysfunction, NYHA class 2*    4. PAF (paroxysmal atrial fibrillation)          Plan:   Skin is intact there is an area of cellulitis.  Patient was given   augmentin 875 bid.  Lengthy discussion with the patient and the son about allergy to ampicillin.  Patient has listed a lot of allergies she is allergic to almost all antibiotics but she states that she is really not allergic she was just nauseated with ampicillin and all other antibiotics 2.  Augmentin was discussed with the son who is taking it right now from a different physician.  Both both of them understand that there could be serious reactions but they both believe that that she is not allergic to it and wants to try.  She'll be reevaluated and if not better in a week will get x-ray to rule out bony injury    .      No Follow-up on file.

## 2017-05-05 ENCOUNTER — TELEPHONE (OUTPATIENT)
Dept: CARDIOLOGY | Facility: CLINIC | Age: 82
End: 2017-05-05

## 2017-05-05 DIAGNOSIS — L03.114 CELLULITIS OF LEFT UPPER EXTREMITY: Primary | ICD-10-CM

## 2017-05-10 ENCOUNTER — OFFICE VISIT (OUTPATIENT)
Dept: SURGERY | Facility: CLINIC | Age: 82
End: 2017-05-10

## 2017-05-10 VITALS
HEART RATE: 89 BPM | BODY MASS INDEX: 27.46 KG/M2 | WEIGHT: 155 LBS | SYSTOLIC BLOOD PRESSURE: 150 MMHG | DIASTOLIC BLOOD PRESSURE: 80 MMHG | HEIGHT: 63 IN

## 2017-05-10 DIAGNOSIS — T14.8XXA HEMATOMA: Primary | ICD-10-CM

## 2017-05-10 PROCEDURE — 99203 OFFICE O/P NEW LOW 30 MIN: CPT | Performed by: SURGERY

## 2017-05-12 PROBLEM — T14.8XXA HEMATOMA: Status: ACTIVE | Noted: 2017-05-12

## 2017-05-17 ENCOUNTER — HOSPITAL ENCOUNTER (INPATIENT)
Facility: HOSPITAL | Age: 82
LOS: 1 days | Discharge: HOME HEALTH | End: 2017-05-19
Attending: EMERGENCY MEDICINE | Admitting: INTERNAL MEDICINE

## 2017-05-17 ENCOUNTER — APPOINTMENT (OUTPATIENT)
Dept: GENERAL RADIOLOGY | Facility: HOSPITAL | Age: 82
End: 2017-05-17

## 2017-05-17 DIAGNOSIS — I20.0 UNSTABLE ANGINA (HCC): Primary | ICD-10-CM

## 2017-05-17 LAB
ALBUMIN SERPL-MCNC: 4 G/DL (ref 3.4–4.8)
ALBUMIN/GLOB SERPL: 1.4 G/DL (ref 1.5–2.5)
ALP SERPL-CCNC: 76 U/L (ref 35–104)
ALT SERPL W P-5'-P-CCNC: 15 U/L (ref 10–36)
ANION GAP SERPL CALCULATED.3IONS-SCNC: 9.8 MMOL/L (ref 3.6–11.2)
AST SERPL-CCNC: 26 U/L (ref 10–30)
BASOPHILS # BLD AUTO: 0.02 10*3/MM3 (ref 0–0.3)
BASOPHILS NFR BLD AUTO: 0.4 % (ref 0–2)
BILIRUB SERPL-MCNC: 0.3 MG/DL (ref 0.2–1.8)
BNP SERPL-MCNC: 431 PG/ML (ref 0–100)
BUN BLD-MCNC: 27 MG/DL (ref 7–21)
BUN/CREAT SERPL: 22.9 (ref 7–25)
CALCIUM SPEC-SCNC: 9.4 MG/DL (ref 7.7–10)
CHLORIDE SERPL-SCNC: 105 MMOL/L (ref 99–112)
CK MB SERPL-CCNC: 1.67 NG/ML (ref 0–5)
CK MB SERPL-RTO: 2.9 % (ref 0–3)
CK SERPL-CCNC: 58 U/L (ref 24–173)
CO2 SERPL-SCNC: 28.2 MMOL/L (ref 24.3–31.9)
CREAT BLD-MCNC: 1.18 MG/DL (ref 0.43–1.29)
DEPRECATED RDW RBC AUTO: 50.8 FL (ref 37–54)
EOSINOPHIL # BLD AUTO: 0.16 10*3/MM3 (ref 0–0.7)
EOSINOPHIL NFR BLD AUTO: 2.9 % (ref 0–7)
ERYTHROCYTE [DISTWIDTH] IN BLOOD BY AUTOMATED COUNT: 15.6 % (ref 11.5–14.5)
GFR SERPL CREATININE-BSD FRML MDRD: 43 ML/MIN/1.73
GLOBULIN UR ELPH-MCNC: 2.9 GM/DL
GLUCOSE BLD-MCNC: 107 MG/DL (ref 70–110)
HCT VFR BLD AUTO: 34.9 % (ref 37–47)
HGB BLD-MCNC: 10.5 G/DL (ref 12–16)
IMM GRANULOCYTES # BLD: 0.01 10*3/MM3 (ref 0–0.03)
IMM GRANULOCYTES NFR BLD: 0.2 % (ref 0–0.5)
LIPASE SERPL-CCNC: 51 U/L (ref 13–60)
LYMPHOCYTES # BLD AUTO: 1.86 10*3/MM3 (ref 1–3)
LYMPHOCYTES NFR BLD AUTO: 34 % (ref 16–46)
MCH RBC QN AUTO: 26.7 PG (ref 27–33)
MCHC RBC AUTO-ENTMCNC: 30.1 G/DL (ref 33–37)
MCV RBC AUTO: 88.8 FL (ref 80–94)
MONOCYTES # BLD AUTO: 0.82 10*3/MM3 (ref 0.1–0.9)
MONOCYTES NFR BLD AUTO: 15 % (ref 0–12)
MYOGLOBIN SERPL-MCNC: 56 NG/ML (ref 0–109)
NEUTROPHILS # BLD AUTO: 2.6 10*3/MM3 (ref 1.4–6.5)
NEUTROPHILS NFR BLD AUTO: 47.5 % (ref 40–75)
OSMOLALITY SERPL CALC.SUM OF ELEC: 290.6 MOSM/KG (ref 273–305)
PLATELET # BLD AUTO: 217 10*3/MM3 (ref 130–400)
PMV BLD AUTO: 10.1 FL (ref 6–10)
POTASSIUM BLD-SCNC: 4.8 MMOL/L (ref 3.5–5.3)
PROT SERPL-MCNC: 6.9 G/DL (ref 6–8)
RBC # BLD AUTO: 3.93 10*6/MM3 (ref 4.2–5.4)
SODIUM BLD-SCNC: 143 MMOL/L (ref 135–153)
TROPONIN I SERPL-MCNC: 0.1 NG/ML
WBC NRBC COR # BLD: 5.47 10*3/MM3 (ref 4.5–12.5)

## 2017-05-17 PROCEDURE — 93010 ELECTROCARDIOGRAM REPORT: CPT | Performed by: INTERNAL MEDICINE

## 2017-05-17 PROCEDURE — 93005 ELECTROCARDIOGRAM TRACING: CPT | Performed by: EMERGENCY MEDICINE

## 2017-05-17 PROCEDURE — 82550 ASSAY OF CK (CPK): CPT | Performed by: EMERGENCY MEDICINE

## 2017-05-17 PROCEDURE — 82553 CREATINE MB FRACTION: CPT | Performed by: EMERGENCY MEDICINE

## 2017-05-17 PROCEDURE — 80053 COMPREHEN METABOLIC PANEL: CPT | Performed by: EMERGENCY MEDICINE

## 2017-05-17 PROCEDURE — 85025 COMPLETE CBC W/AUTO DIFF WBC: CPT | Performed by: EMERGENCY MEDICINE

## 2017-05-17 PROCEDURE — 99285 EMERGENCY DEPT VISIT HI MDM: CPT

## 2017-05-17 PROCEDURE — 71010 XR CHEST 1 VW: CPT | Performed by: RADIOLOGY

## 2017-05-17 PROCEDURE — 84484 ASSAY OF TROPONIN QUANT: CPT | Performed by: EMERGENCY MEDICINE

## 2017-05-17 PROCEDURE — 71010 HC CHEST PA OR AP: CPT

## 2017-05-17 PROCEDURE — 83874 ASSAY OF MYOGLOBIN: CPT | Performed by: EMERGENCY MEDICINE

## 2017-05-17 PROCEDURE — 83690 ASSAY OF LIPASE: CPT | Performed by: EMERGENCY MEDICINE

## 2017-05-17 PROCEDURE — 83880 ASSAY OF NATRIURETIC PEPTIDE: CPT | Performed by: EMERGENCY MEDICINE

## 2017-05-17 RX ORDER — SODIUM CHLORIDE 0.9 % (FLUSH) 0.9 %
10 SYRINGE (ML) INJECTION AS NEEDED
Status: DISCONTINUED | OUTPATIENT
Start: 2017-05-17 | End: 2017-05-19 | Stop reason: HOSPADM

## 2017-05-17 RX ORDER — ASPIRIN 81 MG/1
324 TABLET, CHEWABLE ORAL ONCE
Status: COMPLETED | OUTPATIENT
Start: 2017-05-17 | End: 2017-05-17

## 2017-05-17 RX ADMIN — ASPIRIN 324 MG: 81 TABLET, CHEWABLE ORAL at 22:26

## 2017-05-17 RX ADMIN — NITROGLYCERIN 0.5 INCH: 20 OINTMENT TOPICAL at 22:28

## 2017-05-18 PROBLEM — I20.0 UNSTABLE ANGINA (HCC): Status: ACTIVE | Noted: 2017-05-18

## 2017-05-18 LAB
ACT BLD: 162 SECONDS (ref 82–152)
ACT BLD: 193 SECONDS (ref 82–152)
ACT BLD: 229 SECONDS (ref 82–152)
APTT PPP: 25.7 SECONDS (ref 24.4–31)
APTT PPP: 73.9 SECONDS (ref 24.4–31)
CK MB SERPL-CCNC: 1.53 NG/ML (ref 0–5)
CK MB SERPL-RTO: 2.7 % (ref 0–3)
CK SERPL-CCNC: 56 U/L (ref 24–173)
INR PPP: 0.97 (ref 0.8–1.1)
MYOGLOBIN SERPL-MCNC: 58 NG/ML (ref 0–109)
PROTHROMBIN TIME: 10.7 SECONDS (ref 9.8–11.9)
TROPONIN I SERPL-MCNC: 0.09 NG/ML

## 2017-05-18 PROCEDURE — 93005 ELECTROCARDIOGRAM TRACING: CPT | Performed by: INTERNAL MEDICINE

## 2017-05-18 PROCEDURE — 63710000001 DIPHENHYDRAMINE PER 50 MG

## 2017-05-18 PROCEDURE — 94799 UNLISTED PULMONARY SVC/PX: CPT

## 2017-05-18 PROCEDURE — C1874 STENT, COATED/COV W/DEL SYS: HCPCS | Performed by: INTERNAL MEDICINE

## 2017-05-18 PROCEDURE — C1769 GUIDE WIRE: HCPCS | Performed by: INTERNAL MEDICINE

## 2017-05-18 PROCEDURE — 85347 COAGULATION TIME ACTIVATED: CPT

## 2017-05-18 PROCEDURE — 82550 ASSAY OF CK (CPK): CPT | Performed by: EMERGENCY MEDICINE

## 2017-05-18 PROCEDURE — 25010000002 HEPARIN (PORCINE) PER 1000 UNITS: Performed by: EMERGENCY MEDICINE

## 2017-05-18 PROCEDURE — 93454 CORONARY ARTERY ANGIO S&I: CPT | Performed by: INTERNAL MEDICINE

## 2017-05-18 PROCEDURE — B2111ZZ FLUOROSCOPY OF MULTIPLE CORONARY ARTERIES USING LOW OSMOLAR CONTRAST: ICD-10-PCS | Performed by: INTERNAL MEDICINE

## 2017-05-18 PROCEDURE — 94640 AIRWAY INHALATION TREATMENT: CPT

## 2017-05-18 PROCEDURE — C1725 CATH, TRANSLUMIN NON-LASER: HCPCS | Performed by: INTERNAL MEDICINE

## 2017-05-18 PROCEDURE — 83874 ASSAY OF MYOGLOBIN: CPT | Performed by: EMERGENCY MEDICINE

## 2017-05-18 PROCEDURE — 85730 THROMBOPLASTIN TIME PARTIAL: CPT | Performed by: EMERGENCY MEDICINE

## 2017-05-18 PROCEDURE — 25010000002 FENTANYL CITRATE (PF) 100 MCG/2ML SOLUTION: Performed by: INTERNAL MEDICINE

## 2017-05-18 PROCEDURE — 92928 PRQ TCAT PLMT NTRAC ST 1 LES: CPT | Performed by: INTERNAL MEDICINE

## 2017-05-18 PROCEDURE — 85610 PROTHROMBIN TIME: CPT | Performed by: EMERGENCY MEDICINE

## 2017-05-18 PROCEDURE — 4A023N7 MEASUREMENT OF CARDIAC SAMPLING AND PRESSURE, LEFT HEART, PERCUTANEOUS APPROACH: ICD-10-PCS | Performed by: INTERNAL MEDICINE

## 2017-05-18 PROCEDURE — 25010000002 ONDANSETRON PER 1 MG

## 2017-05-18 PROCEDURE — C1894 INTRO/SHEATH, NON-LASER: HCPCS | Performed by: INTERNAL MEDICINE

## 2017-05-18 PROCEDURE — 027034Z DILATION OF CORONARY ARTERY, ONE ARTERY WITH DRUG-ELUTING INTRALUMINAL DEVICE, PERCUTANEOUS APPROACH: ICD-10-PCS | Performed by: INTERNAL MEDICINE

## 2017-05-18 PROCEDURE — 25010000002 VANCOMYCIN PER 500 MG: Performed by: INTERNAL MEDICINE

## 2017-05-18 PROCEDURE — C1887 CATHETER, GUIDING: HCPCS | Performed by: INTERNAL MEDICINE

## 2017-05-18 PROCEDURE — 99223 1ST HOSP IP/OBS HIGH 75: CPT | Performed by: INTERNAL MEDICINE

## 2017-05-18 PROCEDURE — 84484 ASSAY OF TROPONIN QUANT: CPT | Performed by: EMERGENCY MEDICINE

## 2017-05-18 PROCEDURE — 0 IOPAMIDOL PER 1 ML: Performed by: INTERNAL MEDICINE

## 2017-05-18 PROCEDURE — C1760 CLOSURE DEV, VASC: HCPCS | Performed by: INTERNAL MEDICINE

## 2017-05-18 PROCEDURE — B2151ZZ FLUOROSCOPY OF LEFT HEART USING LOW OSMOLAR CONTRAST: ICD-10-PCS | Performed by: INTERNAL MEDICINE

## 2017-05-18 PROCEDURE — 25010000002 ONDANSETRON PER 1 MG: Performed by: EMERGENCY MEDICINE

## 2017-05-18 PROCEDURE — 25010000002 MIDAZOLAM PER 1 MG: Performed by: INTERNAL MEDICINE

## 2017-05-18 PROCEDURE — 25010000002 HEPARIN (PORCINE) PER 1000 UNITS: Performed by: INTERNAL MEDICINE

## 2017-05-18 PROCEDURE — 82553 CREATINE MB FRACTION: CPT | Performed by: EMERGENCY MEDICINE

## 2017-05-18 PROCEDURE — 85730 THROMBOPLASTIN TIME PARTIAL: CPT | Performed by: INTERNAL MEDICINE

## 2017-05-18 PROCEDURE — C9600 PERC DRUG-EL COR STENT SING: HCPCS | Performed by: INTERNAL MEDICINE

## 2017-05-18 PROCEDURE — 93005 ELECTROCARDIOGRAM TRACING: CPT | Performed by: EMERGENCY MEDICINE

## 2017-05-18 DEVICE — XIENCE ALPINE EVEROLIMUS ELUTING CORONARY STENT SYSTEM 3.50 MM X 28 MM / RAPID-EXCHANGE
Type: IMPLANTABLE DEVICE | Status: FUNCTIONAL
Brand: XIENCE ALPINE

## 2017-05-18 RX ORDER — DIPHENHYDRAMINE HCL 25 MG
25 CAPSULE ORAL ONCE
Status: COMPLETED | OUTPATIENT
Start: 2017-05-18 | End: 2017-05-18

## 2017-05-18 RX ORDER — SODIUM CHLORIDE 9 MG/ML
INJECTION, SOLUTION INTRAVENOUS
Status: COMPLETED
Start: 2017-05-18 | End: 2017-05-18

## 2017-05-18 RX ORDER — HEPARIN SODIUM 5000 [USP'U]/ML
30 INJECTION, SOLUTION INTRAVENOUS; SUBCUTANEOUS AS NEEDED
Status: DISCONTINUED | OUTPATIENT
Start: 2017-05-18 | End: 2017-05-18

## 2017-05-18 RX ORDER — ROSUVASTATIN CALCIUM 5 MG/1
5 TABLET, COATED ORAL NIGHTLY
Status: DISCONTINUED | OUTPATIENT
Start: 2017-05-18 | End: 2017-05-19 | Stop reason: HOSPADM

## 2017-05-18 RX ORDER — MONTELUKAST SODIUM 10 MG/1
10 TABLET ORAL NIGHTLY
Status: DISCONTINUED | OUTPATIENT
Start: 2017-05-18 | End: 2017-05-19 | Stop reason: HOSPADM

## 2017-05-18 RX ORDER — METOPROLOL TARTRATE 5 MG/5ML
INJECTION INTRAVENOUS AS NEEDED
Status: DISCONTINUED | OUTPATIENT
Start: 2017-05-18 | End: 2017-05-18 | Stop reason: HOSPADM

## 2017-05-18 RX ORDER — DIPHENHYDRAMINE HCL 25 MG
CAPSULE ORAL
Status: COMPLETED
Start: 2017-05-18 | End: 2017-05-18

## 2017-05-18 RX ORDER — SODIUM CHLORIDE 9 MG/ML
INJECTION, SOLUTION INTRAVENOUS CONTINUOUS PRN
Status: DISCONTINUED | OUTPATIENT
Start: 2017-05-18 | End: 2017-05-18 | Stop reason: HOSPADM

## 2017-05-18 RX ORDER — SODIUM CHLORIDE 9 MG/ML
100 INJECTION, SOLUTION INTRAVENOUS CONTINUOUS
Status: DISCONTINUED | OUTPATIENT
Start: 2017-05-18 | End: 2017-05-18

## 2017-05-18 RX ORDER — FENTANYL CITRATE 50 UG/ML
INJECTION, SOLUTION INTRAMUSCULAR; INTRAVENOUS AS NEEDED
Status: DISCONTINUED | OUTPATIENT
Start: 2017-05-18 | End: 2017-05-18 | Stop reason: HOSPADM

## 2017-05-18 RX ORDER — NITROGLYCERIN 0.4 MG/1
0.4 TABLET SUBLINGUAL
Status: DISCONTINUED | OUTPATIENT
Start: 2017-05-18 | End: 2017-05-19 | Stop reason: HOSPADM

## 2017-05-18 RX ORDER — NITROGLYCERIN 20 MG/100ML
5-200 INJECTION INTRAVENOUS
Status: DISCONTINUED | OUTPATIENT
Start: 2017-05-18 | End: 2017-05-18

## 2017-05-18 RX ORDER — MULTIVITAMIN
1 TABLET ORAL DAILY
Status: DISCONTINUED | OUTPATIENT
Start: 2017-05-18 | End: 2017-05-19 | Stop reason: HOSPADM

## 2017-05-18 RX ORDER — SODIUM CHLORIDE 0.9 % (FLUSH) 0.9 %
1-10 SYRINGE (ML) INJECTION AS NEEDED
Status: DISCONTINUED | OUTPATIENT
Start: 2017-05-18 | End: 2017-05-18

## 2017-05-18 RX ORDER — DIAZEPAM 5 MG/1
TABLET ORAL
Status: COMPLETED
Start: 2017-05-18 | End: 2017-05-18

## 2017-05-18 RX ORDER — BUDESONIDE 0.5 MG/2ML
0.5 INHALANT ORAL
Status: DISCONTINUED | OUTPATIENT
Start: 2017-05-18 | End: 2017-05-19 | Stop reason: HOSPADM

## 2017-05-18 RX ORDER — LIDOCAINE HYDROCHLORIDE 20 MG/ML
INJECTION, SOLUTION INFILTRATION; PERINEURAL AS NEEDED
Status: DISCONTINUED | OUTPATIENT
Start: 2017-05-18 | End: 2017-05-18 | Stop reason: HOSPADM

## 2017-05-18 RX ORDER — ASPIRIN 325 MG
325 TABLET, DELAYED RELEASE (ENTERIC COATED) ORAL DAILY
Status: CANCELLED | OUTPATIENT
Start: 2017-05-18

## 2017-05-18 RX ORDER — MIDAZOLAM HYDROCHLORIDE 1 MG/ML
INJECTION INTRAMUSCULAR; INTRAVENOUS AS NEEDED
Status: DISCONTINUED | OUTPATIENT
Start: 2017-05-18 | End: 2017-05-18 | Stop reason: HOSPADM

## 2017-05-18 RX ORDER — CETIRIZINE HYDROCHLORIDE 10 MG/1
5 TABLET ORAL DAILY
Status: DISCONTINUED | OUTPATIENT
Start: 2017-05-18 | End: 2017-05-19 | Stop reason: HOSPADM

## 2017-05-18 RX ORDER — ASPIRIN 325 MG
TABLET, DELAYED RELEASE (ENTERIC COATED) ORAL
Status: COMPLETED
Start: 2017-05-18 | End: 2017-05-18

## 2017-05-18 RX ORDER — HEPARIN SODIUM 1000 [USP'U]/ML
INJECTION, SOLUTION INTRAVENOUS; SUBCUTANEOUS AS NEEDED
Status: DISCONTINUED | OUTPATIENT
Start: 2017-05-18 | End: 2017-05-18 | Stop reason: HOSPADM

## 2017-05-18 RX ORDER — CLOPIDOGREL BISULFATE 75 MG/1
TABLET ORAL
Status: COMPLETED
Start: 2017-05-18 | End: 2017-05-18

## 2017-05-18 RX ORDER — HEPARIN SODIUM 5000 [USP'U]/ML
60 INJECTION, SOLUTION INTRAVENOUS; SUBCUTANEOUS ONCE
Status: COMPLETED | OUTPATIENT
Start: 2017-05-18 | End: 2017-05-18

## 2017-05-18 RX ORDER — HEPARIN SODIUM 5000 [USP'U]/ML
60 INJECTION, SOLUTION INTRAVENOUS; SUBCUTANEOUS AS NEEDED
Status: DISCONTINUED | OUTPATIENT
Start: 2017-05-18 | End: 2017-05-18

## 2017-05-18 RX ORDER — ONDANSETRON 2 MG/ML
INJECTION INTRAMUSCULAR; INTRAVENOUS
Status: COMPLETED
Start: 2017-05-18 | End: 2017-05-18

## 2017-05-18 RX ORDER — ALBUTEROL SULFATE 2.5 MG/3ML
2.5 SOLUTION RESPIRATORY (INHALATION) EVERY 4 HOURS PRN
COMMUNITY
End: 2017-09-18

## 2017-05-18 RX ORDER — LOSARTAN POTASSIUM 25 MG/1
25 TABLET ORAL DAILY
Status: DISCONTINUED | OUTPATIENT
Start: 2017-05-18 | End: 2017-05-19 | Stop reason: HOSPADM

## 2017-05-18 RX ORDER — AMLODIPINE BESYLATE 5 MG/1
5 TABLET ORAL DAILY
Status: DISCONTINUED | OUTPATIENT
Start: 2017-05-18 | End: 2017-05-19 | Stop reason: HOSPADM

## 2017-05-18 RX ORDER — I-VITE, TAB 1000-60-2MG (60/BT) 300MCG-200
1 TAB ORAL DAILY
Status: DISCONTINUED | OUTPATIENT
Start: 2017-05-18 | End: 2017-05-19 | Stop reason: HOSPADM

## 2017-05-18 RX ORDER — DIAZEPAM 5 MG/1
5 TABLET ORAL ONCE
Status: COMPLETED | OUTPATIENT
Start: 2017-05-18 | End: 2017-05-18

## 2017-05-18 RX ORDER — ASPIRIN 325 MG
325 TABLET, DELAYED RELEASE (ENTERIC COATED) ORAL DAILY
Status: DISCONTINUED | OUTPATIENT
Start: 2017-05-18 | End: 2017-05-19 | Stop reason: HOSPADM

## 2017-05-18 RX ORDER — ONDANSETRON 2 MG/ML
4 INJECTION INTRAMUSCULAR; INTRAVENOUS ONCE
Status: COMPLETED | OUTPATIENT
Start: 2017-05-18 | End: 2017-05-18

## 2017-05-18 RX ORDER — CLOPIDOGREL BISULFATE 75 MG/1
75 TABLET ORAL DAILY
Status: DISCONTINUED | OUTPATIENT
Start: 2017-05-18 | End: 2017-05-19 | Stop reason: HOSPADM

## 2017-05-18 RX ORDER — GABAPENTIN 300 MG/1
300 CAPSULE ORAL NIGHTLY PRN
Status: DISCONTINUED | OUTPATIENT
Start: 2017-05-18 | End: 2017-05-19 | Stop reason: HOSPADM

## 2017-05-18 RX ORDER — CLOPIDOGREL BISULFATE 75 MG/1
75 TABLET ORAL DAILY
Status: CANCELLED | OUTPATIENT
Start: 2017-05-18

## 2017-05-18 RX ORDER — ALBUTEROL SULFATE 2.5 MG/3ML
2.5 SOLUTION RESPIRATORY (INHALATION) EVERY 4 HOURS PRN
Status: DISCONTINUED | OUTPATIENT
Start: 2017-05-18 | End: 2017-05-19 | Stop reason: HOSPADM

## 2017-05-18 RX ADMIN — BUDESONIDE 0.5 MG: 0.5 SUSPENSION RESPIRATORY (INHALATION) at 19:15

## 2017-05-18 RX ADMIN — LOSARTAN POTASSIUM 25 MG: 25 TABLET, FILM COATED ORAL at 17:24

## 2017-05-18 RX ADMIN — NITROGLYCERIN 0.4 MG: 0.4 TABLET SUBLINGUAL at 00:23

## 2017-05-18 RX ADMIN — Medication 25 MG: at 11:52

## 2017-05-18 RX ADMIN — ASPIRIN 325 MG: 325 TABLET, COATED ORAL at 11:52

## 2017-05-18 RX ADMIN — CLOPIDOGREL BISULFATE 75 MG: 75 TABLET, FILM COATED ORAL at 11:52

## 2017-05-18 RX ADMIN — GABAPENTIN 300 MG: 300 CAPSULE ORAL at 21:16

## 2017-05-18 RX ADMIN — MONTELUKAST SODIUM 10 MG: 10 TABLET ORAL at 21:17

## 2017-05-18 RX ADMIN — HEPARIN SODIUM 4100 UNITS: 5000 INJECTION, SOLUTION INTRAVENOUS; SUBCUTANEOUS at 01:22

## 2017-05-18 RX ADMIN — DIAZEPAM 5 MG: 5 TABLET ORAL at 11:52

## 2017-05-18 RX ADMIN — ONDANSETRON 4 MG: 2 INJECTION, SOLUTION INTRAMUSCULAR; INTRAVENOUS at 01:45

## 2017-05-18 RX ADMIN — ONDANSETRON 4 MG: 2 INJECTION INTRAMUSCULAR; INTRAVENOUS at 01:45

## 2017-05-18 RX ADMIN — NITROGLYCERIN 0.4 MG: 0.4 TABLET SUBLINGUAL at 00:45

## 2017-05-18 RX ADMIN — NITROGLYCERIN 0.4 MG: 0.4 TABLET SUBLINGUAL at 00:18

## 2017-05-18 RX ADMIN — DIPHENHYDRAMINE HYDROCHLORIDE 25 MG: 25 CAPSULE ORAL at 11:52

## 2017-05-18 RX ADMIN — CETIRIZINE HYDROCHLORIDE 5 MG: 10 TABLET ORAL at 17:24

## 2017-05-18 RX ADMIN — Medication 1 TABLET: at 17:24

## 2017-05-18 RX ADMIN — AMLODIPINE BESYLATE 5 MG: 5 TABLET ORAL at 17:23

## 2017-05-18 RX ADMIN — ALBUTEROL SULFATE 2.5 MG: 2.5 SOLUTION RESPIRATORY (INHALATION) at 19:14

## 2017-05-18 RX ADMIN — ONDANSETRON 4 MG: 2 INJECTION, SOLUTION INTRAMUSCULAR; INTRAVENOUS at 00:47

## 2017-05-18 RX ADMIN — NITROGLYCERIN 5 MCG/MIN: 20 INJECTION INTRAVENOUS at 01:27

## 2017-05-18 RX ADMIN — CALCIUM CARBONATE-VITAMIN D TAB 500 MG-200 UNIT 500 MG: 500-200 TAB at 17:25

## 2017-05-18 RX ADMIN — SODIUM CHLORIDE 100 ML/HR: 9 INJECTION, SOLUTION INTRAVENOUS at 14:44

## 2017-05-18 RX ADMIN — I-VITE, TAB 1000-60-2MG (60/BT) 1 TABLET: TAB at 17:25

## 2017-05-18 RX ADMIN — METOPROLOL TARTRATE 25 MG: 25 TABLET, FILM COATED ORAL at 21:16

## 2017-05-18 RX ADMIN — HEPARIN SODIUM 12 UNITS/KG/HR: 10000 INJECTION, SOLUTION INTRAVENOUS at 01:24

## 2017-05-19 ENCOUNTER — HOSPITAL ENCOUNTER (OUTPATIENT)
Dept: RESPIRATORY THERAPY | Facility: HOSPITAL | Age: 82
Discharge: HOME | End: 2017-05-19
Attending: INTERNAL MEDICINE | Admitting: INTERNAL MEDICINE

## 2017-05-19 VITALS
TEMPERATURE: 97.5 F | RESPIRATION RATE: 15 BRPM | OXYGEN SATURATION: 100 % | WEIGHT: 156.2 LBS | DIASTOLIC BLOOD PRESSURE: 61 MMHG | HEART RATE: 60 BPM | SYSTOLIC BLOOD PRESSURE: 123 MMHG | BODY MASS INDEX: 27.68 KG/M2 | HEIGHT: 63 IN

## 2017-05-19 DIAGNOSIS — J45.30 MILD PERSISTENT ASTHMA WITHOUT COMPLICATION: ICD-10-CM

## 2017-05-19 LAB
ANION GAP SERPL CALCULATED.3IONS-SCNC: 7.9 MMOL/L (ref 3.6–11.2)
BUN BLD-MCNC: 16 MG/DL (ref 7–21)
BUN/CREAT SERPL: 16.3 (ref 7–25)
CALCIUM SPEC-SCNC: 8.6 MG/DL (ref 7.7–10)
CHLORIDE SERPL-SCNC: 110 MMOL/L (ref 99–112)
CHOLEST SERPL-MCNC: 106 MG/DL (ref 0–200)
CO2 SERPL-SCNC: 22.1 MMOL/L (ref 24.3–31.9)
CREAT BLD-MCNC: 0.98 MG/DL (ref 0.43–1.29)
DEPRECATED RDW RBC AUTO: 51 FL (ref 37–54)
ERYTHROCYTE [DISTWIDTH] IN BLOOD BY AUTOMATED COUNT: 15.8 % (ref 11.5–14.5)
GFR SERPL CREATININE-BSD FRML MDRD: 53 ML/MIN/1.73
GLUCOSE BLD-MCNC: 85 MG/DL (ref 70–110)
HBA1C MFR BLD: 5.5 % (ref 4.5–5.7)
HCT VFR BLD AUTO: 30.7 % (ref 37–47)
HDLC SERPL-MCNC: 42 MG/DL (ref 60–100)
HGB BLD-MCNC: 9.2 G/DL (ref 12–16)
LDLC SERPL CALC-MCNC: 55 MG/DL (ref 0–100)
LDLC/HDLC SERPL: 1.3 {RATIO}
MCH RBC QN AUTO: 27.4 PG (ref 27–33)
MCHC RBC AUTO-ENTMCNC: 30 G/DL (ref 33–37)
MCV RBC AUTO: 91.4 FL (ref 80–94)
OSMOLALITY SERPL CALC.SUM OF ELEC: 279.8 MOSM/KG (ref 273–305)
PLATELET # BLD AUTO: 190 10*3/MM3 (ref 130–400)
PMV BLD AUTO: 10.4 FL (ref 6–10)
POTASSIUM BLD-SCNC: 4.9 MMOL/L (ref 3.5–5.3)
RBC # BLD AUTO: 3.36 10*6/MM3 (ref 4.2–5.4)
SODIUM BLD-SCNC: 140 MMOL/L (ref 135–153)
TRIGL SERPL-MCNC: 46 MG/DL (ref 0–150)
VLDLC SERPL-MCNC: 9.2 MG/DL
WBC NRBC COR # BLD: 6.01 10*3/MM3 (ref 4.5–12.5)

## 2017-05-19 PROCEDURE — 94060 EVALUATION OF WHEEZING: CPT | Performed by: INTERNAL MEDICINE

## 2017-05-19 PROCEDURE — 94799 UNLISTED PULMONARY SVC/PX: CPT

## 2017-05-19 PROCEDURE — 94727 GAS DIL/WSHOT DETER LNG VOL: CPT

## 2017-05-19 PROCEDURE — 94729 DIFFUSING CAPACITY: CPT | Performed by: INTERNAL MEDICINE

## 2017-05-19 PROCEDURE — 93010 ELECTROCARDIOGRAM REPORT: CPT | Performed by: INTERNAL MEDICINE

## 2017-05-19 PROCEDURE — 80048 BASIC METABOLIC PNL TOTAL CA: CPT | Performed by: INTERNAL MEDICINE

## 2017-05-19 PROCEDURE — 94060 EVALUATION OF WHEEZING: CPT

## 2017-05-19 PROCEDURE — 94729 DIFFUSING CAPACITY: CPT

## 2017-05-19 PROCEDURE — A9270 NON-COVERED ITEM OR SERVICE: HCPCS

## 2017-05-19 PROCEDURE — 63710000001 ALBUTEROL PER 1 MG

## 2017-05-19 PROCEDURE — 80061 LIPID PANEL: CPT | Performed by: INTERNAL MEDICINE

## 2017-05-19 PROCEDURE — 93005 ELECTROCARDIOGRAM TRACING: CPT | Performed by: INTERNAL MEDICINE

## 2017-05-19 PROCEDURE — 99238 HOSP IP/OBS DSCHRG MGMT 30/<: CPT | Performed by: INTERNAL MEDICINE

## 2017-05-19 PROCEDURE — 83036 HEMOGLOBIN GLYCOSYLATED A1C: CPT | Performed by: INTERNAL MEDICINE

## 2017-05-19 PROCEDURE — 94727 GAS DIL/WSHOT DETER LNG VOL: CPT | Performed by: INTERNAL MEDICINE

## 2017-05-19 PROCEDURE — 85027 COMPLETE CBC AUTOMATED: CPT | Performed by: INTERNAL MEDICINE

## 2017-05-19 RX ADMIN — ASPIRIN 325 MG: 325 TABLET, COATED ORAL at 08:13

## 2017-05-19 RX ADMIN — BUDESONIDE 0.5 MG: 0.5 SUSPENSION RESPIRATORY (INHALATION) at 07:00

## 2017-05-19 RX ADMIN — I-VITE, TAB 1000-60-2MG (60/BT) 1 TABLET: TAB at 08:11

## 2017-05-19 RX ADMIN — CLOPIDOGREL BISULFATE 75 MG: 75 TABLET, FILM COATED ORAL at 08:13

## 2017-05-19 RX ADMIN — ALBUTEROL SULFATE 2.5 MG: 2.5 SOLUTION RESPIRATORY (INHALATION) at 07:00

## 2017-05-19 RX ADMIN — CETIRIZINE HYDROCHLORIDE 5 MG: 10 TABLET ORAL at 08:13

## 2017-05-19 RX ADMIN — CALCIUM CARBONATE-VITAMIN D TAB 500 MG-200 UNIT 500 MG: 500-200 TAB at 08:13

## 2017-05-19 RX ADMIN — METOPROLOL TARTRATE 25 MG: 25 TABLET, FILM COATED ORAL at 08:12

## 2017-05-19 RX ADMIN — LOSARTAN POTASSIUM 25 MG: 25 TABLET, FILM COATED ORAL at 08:11

## 2017-05-19 RX ADMIN — Medication 1 TABLET: at 08:12

## 2017-05-19 RX ADMIN — AMLODIPINE BESYLATE 5 MG: 5 TABLET ORAL at 08:12

## 2017-06-01 ENCOUNTER — OFFICE VISIT (OUTPATIENT)
Dept: CARDIOLOGY | Facility: CLINIC | Age: 82
End: 2017-06-01

## 2017-06-01 ENCOUNTER — TELEPHONE (OUTPATIENT)
Dept: CARDIOLOGY | Facility: CLINIC | Age: 82
End: 2017-06-01

## 2017-06-01 VITALS
WEIGHT: 154.8 LBS | BODY MASS INDEX: 26.43 KG/M2 | SYSTOLIC BLOOD PRESSURE: 100 MMHG | HEART RATE: 87 BPM | DIASTOLIC BLOOD PRESSURE: 68 MMHG | OXYGEN SATURATION: 98 % | HEIGHT: 64 IN

## 2017-06-01 DIAGNOSIS — I48.0 PAF (PAROXYSMAL ATRIAL FIBRILLATION) (HCC): ICD-10-CM

## 2017-06-01 DIAGNOSIS — E78.2 MIXED HYPERLIPIDEMIA: ICD-10-CM

## 2017-06-01 DIAGNOSIS — I38 VALVULAR HEART DISEASE: ICD-10-CM

## 2017-06-01 DIAGNOSIS — I25.10 CORONARY ARTERY DISEASE INVOLVING NATIVE CORONARY ARTERY OF NATIVE HEART WITHOUT ANGINA PECTORIS: ICD-10-CM

## 2017-06-01 DIAGNOSIS — I50.30 CONGESTIVE HEART FAILURE WITH LV DIASTOLIC DYSFUNCTION, NYHA CLASS 1 (HCC): ICD-10-CM

## 2017-06-01 DIAGNOSIS — I95.2 HYPOTENSION DUE TO DRUGS: Primary | ICD-10-CM

## 2017-06-01 PROBLEM — L03.114 CELLULITIS OF LEFT UPPER EXTREMITY: Status: RESOLVED | Noted: 2017-04-28 | Resolved: 2017-06-01

## 2017-06-01 PROBLEM — R07.9 CHEST PAIN OF UNCERTAIN ETIOLOGY: Status: RESOLVED | Noted: 2017-03-09 | Resolved: 2017-06-01

## 2017-06-01 PROBLEM — T14.8XXA HEMATOMA: Status: RESOLVED | Noted: 2017-05-12 | Resolved: 2017-06-01

## 2017-06-01 PROBLEM — R19.7 DIARRHEA IN ADULT PATIENT: Status: RESOLVED | Noted: 2017-01-23 | Resolved: 2017-06-01

## 2017-06-01 PROBLEM — I20.0 UNSTABLE ANGINA (HCC): Status: RESOLVED | Noted: 2017-05-18 | Resolved: 2017-06-01

## 2017-06-01 PROCEDURE — 99213 OFFICE O/P EST LOW 20 MIN: CPT | Performed by: INTERNAL MEDICINE

## 2017-06-01 RX ORDER — MECLIZINE HCL 25MG 25 MG/1
25 TABLET, CHEWABLE ORAL 3 TIMES DAILY PRN
COMMUNITY
End: 2017-09-18

## 2017-06-01 NOTE — TELEPHONE ENCOUNTER
----- Message from Emily Cleary MD sent at 6/1/2017  8:55 AM EDT -----  Regarding: RE: BP dropping  Come in  ----- Message -----     From: Mary Friend MA     Sent: 6/1/2017   8:46 AM       To: Emily Cleary MD  Subject: BP dropping                                      Pt's daughter Ramona called and wanted to ask what they should do.  Before Cici had the stent replaced she was on 3 BP medications she was and still is taking metoprolol tartrate 25 mg BID, amlodipine 5 mg QD and Losartan 25 mg QD.  Her BP is really low averaging around 95/55.  Her daughter wants to know what you suggest they do.

## 2017-06-01 NOTE — TELEPHONE ENCOUNTER
I called the pt's daughter back and told her that doc wants her to come in and bring her medications. I gave her to scheduling

## 2017-06-01 NOTE — PROGRESS NOTES
subjective     Chief Complaint   Patient presents with   • Fatigue   • Extremity Weakness   • Hypotension   • Hypertension   • Hyperlipidemia     History of Present Illness  Patient is 89 years old white female who has known coronary artery disease with multiple coronary stents in the past.  Recently on May 18, 2017 patient was found to have severe in-stent restenosis of the proximal RCA requiring successful angioplasty and stenting which was done by Dr. Cash.    Patient had excellent results.  She is feeling much better however her blood pressure has been dropping.  Patient's daughter brought her blood pressure readings it went as low as 75/60 systolic.  Mostly is running around  systolic.  Patient gets very sleepy and tired.    She denies any chest pain palpitations or shortness of breath.  Patient also complains of easy bruisability and multiple areas of ecchymosis.  There has been no errol bleeding.    She is doing very well from cardiac standpoint has no chest tightness pressure sensation or heaviness.  Lipids are normal at this time.    Patient also is worried about swelling of the right sternoclavicular joint .  Deformity apparently is growing.      Patient Active Problem List   Diagnosis   • Spinal stenosis, degenerative disc disease, back pain*   • Deformity of the right Sternoclavicular joint*   • COPD (chronic obstructive pulmonary disease)   • Essential hypertension   • Mixed hyperlipidemia   • CAD, status post RCA and circumflex stents, in-stent restenosis of RCA requiring stenting 2017 Dr. Cash    • Pacemaker*   • hx of Myocardial infarction*   • Valvular heart disease mild mitral regurgitation not significant*   • Congestive heart failure with LV diastolic dysfunction, NYHA class 2*   • Atopic rhinitis   • Hyperparathyroidism status post parathyroidectomy   • Chronic back pain   • CKD (chronic kidney disease) stage 3, GFR 30-59 ml/min   • Diastolic heart failure secondary to hypertrophic  cardiomyopathy   • Hyperglycemia   • PAF (paroxysmal atrial fibrillation)   • Bradycardia   • Neuropathy   • Cerebrovascular disease   • Mild obesity   • Hypotension due to drugs       Social History   Substance Use Topics   • Smoking status: Never Smoker   • Smokeless tobacco: Never Used   • Alcohol use No       Allergies   Allergen Reactions   • Bee Venom    • Codeine    • Erythromycin    • Lipitor [Atorvastatin]    • Penicillins    • Tetracyclines & Related    • Zetia [Ezetimibe]    • Zocor [Simvastatin]        Current Outpatient Prescriptions on File Prior to Visit   Medication Sig   • albuterol (PROVENTIL HFA;VENTOLIN HFA) 108 (90 BASE) MCG/ACT inhaler Inhale 2 puffs 2 (Two) Times a Day As Needed for Wheezing.   • albuterol (PROVENTIL) (2.5 MG/3ML) 0.083% nebulizer solution Take 2.5 mg by nebulization Every 4 (Four) Hours As Needed for Wheezing.   • amLODIPine (NORVASC) 5 MG tablet Take 1 tablet by mouth Daily. (Patient taking differently: Take 5 mg by mouth Daily.)   • aspirin  MG tablet Take 1 tablet by mouth Daily.   • Calcium Carb-Cholecalciferol (CALCIUM 600 + D) 600-200 MG-UNIT tablet Take 1 tablet by mouth 2 (Two) Times a Day Before Meals.   • cetirizine (ZyrTEC) 10 MG tablet Take 1 tablet by mouth daily.   • clopidogrel (PLAVIX) 75 MG tablet Take 1 tablet by mouth Daily.   • gabapentin (NEURONTIN) 300 MG capsule Take 1 capsule by mouth every night. (Patient taking differently: Take 300 mg by mouth At Night As Needed.)   • losartan (COZAAR) 25 MG tablet Take 1 tablet by mouth Daily.   • metoprolol tartrate (LOPRESSOR) 25 MG tablet Take 1 tablet by mouth Every 12 (Twelve) Hours.   • montelukast (SINGULAIR) 10 MG tablet Take 1 tablet by mouth Every Night.   • Multiple Vitamins-Minerals (OCUVITE ADULT 50+ PO) Take 1 tablet by mouth Daily.   • multivitamin (DAILY RITIKA) tablet tablet Take 1 tablet by mouth Daily.   • PULMICORT FLEXHALER 90 MCG/ACT inhaler Inhale 1 puff 2 (Two) Times a Day. (Patient  "taking differently: Inhale 1 puff 2 (Two) Times a Day.)   • rosuvastatin (CRESTOR) 5 MG tablet Take 1 tablet by mouth Daily. (Patient taking differently: Take 5 mg by mouth Every Night.)     No current facility-administered medications on file prior to visit.          The following portions of the patient's history were reviewed and updated as appropriate: allergies, current medications, past family history, past medical history, past social history, past surgical history and problem list.    Review of Systems   Constitution: Positive for weakness and malaise/fatigue.   HENT: Negative.  Negative for congestion and headaches.    Eyes: Negative.    Cardiovascular: Negative.  Negative for chest pain, claudication, cyanosis, dyspnea on exertion, irregular heartbeat, leg swelling, near-syncope, orthopnea, palpitations, paroxysmal nocturnal dyspnea and syncope.        Low blood pressure   Respiratory: Negative.  Negative for shortness of breath.    Hematologic/Lymphatic: Negative.    Skin: Negative.         Ecchymosis and bruising   Musculoskeletal: Positive for arthritis and joint swelling.   Gastrointestinal: Negative.           Objective:     /68 (BP Location: Left arm, Patient Position: Sitting)  Pulse 87  Ht 63.5\" (161.3 cm)  Wt 154 lb 12.8 oz (70.2 kg)  SpO2 98%  BMI 26.99 kg/m2  Physical Exam   Constitutional: She appears well-developed and well-nourished.   HENT:   Head: Normocephalic and atraumatic.   Mouth/Throat: Oropharynx is clear and moist.   Eyes: Conjunctivae and EOM are normal. Pupils are equal, round, and reactive to light. No scleral icterus.   Neck: Normal range of motion. Neck supple. No JVD present. No tracheal deviation present. No thyromegaly present.   Cardiovascular: Normal rate, regular rhythm, normal heart sounds and intact distal pulses.  Exam reveals no friction rub.    No murmur heard.  Pulmonary/Chest: Effort normal and breath sounds normal. No respiratory distress. She has no " wheezes. She has no rales. She exhibits no tenderness.   Abdominal: Soft. Bowel sounds are normal. She exhibits no distension and no mass. There is no tenderness. There is no rebound and no guarding.   Musculoskeletal: Normal range of motion. She exhibits no edema, tenderness or deformity.   Swelling right sternoclavicular joint   Lymphadenopathy:     She has no cervical adenopathy.   Neurological: She is alert. She has normal reflexes. No cranial nerve deficit. She exhibits normal muscle tone. Coordination normal.   Skin: Skin is warm and dry.   Psychiatric: She has a normal mood and affect. Her behavior is normal. Judgment and thought content normal.         Lab Review  Lab Results   Component Value Date     05/19/2017    K 4.9 05/19/2017     05/19/2017    BUN 16 05/19/2017    CREATININE 0.98 05/19/2017    GLUCOSE 85 05/19/2017    CALCIUM 8.6 05/19/2017    ALT 15 05/17/2017    ALKPHOS 76 05/17/2017    LABIL2 1.4 (L) 05/17/2017     Lab Results   Component Value Date    CKTOTAL 56 05/18/2017     Lab Results   Component Value Date    WBC 6.01 05/19/2017    HGB 9.2 (L) 05/19/2017    HCT 30.7 (L) 05/19/2017     05/19/2017     Lab Results   Component Value Date    INR 0.97 05/18/2017    INR 0.97 03/08/2017    INR 0.94 01/22/2017     Lab Results   Component Value Date    MG 1.9 03/06/2017     Lab Results   Component Value Date    TSH 1.613 01/14/2017     Lab Results   Component Value Date    .0 (H) 05/17/2017     Lab Results   Component Value Date    CHOL 106 05/19/2017    TRIG 46 05/19/2017    HDL 42 (L) 05/19/2017    LDLCALC 55 05/19/2017    VLDL 9.2 05/19/2017    LDLHDL 1.30 05/19/2017         Procedures     I personally viewed and interpreted the patient's LAB data         Assessment:     1. Hypotension due to drugs    2. Coronary artery disease involving native coronary artery of native heart without angina pectoris    3. Valvular heart disease mild mitral regurgitation not significant*     4. PAF (paroxysmal atrial fibrillation)    5. Mixed hyperlipidemia    6. Congestive heart failure with LV diastolic dysfunction, NYHA class 2*          Plan:      Patient is having low blood pressure and feels weak tired and sleepy.  Blood pressure reading were reviewed.  Patient was advised to take metoprolol tartrate 25 mg half twice a day instead of all at one time because is not a long-acting medicine    Hold losartan 25 mg and take it only if blood pressure is more than 1:30 and 140 systolic  Continue taking Norvasc and metoprolol as explained.    Swelling of right sternoclavicular joint will be addressed in few months  Lipids are normal.    She will continue Crestor 5 mg daily.  She was advised to make sure that she continues taking aspirin and Plavix despite her bruising and ecchymosis    Follow-up appointment in one month.      No Follow-up on file.

## 2017-06-07 DIAGNOSIS — G25.81 RLS (RESTLESS LEGS SYNDROME): ICD-10-CM

## 2017-06-07 RX ORDER — GABAPENTIN 300 MG/1
300 CAPSULE ORAL NIGHTLY
Qty: 90 CAPSULE | Refills: 1 | OUTPATIENT
Start: 2017-06-07 | End: 2017-09-18 | Stop reason: SDUPTHER

## 2017-06-07 RX ORDER — ROSUVASTATIN CALCIUM 5 MG/1
5 TABLET, COATED ORAL DAILY
Qty: 90 TABLET | Refills: 0 | Status: ON HOLD | OUTPATIENT
Start: 2017-06-07 | End: 2020-01-01 | Stop reason: DRUGHIGH

## 2017-06-09 ENCOUNTER — OFFICE VISIT (OUTPATIENT)
Dept: PULMONOLOGY | Facility: CLINIC | Age: 82
End: 2017-06-09

## 2017-06-09 VITALS
OXYGEN SATURATION: 98 % | WEIGHT: 157 LBS | DIASTOLIC BLOOD PRESSURE: 65 MMHG | HEART RATE: 62 BPM | BODY MASS INDEX: 27.82 KG/M2 | TEMPERATURE: 97.9 F | HEIGHT: 63 IN | SYSTOLIC BLOOD PRESSURE: 94 MMHG

## 2017-06-09 DIAGNOSIS — J44.9 CHRONIC OBSTRUCTIVE PULMONARY DISEASE, UNSPECIFIED COPD TYPE (HCC): Primary | ICD-10-CM

## 2017-06-09 DIAGNOSIS — I50.22 CHRONIC SYSTOLIC CONGESTIVE HEART FAILURE (HCC): ICD-10-CM

## 2017-06-09 PROCEDURE — 99214 OFFICE O/P EST MOD 30 MIN: CPT | Performed by: INTERNAL MEDICINE
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
## 2017-06-13 ENCOUNTER — LAB (OUTPATIENT)
Dept: LAB | Facility: HOSPITAL | Age: 82
End: 2017-06-13
Attending: INTERNAL MEDICINE

## 2017-06-13 DIAGNOSIS — E78.2 MIXED HYPERLIPIDEMIA: ICD-10-CM

## 2017-06-13 LAB
ALBUMIN SERPL-MCNC: 4.1 G/DL (ref 3.4–4.8)
ALBUMIN/GLOB SERPL: 1.3 G/DL (ref 1.5–2.5)
ALP SERPL-CCNC: 80 U/L (ref 35–104)
ALT SERPL W P-5'-P-CCNC: 11 U/L (ref 10–36)
ANION GAP SERPL CALCULATED.3IONS-SCNC: 7.9 MMOL/L (ref 3.6–11.2)
AST SERPL-CCNC: 24 U/L (ref 10–30)
BILIRUB SERPL-MCNC: 0.4 MG/DL (ref 0.2–1.8)
BUN BLD-MCNC: 23 MG/DL (ref 7–21)
BUN/CREAT SERPL: 19.7 (ref 7–25)
CALCIUM SPEC-SCNC: 9.2 MG/DL (ref 7.7–10)
CHLORIDE SERPL-SCNC: 111 MMOL/L (ref 99–112)
CHOLEST SERPL-MCNC: 130 MG/DL (ref 0–200)
CK SERPL-CCNC: 103 U/L (ref 24–173)
CO2 SERPL-SCNC: 25.1 MMOL/L (ref 24.3–31.9)
CREAT BLD-MCNC: 1.17 MG/DL (ref 0.43–1.29)
GFR SERPL CREATININE-BSD FRML MDRD: 43 ML/MIN/1.73
GLOBULIN UR ELPH-MCNC: 3.1 GM/DL
GLUCOSE BLD-MCNC: 93 MG/DL (ref 70–110)
HDLC SERPL-MCNC: 50 MG/DL (ref 60–100)
LDLC SERPL CALC-MCNC: 70 MG/DL (ref 0–100)
LDLC/HDLC SERPL: 1.4 {RATIO}
OSMOLALITY SERPL CALC.SUM OF ELEC: 290.2 MOSM/KG (ref 273–305)
POTASSIUM BLD-SCNC: 5.1 MMOL/L (ref 3.5–5.3)
PROT SERPL-MCNC: 7.2 G/DL (ref 6–8)
SODIUM BLD-SCNC: 144 MMOL/L (ref 135–153)
TRIGL SERPL-MCNC: 51 MG/DL (ref 0–150)
VLDLC SERPL-MCNC: 10.2 MG/DL

## 2017-06-13 PROCEDURE — 80053 COMPREHEN METABOLIC PANEL: CPT | Performed by: INTERNAL MEDICINE

## 2017-06-13 PROCEDURE — 80061 LIPID PANEL: CPT | Performed by: INTERNAL MEDICINE

## 2017-06-13 PROCEDURE — 82550 ASSAY OF CK (CPK): CPT | Performed by: INTERNAL MEDICINE

## 2017-06-15 ENCOUNTER — OFFICE VISIT (OUTPATIENT)
Dept: CARDIOLOGY | Facility: CLINIC | Age: 82
End: 2017-06-15

## 2017-06-15 VITALS
SYSTOLIC BLOOD PRESSURE: 97 MMHG | BODY MASS INDEX: 27.75 KG/M2 | OXYGEN SATURATION: 95 % | WEIGHT: 156.6 LBS | HEIGHT: 63 IN | HEART RATE: 67 BPM | DIASTOLIC BLOOD PRESSURE: 68 MMHG

## 2017-06-15 DIAGNOSIS — I50.32: ICD-10-CM

## 2017-06-15 DIAGNOSIS — I48.0 PAF (PAROXYSMAL ATRIAL FIBRILLATION) (HCC): ICD-10-CM

## 2017-06-15 DIAGNOSIS — I42.2: ICD-10-CM

## 2017-06-15 DIAGNOSIS — D50.9 IRON DEFICIENCY ANEMIA, UNSPECIFIED IRON DEFICIENCY ANEMIA TYPE: ICD-10-CM

## 2017-06-15 DIAGNOSIS — R06.02 SHORTNESS OF BREATH: Primary | ICD-10-CM

## 2017-06-15 DIAGNOSIS — I50.42 CHRONIC COMBINED SYSTOLIC AND DIASTOLIC CONGESTIVE HEART FAILURE (HCC): ICD-10-CM

## 2017-06-15 DIAGNOSIS — Z95.0 PACEMAKER: ICD-10-CM

## 2017-06-15 DIAGNOSIS — R53.82 CHRONIC FATIGUE: ICD-10-CM

## 2017-06-15 DIAGNOSIS — I10 ESSENTIAL HYPERTENSION: ICD-10-CM

## 2017-06-15 DIAGNOSIS — I25.10 CORONARY ARTERY DISEASE INVOLVING NATIVE CORONARY ARTERY OF NATIVE HEART WITHOUT ANGINA PECTORIS: ICD-10-CM

## 2017-06-15 DIAGNOSIS — I95.2 HYPOTENSION DUE TO DRUGS: ICD-10-CM

## 2017-06-15 LAB
BASOPHILS # BLD AUTO: 0.02 10*3/MM3 (ref 0–0.3)
BASOPHILS NFR BLD AUTO: 0.3 % (ref 0–2)
BNP SERPL-MCNC: 486 PG/ML (ref 0–100)
DEPRECATED RDW RBC AUTO: 49.8 FL (ref 37–54)
EOSINOPHIL # BLD AUTO: 0.17 10*3/MM3 (ref 0–0.7)
EOSINOPHIL NFR BLD AUTO: 2.7 % (ref 0–7)
ERYTHROCYTE [DISTWIDTH] IN BLOOD BY AUTOMATED COUNT: 15.5 % (ref 11.5–14.5)
HCT VFR BLD AUTO: 34.1 % (ref 37–47)
HGB BLD-MCNC: 10.6 G/DL (ref 12–16)
IMM GRANULOCYTES # BLD: 0.02 10*3/MM3 (ref 0–0.03)
IMM GRANULOCYTES NFR BLD: 0.3 % (ref 0–0.5)
IRON 24H UR-MRATE: 36 MCG/DL (ref 49–151)
LYMPHOCYTES # BLD AUTO: 1.2 10*3/MM3 (ref 1–3)
LYMPHOCYTES NFR BLD AUTO: 19.3 % (ref 16–46)
MCH RBC QN AUTO: 27.5 PG (ref 27–33)
MCHC RBC AUTO-ENTMCNC: 31.1 G/DL (ref 33–37)
MCV RBC AUTO: 88.6 FL (ref 80–94)
MONOCYTES # BLD AUTO: 0.75 10*3/MM3 (ref 0.1–0.9)
MONOCYTES NFR BLD AUTO: 12 % (ref 0–12)
NEUTROPHILS # BLD AUTO: 4.07 10*3/MM3 (ref 1.4–6.5)
NEUTROPHILS NFR BLD AUTO: 65.4 % (ref 40–75)
PLATELET # BLD AUTO: 223 10*3/MM3 (ref 130–400)
PMV BLD AUTO: 10.8 FL (ref 6–10)
RBC # BLD AUTO: 3.85 10*6/MM3 (ref 4.2–5.4)
WBC NRBC COR # BLD: 6.23 10*3/MM3 (ref 4.5–12.5)

## 2017-06-15 PROCEDURE — 93000 ELECTROCARDIOGRAM COMPLETE: CPT | Performed by: INTERNAL MEDICINE

## 2017-06-15 PROCEDURE — 83880 ASSAY OF NATRIURETIC PEPTIDE: CPT | Performed by: INTERNAL MEDICINE

## 2017-06-15 PROCEDURE — 99213 OFFICE O/P EST LOW 20 MIN: CPT | Performed by: INTERNAL MEDICINE

## 2017-06-15 PROCEDURE — 83540 ASSAY OF IRON: CPT | Performed by: INTERNAL MEDICINE

## 2017-06-15 PROCEDURE — 85025 COMPLETE CBC W/AUTO DIFF WBC: CPT | Performed by: INTERNAL MEDICINE

## 2017-06-15 RX ORDER — FUROSEMIDE 20 MG/1
10 TABLET ORAL 2 TIMES DAILY
Qty: 45 TABLET | Refills: 3 | Status: SHIPPED | OUTPATIENT
Start: 2017-06-15 | End: 2017-09-25

## 2017-06-15 RX ORDER — IRON POLYSACCHARIDE COMPLEX 150 MG
150 CAPSULE ORAL DAILY
Qty: 90 CAPSULE | Refills: 1 | Status: SHIPPED | OUTPATIENT
Start: 2017-06-15 | End: 2018-01-22 | Stop reason: SDUPTHER

## 2017-06-15 RX ORDER — AMLODIPINE BESYLATE 5 MG/1
2.5 TABLET ORAL DAILY
Qty: 30 TABLET | Refills: 1 | Status: SHIPPED | OUTPATIENT
Start: 2017-06-15 | End: 2017-11-30 | Stop reason: ALTCHOICE
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
## 2017-07-07 ENCOUNTER — TELEPHONE (OUTPATIENT)
Dept: CARDIOLOGY | Facility: CLINIC | Age: 82
End: 2017-07-07

## 2017-07-07 NOTE — TELEPHONE ENCOUNTER
Called patient's daughter back & told her what Dr. Cleary said. She said she would feel better if she was seen by Dr. Evin CARIAS, so we got her an apt on Monday.     ----- Message from Emily Cleary MD sent at 7/7/2017 12:57 PM EDT -----  Regarding: RE: BP  Contact: [phone]  High side  ----- Message -----     From: Catherine Morales MA     Sent: 7/7/2017  12:39 PM       To: Emily Cleary MD  Subject: BP                                               Home health is at the home and said that the patient is holding her Metoprolol 25 mg, because they are not sure of which arm to check her BP in. They said that she had a blockage on the left side and the BP is significantly different. Right now HH got 100/63 Left, 146/56 right~~ earlier it was 85/57 Left,  134/44 Right! Dr. Mcdowell told them to only give if top number was over 140, but which arm should they go by? Just need carnification!!

## 2017-07-10 ENCOUNTER — OFFICE VISIT (OUTPATIENT)
Dept: CARDIOLOGY | Facility: CLINIC | Age: 82
End: 2017-07-10

## 2017-07-10 VITALS
OXYGEN SATURATION: 99 % | WEIGHT: 156 LBS | DIASTOLIC BLOOD PRESSURE: 70 MMHG | HEIGHT: 63 IN | SYSTOLIC BLOOD PRESSURE: 100 MMHG | BODY MASS INDEX: 27.64 KG/M2 | HEART RATE: 70 BPM | RESPIRATION RATE: 18 BRPM

## 2017-07-10 DIAGNOSIS — E21.3 HYPERPARATHYROIDISM (HCC): ICD-10-CM

## 2017-07-10 DIAGNOSIS — I10 ESSENTIAL HYPERTENSION: Primary | ICD-10-CM

## 2017-07-10 DIAGNOSIS — Z95.0 PACEMAKER: ICD-10-CM

## 2017-07-10 DIAGNOSIS — I50.32: ICD-10-CM

## 2017-07-10 DIAGNOSIS — I48.0 PAF (PAROXYSMAL ATRIAL FIBRILLATION) (HCC): ICD-10-CM

## 2017-07-10 DIAGNOSIS — E78.2 MIXED HYPERLIPIDEMIA: ICD-10-CM

## 2017-07-10 DIAGNOSIS — I25.10 CORONARY ARTERY DISEASE INVOLVING NATIVE CORONARY ARTERY OF NATIVE HEART WITHOUT ANGINA PECTORIS: ICD-10-CM

## 2017-07-10 DIAGNOSIS — N18.30 CKD (CHRONIC KIDNEY DISEASE) STAGE 3, GFR 30-59 ML/MIN (HCC): ICD-10-CM

## 2017-07-10 DIAGNOSIS — I42.2: ICD-10-CM

## 2017-07-10 PROCEDURE — 99213 OFFICE O/P EST LOW 20 MIN: CPT | Performed by: INTERNAL MEDICINE

## 2017-07-10 RX ORDER — NITROGLYCERIN 0.4 MG/1
TABLET SUBLINGUAL
Refills: 0 | COMMUNITY
Start: 2017-06-16 | End: 2017-11-30 | Stop reason: SDUPTHER

## 2017-07-10 RX ORDER — METOPROLOL SUCCINATE 25 MG/1
25 TABLET, EXTENDED RELEASE ORAL DAILY
COMMUNITY
End: 2017-07-10

## 2017-07-10 RX ORDER — AMLODIPINE BESYLATE 2.5 MG/1
TABLET ORAL
Refills: 1 | COMMUNITY
Start: 2017-06-15 | End: 2017-09-18 | Stop reason: DRUGHIGH
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
## 2017-07-19 ENCOUNTER — OFFICE VISIT (OUTPATIENT)
Dept: CARDIAC SURGERY | Facility: CLINIC | Age: 82
End: 2017-07-19

## 2017-07-19 VITALS
HEIGHT: 63 IN | WEIGHT: 164 LBS | DIASTOLIC BLOOD PRESSURE: 64 MMHG | OXYGEN SATURATION: 98 % | SYSTOLIC BLOOD PRESSURE: 172 MMHG | HEART RATE: 60 BPM | BODY MASS INDEX: 29.06 KG/M2

## 2017-07-19 DIAGNOSIS — I77.1 SUBCLAVIAN ARTERIAL STENOSIS (HCC): Primary | ICD-10-CM

## 2017-07-19 PROCEDURE — 99203 OFFICE O/P NEW LOW 30 MIN: CPT | Performed by: THORACIC SURGERY (CARDIOTHORACIC VASCULAR SURGERY)

## 2017-08-01 ENCOUNTER — HOSPITAL ENCOUNTER (EMERGENCY)
Facility: HOSPITAL | Age: 82
Discharge: ANOTHER HOSPITAL | End: 2017-08-02
Attending: FAMILY MEDICINE | Admitting: FAMILY MEDICINE

## 2017-08-01 ENCOUNTER — APPOINTMENT (OUTPATIENT)
Dept: GENERAL RADIOLOGY | Facility: HOSPITAL | Age: 82
End: 2017-08-01

## 2017-08-01 DIAGNOSIS — I25.10 CORONARY ARTERY DISEASE INVOLVING NATIVE CORONARY ARTERY, ANGINA PRESENCE UNSPECIFIED, UNSPECIFIED WHETHER NATIVE OR TRANSPLANTED HEART: Primary | ICD-10-CM

## 2017-08-01 DIAGNOSIS — N17.9 AKI (ACUTE KIDNEY INJURY) (HCC): ICD-10-CM

## 2017-08-01 DIAGNOSIS — R77.8 ELEVATED TROPONIN: ICD-10-CM

## 2017-08-01 LAB
ALBUMIN SERPL-MCNC: 4.3 G/DL (ref 3.4–4.8)
ALBUMIN/GLOB SERPL: 1.6 G/DL (ref 1.5–2.5)
ALP SERPL-CCNC: 77 U/L (ref 35–104)
ALT SERPL W P-5'-P-CCNC: 15 U/L (ref 10–36)
ANION GAP SERPL CALCULATED.3IONS-SCNC: 8.9 MMOL/L (ref 3.6–11.2)
AST SERPL-CCNC: 30 U/L (ref 10–30)
BASOPHILS # BLD AUTO: 0.02 10*3/MM3 (ref 0–0.3)
BASOPHILS NFR BLD AUTO: 0.3 % (ref 0–2)
BILIRUB SERPL-MCNC: 0.2 MG/DL (ref 0.2–1.8)
BNP SERPL-MCNC: 227 PG/ML (ref 0–100)
BUN BLD-MCNC: 36 MG/DL (ref 7–21)
BUN/CREAT SERPL: 26.7 (ref 7–25)
CALCIUM SPEC-SCNC: 9.3 MG/DL (ref 7.7–10)
CHLORIDE SERPL-SCNC: 106 MMOL/L (ref 99–112)
CK MB SERPL-CCNC: 2.05 NG/ML (ref 0–5)
CK MB SERPL-RTO: 2.7 % (ref 0–3)
CK SERPL-CCNC: 76 U/L (ref 24–173)
CO2 SERPL-SCNC: 24.1 MMOL/L (ref 24.3–31.9)
CREAT BLD-MCNC: 1.35 MG/DL (ref 0.43–1.29)
DEPRECATED RDW RBC AUTO: 47.7 FL (ref 37–54)
EOSINOPHIL # BLD AUTO: 0.15 10*3/MM3 (ref 0–0.7)
EOSINOPHIL NFR BLD AUTO: 2.2 % (ref 0–7)
ERYTHROCYTE [DISTWIDTH] IN BLOOD BY AUTOMATED COUNT: 15.3 % (ref 11.5–14.5)
GFR SERPL CREATININE-BSD FRML MDRD: 37 ML/MIN/1.73
GLOBULIN UR ELPH-MCNC: 2.7 GM/DL
GLUCOSE BLD-MCNC: 114 MG/DL (ref 70–110)
HCT VFR BLD AUTO: 36.3 % (ref 37–47)
HGB BLD-MCNC: 11.5 G/DL (ref 12–16)
IMM GRANULOCYTES # BLD: 0.01 10*3/MM3 (ref 0–0.03)
IMM GRANULOCYTES NFR BLD: 0.1 % (ref 0–0.5)
LYMPHOCYTES # BLD AUTO: 2 10*3/MM3 (ref 1–3)
LYMPHOCYTES NFR BLD AUTO: 30 % (ref 16–46)
MCH RBC QN AUTO: 27.1 PG (ref 27–33)
MCHC RBC AUTO-ENTMCNC: 31.7 G/DL (ref 33–37)
MCV RBC AUTO: 85.4 FL (ref 80–94)
MONOCYTES # BLD AUTO: 0.92 10*3/MM3 (ref 0.1–0.9)
MONOCYTES NFR BLD AUTO: 13.8 % (ref 0–12)
MYOGLOBIN SERPL-MCNC: 61 NG/ML (ref 0–109)
NEUTROPHILS # BLD AUTO: 3.57 10*3/MM3 (ref 1.4–6.5)
NEUTROPHILS NFR BLD AUTO: 53.6 % (ref 40–75)
OSMOLALITY SERPL CALC.SUM OF ELEC: 286.7 MOSM/KG (ref 273–305)
PLATELET # BLD AUTO: 223 10*3/MM3 (ref 130–400)
PMV BLD AUTO: 10.3 FL (ref 6–10)
POTASSIUM BLD-SCNC: 5.2 MMOL/L (ref 3.5–5.3)
PROT SERPL-MCNC: 7 G/DL (ref 6–8)
RBC # BLD AUTO: 4.25 10*6/MM3 (ref 4.2–5.4)
SODIUM BLD-SCNC: 139 MMOL/L (ref 135–153)
TROPONIN I SERPL-MCNC: 0.05 NG/ML
WBC NRBC COR # BLD: 6.67 10*3/MM3 (ref 4.5–12.5)

## 2017-08-01 PROCEDURE — 82550 ASSAY OF CK (CPK): CPT | Performed by: FAMILY MEDICINE

## 2017-08-01 PROCEDURE — 85025 COMPLETE CBC W/AUTO DIFF WBC: CPT | Performed by: FAMILY MEDICINE

## 2017-08-01 PROCEDURE — 99285 EMERGENCY DEPT VISIT HI MDM: CPT

## 2017-08-01 PROCEDURE — 93010 ELECTROCARDIOGRAM REPORT: CPT | Performed by: INTERNAL MEDICINE

## 2017-08-01 PROCEDURE — 82553 CREATINE MB FRACTION: CPT | Performed by: FAMILY MEDICINE

## 2017-08-01 PROCEDURE — 93005 ELECTROCARDIOGRAM TRACING: CPT | Performed by: FAMILY MEDICINE

## 2017-08-01 PROCEDURE — 96361 HYDRATE IV INFUSION ADD-ON: CPT

## 2017-08-01 PROCEDURE — 71010 XR CHEST 1 VW: CPT | Performed by: RADIOLOGY

## 2017-08-01 PROCEDURE — 83880 ASSAY OF NATRIURETIC PEPTIDE: CPT | Performed by: FAMILY MEDICINE

## 2017-08-01 PROCEDURE — 83874 ASSAY OF MYOGLOBIN: CPT | Performed by: FAMILY MEDICINE

## 2017-08-01 PROCEDURE — 84484 ASSAY OF TROPONIN QUANT: CPT | Performed by: FAMILY MEDICINE

## 2017-08-01 PROCEDURE — 80053 COMPREHEN METABOLIC PANEL: CPT | Performed by: FAMILY MEDICINE

## 2017-08-01 PROCEDURE — 71010 HC CHEST PA OR AP: CPT

## 2017-08-01 RX ORDER — ASPIRIN 81 MG/1
324 TABLET, CHEWABLE ORAL ONCE
Status: COMPLETED | OUTPATIENT
Start: 2017-08-01 | End: 2017-08-01

## 2017-08-01 RX ADMIN — ASPIRIN 324 MG: 81 TABLET, CHEWABLE ORAL at 22:38

## 2017-08-01 RX ADMIN — NITROGLYCERIN 1 INCH: 20 OINTMENT TOPICAL at 22:24

## 2017-08-01 RX ADMIN — SODIUM CHLORIDE 500 ML: 9 INJECTION, SOLUTION INTRAVENOUS at 23:24

## 2017-08-02 VITALS
HEART RATE: 63 BPM | WEIGHT: 156 LBS | RESPIRATION RATE: 18 BRPM | DIASTOLIC BLOOD PRESSURE: 62 MMHG | HEIGHT: 63 IN | TEMPERATURE: 98.1 F | SYSTOLIC BLOOD PRESSURE: 165 MMHG | BODY MASS INDEX: 27.64 KG/M2 | OXYGEN SATURATION: 99 %

## 2017-08-02 PROCEDURE — 25010000002 MORPHINE PER 10 MG

## 2017-08-02 PROCEDURE — 96374 THER/PROPH/DIAG INJ IV PUSH: CPT

## 2017-08-02 PROCEDURE — 25010000002 MORPHINE PER 10 MG: Performed by: FAMILY MEDICINE

## 2017-08-02 RX ORDER — MORPHINE SULFATE 2 MG/ML
1 INJECTION, SOLUTION INTRAMUSCULAR; INTRAVENOUS ONCE
Status: COMPLETED | OUTPATIENT
Start: 2017-08-02 | End: 2017-08-02

## 2017-08-02 RX ORDER — MORPHINE SULFATE 2 MG/ML
2 INJECTION, SOLUTION INTRAMUSCULAR; INTRAVENOUS ONCE
Status: DISCONTINUED | OUTPATIENT
Start: 2017-08-02 | End: 2017-08-02

## 2017-08-02 RX ADMIN — MORPHINE SULFATE 1 MG: 2 INJECTION, SOLUTION INTRAMUSCULAR; INTRAVENOUS at 01:00

## 2017-08-02 RX ADMIN — MORPHINE SULFATE 4 MG: 4 INJECTION, SOLUTION INTRAMUSCULAR; INTRAVENOUS at 00:59

## 2017-08-02 NOTE — ED NOTES
I called the [facility] for their cardiologist @ [facility], Dr. Ayon.     Gregor Ledesma  08/01/17 2237

## 2017-08-02 NOTE — ED NOTES
Assisted pt on bedpan.  Pt urinated.  Kassi care provided.  Pt c/o chest pain and bilateral teeth pain. Rates 6/10.  Dr. Dover notified.  [facility] ALS crew here to transport pt to [facility].      Shanae Mora RN  08/02/17 0059

## 2017-08-02 NOTE — ED NOTES
I called house supervisor, Michael, to inform him of the transfer.     Gregor Ledesma  08/01/17 [id number]

## 2017-08-02 NOTE — ED NOTES
I called [facility] for [facility] cardiologist[facility]       Gregor Ledesma  08/01/17 2359

## 2017-08-02 NOTE — ED NOTES
"I called [facility] for an ALS truck, [place] said, \"they will tone it out.\"     Gregor Ledesma  08/01/17 [id number]    "
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
## 2017-08-02 NOTE — ED NOTES
[facility] here to transport Pt to [facility]. Vital signs stable. Pt denies chest pain. Pt normal sinus with HR 60 on the monitor. No acute distress noted at this time.      Zahra Carey RN  08/02/17 0052

## 2017-08-02 NOTE — ED NOTES
Pt waiting on [facility] to take pt to [facility].      Shanae Mora RN  08/02/17 0013

## 2017-08-02 NOTE — ED NOTES
Vashti at the transfer center calls and states pt is going to [facility].      Shanae Mora RN  08/02/17 0005

## 2017-08-02 NOTE — ED NOTES
Called [facility] to number [phone] and nurse states they are not ready to receive report.  Will call back.      Shanae Mora RN  08/01/17 0176

## 2017-08-07 ENCOUNTER — TELEPHONE (OUTPATIENT)
Dept: PULMONOLOGY | Facility: CLINIC | Age: 82
End: 2017-08-07

## 2017-08-17 ENCOUNTER — TRANSCRIBE ORDERS (OUTPATIENT)
Dept: ADMINISTRATIVE | Facility: HOSPITAL | Age: 82
End: 2017-08-17

## 2017-08-17 DIAGNOSIS — Z12.31 VISIT FOR SCREENING MAMMOGRAM: Primary | ICD-10-CM

## 2017-08-28 ENCOUNTER — HOSPITAL ENCOUNTER (EMERGENCY)
Facility: HOSPITAL | Age: 82
Discharge: HOME | End: 2017-08-29
Attending: EMERGENCY MEDICINE | Admitting: EMERGENCY MEDICINE

## 2017-08-28 DIAGNOSIS — I20.9 ANGINA PECTORIS (HCC): Primary | ICD-10-CM

## 2017-08-28 DIAGNOSIS — I16.0 HYPERTENSIVE URGENCY: ICD-10-CM

## 2017-08-28 LAB
ALBUMIN SERPL-MCNC: 4.2 G/DL (ref 3.4–4.8)
ALBUMIN/GLOB SERPL: 1.6 G/DL (ref 1.5–2.5)
ALP SERPL-CCNC: 76 U/L (ref 35–104)
ALT SERPL W P-5'-P-CCNC: 15 U/L (ref 10–36)
ANION GAP SERPL CALCULATED.3IONS-SCNC: 6.3 MMOL/L (ref 3.6–11.2)
APTT PPP: 30 SECONDS (ref 23.8–36.1)
AST SERPL-CCNC: 26 U/L (ref 10–30)
BACTERIA UR QL AUTO: ABNORMAL /HPF
BASOPHILS # BLD AUTO: 0.01 10*3/MM3 (ref 0–0.3)
BASOPHILS NFR BLD AUTO: 0.2 % (ref 0–2)
BILIRUB SERPL-MCNC: 0.3 MG/DL (ref 0.2–1.8)
BILIRUB UR QL STRIP: NEGATIVE
BUN BLD-MCNC: 21 MG/DL (ref 7–21)
BUN/CREAT SERPL: 17.6 (ref 7–25)
CALCIUM SPEC-SCNC: 9.1 MG/DL (ref 7.7–10)
CHLORIDE SERPL-SCNC: 106 MMOL/L (ref 99–112)
CK MB SERPL-CCNC: 2.43 NG/ML (ref 0–5)
CK MB SERPL-RTO: 3.6 % (ref 0–3)
CK SERPL-CCNC: 68 U/L (ref 24–173)
CLARITY UR: CLEAR
CO2 SERPL-SCNC: 24.7 MMOL/L (ref 24.3–31.9)
COLOR UR: YELLOW
CREAT BLD-MCNC: 1.19 MG/DL (ref 0.43–1.29)
DEPRECATED RDW RBC AUTO: 47.5 FL (ref 37–54)
EOSINOPHIL # BLD AUTO: 0.14 10*3/MM3 (ref 0–0.7)
EOSINOPHIL NFR BLD AUTO: 2.8 % (ref 0–7)
ERYTHROCYTE [DISTWIDTH] IN BLOOD BY AUTOMATED COUNT: 15.6 % (ref 11.5–14.5)
GFR SERPL CREATININE-BSD FRML MDRD: 43 ML/MIN/1.73
GLOBULIN UR ELPH-MCNC: 2.6 GM/DL
GLUCOSE BLD-MCNC: 128 MG/DL (ref 70–110)
GLUCOSE UR STRIP-MCNC: NEGATIVE MG/DL
HCT VFR BLD AUTO: 32.3 % (ref 37–47)
HGB BLD-MCNC: 10.5 G/DL (ref 12–16)
HGB UR QL STRIP.AUTO: NEGATIVE
HYALINE CASTS UR QL AUTO: ABNORMAL /LPF
IMM GRANULOCYTES # BLD: 0.01 10*3/MM3 (ref 0–0.03)
IMM GRANULOCYTES NFR BLD: 0.2 % (ref 0–0.5)
INR PPP: 0.99 (ref 0.9–1.1)
KETONES UR QL STRIP: NEGATIVE
LEUKOCYTE ESTERASE UR QL STRIP.AUTO: ABNORMAL
LYMPHOCYTES # BLD AUTO: 1.5 10*3/MM3 (ref 1–3)
LYMPHOCYTES NFR BLD AUTO: 30.5 % (ref 16–46)
MCH RBC QN AUTO: 27.8 PG (ref 27–33)
MCHC RBC AUTO-ENTMCNC: 32.5 G/DL (ref 33–37)
MCV RBC AUTO: 85.4 FL (ref 80–94)
MONOCYTES # BLD AUTO: 0.67 10*3/MM3 (ref 0.1–0.9)
MONOCYTES NFR BLD AUTO: 13.6 % (ref 0–12)
NEUTROPHILS # BLD AUTO: 2.59 10*3/MM3 (ref 1.4–6.5)
NEUTROPHILS NFR BLD AUTO: 52.7 % (ref 40–75)
NITRITE UR QL STRIP: NEGATIVE
OSMOLALITY SERPL CALC.SUM OF ELEC: 278.4 MOSM/KG (ref 273–305)
PH UR STRIP.AUTO: 7.5 [PH] (ref 5–8)
PLATELET # BLD AUTO: 221 10*3/MM3 (ref 130–400)
PMV BLD AUTO: 10.5 FL (ref 6–10)
POTASSIUM BLD-SCNC: 4.9 MMOL/L (ref 3.5–5.3)
PROT SERPL-MCNC: 6.8 G/DL (ref 6–8)
PROT UR QL STRIP: NEGATIVE
PROTHROMBIN TIME: 13.2 SECONDS (ref 11–15.4)
RBC # BLD AUTO: 3.78 10*6/MM3 (ref 4.2–5.4)
RBC # UR: ABNORMAL /HPF
REF LAB TEST METHOD: ABNORMAL
SODIUM BLD-SCNC: 137 MMOL/L (ref 135–153)
SP GR UR STRIP: 1.01 (ref 1–1.03)
SQUAMOUS #/AREA URNS HPF: ABNORMAL /HPF
TROPONIN I SERPL-MCNC: 0.05 NG/ML
UROBILINOGEN UR QL STRIP: ABNORMAL
WBC NRBC COR # BLD: 4.92 10*3/MM3 (ref 4.5–12.5)
WBC UR QL AUTO: ABNORMAL /HPF

## 2017-08-28 PROCEDURE — 84484 ASSAY OF TROPONIN QUANT: CPT | Performed by: EMERGENCY MEDICINE

## 2017-08-28 PROCEDURE — 93005 ELECTROCARDIOGRAM TRACING: CPT | Performed by: EMERGENCY MEDICINE

## 2017-08-28 PROCEDURE — 82550 ASSAY OF CK (CPK): CPT | Performed by: EMERGENCY MEDICINE

## 2017-08-28 PROCEDURE — 85730 THROMBOPLASTIN TIME PARTIAL: CPT | Performed by: EMERGENCY MEDICINE

## 2017-08-28 PROCEDURE — 93010 ELECTROCARDIOGRAM REPORT: CPT | Performed by: INTERNAL MEDICINE

## 2017-08-28 PROCEDURE — 85025 COMPLETE CBC W/AUTO DIFF WBC: CPT | Performed by: EMERGENCY MEDICINE

## 2017-08-28 PROCEDURE — 87086 URINE CULTURE/COLONY COUNT: CPT | Performed by: EMERGENCY MEDICINE

## 2017-08-28 PROCEDURE — 85610 PROTHROMBIN TIME: CPT | Performed by: EMERGENCY MEDICINE

## 2017-08-28 PROCEDURE — 80053 COMPREHEN METABOLIC PANEL: CPT | Performed by: EMERGENCY MEDICINE

## 2017-08-28 PROCEDURE — 82553 CREATINE MB FRACTION: CPT | Performed by: EMERGENCY MEDICINE

## 2017-08-28 PROCEDURE — 81001 URINALYSIS AUTO W/SCOPE: CPT | Performed by: EMERGENCY MEDICINE

## 2017-08-28 PROCEDURE — 99284 EMERGENCY DEPT VISIT MOD MDM: CPT

## 2017-08-28 RX ORDER — CARVEDILOL 3.12 MG/1
3.12 TABLET ORAL 2 TIMES DAILY WITH MEALS
COMMUNITY
End: 2017-11-03 | Stop reason: SDUPTHER

## 2017-08-28 RX ORDER — LISINOPRIL 2.5 MG/1
20 TABLET ORAL DAILY
COMMUNITY
End: 2017-11-30

## 2017-08-28 RX ORDER — ISOSORBIDE MONONITRATE 30 MG/1
30 TABLET, EXTENDED RELEASE ORAL DAILY
COMMUNITY
End: 2017-09-18

## 2017-08-28 RX ORDER — CLONIDINE HYDROCHLORIDE 0.1 MG/1
0.2 TABLET ORAL ONCE
Status: COMPLETED | OUTPATIENT
Start: 2017-08-28 | End: 2017-08-28

## 2017-08-28 RX ORDER — SODIUM CHLORIDE 0.9 % (FLUSH) 0.9 %
10 SYRINGE (ML) INJECTION AS NEEDED
Status: DISCONTINUED | OUTPATIENT
Start: 2017-08-28 | End: 2017-08-29 | Stop reason: HOSPADM

## 2017-08-28 RX ADMIN — CLONIDINE HYDROCHLORIDE 0.2 MG: 0.1 TABLET ORAL at 22:33

## 2017-08-29 VITALS
DIASTOLIC BLOOD PRESSURE: 88 MMHG | RESPIRATION RATE: 16 BRPM | WEIGHT: 157 LBS | HEART RATE: 60 BPM | SYSTOLIC BLOOD PRESSURE: 167 MMHG | TEMPERATURE: 97.9 F | OXYGEN SATURATION: 98 % | HEIGHT: 63 IN | BODY MASS INDEX: 27.82 KG/M2

## 2017-08-29 LAB
CK MB SERPL-CCNC: 2.47 NG/ML (ref 0–5)
CK MB SERPL-RTO: 4 % (ref 0–3)
CK SERPL-CCNC: 61 U/L (ref 24–173)
TROPONIN I SERPL-MCNC: 0.07 NG/ML

## 2017-08-29 PROCEDURE — 82550 ASSAY OF CK (CPK): CPT | Performed by: EMERGENCY MEDICINE

## 2017-08-29 PROCEDURE — 36415 COLL VENOUS BLD VENIPUNCTURE: CPT

## 2017-08-29 PROCEDURE — 84484 ASSAY OF TROPONIN QUANT: CPT | Performed by: EMERGENCY MEDICINE

## 2017-08-29 PROCEDURE — 82553 CREATINE MB FRACTION: CPT | Performed by: EMERGENCY MEDICINE

## 2017-08-31 LAB — BACTERIA SPEC AEROBE CULT: NORMAL

## 2017-09-14 ENCOUNTER — OFFICE VISIT (OUTPATIENT)
Dept: PULMONOLOGY | Facility: CLINIC | Age: 82
End: 2017-09-14

## 2017-09-14 VITALS
BODY MASS INDEX: 27.82 KG/M2 | SYSTOLIC BLOOD PRESSURE: 130 MMHG | HEIGHT: 63 IN | OXYGEN SATURATION: 97 % | TEMPERATURE: 97.8 F | WEIGHT: 157 LBS | HEART RATE: 63 BPM | DIASTOLIC BLOOD PRESSURE: 56 MMHG

## 2017-09-14 DIAGNOSIS — J44.9 CHRONIC OBSTRUCTIVE PULMONARY DISEASE, UNSPECIFIED COPD TYPE (HCC): Primary | ICD-10-CM

## 2017-09-14 DIAGNOSIS — I50.22 CHRONIC SYSTOLIC CONGESTIVE HEART FAILURE (HCC): ICD-10-CM

## 2017-09-14 PROCEDURE — 99214 OFFICE O/P EST MOD 30 MIN: CPT | Performed by: INTERNAL MEDICINE

## 2017-09-14 RX ORDER — LEVALBUTEROL TARTRATE 45 UG/1
2 AEROSOL, METERED ORAL 4 TIMES DAILY PRN
Qty: 15 G | Refills: 11 | Status: SHIPPED | OUTPATIENT
Start: 2017-09-14 | End: 2017-09-18

## 2017-09-18 ENCOUNTER — OFFICE VISIT (OUTPATIENT)
Dept: CARDIOLOGY | Facility: CLINIC | Age: 82
End: 2017-09-18

## 2017-09-18 VITALS
HEIGHT: 63 IN | SYSTOLIC BLOOD PRESSURE: 196 MMHG | TEMPERATURE: 97.6 F | BODY MASS INDEX: 27.21 KG/M2 | OXYGEN SATURATION: 98 % | DIASTOLIC BLOOD PRESSURE: 78 MMHG | HEART RATE: 67 BPM | WEIGHT: 153.6 LBS

## 2017-09-18 DIAGNOSIS — I77.1 SUBCLAVIAN ARTERIAL STENOSIS (HCC): ICD-10-CM

## 2017-09-18 DIAGNOSIS — I10 ESSENTIAL HYPERTENSION: ICD-10-CM

## 2017-09-18 DIAGNOSIS — I25.10 CORONARY ARTERY DISEASE INVOLVING NATIVE CORONARY ARTERY, ANGINA PRESENCE UNSPECIFIED, UNSPECIFIED WHETHER NATIVE OR TRANSPLANTED HEART: ICD-10-CM

## 2017-09-18 DIAGNOSIS — J02.9 SORE THROAT: Primary | ICD-10-CM

## 2017-09-18 DIAGNOSIS — I42.2: ICD-10-CM

## 2017-09-18 DIAGNOSIS — E78.2 MIXED HYPERLIPIDEMIA: ICD-10-CM

## 2017-09-18 DIAGNOSIS — I48.0 PAF (PAROXYSMAL ATRIAL FIBRILLATION) (HCC): ICD-10-CM

## 2017-09-18 DIAGNOSIS — G25.81 RLS (RESTLESS LEGS SYNDROME): ICD-10-CM

## 2017-09-18 DIAGNOSIS — I50.32: ICD-10-CM

## 2017-09-18 PROCEDURE — 99214 OFFICE O/P EST MOD 30 MIN: CPT | Performed by: INTERNAL MEDICINE

## 2017-09-18 RX ORDER — PRASUGREL 10 MG/1
10 TABLET, FILM COATED ORAL DAILY
COMMUNITY
End: 2017-11-30 | Stop reason: ALTCHOICE

## 2017-09-18 RX ORDER — LEVOFLOXACIN 250 MG/1
250 TABLET ORAL DAILY
Qty: 7 TABLET | Refills: 0 | Status: SHIPPED | OUTPATIENT
Start: 2017-09-18 | End: 2017-09-25

## 2017-09-18 RX ORDER — GABAPENTIN 300 MG/1
300 CAPSULE ORAL NIGHTLY
Qty: 90 CAPSULE | Refills: 0 | OUTPATIENT
Start: 2017-09-18 | End: 2017-12-18 | Stop reason: SDUPTHER
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
## 2017-09-24 PROBLEM — J02.9 SORE THROAT: Status: ACTIVE | Noted: 2017-09-24

## 2017-09-25 ENCOUNTER — OFFICE VISIT (OUTPATIENT)
Dept: CARDIOLOGY | Facility: CLINIC | Age: 82
End: 2017-09-25

## 2017-09-25 VITALS
HEIGHT: 63 IN | WEIGHT: 149.6 LBS | OXYGEN SATURATION: 98 % | DIASTOLIC BLOOD PRESSURE: 62 MMHG | SYSTOLIC BLOOD PRESSURE: 134 MMHG | BODY MASS INDEX: 26.51 KG/M2 | HEART RATE: 66 BPM

## 2017-09-25 DIAGNOSIS — I10 ESSENTIAL HYPERTENSION: ICD-10-CM

## 2017-09-25 DIAGNOSIS — I25.10 CORONARY ARTERY DISEASE INVOLVING NATIVE CORONARY ARTERY, ANGINA PRESENCE UNSPECIFIED, UNSPECIFIED WHETHER NATIVE OR TRANSPLANTED HEART: Primary | ICD-10-CM

## 2017-09-25 DIAGNOSIS — I50.42 CHRONIC COMBINED SYSTOLIC AND DIASTOLIC CONGESTIVE HEART FAILURE (HCC): ICD-10-CM

## 2017-09-25 DIAGNOSIS — R53.82 CHRONIC FATIGUE: ICD-10-CM

## 2017-09-25 DIAGNOSIS — E21.3 HYPERPARATHYROIDISM (HCC): ICD-10-CM

## 2017-09-25 DIAGNOSIS — E78.2 MIXED HYPERLIPIDEMIA: ICD-10-CM

## 2017-09-25 DIAGNOSIS — D50.9 IRON DEFICIENCY ANEMIA, UNSPECIFIED IRON DEFICIENCY ANEMIA TYPE: ICD-10-CM

## 2017-09-25 PROCEDURE — 99213 OFFICE O/P EST LOW 20 MIN: CPT | Performed by: INTERNAL MEDICINE

## 2017-09-25 RX ORDER — ISOSORBIDE MONONITRATE 30 MG/1
30 TABLET, EXTENDED RELEASE ORAL DAILY
Status: ON HOLD | COMMUNITY
End: 2020-01-01
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
## 2017-10-13 ENCOUNTER — OFFICE VISIT (OUTPATIENT)
Dept: PULMONOLOGY | Facility: CLINIC | Age: 82
End: 2017-10-13

## 2017-10-13 VITALS
SYSTOLIC BLOOD PRESSURE: 157 MMHG | OXYGEN SATURATION: 100 % | DIASTOLIC BLOOD PRESSURE: 65 MMHG | WEIGHT: 148 LBS | BODY MASS INDEX: 26.22 KG/M2 | HEART RATE: 63 BPM | HEIGHT: 63 IN | TEMPERATURE: 97.9 F

## 2017-10-13 DIAGNOSIS — J44.9 CHRONIC OBSTRUCTIVE PULMONARY DISEASE, UNSPECIFIED COPD TYPE (HCC): Primary | ICD-10-CM

## 2017-10-13 DIAGNOSIS — I50.22 CHRONIC SYSTOLIC CONGESTIVE HEART FAILURE (HCC): ICD-10-CM

## 2017-10-13 PROCEDURE — 99214 OFFICE O/P EST MOD 30 MIN: CPT | Performed by: INTERNAL MEDICINE
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
## 2017-10-31 ENCOUNTER — TRANSCRIBE ORDERS (OUTPATIENT)
Dept: ADMINISTRATIVE | Facility: HOSPITAL | Age: 82
End: 2017-10-31

## 2017-10-31 DIAGNOSIS — Z12.31 VISIT FOR SCREENING MAMMOGRAM: Primary | ICD-10-CM

## 2017-11-03 RX ORDER — CARVEDILOL 3.12 MG/1
3.12 TABLET ORAL 2 TIMES DAILY WITH MEALS
Qty: 60 TABLET | Refills: 0 | Status: ON HOLD | OUTPATIENT
Start: 2017-11-03 | End: 2020-01-01

## 2017-11-23 ENCOUNTER — APPOINTMENT (OUTPATIENT)
Dept: GENERAL RADIOLOGY | Facility: HOSPITAL | Age: 82
End: 2017-11-23

## 2017-11-23 ENCOUNTER — HOSPITAL ENCOUNTER (EMERGENCY)
Facility: HOSPITAL | Age: 82
Discharge: ANOTHER HOSPITAL | End: 2017-11-23
Attending: EMERGENCY MEDICINE | Admitting: EMERGENCY MEDICINE

## 2017-11-23 ENCOUNTER — APPOINTMENT (OUTPATIENT)
Dept: CT IMAGING | Facility: HOSPITAL | Age: 82
End: 2017-11-23

## 2017-11-23 VITALS
OXYGEN SATURATION: 93 % | RESPIRATION RATE: 18 BRPM | BODY MASS INDEX: 26.05 KG/M2 | WEIGHT: 147 LBS | SYSTOLIC BLOOD PRESSURE: 141 MMHG | DIASTOLIC BLOOD PRESSURE: 64 MMHG | TEMPERATURE: 97.6 F | HEART RATE: 64 BPM | HEIGHT: 63 IN

## 2017-11-23 DIAGNOSIS — I21.3 ST ELEVATION MYOCARDIAL INFARCTION (STEMI), UNSPECIFIED ARTERY (HCC): Primary | ICD-10-CM

## 2017-11-23 DIAGNOSIS — R79.89 ELEVATED D-DIMER: ICD-10-CM

## 2017-11-23 LAB
ALBUMIN SERPL-MCNC: 4.1 G/DL (ref 3.4–4.8)
ALBUMIN/GLOB SERPL: 1.7 G/DL (ref 1.5–2.5)
ALP SERPL-CCNC: 71 U/L (ref 35–104)
ALT SERPL W P-5'-P-CCNC: 13 U/L (ref 10–36)
ANION GAP SERPL CALCULATED.3IONS-SCNC: 9.4 MMOL/L (ref 3.6–11.2)
APTT PPP: 30.3 SECONDS (ref 23.8–36.1)
AST SERPL-CCNC: 24 U/L (ref 10–30)
BACTERIA UR QL AUTO: NORMAL /HPF
BASOPHILS # BLD AUTO: 0.02 10*3/MM3 (ref 0–0.3)
BASOPHILS NFR BLD AUTO: 0.3 % (ref 0–2)
BILIRUB SERPL-MCNC: 0.4 MG/DL (ref 0.2–1.8)
BILIRUB UR QL STRIP: NEGATIVE
BUN BLD-MCNC: 25 MG/DL (ref 7–21)
BUN/CREAT SERPL: 20.3 (ref 7–25)
CALCIUM SPEC-SCNC: 8.5 MG/DL (ref 7.7–10)
CHLORIDE SERPL-SCNC: 106 MMOL/L (ref 99–112)
CK MB SERPL-CCNC: 1.26 NG/ML (ref 0–5)
CK MB SERPL-RTO: 2.4 % (ref 0–3)
CK SERPL-CCNC: 53 U/L (ref 24–173)
CLARITY UR: CLEAR
CO2 SERPL-SCNC: 22.6 MMOL/L (ref 24.3–31.9)
COLOR UR: YELLOW
CREAT BLD-MCNC: 1.23 MG/DL (ref 0.43–1.29)
D DIMER PPP FEU-MCNC: 1.89 MCGFEU/ML (ref 0–0.5)
DEPRECATED RDW RBC AUTO: 46.6 FL (ref 37–54)
EOSINOPHIL # BLD AUTO: 0.34 10*3/MM3 (ref 0–0.7)
EOSINOPHIL NFR BLD AUTO: 4.8 % (ref 0–7)
ERYTHROCYTE [DISTWIDTH] IN BLOOD BY AUTOMATED COUNT: 15.3 % (ref 11.5–14.5)
GFR SERPL CREATININE-BSD FRML MDRD: 41 ML/MIN/1.73
GLOBULIN UR ELPH-MCNC: 2.4 GM/DL
GLUCOSE BLD-MCNC: 97 MG/DL (ref 70–110)
GLUCOSE UR STRIP-MCNC: NEGATIVE MG/DL
HCT VFR BLD AUTO: 35.2 % (ref 37–47)
HGB BLD-MCNC: 11.4 G/DL (ref 12–16)
HGB UR QL STRIP.AUTO: NEGATIVE
HYALINE CASTS UR QL AUTO: NORMAL /LPF
IMM GRANULOCYTES # BLD: 0.02 10*3/MM3 (ref 0–0.03)
IMM GRANULOCYTES NFR BLD: 0.3 % (ref 0–0.5)
INR PPP: 1.04 (ref 0.9–1.1)
KETONES UR QL STRIP: NEGATIVE
LEUKOCYTE ESTERASE UR QL STRIP.AUTO: ABNORMAL
LIPASE SERPL-CCNC: 51 U/L (ref 13–60)
LYMPHOCYTES # BLD AUTO: 1.6 10*3/MM3 (ref 1–3)
LYMPHOCYTES NFR BLD AUTO: 22.4 % (ref 16–46)
MCH RBC QN AUTO: 27.2 PG (ref 27–33)
MCHC RBC AUTO-ENTMCNC: 32.4 G/DL (ref 33–37)
MCV RBC AUTO: 84 FL (ref 80–94)
MONOCYTES # BLD AUTO: 0.99 10*3/MM3 (ref 0.1–0.9)
MONOCYTES NFR BLD AUTO: 13.9 % (ref 0–12)
NEUTROPHILS # BLD AUTO: 4.17 10*3/MM3 (ref 1.4–6.5)
NEUTROPHILS NFR BLD AUTO: 58.3 % (ref 40–75)
NITRITE UR QL STRIP: NEGATIVE
OSMOLALITY SERPL CALC.SUM OF ELEC: 280 MOSM/KG (ref 273–305)
PH UR STRIP.AUTO: 7 [PH] (ref 5–8)
PLATELET # BLD AUTO: 227 10*3/MM3 (ref 130–400)
PMV BLD AUTO: 10.3 FL (ref 6–10)
POTASSIUM BLD-SCNC: 4.5 MMOL/L (ref 3.5–5.3)
PROT SERPL-MCNC: 6.5 G/DL (ref 6–8)
PROT UR QL STRIP: NEGATIVE
PROTHROMBIN TIME: 13.7 SECONDS (ref 11–15.4)
RBC # BLD AUTO: 4.19 10*6/MM3 (ref 4.2–5.4)
RBC # UR: NORMAL /HPF
REF LAB TEST METHOD: NORMAL
SODIUM BLD-SCNC: 138 MMOL/L (ref 135–153)
SP GR UR STRIP: 1.01 (ref 1–1.03)
SQUAMOUS #/AREA URNS HPF: NORMAL /HPF
TROPONIN I SERPL-MCNC: 0.06 NG/ML
UROBILINOGEN UR QL STRIP: ABNORMAL
WBC NRBC COR # BLD: 7.14 10*3/MM3 (ref 4.5–12.5)
WBC UR QL AUTO: NORMAL /HPF

## 2017-11-23 PROCEDURE — 99284 EMERGENCY DEPT VISIT MOD MDM: CPT

## 2017-11-23 PROCEDURE — 0 IOPAMIDOL PER 1 ML: Performed by: EMERGENCY MEDICINE

## 2017-11-23 PROCEDURE — 82553 CREATINE MB FRACTION: CPT | Performed by: PHYSICIAN ASSISTANT

## 2017-11-23 PROCEDURE — 93010 ELECTROCARDIOGRAM REPORT: CPT | Performed by: INTERNAL MEDICINE

## 2017-11-23 PROCEDURE — 25010000002 MORPHINE PER 10 MG: Performed by: EMERGENCY MEDICINE

## 2017-11-23 PROCEDURE — 71010 HC CHEST PA OR AP: CPT

## 2017-11-23 PROCEDURE — 84484 ASSAY OF TROPONIN QUANT: CPT | Performed by: PHYSICIAN ASSISTANT

## 2017-11-23 PROCEDURE — 85025 COMPLETE CBC W/AUTO DIFF WBC: CPT | Performed by: PHYSICIAN ASSISTANT

## 2017-11-23 PROCEDURE — 82550 ASSAY OF CK (CPK): CPT | Performed by: PHYSICIAN ASSISTANT

## 2017-11-23 PROCEDURE — 25010000002 ONDANSETRON PER 1 MG

## 2017-11-23 PROCEDURE — 81001 URINALYSIS AUTO W/SCOPE: CPT | Performed by: PHYSICIAN ASSISTANT

## 2017-11-23 PROCEDURE — 83690 ASSAY OF LIPASE: CPT | Performed by: PHYSICIAN ASSISTANT

## 2017-11-23 PROCEDURE — 85610 PROTHROMBIN TIME: CPT | Performed by: PHYSICIAN ASSISTANT

## 2017-11-23 PROCEDURE — 96375 TX/PRO/DX INJ NEW DRUG ADDON: CPT

## 2017-11-23 PROCEDURE — 96374 THER/PROPH/DIAG INJ IV PUSH: CPT

## 2017-11-23 PROCEDURE — 85730 THROMBOPLASTIN TIME PARTIAL: CPT | Performed by: PHYSICIAN ASSISTANT

## 2017-11-23 PROCEDURE — 71275 CT ANGIOGRAPHY CHEST: CPT | Performed by: RADIOLOGY

## 2017-11-23 PROCEDURE — 71010 XR CHEST 1 VW: CPT | Performed by: RADIOLOGY

## 2017-11-23 PROCEDURE — 25010000002 HEPARIN (PORCINE) PER 1000 UNITS: Performed by: PHYSICIAN ASSISTANT

## 2017-11-23 PROCEDURE — 93005 ELECTROCARDIOGRAM TRACING: CPT | Performed by: PHYSICIAN ASSISTANT

## 2017-11-23 PROCEDURE — 85379 FIBRIN DEGRADATION QUANT: CPT | Performed by: PHYSICIAN ASSISTANT

## 2017-11-23 PROCEDURE — 80053 COMPREHEN METABOLIC PANEL: CPT | Performed by: PHYSICIAN ASSISTANT

## 2017-11-23 PROCEDURE — 71275 CT ANGIOGRAPHY CHEST: CPT

## 2017-11-23 RX ORDER — MORPHINE SULFATE 2 MG/ML
2 INJECTION, SOLUTION INTRAMUSCULAR; INTRAVENOUS ONCE
Status: COMPLETED | OUTPATIENT
Start: 2017-11-23 | End: 2017-11-23

## 2017-11-23 RX ORDER — SODIUM CHLORIDE 0.9 % (FLUSH) 0.9 %
10 SYRINGE (ML) INJECTION AS NEEDED
Status: DISCONTINUED | OUTPATIENT
Start: 2017-11-23 | End: 2017-11-23 | Stop reason: HOSPADM

## 2017-11-23 RX ORDER — CLOPIDOGREL BISULFATE 75 MG/1
600 TABLET ORAL ONCE
Status: COMPLETED | OUTPATIENT
Start: 2017-11-23 | End: 2017-11-23

## 2017-11-23 RX ORDER — HEPARIN SODIUM 5000 [USP'U]/ML
60 INJECTION, SOLUTION INTRAVENOUS; SUBCUTANEOUS ONCE
Status: COMPLETED | OUTPATIENT
Start: 2017-11-23 | End: 2017-11-23

## 2017-11-23 RX ORDER — ONDANSETRON 2 MG/ML
INJECTION INTRAMUSCULAR; INTRAVENOUS
Status: COMPLETED
Start: 2017-11-23 | End: 2017-11-23

## 2017-11-23 RX ADMIN — HEPARIN SODIUM 4000 UNITS: 5000 INJECTION, SOLUTION INTRAVENOUS; SUBCUTANEOUS at 05:22

## 2017-11-23 RX ADMIN — ONDANSETRON 4 MG: 2 INJECTION, SOLUTION INTRAMUSCULAR; INTRAVENOUS at 05:08

## 2017-11-23 RX ADMIN — MORPHINE SULFATE 2 MG: 2 INJECTION, SOLUTION INTRAMUSCULAR; INTRAVENOUS at 05:09

## 2017-11-23 RX ADMIN — CLOPIDOGREL 600 MG: 75 TABLET, FILM COATED ORAL at 05:22

## 2017-11-23 RX ADMIN — IOPAMIDOL 90 ML: 755 INJECTION, SOLUTION INTRAVENOUS at 05:14
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
## 2017-11-23 NOTE — ED NOTES
Report called to Lila at [facility] cath lab @ 05:55     Cipriano Reich RN  11/23/17 0616

## 2017-11-23 NOTE — ED NOTES
"[facility] ALS truck accepted run per [place], no ETA but \"should not be long I am toning it out now\"     Gianna Bullock  11/23/17 0518    "

## 2017-11-30 ENCOUNTER — TELEPHONE (OUTPATIENT)
Dept: CARDIOLOGY | Facility: CLINIC | Age: 82
End: 2017-11-30

## 2017-11-30 ENCOUNTER — OFFICE VISIT (OUTPATIENT)
Dept: CARDIOLOGY | Facility: CLINIC | Age: 82
End: 2017-11-30

## 2017-11-30 VITALS
SYSTOLIC BLOOD PRESSURE: 134 MMHG | HEART RATE: 64 BPM | BODY MASS INDEX: 26.12 KG/M2 | WEIGHT: 147.4 LBS | HEIGHT: 63 IN | DIASTOLIC BLOOD PRESSURE: 66 MMHG | OXYGEN SATURATION: 98 %

## 2017-11-30 DIAGNOSIS — I48.0 PAF (PAROXYSMAL ATRIAL FIBRILLATION) (HCC): ICD-10-CM

## 2017-11-30 DIAGNOSIS — I25.10 CORONARY ARTERY DISEASE INVOLVING NATIVE CORONARY ARTERY, ANGINA PRESENCE UNSPECIFIED, UNSPECIFIED WHETHER NATIVE OR TRANSPLANTED HEART: Primary | ICD-10-CM

## 2017-11-30 DIAGNOSIS — I10 ESSENTIAL HYPERTENSION: ICD-10-CM

## 2017-11-30 DIAGNOSIS — E78.2 MIXED HYPERLIPIDEMIA: ICD-10-CM

## 2017-11-30 DIAGNOSIS — I50.32 CHRONIC DIASTOLIC HEART FAILURE SECONDARY TO HYPERTROPHIC CARDIOMYOPATHY (HCC): ICD-10-CM

## 2017-11-30 DIAGNOSIS — I42.2 CHRONIC DIASTOLIC HEART FAILURE SECONDARY TO HYPERTROPHIC CARDIOMYOPATHY (HCC): ICD-10-CM

## 2017-11-30 DIAGNOSIS — I77.1 SUBCLAVIAN ARTERIAL STENOSIS (HCC): ICD-10-CM

## 2017-11-30 DIAGNOSIS — Z95.0 PACEMAKER: ICD-10-CM

## 2017-11-30 DIAGNOSIS — E21.3 HYPERPARATHYROIDISM (HCC): Primary | ICD-10-CM

## 2017-11-30 PROCEDURE — 99214 OFFICE O/P EST MOD 30 MIN: CPT | Performed by: INTERNAL MEDICINE

## 2017-11-30 RX ORDER — BENZONATATE 100 MG/1
CAPSULE ORAL
COMMUNITY
Start: 2017-11-27 | End: 2019-02-06

## 2017-11-30 RX ORDER — PANTOPRAZOLE SODIUM 40 MG/1
TABLET, DELAYED RELEASE ORAL
Status: ON HOLD | COMMUNITY
Start: 2017-11-27 | End: 2020-01-01

## 2017-11-30 RX ORDER — NITROGLYCERIN 0.4 MG/1
0.4 TABLET SUBLINGUAL
Qty: 30 TABLET | Refills: 0 | Status: SHIPPED | OUTPATIENT
Start: 2017-11-30

## 2017-11-30 RX ORDER — CLOPIDOGREL BISULFATE 75 MG/1
TABLET ORAL
COMMUNITY
Start: 2017-11-27 | End: 2018-04-09 | Stop reason: SDUPTHER

## 2017-11-30 RX ORDER — AMLODIPINE BESYLATE 5 MG/1
5 TABLET ORAL DAILY
Qty: 90 TABLET | Refills: 3 | Status: ON HOLD | OUTPATIENT
Start: 2017-11-30 | End: 2020-01-01

## 2017-11-30 RX ORDER — LISINOPRIL 20 MG/1
20 TABLET ORAL DAILY
Qty: 90 TABLET | Refills: 3 | Status: ON HOLD | OUTPATIENT
Start: 2017-11-30 | End: 2020-01-01 | Stop reason: DRUGHIGH

## 2017-11-30 NOTE — TELEPHONE ENCOUNTER
----- Message from Emily Cleary MD sent at 11/21/2017  4:18 PM EST -----  Regarding: RE: Endocrinology  Contact: [phone]  Okay to make appointment  ----- Message -----     From: Catherine Morales MA     Sent: 11/21/2017   2:43 PM       To: Emily Cleary MD  Subject: Endocrinology                                    Patients daughter called and wants us to refer her to Endocrinology. She said that she has parathyroidism and she has been having trouble with her calcium levels. She wants her to go to  ~ Dr. Pedro Alonso this is his specialty! What do you want me to do?
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
## 2017-12-07 ENCOUNTER — HOSPITAL ENCOUNTER (OUTPATIENT)
Dept: MAMMOGRAPHY | Facility: HOSPITAL | Age: 82
Discharge: HOME | End: 2017-12-07
Attending: INTERNAL MEDICINE | Admitting: INTERNAL MEDICINE

## 2017-12-07 ENCOUNTER — TELEPHONE (OUTPATIENT)
Dept: CARDIOLOGY | Facility: CLINIC | Age: 82
End: 2017-12-07

## 2017-12-07 ENCOUNTER — LAB (OUTPATIENT)
Dept: LAB | Facility: HOSPITAL | Age: 82
End: 2017-12-07
Attending: INTERNAL MEDICINE

## 2017-12-07 DIAGNOSIS — E78.2 MIXED HYPERLIPIDEMIA: ICD-10-CM

## 2017-12-07 DIAGNOSIS — R53.82 CHRONIC FATIGUE: ICD-10-CM

## 2017-12-07 DIAGNOSIS — E21.3 HYPERPARATHYROIDISM (HCC): ICD-10-CM

## 2017-12-07 DIAGNOSIS — I50.42 CHRONIC COMBINED SYSTOLIC AND DIASTOLIC CONGESTIVE HEART FAILURE (HCC): ICD-10-CM

## 2017-12-07 DIAGNOSIS — D50.9 IRON DEFICIENCY ANEMIA, UNSPECIFIED IRON DEFICIENCY ANEMIA TYPE: ICD-10-CM

## 2017-12-07 DIAGNOSIS — Z12.31 VISIT FOR SCREENING MAMMOGRAM: ICD-10-CM

## 2017-12-07 LAB
25(OH)D3 SERPL-MCNC: 41 NG/ML
ALBUMIN SERPL-MCNC: 4.4 G/DL (ref 3.4–4.8)
ALBUMIN/GLOB SERPL: 1.6 G/DL (ref 1.5–2.5)
ALP SERPL-CCNC: 70 U/L (ref 35–104)
ALT SERPL W P-5'-P-CCNC: 13 U/L (ref 10–36)
ANION GAP SERPL CALCULATED.3IONS-SCNC: 8.2 MMOL/L (ref 3.6–11.2)
AST SERPL-CCNC: 21 U/L (ref 10–30)
BASOPHILS # BLD AUTO: 0.02 10*3/MM3 (ref 0–0.3)
BASOPHILS NFR BLD AUTO: 0.2 % (ref 0–2)
BILIRUB SERPL-MCNC: 0.5 MG/DL (ref 0.2–1.8)
BNP SERPL-MCNC: 485 PG/ML (ref 0–100)
BUN BLD-MCNC: 19 MG/DL (ref 7–21)
BUN/CREAT SERPL: 17 (ref 7–25)
CALCIUM SPEC-SCNC: 9.4 MG/DL (ref 7.7–10)
CHLORIDE SERPL-SCNC: 102 MMOL/L (ref 99–112)
CHOLEST SERPL-MCNC: 154 MG/DL (ref 0–200)
CK SERPL-CCNC: 57 U/L (ref 24–173)
CO2 SERPL-SCNC: 24.8 MMOL/L (ref 24.3–31.9)
CREAT BLD-MCNC: 1.12 MG/DL (ref 0.43–1.29)
DEPRECATED RDW RBC AUTO: 49.1 FL (ref 37–54)
EOSINOPHIL # BLD AUTO: 0.18 10*3/MM3 (ref 0–0.7)
EOSINOPHIL NFR BLD AUTO: 2.2 % (ref 0–7)
ERYTHROCYTE [DISTWIDTH] IN BLOOD BY AUTOMATED COUNT: 16 % (ref 11.5–14.5)
GFR SERPL CREATININE-BSD FRML MDRD: 45 ML/MIN/1.73
GLOBULIN UR ELPH-MCNC: 2.7 GM/DL
GLUCOSE BLD-MCNC: 95 MG/DL (ref 70–110)
HCT VFR BLD AUTO: 34.3 % (ref 37–47)
HDLC SERPL-MCNC: 51 MG/DL (ref 60–100)
HGB BLD-MCNC: 11 G/DL (ref 12–16)
IMM GRANULOCYTES # BLD: 0.02 10*3/MM3 (ref 0–0.03)
IMM GRANULOCYTES NFR BLD: 0.2 % (ref 0–0.5)
IRON 24H UR-MRATE: 48 MCG/DL (ref 49–151)
LDLC SERPL CALC-MCNC: 90 MG/DL (ref 0–100)
LDLC/HDLC SERPL: 1.76 {RATIO}
LYMPHOCYTES # BLD AUTO: 1.07 10*3/MM3 (ref 1–3)
LYMPHOCYTES NFR BLD AUTO: 13.2 % (ref 16–46)
MCH RBC QN AUTO: 27.3 PG (ref 27–33)
MCHC RBC AUTO-ENTMCNC: 32.1 G/DL (ref 33–37)
MCV RBC AUTO: 85.1 FL (ref 80–94)
MONOCYTES # BLD AUTO: 0.71 10*3/MM3 (ref 0.1–0.9)
MONOCYTES NFR BLD AUTO: 8.8 % (ref 0–12)
NEUTROPHILS # BLD AUTO: 6.09 10*3/MM3 (ref 1.4–6.5)
NEUTROPHILS NFR BLD AUTO: 75.4 % (ref 40–75)
OSMOLALITY SERPL CALC.SUM OF ELEC: 272.2 MOSM/KG (ref 273–305)
PLATELET # BLD AUTO: 280 10*3/MM3 (ref 130–400)
PMV BLD AUTO: 10.1 FL (ref 6–10)
POTASSIUM BLD-SCNC: 4.9 MMOL/L (ref 3.5–5.3)
PROT SERPL-MCNC: 7.1 G/DL (ref 6–8)
RBC # BLD AUTO: 4.03 10*6/MM3 (ref 4.2–5.4)
SODIUM BLD-SCNC: 135 MMOL/L (ref 135–153)
T4 FREE SERPL-MCNC: 0.87 NG/DL (ref 0.89–1.76)
TRIGL SERPL-MCNC: 66 MG/DL (ref 0–150)
TSH SERPL DL<=0.05 MIU/L-ACNC: 1.32 MIU/ML (ref 0.55–4.78)
VIT B12 BLD-MCNC: 758 PG/ML (ref 211–911)
VLDLC SERPL-MCNC: 13.2 MG/DL
WBC NRBC COR # BLD: 8.09 10*3/MM3 (ref 4.5–12.5)

## 2017-12-07 PROCEDURE — 85025 COMPLETE CBC W/AUTO DIFF WBC: CPT

## 2017-12-07 PROCEDURE — 84439 ASSAY OF FREE THYROXINE: CPT

## 2017-12-07 PROCEDURE — 84443 ASSAY THYROID STIM HORMONE: CPT

## 2017-12-07 PROCEDURE — 80061 LIPID PANEL: CPT

## 2017-12-07 PROCEDURE — 77063 BREAST TOMOSYNTHESIS BI: CPT

## 2017-12-07 PROCEDURE — G0202 SCR MAMMO BI INCL CAD: HCPCS | Performed by: RADIOLOGY

## 2017-12-07 PROCEDURE — 77063 BREAST TOMOSYNTHESIS BI: CPT | Performed by: RADIOLOGY

## 2017-12-07 PROCEDURE — G0202 SCR MAMMO BI INCL CAD: HCPCS

## 2017-12-07 PROCEDURE — 82550 ASSAY OF CK (CPK): CPT

## 2017-12-07 PROCEDURE — 82306 VITAMIN D 25 HYDROXY: CPT

## 2017-12-07 PROCEDURE — 83880 ASSAY OF NATRIURETIC PEPTIDE: CPT

## 2017-12-07 PROCEDURE — 80053 COMPREHEN METABOLIC PANEL: CPT

## 2017-12-07 PROCEDURE — 82607 VITAMIN B-12: CPT

## 2017-12-07 PROCEDURE — 83540 ASSAY OF IRON: CPT

## 2017-12-07 RX ORDER — FUROSEMIDE 20 MG/1
20 TABLET ORAL
Qty: 45 TABLET | Refills: 0 | Status: SHIPPED | OUTPATIENT
Start: 2017-12-07 | End: 2018-04-09 | Stop reason: SDUPTHER

## 2017-12-07 NOTE — TELEPHONE ENCOUNTER
Called pt made aware. Called RX to [facility].    MD Sanjana Russell MA                     Blood work shows mild heart failure.  Take Lasix 20 mg every other day.  Call in prescription

## 2017-12-18 ENCOUNTER — OFFICE VISIT (OUTPATIENT)
Dept: CARDIOLOGY | Facility: CLINIC | Age: 82
End: 2017-12-18

## 2017-12-18 VITALS
OXYGEN SATURATION: 99 % | SYSTOLIC BLOOD PRESSURE: 138 MMHG | DIASTOLIC BLOOD PRESSURE: 74 MMHG | WEIGHT: 145.2 LBS | BODY MASS INDEX: 25.73 KG/M2 | HEIGHT: 63 IN | HEART RATE: 63 BPM

## 2017-12-18 DIAGNOSIS — I48.0 PAF (PAROXYSMAL ATRIAL FIBRILLATION) (HCC): ICD-10-CM

## 2017-12-18 DIAGNOSIS — E78.2 MIXED HYPERLIPIDEMIA: ICD-10-CM

## 2017-12-18 DIAGNOSIS — I10 ESSENTIAL HYPERTENSION: ICD-10-CM

## 2017-12-18 DIAGNOSIS — Z95.0 PACEMAKER: ICD-10-CM

## 2017-12-18 DIAGNOSIS — I42.2 CHRONIC DIASTOLIC HEART FAILURE SECONDARY TO HYPERTROPHIC CARDIOMYOPATHY (HCC): ICD-10-CM

## 2017-12-18 DIAGNOSIS — I25.10 CORONARY ARTERY DISEASE INVOLVING NATIVE CORONARY ARTERY, ANGINA PRESENCE UNSPECIFIED, UNSPECIFIED WHETHER NATIVE OR TRANSPLANTED HEART: Primary | ICD-10-CM

## 2017-12-18 DIAGNOSIS — G25.81 RLS (RESTLESS LEGS SYNDROME): ICD-10-CM

## 2017-12-18 DIAGNOSIS — I50.32 CHRONIC DIASTOLIC HEART FAILURE SECONDARY TO HYPERTROPHIC CARDIOMYOPATHY (HCC): ICD-10-CM

## 2017-12-18 PROCEDURE — 99213 OFFICE O/P EST LOW 20 MIN: CPT | Performed by: INTERNAL MEDICINE

## 2017-12-18 PROCEDURE — 93000 ELECTROCARDIOGRAM COMPLETE: CPT | Performed by: INTERNAL MEDICINE

## 2017-12-18 RX ORDER — GABAPENTIN 300 MG/1
300 CAPSULE ORAL NIGHTLY
Qty: 90 CAPSULE | Refills: 0 | Status: ON HOLD | OUTPATIENT
Start: 2017-12-18 | End: 2020-01-01
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
## 2017-12-28 ENCOUNTER — OFFICE VISIT (OUTPATIENT)
Dept: SURGERY | Facility: CLINIC | Age: 82
End: 2017-12-28

## 2017-12-28 VITALS — HEIGHT: 55 IN | WEIGHT: 145 LBS | BODY MASS INDEX: 33.56 KG/M2

## 2017-12-28 DIAGNOSIS — K61.1 PERIRECTAL ABSCESS: Primary | ICD-10-CM

## 2017-12-28 PROCEDURE — 46050 I&D PERIANAL ABSCESS SUPFC: CPT | Performed by: SURGERY

## 2017-12-28 PROCEDURE — 99213 OFFICE O/P EST LOW 20 MIN: CPT | Performed by: SURGERY

## 2017-12-28 RX ORDER — SULFAMETHOXAZOLE AND TRIMETHOPRIM 800; 160 MG/1; MG/1
1 TABLET ORAL 2 TIMES DAILY
Qty: 6 TABLET | Refills: 0 | Status: SHIPPED | OUTPATIENT
Start: 2017-12-28 | End: 2017-12-31

## 2017-12-28 NOTE — PROGRESS NOTES
Subjective   Cici Veliz is a 89 y.o. female.     History of Present Illness She has had a cyst/Abscess in the perirectal area drained a couple of times. She says it had swelled up after hemorrhoid surgery in the past but this time it started bothering her the last week. It has drained some. No antibiotics yet.    The following portions of the patient's history were reviewed and updated as appropriate: allergies, current medications, past family history, past medical history, past social history, past surgical history and problem list.    Review of Systems   Constitutional: Negative for activity change, appetite change, chills, fever and unexpected weight change.   HENT: Negative for congestion, facial swelling and sore throat.    Eyes: Negative for photophobia and visual disturbance.   Respiratory: Negative for chest tightness, shortness of breath and wheezing.    Cardiovascular: Negative for chest pain, palpitations and leg swelling.   Gastrointestinal: Negative for abdominal distention, abdominal pain, anal bleeding, blood in stool, constipation, diarrhea, nausea, rectal pain and vomiting.   Endocrine: Negative for cold intolerance, heat intolerance, polydipsia and polyuria.   Genitourinary: Negative for difficulty urinating, dysuria, flank pain and urgency.   Musculoskeletal: Negative for back pain and myalgias.   Skin: Positive for color change and wound. Negative for rash.   Allergic/Immunologic: Negative for immunocompromised state.   Neurological: Negative for dizziness, seizures, syncope, light-headedness, numbness and headaches.   Hematological: Negative for adenopathy. Does not bruise/bleed easily.   Psychiatric/Behavioral: Negative for behavioral problems and confusion. The patient is not nervous/anxious.        Objective   Physical Exam   Constitutional: She is oriented to person, place, and time. She appears well-developed and well-nourished. She does not appear ill. No distress.       HENT:   Head:  Normocephalic. Head is without laceration. Hair is normal.   Right Ear: Hearing and ear canal normal.   Left Ear: Hearing and ear canal normal.   Nose: Nose normal. No sinus tenderness. No epistaxis. Right sinus exhibits no maxillary sinus tenderness and no frontal sinus tenderness. Left sinus exhibits no maxillary sinus tenderness and no frontal sinus tenderness.   Eyes: Conjunctivae and lids are normal. Pupils are equal, round, and reactive to light.   Neck: Normal range of motion. No JVD present. No tracheal tenderness present. No tracheal deviation present. No thyroid mass and no thyromegaly present.   Cardiovascular: Normal rate and regular rhythm.  Exam reveals no gallop.    No murmur heard.  Pulmonary/Chest: Effort normal and breath sounds normal. No stridor. She has no wheezes. She exhibits no tenderness.   Abdominal: Soft. Bowel sounds are normal. She exhibits no distension, no ascites and no mass. There is no tenderness. There is no rebound and no guarding. No hernia.   Musculoskeletal: She exhibits no edema or deformity.   Lymphadenopathy:     She has no cervical adenopathy.     She has no axillary adenopathy.        Right: No inguinal and no supraclavicular adenopathy present.        Left: No inguinal and no supraclavicular adenopathy present.   Neurological: She is alert and oriented to person, place, and time. She exhibits normal muscle tone.   Skin: Skin is warm, dry and intact. No rash noted. There is erythema. No pallor.   Psychiatric: She has a normal mood and affect. Her behavior is normal. Thought content normal.   Vitals reviewed.  ID done    Assessment/Plan   Cici was seen today for cyst.    Diagnoses and all orders for this visit:    Perirectal abscess    Clean and pack bid, recheck 1 week. Antibiotics.

## 2017-12-30 ENCOUNTER — APPOINTMENT (OUTPATIENT)
Dept: LAB | Facility: HOSPITAL | Age: 82
End: 2017-12-30
Attending: INTERNAL MEDICINE

## 2017-12-30 DIAGNOSIS — Z51.89 VISIT FOR WOUND CHECK: Primary | ICD-10-CM

## 2017-12-30 LAB
ANION GAP SERPL CALCULATED.3IONS-SCNC: 9.2 MMOL/L (ref 3.6–11.2)
BASOPHILS # BLD AUTO: 0.02 10*3/MM3 (ref 0–0.3)
BASOPHILS NFR BLD AUTO: 0.3 % (ref 0–2)
BUN BLD-MCNC: 23 MG/DL (ref 7–21)
BUN/CREAT SERPL: 18.7 (ref 7–25)
CALCIUM SPEC-SCNC: 9 MG/DL (ref 7.7–10)
CHLORIDE SERPL-SCNC: 105 MMOL/L (ref 99–112)
CO2 SERPL-SCNC: 22.8 MMOL/L (ref 24.3–31.9)
CREAT BLD-MCNC: 1.23 MG/DL (ref 0.43–1.29)
DEPRECATED RDW RBC AUTO: 51.7 FL (ref 37–54)
EOSINOPHIL # BLD AUTO: 0.09 10*3/MM3 (ref 0–0.7)
EOSINOPHIL NFR BLD AUTO: 1.3 % (ref 0–7)
ERYTHROCYTE [DISTWIDTH] IN BLOOD BY AUTOMATED COUNT: 16.1 % (ref 11.5–14.5)
GFR SERPL CREATININE-BSD FRML MDRD: 41 ML/MIN/1.73
GLUCOSE BLD-MCNC: 101 MG/DL (ref 70–110)
HCT VFR BLD AUTO: 32.1 % (ref 37–47)
HGB BLD-MCNC: 10.3 G/DL (ref 12–16)
IMM GRANULOCYTES # BLD: 0.01 10*3/MM3 (ref 0–0.03)
IMM GRANULOCYTES NFR BLD: 0.1 % (ref 0–0.5)
LYMPHOCYTES # BLD AUTO: 1.29 10*3/MM3 (ref 1–3)
LYMPHOCYTES NFR BLD AUTO: 18.3 % (ref 16–46)
MCH RBC QN AUTO: 28 PG (ref 27–33)
MCHC RBC AUTO-ENTMCNC: 32.1 G/DL (ref 33–37)
MCV RBC AUTO: 87.2 FL (ref 80–94)
MONOCYTES # BLD AUTO: 0.69 10*3/MM3 (ref 0.1–0.9)
MONOCYTES NFR BLD AUTO: 9.8 % (ref 0–12)
NEUTROPHILS # BLD AUTO: 4.93 10*3/MM3 (ref 1.4–6.5)
NEUTROPHILS NFR BLD AUTO: 70.2 % (ref 40–75)
OSMOLALITY SERPL CALC.SUM OF ELEC: 277.6 MOSM/KG (ref 273–305)
PLATELET # BLD AUTO: 230 10*3/MM3 (ref 130–400)
PMV BLD AUTO: 10.3 FL (ref 6–10)
POTASSIUM BLD-SCNC: 4.9 MMOL/L (ref 3.5–5.3)
RBC # BLD AUTO: 3.68 10*6/MM3 (ref 4.2–5.4)
SODIUM BLD-SCNC: 137 MMOL/L (ref 135–153)
WBC NRBC COR # BLD: 7.03 10*3/MM3 (ref 4.5–12.5)

## 2017-12-30 PROCEDURE — 80048 BASIC METABOLIC PNL TOTAL CA: CPT | Performed by: INTERNAL MEDICINE

## 2017-12-30 PROCEDURE — 85025 COMPLETE CBC W/AUTO DIFF WBC: CPT

## 2017-12-30 NOTE — DISCHARGE PLACEMENT REQUEST
"Cici Veliz (89 y.o. Female)     Date of Birth Social Security Number Address Home Phone MRN    09/07/1925  [address] [place] [id number] [phone] [id number]    [employer] Marital Status          Hindu        Admission Date Admission Type Admitting Provider Attending Provider Department, Room/Bed        [facility] GENERAL SURGERY, --/--    Discharge Date Discharge Disposition Discharge Destination                      Attending Provider: (none)    Allergies:  Bee Venom, Codeine, Erythromycin, Lipitor [Atorvastatin], Penicillins, Tetracyclines & Related, Zetia [Ezetimibe], Zocor [Simvastatin]    Isolation:  None   Infection:  None   Code Status:  Prior    Ht:  63 cm (24.8\")   Wt:  65.8 kg (145 lb)    Admission Cmt:  None   Principal Problem:  None                Active Insurance as of 12/30/2017     Primary Coverage     Payor Plan Insurance Group Employer/Plan Group    MEDICARE MEDICARE A & B      Payor Plan Address Payor Plan Phone Number Effective From Effective To    [address] [phone] 9/1/1990     [place] [address]       Subscriber Name Subscriber Birth Date Member ID       CICI VELIZ 9/7/1925 [id number]           Secondary Coverage     Payor Plan Insurance Group Employer/Plan Group    [place] [id number]     Payor Plan Address Payor Plan Phone Number Effective From Effective To    [address]  8/18/1997     [place] [address]       Subscriber Name Subscriber Birth Date Member ID       CICI VELIZ 9/7/1925 [id number]                 Emergency Contacts      (Rel.) Home Phone Work Phone Mobile Phone    AlfonsoJazmyneRamona (Daughter) -- -- [phone]    Carson Veliz (Son) -- -- [phone]            Insurance Information          No hospital account.        Notes  Encounter Date: 12/28/2017  Carson Seaman MD   General Surgery   Expand All Collapse All   Hide copied text  Hover for attribution information     Subjective     "   Cici Veliz is a 89 y.o. female.      History of Present Illness She has had a cyst/Abscess in the perirectal area drained a couple of times. She says it had swelled up after hemorrhoid surgery in the past but this time it started bothering her the last week. It has drained some. No antibiotics yet.     The following portions of the patient's history were reviewed and updated as appropriate: allergies, current medications, past family history, past medical history, past social history, past surgical history and problem list.     Review of Systems   Constitutional: Negative for activity change, appetite change, chills, fever and unexpected weight change.   HENT: Negative for congestion, facial swelling and sore throat.    Eyes: Negative for photophobia and visual disturbance.   Respiratory: Negative for chest tightness, shortness of breath and wheezing.    Cardiovascular: Negative for chest pain, palpitations and leg swelling.   Gastrointestinal: Negative for abdominal distention, abdominal pain, anal bleeding, blood in stool, constipation, diarrhea, nausea, rectal pain and vomiting.   Endocrine: Negative for cold intolerance, heat intolerance, polydipsia and polyuria.   Genitourinary: Negative for difficulty urinating, dysuria, flank pain and urgency.   Musculoskeletal: Negative for back pain and myalgias.   Skin: Positive for color change and wound. Negative for rash.   Allergic/Immunologic: Negative for immunocompromised state.   Neurological: Negative for dizziness, seizures, syncope, light-headedness, numbness and headaches.   Hematological: Negative for adenopathy. Does not bruise/bleed easily.   Psychiatric/Behavioral: Negative for behavioral problems and confusion. The patient is not nervous/anxious.             Objective       Physical Exam   Constitutional: She is oriented to person, place, and time. She appears well-developed and well-nourished. She does not appear ill. No distress.       HENT:    Head: Normocephalic. Head is without laceration. Hair is normal.   Right Ear: Hearing and ear canal normal.   Left Ear: Hearing and ear canal normal.   Nose: Nose normal. No sinus tenderness. No epistaxis. Right sinus exhibits no maxillary sinus tenderness and no frontal sinus tenderness. Left sinus exhibits no maxillary sinus tenderness and no frontal sinus tenderness.   Eyes: Conjunctivae and lids are normal. Pupils are equal, round, and reactive to light.   Neck: Normal range of motion. No JVD present. No tracheal tenderness present. No tracheal deviation present. No thyroid mass and no thyromegaly present.   Cardiovascular: Normal rate and regular rhythm.  Exam reveals no gallop.    No murmur heard.  Pulmonary/Chest: Effort normal and breath sounds normal. No stridor. She has no wheezes. She exhibits no tenderness.   Abdominal: Soft. Bowel sounds are normal. She exhibits no distension, no ascites and no mass. There is no tenderness. There is no rebound and no guarding. No hernia.   Musculoskeletal: She exhibits no edema or deformity.   Lymphadenopathy:     She has no cervical adenopathy.     She has no axillary adenopathy.        Right: No inguinal and no supraclavicular adenopathy present.        Left: No inguinal and no supraclavicular adenopathy present.   Neurological: She is alert and oriented to person, place, and time. She exhibits normal muscle tone.   Skin: Skin is warm, dry and intact. No rash noted. There is erythema. No pallor.   Psychiatric: She has a normal mood and affect. Her behavior is normal. Thought content normal.   Vitals reviewed.  ID done        Assessment/Plan       Cici was seen today for cyst.     Diagnoses and all orders for this visit:     Perirectal abscess     Clean and pack bid, recheck 1 week. Antibiotics.                ·   Office Visit on 12/28/2017   ·    ·   Detailed Report   ·

## 2017-12-30 NOTE — DISCHARGE PLACEMENT REQUEST
"Cici Veliz (89 y.o. Female)     Date of Birth Social Security Number Address Home Phone MRN    09/07/1925  [address]  [facility] [address] [phone] [id number]    Congregational Marital Status          Amish        Admission Date Admission Type Admitting Provider Attending Provider Department, Room/Bed        [facility] GENERAL SURGERY, --/--    Discharge Date Discharge Disposition Discharge Destination                      Attending Provider: (none)    Allergies:  Bee Venom, Codeine, Erythromycin, Lipitor [Atorvastatin], Penicillins, Tetracyclines & Related, Zetia [Ezetimibe], Zocor [Simvastatin]    Isolation:  None   Infection:  None   Code Status:  Prior    Ht:  63 cm (24.8\")   Wt:  65.8 kg (145 lb)    Admission Cmt:  None   Principal Problem:  None                Active Insurance as of 12/30/2017     Primary Coverage     Payor Plan Insurance Group Employer/Plan Group    MEDICARE MEDICARE A & B      Payor Plan Address Payor Plan Phone Number Effective From Effective To    [address] [phone] 9/1/1990     [place] [address]       Subscriber Name Subscriber Birth Date Member ID       CICI VELIZ 9/7/1925 [id number]           Secondary Coverage     Payor Plan Insurance Group Employer/Plan Group    [place] [id number]     Payor Plan Address Payor Plan Phone Number Effective From Effective To    [address]  8/18/1997     [place] [address]       Subscriber Name Subscriber Birth Date Member ID       CICI VELIZ 9/7/1925 [id number]                 Emergency Contacts      (Rel.) Home Phone Work Phone Mobile Phone    AlfonsoRamona (Daughter) -- -- [phone]    Carson Veliz (Son) -- -- [phone]            Insurance Information          No hospital account.         [facility] GENERAL SURGERY  [address]  [facility] [address]  Phone:  [phone]  Fax:  [phone] Date: Dec 30, 2017      Ambulatory " Referral to Home Health     Patient:  Cici Veliz MRN:  [id number]   [address] [address] :  1925  SSN:    Phone: [phone] Sex:  F      INSURANCE PAYOR PLAN GROUP # SUBSCRIBER ID   Primary:  Secondary: MEDICARE [employer]UE CROSS [id number]  [id number]    [id number]  [id number]      Referring Provider Information:  DAMIAN KRUSE Phone: [phone] Fax: [phone]      Referral Information:   # Visits:  1 Referral Type: Home Health [42]   Urgency:  Routine Referral Reason: Specialty Services Required   Start Date: Dec 30, 2017 End Date:  To be determined by Insurer   Diagnosis: Visit for wound check (Z51.89 [ICD-10-CM] V58.89 [ICD-9-CM])      Refer to Dept:   Refer to Provider:   Refer to Facility:       Face to Face Visit Date: 2017  Follow-up Provider for Plan of Care? I will be treating the patient on an ongoing basis.  Please send me the Plan of Care for signature.  Follow-up Provider: DEMARCO LEDEZMA [[id number]]  Reason/Clinical Findings: buttock abscess  Describe mobility limitations that make leaving home difficult: elderly limited mobility  Nursing/Therapeutic Services Requested: Other (wound care, once daily wound packing)  Frequency: 1 Week 1 (wet to dry daily gauze packing)     This document serves as a request of services and does not constitute Insurance authorization or approval of services.  To determine eligibility, please contact the members Insurance carrier to verify and review coverage.     If you have medical questions regarding this request for services. Please contact [facility] GENERAL SURGERY at [phone] between the hours of 8:00am - 5:00pm (Mon-Fri).             Authorizing Provider:Damian Kruse MD  Authorizing Provider's NPI: [id number]  Order Entered By: Damian Kruse MD 2017 12:36 PM     Electronically signed by: Damian Kruse MD 2017 12:36 PM

## 2018-01-04 ENCOUNTER — OFFICE VISIT (OUTPATIENT)
Dept: SURGERY | Facility: CLINIC | Age: 83
End: 2018-01-04

## 2018-01-04 VITALS — HEIGHT: 55 IN | WEIGHT: 145 LBS | BODY MASS INDEX: 33.56 KG/M2

## 2018-01-04 DIAGNOSIS — K61.1 PERIRECTAL ABSCESS: Primary | ICD-10-CM

## 2018-01-04 PROCEDURE — 99024 POSTOP FOLLOW-UP VISIT: CPT | Performed by: SURGERY

## 2018-01-04 NOTE — PROGRESS NOTES
Subjective   Cici Veliz is a 89 y.o. female.     History of Present Illness She is feeling much better. Minimal drainage or pain from the perirectal abscess drainage site.    The following portions of the patient's history were reviewed and updated as appropriate: allergies, current medications, past family history, past medical history, past social history, past surgical history and problem list.    Review of Systems perirectal abscess.    Objective   Physical Exam wound is nearly closed no erythema and minimal induration.     Assessment/Plan   Cici was seen today for follow-up.    Diagnoses and all orders for this visit:    Perirectal abscess    recheck in 3 weeks or sooner if it flares up again.

## 2018-01-09 ENCOUNTER — TELEPHONE (OUTPATIENT)
Dept: CARDIOLOGY | Facility: CLINIC | Age: 83
End: 2018-01-09

## 2018-01-09 DIAGNOSIS — D50.9 IRON DEFICIENCY ANEMIA, UNSPECIFIED IRON DEFICIENCY ANEMIA TYPE: Primary | ICD-10-CM

## 2018-01-09 NOTE — TELEPHONE ENCOUNTER
----- Message from Emily Cleary MD sent at 1/9/2018  2:15 PM EST -----  Regarding: RE: LABS   Hemoglobin is lower however continue taking iron pill only one a day.  Will check CBC in 2 more weeks and iron level in 2 more weeks.  ----- Message -----     From: Catherine Morales MA     Sent: 1/9/2018  12:47 PM       To: Emily Cleary MD  Subject: LABS                                             PT called wanting to know if you have re-viewed her labs??

## 2018-01-22 RX ORDER — IRON POLYSACCHARIDE COMPLEX 150 MG
150 CAPSULE ORAL DAILY
Qty: 90 CAPSULE | Refills: 1 | Status: SHIPPED | OUTPATIENT
Start: 2018-01-22 | End: 2019-07-20 | Stop reason: SDUPTHER

## 2018-01-25 ENCOUNTER — HOSPITAL ENCOUNTER (EMERGENCY)
Facility: HOSPITAL | Age: 83
Discharge: HOME | End: 2018-01-26
Attending: EMERGENCY MEDICINE | Admitting: EMERGENCY MEDICINE

## 2018-01-25 DIAGNOSIS — I20.9 ANGINA PECTORIS (HCC): Primary | ICD-10-CM

## 2018-01-25 DIAGNOSIS — R77.8 ELEVATED TROPONIN: ICD-10-CM

## 2018-01-25 LAB
BASOPHILS # BLD AUTO: 0.01 10*3/MM3 (ref 0–0.3)
BASOPHILS NFR BLD AUTO: 0.2 % (ref 0–2)
DEPRECATED RDW RBC AUTO: 50.2 FL (ref 37–54)
EOSINOPHIL # BLD AUTO: 0.13 10*3/MM3 (ref 0–0.7)
EOSINOPHIL NFR BLD AUTO: 2.2 % (ref 0–7)
ERYTHROCYTE [DISTWIDTH] IN BLOOD BY AUTOMATED COUNT: 15.5 % (ref 11.5–14.5)
HCT VFR BLD AUTO: 34 % (ref 37–47)
HGB BLD-MCNC: 10.7 G/DL (ref 12–16)
IMM GRANULOCYTES # BLD: 0.01 10*3/MM3 (ref 0–0.03)
IMM GRANULOCYTES NFR BLD: 0.2 % (ref 0–0.5)
LYMPHOCYTES # BLD AUTO: 1.28 10*3/MM3 (ref 1–3)
LYMPHOCYTES NFR BLD AUTO: 21.6 % (ref 16–46)
MCH RBC QN AUTO: 28.2 PG (ref 27–33)
MCHC RBC AUTO-ENTMCNC: 31.5 G/DL (ref 33–37)
MCV RBC AUTO: 89.7 FL (ref 80–94)
MONOCYTES # BLD AUTO: 0.79 10*3/MM3 (ref 0.1–0.9)
MONOCYTES NFR BLD AUTO: 13.3 % (ref 0–12)
NEUTROPHILS # BLD AUTO: 3.7 10*3/MM3 (ref 1.4–6.5)
NEUTROPHILS NFR BLD AUTO: 62.5 % (ref 40–75)
PLATELET # BLD AUTO: 191 10*3/MM3 (ref 130–400)
PMV BLD AUTO: 10.6 FL (ref 6–10)
RBC # BLD AUTO: 3.79 10*6/MM3 (ref 4.2–5.4)
WBC NRBC COR # BLD: 5.92 10*3/MM3 (ref 4.5–12.5)

## 2018-01-25 PROCEDURE — 93005 ELECTROCARDIOGRAM TRACING: CPT | Performed by: EMERGENCY MEDICINE

## 2018-01-25 PROCEDURE — 93010 ELECTROCARDIOGRAM REPORT: CPT | Performed by: INTERNAL MEDICINE

## 2018-01-25 PROCEDURE — 99285 EMERGENCY DEPT VISIT HI MDM: CPT

## 2018-01-25 PROCEDURE — 84484 ASSAY OF TROPONIN QUANT: CPT | Performed by: EMERGENCY MEDICINE

## 2018-01-25 PROCEDURE — 36415 COLL VENOUS BLD VENIPUNCTURE: CPT

## 2018-01-25 PROCEDURE — 83690 ASSAY OF LIPASE: CPT | Performed by: EMERGENCY MEDICINE

## 2018-01-25 PROCEDURE — 85025 COMPLETE CBC W/AUTO DIFF WBC: CPT | Performed by: EMERGENCY MEDICINE

## 2018-01-25 PROCEDURE — 82550 ASSAY OF CK (CPK): CPT | Performed by: EMERGENCY MEDICINE

## 2018-01-25 PROCEDURE — 82553 CREATINE MB FRACTION: CPT | Performed by: EMERGENCY MEDICINE

## 2018-01-25 PROCEDURE — 82150 ASSAY OF AMYLASE: CPT | Performed by: EMERGENCY MEDICINE

## 2018-01-25 PROCEDURE — 85610 PROTHROMBIN TIME: CPT | Performed by: EMERGENCY MEDICINE

## 2018-01-25 PROCEDURE — 80053 COMPREHEN METABOLIC PANEL: CPT | Performed by: EMERGENCY MEDICINE

## 2018-01-25 PROCEDURE — 85730 THROMBOPLASTIN TIME PARTIAL: CPT | Performed by: EMERGENCY MEDICINE

## 2018-01-25 RX ORDER — SODIUM CHLORIDE 0.9 % (FLUSH) 0.9 %
10 SYRINGE (ML) INJECTION AS NEEDED
Status: DISCONTINUED | OUTPATIENT
Start: 2018-01-25 | End: 2018-01-26 | Stop reason: HOSPADM

## 2018-01-26 ENCOUNTER — CLINICAL SUPPORT (OUTPATIENT)
Dept: CARDIOLOGY | Facility: CLINIC | Age: 83
End: 2018-01-26

## 2018-01-26 VITALS
WEIGHT: 160 LBS | OXYGEN SATURATION: 98 % | SYSTOLIC BLOOD PRESSURE: 146 MMHG | HEIGHT: 63 IN | DIASTOLIC BLOOD PRESSURE: 76 MMHG | TEMPERATURE: 98.2 F | BODY MASS INDEX: 28.35 KG/M2 | HEART RATE: 61 BPM | RESPIRATION RATE: 18 BRPM

## 2018-01-26 DIAGNOSIS — I48.0 PAF (PAROXYSMAL ATRIAL FIBRILLATION) (HCC): ICD-10-CM

## 2018-01-26 DIAGNOSIS — R00.1 BRADYCARDIA: ICD-10-CM

## 2018-01-26 DIAGNOSIS — Z95.0 PACEMAKER: Primary | ICD-10-CM

## 2018-01-26 LAB
ALBUMIN SERPL-MCNC: 4.3 G/DL (ref 3.4–4.8)
ALBUMIN/GLOB SERPL: 2 G/DL (ref 1.5–2.5)
ALP SERPL-CCNC: 62 U/L (ref 35–104)
ALT SERPL W P-5'-P-CCNC: 12 U/L (ref 10–36)
AMYLASE SERPL-CCNC: 97 U/L (ref 28–100)
ANION GAP SERPL CALCULATED.3IONS-SCNC: 7 MMOL/L (ref 3.6–11.2)
APTT PPP: 31.4 SECONDS (ref 23.8–36.1)
AST SERPL-CCNC: 21 U/L (ref 10–30)
BACTERIA UR QL AUTO: ABNORMAL /HPF
BILIRUB SERPL-MCNC: 0.3 MG/DL (ref 0.2–1.8)
BILIRUB UR QL STRIP: NEGATIVE
BUN BLD-MCNC: 31 MG/DL (ref 7–21)
BUN/CREAT SERPL: 27.9 (ref 7–25)
CALCIUM SPEC-SCNC: 8.5 MG/DL (ref 7.7–10)
CHLORIDE SERPL-SCNC: 100 MMOL/L (ref 99–112)
CK MB SERPL-CCNC: 1.1 NG/ML (ref 0–5)
CK MB SERPL-CCNC: 1.35 NG/ML (ref 0–5)
CK MB SERPL-CCNC: 1.4 NG/ML (ref 0–5)
CK MB SERPL-RTO: 1.2 % (ref 0–3)
CK MB SERPL-RTO: 2.4 % (ref 0–3)
CK MB SERPL-RTO: 2.5 % (ref 0–3)
CK SERPL-CCNC: 54 U/L (ref 24–173)
CK SERPL-CCNC: 59 U/L (ref 24–173)
CK SERPL-CCNC: 91 U/L (ref 24–173)
CLARITY UR: CLEAR
CO2 SERPL-SCNC: 27 MMOL/L (ref 24.3–31.9)
COLOR UR: YELLOW
CREAT BLD-MCNC: 1.11 MG/DL (ref 0.43–1.29)
GFR SERPL CREATININE-BSD FRML MDRD: 46 ML/MIN/1.73
GLOBULIN UR ELPH-MCNC: 2.1 GM/DL
GLUCOSE BLD-MCNC: 103 MG/DL (ref 70–110)
GLUCOSE UR STRIP-MCNC: NEGATIVE MG/DL
HGB UR QL STRIP.AUTO: NEGATIVE
HYALINE CASTS UR QL AUTO: ABNORMAL /LPF
INR PPP: 0.98 (ref 0.9–1.1)
KETONES UR QL STRIP: NEGATIVE
LEUKOCYTE ESTERASE UR QL STRIP.AUTO: ABNORMAL
LIPASE SERPL-CCNC: 52 U/L (ref 13–60)
NITRITE UR QL STRIP: NEGATIVE
OSMOLALITY SERPL CALC.SUM OF ELEC: 275 MOSM/KG (ref 273–305)
PH UR STRIP.AUTO: 7.5 [PH] (ref 5–8)
POTASSIUM BLD-SCNC: 4.7 MMOL/L (ref 3.5–5.3)
PROT SERPL-MCNC: 6.4 G/DL (ref 6–8)
PROT UR QL STRIP: NEGATIVE
PROTHROMBIN TIME: 13.1 SECONDS (ref 11–15.4)
RBC # UR: ABNORMAL /HPF
REF LAB TEST METHOD: ABNORMAL
SODIUM BLD-SCNC: 134 MMOL/L (ref 135–153)
SP GR UR STRIP: 1.01 (ref 1–1.03)
SQUAMOUS #/AREA URNS HPF: ABNORMAL /HPF
TROPONIN I SERPL-MCNC: 0.09 NG/ML
TROPONIN I SERPL-MCNC: 0.1 NG/ML
TROPONIN I SERPL-MCNC: 0.11 NG/ML
UROBILINOGEN UR QL STRIP: ABNORMAL
WBC UR QL AUTO: ABNORMAL /HPF

## 2018-01-26 PROCEDURE — 82550 ASSAY OF CK (CPK): CPT | Performed by: EMERGENCY MEDICINE

## 2018-01-26 PROCEDURE — 93288 INTERROG EVL PM/LDLS PM IP: CPT | Performed by: INTERNAL MEDICINE

## 2018-01-26 PROCEDURE — 84484 ASSAY OF TROPONIN QUANT: CPT | Performed by: EMERGENCY MEDICINE

## 2018-01-26 PROCEDURE — 87086 URINE CULTURE/COLONY COUNT: CPT | Performed by: EMERGENCY MEDICINE

## 2018-01-26 PROCEDURE — 81001 URINALYSIS AUTO W/SCOPE: CPT | Performed by: EMERGENCY MEDICINE

## 2018-01-26 PROCEDURE — 82553 CREATINE MB FRACTION: CPT | Performed by: EMERGENCY MEDICINE

## 2018-01-26 PROCEDURE — 36415 COLL VENOUS BLD VENIPUNCTURE: CPT

## 2018-01-26 NOTE — DISCHARGE INSTRUCTIONS
Angina Pectoris  Angina pectoris, often called angina, is extreme discomfort in the chest, neck, or arm. This is caused by a lack of blood in the middle and thickest layer of the heart wall (myocardium). There are four types of angina:  · Stable angina. Stable angina usually occurs in episodes of predictable frequency and duration. It is usually brought on by physical activity, stress, or excitement. Stable angina usually lasts a few minutes and can often be relieved by a medicine that you place under your tongue. This medicine is called sublingual nitroglycerin.  · Unstable angina. Unstable angina can occur even when you are doing little or no physical activity. It can even occur while you are sleeping or when you are at rest. It can suddenly increase in severity or frequency. It may not be relieved by sublingual nitroglycerin, and it can last up to 30 minutes.  · Microvascular angina. This type of angina is caused by a disorder of tiny blood vessels called arterioles. Microvascular angina is more common in women. The pain may be more severe and last longer than other types of angina pectoris.  · Prinzmetal or variant angina. This type of angina pectoris is rare and usually occurs when you are doing little or no physical activity. It especially occurs in the early morning hours.  What are the causes?  Atherosclerosis is the cause of angina. This is the buildup of fat and cholesterol (plaque) on the inside of the arteries. Over time, the plaque may narrow or block the artery, and this will lessen blood flow to the heart. Plaque can also become weak and break off within a coronary artery to form a clot and cause a sudden blockage.  What increases the risk?  Risk factors common to both men and women include:  · High cholesterol levels.  · High blood pressure (hypertension).  · Tobacco use.  · Diabetes.  · Family history of angina.  · Obesity.  · Lack of exercise.  · A diet high in saturated fats.  Women are at greater  risk for angina if they are:  · Over age 55.  · Postmenopausal.  What are the signs or symptoms?  Many people do not experience any symptoms during the early stages of angina. As the condition progresses, symptoms common to both men and women may include:  · Chest pain.  ¨ The pain can be described as a crushing or squeezing in the chest, or a tightness, pressure, fullness, or heaviness in the chest.  ¨ The pain can last more than a few minutes, or it can stop and recur.  · Pain in the arms, neck, jaw, or back.  · Unexplained heartburn or indigestion.  · Shortness of breath.  · Nausea.  · Sudden cold sweats.  · Sudden light-headedness.  Many women have chest discomfort and some of the other symptoms. However, women often have different (atypical) symptoms, such as:  · Fatigue.  · Unexplained feelings of nervousness or anxiety.  · Unexplained weakness.  · Dizziness or fainting.  Sometimes, women may have angina without any symptoms.  How is this diagnosed?  Tests to diagnose angina may include:  · ECG (electrocardiogram).  · Exercise stress test. This looks for signs of blockage when the heart is being exercised.  · Pharmacologic stress test. This test looks for signs of blockage when the heart is being stressed with a medicine.  · Blood tests.  · Coronary angiogram. This is a procedure to look at the coronary arteries to see if there is any blockage.  How is this treated?  The treatment of angina may include the following:  · Healthy behavioral changes to reduce or control risk factors.  · Medicine.  · Coronary stenting. A stent helps to keep an artery open.  · Coronary angioplasty. This procedure widens a narrowed or blocked artery.  · Coronary artery bypass surgery. This will allow your blood to pass the blockage (bypass) to reach your heart.  Follow these instructions at home:  · Take medicines only as directed by your health care provider.  · Do not take the following medicines unless your health care provider  approves:  ¨ Nonsteroidal anti-inflammatory drugs (NSAIDs), such as ibuprofen, naproxen, or celecoxib.  ¨ Vitamin supplements that contain vitamin A, vitamin E, or both.  ¨ Hormone replacement therapy that contains estrogen with or without progestin.  · Manage other health conditions such as hypertension and diabetes as directed by your health care provider.  · Follow a heart-healthy diet. A dietitian can help to educate you about healthy food options and changes.  · Use healthy cooking methods such as roasting, grilling, broiling, baking, poaching, steaming, or stir-frying. Talk to a dietitian to learn more about healthy cooking methods.  · Follow an exercise program approved by your health care provider.  · Maintain a healthy weight. Lose weight as approved by your health care provider.  · Plan rest periods when fatigued.  · Learn to manage stress.  · Do not use any tobacco products, including cigarettes, chewing tobacco, or electronic cigarettes. If you need help quitting, ask your health care provider.  · If you drink alcohol, and your health care provider approves, limit your alcohol intake to no more than 1 drink per day. One drink equals 12 ounces of beer, 5 ounces of wine, or 1½ ounces of hard liquor.  · Stop illegal drug use.  · Keep all follow-up visits as directed by your health care provider. This is important.  Get help right away if:  · You have pain in your chest, neck, arm, jaw, stomach, or back that lasts more than a few minutes, is recurring, or is unrelieved by taking sublingual nitroglycerin.  · You have profuse sweating without cause.  · You have unexplained:  ¨ Heartburn or indigestion.  ¨ Shortness of breath or difficulty breathing.  ¨ Nausea or vomiting.  ¨ Fatigue.  ¨ Feelings of nervousness or anxiety.  ¨ Weakness.  ¨ Diarrhea.  · You have sudden light-headedness or dizziness.  · You faint.  These symptoms may represent a serious problem that is an emergency. Do not wait to see if the  symptoms will go away. Get medical help right away. Call your local emergency services (911 in the [place] Do not drive yourself to the hospital.   This information is not intended to replace advice given to you by your health care provider. Make sure you discuss any questions you have with your health care provider.  Document Released: 12/18/2006 Document Revised: 05/31/2017 Document Reviewed: 04/21/2015  [employer] Interactive Patient Education © 2017 [employer]
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
## 2018-01-26 NOTE — ED NOTES
"Patient presents to Emergency Department with complaints of chest pain \"about an hour ago\", 6 on scale 1-10. Denies chest pain at this time. Assisted patient to undress and placed in hospital gown.        Gretel Dickey RN  01/26/18 0116    "

## 2018-01-28 LAB — BACTERIA SPEC AEROBE CULT: NORMAL

## 2018-01-30 NOTE — PROGRESS NOTES
Cici Veliz was seen today for a pacemaker check. Checked by [facility], No changes. Will recheck in 6 months.

## 2018-02-01 ENCOUNTER — TELEPHONE (OUTPATIENT)
Dept: CARDIOLOGY | Facility: CLINIC | Age: 83
End: 2018-02-01

## 2018-02-01 NOTE — TELEPHONE ENCOUNTER
Pt daughter called & said that Cici had a really big Bulloch on her shoulder and that she had been up all night in pain. She was to the point that she was crying, because it was hurting so bad. She also said that her mother was coughing or spitting up blood. She said that she was just released from the hospital yesterday due to a procedure that she had done.  I placed her on hold & spoke with Dr. Cleary. He felt that she may need x-rays and labs done so therefore she needed to go to the hospital where they could do all of these test.   The daughter was not pleased with the outcome. She did not want to take her to the ER because she did not want to expose her to anything. I told her that I understand but we can not do x-rays or labs here, without these we can not treat her.   Daughter understood but was not happy!!

## 2018-02-21 ENCOUNTER — OFFICE VISIT (OUTPATIENT)
Dept: CARDIOLOGY | Facility: CLINIC | Age: 83
End: 2018-02-21

## 2018-02-21 VITALS
OXYGEN SATURATION: 98 % | HEART RATE: 65 BPM | DIASTOLIC BLOOD PRESSURE: 72 MMHG | SYSTOLIC BLOOD PRESSURE: 100 MMHG | BODY MASS INDEX: 25.52 KG/M2 | WEIGHT: 144 LBS | HEIGHT: 63 IN

## 2018-02-21 DIAGNOSIS — I50.30 HEART FAILURE WITH PRESERVED LEFT VENTRICULAR FUNCTION (HFPEF) (HCC): ICD-10-CM

## 2018-02-21 DIAGNOSIS — I48.0 PAF (PAROXYSMAL ATRIAL FIBRILLATION) (HCC): ICD-10-CM

## 2018-02-21 DIAGNOSIS — I25.10 CORONARY ARTERY DISEASE INVOLVING NATIVE CORONARY ARTERY, ANGINA PRESENCE UNSPECIFIED, UNSPECIFIED WHETHER NATIVE OR TRANSPLANTED HEART: Primary | ICD-10-CM

## 2018-02-21 DIAGNOSIS — E78.2 MIXED HYPERLIPIDEMIA: ICD-10-CM

## 2018-02-21 PROBLEM — I21.4 NSTEMI (NON-ST ELEVATED MYOCARDIAL INFARCTION) (HCC): Status: ACTIVE | Noted: 2018-01-29

## 2018-02-21 PROBLEM — Z95.820 S/P ANGIOPLASTY WITH STENT: Status: ACTIVE | Noted: 2018-01-30

## 2018-02-21 PROCEDURE — 99213 OFFICE O/P EST LOW 20 MIN: CPT | Performed by: INTERNAL MEDICINE
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
## 2018-02-26 ENCOUNTER — TELEPHONE (OUTPATIENT)
Dept: CARDIOLOGY | Facility: CLINIC | Age: 83
End: 2018-02-26

## 2018-02-26 NOTE — TELEPHONE ENCOUNTER
V/o patient Dr Cleary said you can come in today if you need to.   Patient states she has diarrhea and cannot leave the house.

## 2018-02-26 NOTE — TELEPHONE ENCOUNTER
----- Message from Emily Cleary MD sent at 2/26/2018 12:53 PM EST -----  Contact: [phone]  We cannot do anything about the blood thinner.  Blood thinner cannot be stopped or decreased after procedure.  Coming in is okay  ----- Message -----     From: Marlin Byrd     Sent: 2/26/2018   9:01 AM       To: Emily Cleary MD     Patient c/o has had diarrhea since 2 am her eye is bloodshot and she feels really bad thinks her blood might be to thin, she had a balloon procedure done on Friday in [facility] And came home on Saturday wants to come in today.

## 2018-03-12 ENCOUNTER — APPOINTMENT (OUTPATIENT)
Dept: GENERAL RADIOLOGY | Facility: HOSPITAL | Age: 83
End: 2018-03-12

## 2018-03-12 ENCOUNTER — HOSPITAL ENCOUNTER (EMERGENCY)
Facility: HOSPITAL | Age: 83
Discharge: ANOTHER HOSPITAL | End: 2018-03-12
Attending: EMERGENCY MEDICINE | Admitting: EMERGENCY MEDICINE

## 2018-03-12 ENCOUNTER — APPOINTMENT (OUTPATIENT)
Dept: CT IMAGING | Facility: HOSPITAL | Age: 83
End: 2018-03-12

## 2018-03-12 VITALS
TEMPERATURE: 97.5 F | HEIGHT: 63 IN | DIASTOLIC BLOOD PRESSURE: 98 MMHG | WEIGHT: 140 LBS | BODY MASS INDEX: 24.8 KG/M2 | OXYGEN SATURATION: 100 % | RESPIRATION RATE: 24 BRPM | SYSTOLIC BLOOD PRESSURE: 164 MMHG | HEART RATE: 71 BPM

## 2018-03-12 DIAGNOSIS — N30.00 ACUTE CYSTITIS WITHOUT HEMATURIA: ICD-10-CM

## 2018-03-12 DIAGNOSIS — J96.01 ACUTE RESPIRATORY FAILURE WITH HYPOXIA (HCC): Primary | ICD-10-CM

## 2018-03-12 DIAGNOSIS — I50.41 ACUTE COMBINED SYSTOLIC AND DIASTOLIC CONGESTIVE HEART FAILURE (HCC): ICD-10-CM

## 2018-03-12 DIAGNOSIS — E87.1 HYPONATREMIA: ICD-10-CM

## 2018-03-12 LAB
A-A DO2: 67.4 MMHG (ref 0–300)
A-A DO2: 69.9 MMHG (ref 0–300)
ABO GROUP BLD: NORMAL
ALBUMIN SERPL-MCNC: 4.1 G/DL (ref 3.4–4.8)
ALBUMIN/GLOB SERPL: 1.5 G/DL (ref 1.5–2.5)
ALP SERPL-CCNC: 75 U/L (ref 35–104)
ALT SERPL W P-5'-P-CCNC: 27 U/L (ref 10–36)
ANION GAP SERPL CALCULATED.3IONS-SCNC: 8.9 MMOL/L (ref 3.6–11.2)
APTT PPP: 30.3 SECONDS (ref 23.8–36.1)
ARTERIAL PATENCY WRIST A: POSITIVE
ARTERIAL PATENCY WRIST A: POSITIVE
AST SERPL-CCNC: 36 U/L (ref 10–30)
ATMOSPHERIC PRESS: 727 MMHG
ATMOSPHERIC PRESS: 727 MMHG
BACTERIA UR QL AUTO: ABNORMAL /HPF
BASE EXCESS BLDA CALC-SCNC: -7.4 MMOL/L
BASE EXCESS BLDA CALC-SCNC: -7.5 MMOL/L
BASOPHILS # BLD AUTO: 0.01 10*3/MM3 (ref 0–0.3)
BASOPHILS NFR BLD AUTO: 0.2 % (ref 0–2)
BDY SITE: ABNORMAL
BDY SITE: ABNORMAL
BILIRUB SERPL-MCNC: 0.3 MG/DL (ref 0.2–1.8)
BILIRUB UR QL STRIP: NEGATIVE
BLD GP AB SCN SERPL QL: NEGATIVE
BNP SERPL-MCNC: 799.3 PG/ML (ref 0–100)
BODY TEMPERATURE: 98.6 C
BODY TEMPERATURE: 98.6 C
BUN BLD-MCNC: 17 MG/DL (ref 7–21)
BUN/CREAT SERPL: 16 (ref 7–25)
CALCIUM SPEC-SCNC: 9 MG/DL (ref 7.7–10)
CHLORIDE SERPL-SCNC: 101 MMOL/L (ref 99–112)
CK MB SERPL-CCNC: 4.65 NG/ML (ref 0–5)
CK MB SERPL-CCNC: 4.72 NG/ML (ref 0–5)
CK MB SERPL-RTO: 2.9 % (ref 0–3)
CK MB SERPL-RTO: 3.9 % (ref 0–3)
CK SERPL-CCNC: 122 U/L (ref 24–173)
CK SERPL-CCNC: 159 U/L (ref 24–173)
CLARITY UR: ABNORMAL
CO2 SERPL-SCNC: 18.1 MMOL/L (ref 24.3–31.9)
COHGB MFR BLD: 0.9 % (ref 0–5)
COHGB MFR BLD: 1.3 % (ref 0–5)
COLOR UR: YELLOW
CREAT BLD-MCNC: 1.06 MG/DL (ref 0.43–1.29)
D-LACTATE SERPL-SCNC: 1 MMOL/L (ref 0.5–2)
DEPRECATED RDW RBC AUTO: 44.3 FL (ref 37–54)
EOSINOPHIL # BLD AUTO: 0.21 10*3/MM3 (ref 0–0.7)
EOSINOPHIL NFR BLD AUTO: 3.6 % (ref 0–7)
EPAP: 8
ERYTHROCYTE [DISTWIDTH] IN BLOOD BY AUTOMATED COUNT: 15.2 % (ref 11.5–14.5)
GFR SERPL CREATININE-BSD FRML MDRD: 48 ML/MIN/1.73
GLOBULIN UR ELPH-MCNC: 2.7 GM/DL
GLUCOSE BLD-MCNC: 129 MG/DL (ref 70–110)
GLUCOSE UR STRIP-MCNC: NEGATIVE MG/DL
HCO3 BLDA-SCNC: 16.5 MMOL/L (ref 22–26)
HCO3 BLDA-SCNC: 17.3 MMOL/L (ref 22–26)
HCT VFR BLD AUTO: 32.5 % (ref 37–47)
HCT VFR BLD CALC: 31 % (ref 37–47)
HCT VFR BLD CALC: 34 % (ref 37–47)
HGB BLD-MCNC: 10.6 G/DL (ref 12–16)
HGB BLDA-MCNC: 10.5 G/DL (ref 12–16)
HGB BLDA-MCNC: 11.7 G/DL (ref 12–16)
HGB UR QL STRIP.AUTO: ABNORMAL
HOROWITZ INDEX BLD+IHG-RTO: 21 %
HOROWITZ INDEX BLD+IHG-RTO: 30 %
HYALINE CASTS UR QL AUTO: ABNORMAL /LPF
IMM GRANULOCYTES # BLD: 0.01 10*3/MM3 (ref 0–0.03)
IMM GRANULOCYTES NFR BLD: 0.2 % (ref 0–0.5)
INR PPP: 1.05 (ref 0.9–1.1)
IPAP: 16
KETONES UR QL STRIP: NEGATIVE
LEUKOCYTE ESTERASE UR QL STRIP.AUTO: ABNORMAL
LYMPHOCYTES # BLD AUTO: 1.72 10*3/MM3 (ref 1–3)
LYMPHOCYTES NFR BLD AUTO: 29.4 % (ref 16–46)
MCH RBC QN AUTO: 26.6 PG (ref 27–33)
MCHC RBC AUTO-ENTMCNC: 32.6 G/DL (ref 33–37)
MCV RBC AUTO: 81.7 FL (ref 80–94)
METHGB BLD QL: 0.3 % (ref 0–3)
METHGB BLD QL: 0.6 % (ref 0–3)
MODALITY: ABNORMAL
MODALITY: ABNORMAL
MONOCYTES # BLD AUTO: 0.58 10*3/MM3 (ref 0.1–0.9)
MONOCYTES NFR BLD AUTO: 9.9 % (ref 0–12)
NEUTROPHILS # BLD AUTO: 3.33 10*3/MM3 (ref 1.4–6.5)
NEUTROPHILS NFR BLD AUTO: 56.7 % (ref 40–75)
NITRITE UR QL STRIP: NEGATIVE
OSMOLALITY SERPL CALC.SUM OF ELEC: 260.3 MOSM/KG (ref 273–305)
OXYHGB MFR BLDV: 61.3 % (ref 85–100)
OXYHGB MFR BLDV: 96.2 % (ref 85–100)
PCO2 BLDA: 28.4 MM HG (ref 35–45)
PCO2 BLDA: 33.1 MM HG (ref 35–45)
PH BLDA: 7.34 PH UNITS (ref 7.35–7.45)
PH BLDA: 7.38 PH UNITS (ref 7.35–7.45)
PH UR STRIP.AUTO: 5.5 [PH] (ref 5–8)
PLATELET # BLD AUTO: 180 10*3/MM3 (ref 130–400)
PMV BLD AUTO: 10 FL (ref 6–10)
PO2 BLDA: 100.7 MM HG (ref 80–100)
PO2 BLDA: 35.8 MM HG (ref 80–100)
POTASSIUM BLD-SCNC: 5.3 MMOL/L (ref 3.5–5.3)
PROT SERPL-MCNC: 6.8 G/DL (ref 6–8)
PROT UR QL STRIP: ABNORMAL
PROTHROMBIN TIME: 13.8 SECONDS (ref 11–15.4)
RBC # BLD AUTO: 3.98 10*6/MM3 (ref 4.2–5.4)
RBC # UR: ABNORMAL /HPF
REF LAB TEST METHOD: ABNORMAL
RH BLD: POSITIVE
SAO2 % BLDCOA: 62.5 % (ref 90–100)
SAO2 % BLDCOA: 97.4 % (ref 90–100)
SODIUM BLD-SCNC: 128 MMOL/L (ref 135–153)
SP GR UR STRIP: 1.01 (ref 1–1.03)
SQUAMOUS #/AREA URNS HPF: ABNORMAL /HPF
TOTAL RATE: 12 BREATHS/MINUTE
TROPONIN I SERPL-MCNC: 0.17 NG/ML
TROPONIN I SERPL-MCNC: 0.44 NG/ML
TROPONIN I SERPL-MCNC: 0.47 NG/ML
UROBILINOGEN UR QL STRIP: ABNORMAL
WAXY CASTS #/AREA URNS LPF: ABNORMAL /LPF
WBC NRBC COR # BLD: 5.86 10*3/MM3 (ref 4.5–12.5)
WBC UR QL AUTO: ABNORMAL /HPF

## 2018-03-12 PROCEDURE — 83050 HGB METHEMOGLOBIN QUAN: CPT | Performed by: PHYSICIAN ASSISTANT

## 2018-03-12 PROCEDURE — 93005 ELECTROCARDIOGRAM TRACING: CPT | Performed by: EMERGENCY MEDICINE

## 2018-03-12 PROCEDURE — 96375 TX/PRO/DX INJ NEW DRUG ADDON: CPT

## 2018-03-12 PROCEDURE — 85730 THROMBOPLASTIN TIME PARTIAL: CPT | Performed by: PHYSICIAN ASSISTANT

## 2018-03-12 PROCEDURE — 94799 UNLISTED PULMONARY SVC/PX: CPT

## 2018-03-12 PROCEDURE — 36415 COLL VENOUS BLD VENIPUNCTURE: CPT

## 2018-03-12 PROCEDURE — 0 IOPAMIDOL 61 % SOLUTION: Performed by: EMERGENCY MEDICINE

## 2018-03-12 PROCEDURE — 82375 ASSAY CARBOXYHB QUANT: CPT | Performed by: EMERGENCY MEDICINE

## 2018-03-12 PROCEDURE — 93010 ELECTROCARDIOGRAM REPORT: CPT | Performed by: INTERNAL MEDICINE

## 2018-03-12 PROCEDURE — 99285 EMERGENCY DEPT VISIT HI MDM: CPT

## 2018-03-12 PROCEDURE — 86901 BLOOD TYPING SEROLOGIC RH(D): CPT | Performed by: PHYSICIAN ASSISTANT

## 2018-03-12 PROCEDURE — 96366 THER/PROPH/DIAG IV INF ADDON: CPT

## 2018-03-12 PROCEDURE — 82805 BLOOD GASES W/O2 SATURATION: CPT | Performed by: EMERGENCY MEDICINE

## 2018-03-12 PROCEDURE — 80053 COMPREHEN METABOLIC PANEL: CPT | Performed by: PHYSICIAN ASSISTANT

## 2018-03-12 PROCEDURE — 82375 ASSAY CARBOXYHB QUANT: CPT | Performed by: PHYSICIAN ASSISTANT

## 2018-03-12 PROCEDURE — 25010000002 LEVALBUTEROL PER 0.5 MG: Performed by: PHYSICIAN ASSISTANT

## 2018-03-12 PROCEDURE — 85610 PROTHROMBIN TIME: CPT | Performed by: PHYSICIAN ASSISTANT

## 2018-03-12 PROCEDURE — 83605 ASSAY OF LACTIC ACID: CPT | Performed by: PHYSICIAN ASSISTANT

## 2018-03-12 PROCEDURE — 84484 ASSAY OF TROPONIN QUANT: CPT | Performed by: PHYSICIAN ASSISTANT

## 2018-03-12 PROCEDURE — 83880 ASSAY OF NATRIURETIC PEPTIDE: CPT | Performed by: PHYSICIAN ASSISTANT

## 2018-03-12 PROCEDURE — 25010000002 METHYLPREDNISOLONE PER 40 MG: Performed by: PHYSICIAN ASSISTANT

## 2018-03-12 PROCEDURE — 71045 X-RAY EXAM CHEST 1 VIEW: CPT

## 2018-03-12 PROCEDURE — 51702 INSERT TEMP BLADDER CATH: CPT

## 2018-03-12 PROCEDURE — 81001 URINALYSIS AUTO W/SCOPE: CPT | Performed by: PHYSICIAN ASSISTANT

## 2018-03-12 PROCEDURE — 87086 URINE CULTURE/COLONY COUNT: CPT | Performed by: PHYSICIAN ASSISTANT

## 2018-03-12 PROCEDURE — 25010000002 FUROSEMIDE PER 20 MG: Performed by: PHYSICIAN ASSISTANT

## 2018-03-12 PROCEDURE — 86850 RBC ANTIBODY SCREEN: CPT | Performed by: PHYSICIAN ASSISTANT

## 2018-03-12 PROCEDURE — 83050 HGB METHEMOGLOBIN QUAN: CPT | Performed by: EMERGENCY MEDICINE

## 2018-03-12 PROCEDURE — 85025 COMPLETE CBC W/AUTO DIFF WBC: CPT | Performed by: PHYSICIAN ASSISTANT

## 2018-03-12 PROCEDURE — 94660 CPAP INITIATION&MGMT: CPT

## 2018-03-12 PROCEDURE — 87040 BLOOD CULTURE FOR BACTERIA: CPT | Performed by: PHYSICIAN ASSISTANT

## 2018-03-12 PROCEDURE — 96376 TX/PRO/DX INJ SAME DRUG ADON: CPT

## 2018-03-12 PROCEDURE — 86900 BLOOD TYPING SEROLOGIC ABO: CPT | Performed by: PHYSICIAN ASSISTANT

## 2018-03-12 PROCEDURE — 82553 CREATINE MB FRACTION: CPT | Performed by: PHYSICIAN ASSISTANT

## 2018-03-12 PROCEDURE — 71260 CT THORAX DX C+: CPT

## 2018-03-12 PROCEDURE — 94640 AIRWAY INHALATION TREATMENT: CPT

## 2018-03-12 PROCEDURE — 71260 CT THORAX DX C+: CPT | Performed by: RADIOLOGY

## 2018-03-12 PROCEDURE — 82550 ASSAY OF CK (CPK): CPT | Performed by: PHYSICIAN ASSISTANT

## 2018-03-12 PROCEDURE — 82805 BLOOD GASES W/O2 SATURATION: CPT | Performed by: PHYSICIAN ASSISTANT

## 2018-03-12 PROCEDURE — 25010000002 CEFTRIAXONE: Performed by: PHYSICIAN ASSISTANT

## 2018-03-12 PROCEDURE — 96368 THER/DIAG CONCURRENT INF: CPT

## 2018-03-12 PROCEDURE — 71045 X-RAY EXAM CHEST 1 VIEW: CPT | Performed by: RADIOLOGY

## 2018-03-12 PROCEDURE — 36600 WITHDRAWAL OF ARTERIAL BLOOD: CPT | Performed by: PHYSICIAN ASSISTANT

## 2018-03-12 PROCEDURE — 96365 THER/PROPH/DIAG IV INF INIT: CPT

## 2018-03-12 PROCEDURE — 36600 WITHDRAWAL OF ARTERIAL BLOOD: CPT | Performed by: EMERGENCY MEDICINE

## 2018-03-12 RX ORDER — CLOPIDOGREL BISULFATE 75 MG/1
75 TABLET ORAL ONCE
Status: COMPLETED | OUTPATIENT
Start: 2018-03-12 | End: 2018-03-12

## 2018-03-12 RX ORDER — SODIUM CHLORIDE 0.9 % (FLUSH) 0.9 %
10 SYRINGE (ML) INJECTION AS NEEDED
Status: DISCONTINUED | OUTPATIENT
Start: 2018-03-12 | End: 2018-03-12 | Stop reason: HOSPADM

## 2018-03-12 RX ORDER — FUROSEMIDE 10 MG/ML
40 INJECTION INTRAMUSCULAR; INTRAVENOUS ONCE
Status: COMPLETED | OUTPATIENT
Start: 2018-03-12 | End: 2018-03-12

## 2018-03-12 RX ORDER — LEVALBUTEROL INHALATION SOLUTION 1.25 MG/3ML
1.25 SOLUTION RESPIRATORY (INHALATION) ONCE
Status: COMPLETED | OUTPATIENT
Start: 2018-03-12 | End: 2018-03-12

## 2018-03-12 RX ORDER — NITROGLYCERIN 20 MG/100ML
INJECTION INTRAVENOUS
Status: COMPLETED
Start: 2018-03-12 | End: 2018-03-12

## 2018-03-12 RX ORDER — METHYLPREDNISOLONE SODIUM SUCCINATE 40 MG/ML
80 INJECTION, POWDER, LYOPHILIZED, FOR SOLUTION INTRAMUSCULAR; INTRAVENOUS ONCE
Status: COMPLETED | OUTPATIENT
Start: 2018-03-12 | End: 2018-03-12

## 2018-03-12 RX ORDER — NITROGLYCERIN 20 MG/100ML
30 INJECTION INTRAVENOUS
Status: DISCONTINUED | OUTPATIENT
Start: 2018-03-12 | End: 2018-03-12 | Stop reason: HOSPADM

## 2018-03-12 RX ORDER — METOPROLOL TARTRATE 5 MG/5ML
5 INJECTION INTRAVENOUS ONCE
Status: COMPLETED | OUTPATIENT
Start: 2018-03-12 | End: 2018-03-12

## 2018-03-12 RX ORDER — ACETAMINOPHEN 500 MG
1000 TABLET ORAL ONCE
Status: COMPLETED | OUTPATIENT
Start: 2018-03-12 | End: 2018-03-12

## 2018-03-12 RX ADMIN — CLOPIDOGREL 75 MG: 75 TABLET, FILM COATED ORAL at 09:50

## 2018-03-12 RX ADMIN — METOPROLOL TARTRATE 5 MG: 5 INJECTION, SOLUTION INTRAVENOUS at 02:03

## 2018-03-12 RX ADMIN — FUROSEMIDE 40 MG: 10 INJECTION, SOLUTION INTRAMUSCULAR; INTRAVENOUS at 02:33

## 2018-03-12 RX ADMIN — NITROGLYCERIN 30 MCG/MIN: 20 INJECTION INTRAVENOUS at 02:24

## 2018-03-12 RX ADMIN — ACETAMINOPHEN 1000 MG: 500 TABLET ORAL at 06:14

## 2018-03-12 RX ADMIN — IOPAMIDOL 90 ML: 612 INJECTION, SOLUTION INTRAVENOUS at 04:53

## 2018-03-12 RX ADMIN — METHYLPREDNISOLONE SODIUM SUCCINATE 80 MG: 40 INJECTION, POWDER, FOR SOLUTION INTRAMUSCULAR; INTRAVENOUS at 02:04

## 2018-03-12 RX ADMIN — LEVALBUTEROL 1.25 MG: 1.25 SOLUTION RESPIRATORY (INHALATION) at 02:26

## 2018-03-12 RX ADMIN — CEFTRIAXONE 1 G: 1 INJECTION, POWDER, FOR SOLUTION INTRAMUSCULAR; INTRAVENOUS at 08:58

## 2018-03-12 RX ADMIN — FUROSEMIDE 40 MG: 10 INJECTION, SOLUTION INTRAMUSCULAR; INTRAVENOUS at 08:27

## 2018-03-12 RX ADMIN — SODIUM CHLORIDE 250 ML: 9 INJECTION, SOLUTION INTRAVENOUS at 08:23
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
## 2018-03-12 NOTE — ED NOTES
Mac from [facility] called to notify us that they do accept the flight. They will call back for report and will be about 35 minutes after they call for report.  Direct line to them [phone]     Swetha Crowe  03/12/18 0935

## 2018-03-12 NOTE — ED NOTES
Dr. Kidd returned call at this time. Speaking with Dulce Fregoso at this time.      Skyla Rodriguez  03/12/18 7506

## 2018-03-12 NOTE — ED NOTES
[facility] called back to accept pt transfer to the [facility] and will have an eta of 14 minutes.      Swetha Crowe  03/12/18 1122

## 2018-03-12 NOTE — ED NOTES
Yahaira at [facility], whom will be pts nurse, contacted and given updated report as they requested     Caitlin Bills RN  03/12/18 0192

## 2018-03-12 NOTE — ED NOTES
Pt leaving at this time via [facility] helicopter in route to [facility]      Caitlin Bills RN  03/12/18 8110

## 2018-03-12 NOTE — ED NOTES
[facility] present at this time, report given at bedside, copy of all paperwork given and radiology disc given, pt stable, alert, oriented at this time, remains on bipap, bilat iv's in place with no abnormality noted to sites, nitro drip remains in place at 22.5ml/75mcg/hr infusing without difficulty at this time, pt voices no complaints, son at bedside in which has pt's belongings, elizabeth cath remains in place, draining without any difficulty, elizabeth was emptied prior with 2300ml urine out., care turned over to them at this time     Caitlin Bills, RN  03/12/18 7981
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
## 2018-03-12 NOTE — ED NOTES
All ambulance services declined to transport pt to [place] due to not having BIPAP, also because [facility] declined to ride with ambulance to transport pt as well.     Skyla Rodriguez  03/12/18 0325

## 2018-03-12 NOTE — ED NOTES
[facility] contacted to page cardiologist on call, Dr. Garza or whoever is on call for Dr. Garza. Transfer center said they would page cardiologist and call back shortly. Per Dulce Fregoso' request.      Skyla Rodriguez  03/12/18 0256

## 2018-03-12 NOTE — ED NOTES
Quick Updates - Quick Updates: Family at Bedside; Hourly Rounding Complete; Family Updated; Patient Updated; MD at Beside Quick Updates - Free Text: Patient lying on stretcher with side rails up x2. MD at bedside, talking with family and patient. Will continue to monitor.   Respiratory WDL - Respiratory WDL: WDL except Rhythm/Pattern, Respiratory: shortness of breath; labored  Breath Sounds - All Lung Fields Breath Sounds: diminished  Cardiac (Adult) - Cardiac WDL: WDL except Additional Documentation: Chest Pain Assessment (Group); ECG (Group)  Chest Pain Assessment - Chest Pain Location: midsternal Rating (0-10): 5 Character: pressure Chest Pain Intervention: cardiac monitoring continued  ECG - Rhythm: sinus tachycardia     Gretel Dickey RN  03/12/18 3212

## 2018-03-12 NOTE — ED NOTES
Called [facility] to give report [phone]. Spoke with [facility] who states they do not know which nurse will be getting this patient, they will call us for report.      Gretel Dickey RN  03/12/18 4517
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
## 2018-03-12 NOTE — ED NOTES
Provider at bedside performing US of pts heart at this time.     Erika Henderson RN  03/12/18 7532

## 2018-03-12 NOTE — ED NOTES
Mac from [facility] called back they are having mechanical issues with their plane so they have to decline flight. Their closest flight is about 2 and a half hours away. They state that [facility] will be available around noon if we choose to accept them.          Swetha Crowe  03/12/18 0930

## 2018-03-14 LAB — BACTERIA SPEC AEROBE CULT: NORMAL

## 2018-03-17 LAB
BACTERIA SPEC AEROBE CULT: NORMAL
BACTERIA SPEC AEROBE CULT: NORMAL

## 2018-03-20 ENCOUNTER — OFFICE VISIT (OUTPATIENT)
Dept: PULMONOLOGY | Facility: CLINIC | Age: 83
End: 2018-03-20

## 2018-03-20 VITALS
DIASTOLIC BLOOD PRESSURE: 68 MMHG | HEIGHT: 63 IN | WEIGHT: 141.2 LBS | OXYGEN SATURATION: 94 % | HEART RATE: 68 BPM | TEMPERATURE: 98 F | SYSTOLIC BLOOD PRESSURE: 151 MMHG | BODY MASS INDEX: 25.02 KG/M2

## 2018-03-20 DIAGNOSIS — J90 PLEURAL EFFUSION: ICD-10-CM

## 2018-03-20 DIAGNOSIS — J44.9 CHRONIC OBSTRUCTIVE PULMONARY DISEASE, UNSPECIFIED COPD TYPE (HCC): Primary | ICD-10-CM

## 2018-03-20 DIAGNOSIS — I50.22 CHRONIC SYSTOLIC CONGESTIVE HEART FAILURE (HCC): ICD-10-CM

## 2018-03-20 PROCEDURE — 99214 OFFICE O/P EST MOD 30 MIN: CPT | Performed by: INTERNAL MEDICINE

## 2018-03-20 RX ORDER — LEVALBUTEROL INHALATION SOLUTION 0.63 MG/3ML
3 SOLUTION RESPIRATORY (INHALATION) 4 TIMES DAILY PRN
Qty: 120 ML | Refills: 5 | Status: ON HOLD | OUTPATIENT
Start: 2018-03-20 | End: 2020-01-01
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
## 2018-04-09 RX ORDER — FUROSEMIDE 20 MG/1
20 TABLET ORAL
Qty: 45 TABLET | Refills: 0 | Status: ON HOLD | OUTPATIENT
Start: 2018-04-09 | End: 2020-01-01 | Stop reason: DRUGHIGH

## 2018-04-09 RX ORDER — CLOPIDOGREL BISULFATE 75 MG/1
75 TABLET ORAL DAILY
Qty: 90 TABLET | Refills: 0 | Status: SHIPPED | OUTPATIENT
Start: 2018-04-09

## 2018-04-09 NOTE — TELEPHONE ENCOUNTER
----- Message from Emily Cleary MD sent at 4/9/2018  2:47 PM EDT -----  Regarding: RE: Meds  It is okay  ----- Message -----  From: Catherine Morales MA  Sent: 4/9/2018  10:12 AM  To: Emily Cleary MD  Subject: Meds                                             Pharmacy called for a refill on Plavix, however its under historical provider. Wanted to make sure it was ok to refill before I sent in the RX. Is it ok?

## 2018-04-25 ENCOUNTER — LAB (OUTPATIENT)
Dept: LAB | Facility: HOSPITAL | Age: 83
End: 2018-04-25

## 2018-04-25 ENCOUNTER — TRANSCRIBE ORDERS (OUTPATIENT)
Dept: ADMINISTRATIVE | Facility: HOSPITAL | Age: 83
End: 2018-04-25

## 2018-04-25 DIAGNOSIS — I11.0 BENIGN HYPERTENSIVE HEART DISEASE WITH CONGESTIVE HEART FAILURE (HCC): Primary | ICD-10-CM

## 2018-04-25 DIAGNOSIS — I11.0 BENIGN HYPERTENSIVE HEART DISEASE WITH CONGESTIVE HEART FAILURE (HCC): ICD-10-CM

## 2018-04-25 LAB
ANION GAP SERPL CALCULATED.3IONS-SCNC: 8.2 MMOL/L (ref 3.6–11.2)
BUN BLD-MCNC: 21 MG/DL (ref 7–21)
BUN/CREAT SERPL: 18.8 (ref 7–25)
CALCIUM SPEC-SCNC: 9.4 MG/DL (ref 7.7–10)
CHLORIDE SERPL-SCNC: 105 MMOL/L (ref 99–112)
CO2 SERPL-SCNC: 22.8 MMOL/L (ref 24.3–31.9)
CREAT BLD-MCNC: 1.12 MG/DL (ref 0.43–1.29)
GFR SERPL CREATININE-BSD FRML MDRD: 45 ML/MIN/1.73
GLUCOSE BLD-MCNC: 93 MG/DL (ref 70–110)
OSMOLALITY SERPL CALC.SUM OF ELEC: 274.6 MOSM/KG (ref 273–305)
POTASSIUM BLD-SCNC: 4.8 MMOL/L (ref 3.5–5.3)
SODIUM BLD-SCNC: 136 MMOL/L (ref 135–153)

## 2018-04-25 PROCEDURE — 36415 COLL VENOUS BLD VENIPUNCTURE: CPT

## 2018-04-25 PROCEDURE — 80048 BASIC METABOLIC PNL TOTAL CA: CPT

## 2018-06-04 ENCOUNTER — APPOINTMENT (OUTPATIENT)
Dept: CT IMAGING | Facility: HOSPITAL | Age: 83
End: 2018-06-04

## 2018-06-04 ENCOUNTER — APPOINTMENT (OUTPATIENT)
Dept: GENERAL RADIOLOGY | Facility: HOSPITAL | Age: 83
End: 2018-06-04

## 2018-06-04 ENCOUNTER — HOSPITAL ENCOUNTER (EMERGENCY)
Facility: HOSPITAL | Age: 83
Discharge: HOME | End: 2018-06-05
Attending: EMERGENCY MEDICINE | Admitting: EMERGENCY MEDICINE

## 2018-06-04 DIAGNOSIS — S01.01XA LACERATION OF SCALP, INITIAL ENCOUNTER: ICD-10-CM

## 2018-06-04 DIAGNOSIS — N39.0 URINARY TRACT INFECTION WITHOUT HEMATURIA, SITE UNSPECIFIED: ICD-10-CM
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
## 2018-06-04 LAB
APTT PPP: 29.4 SECONDS (ref 23.8–36.1)
BACTERIA UR QL AUTO: ABNORMAL /HPF
BASOPHILS # BLD AUTO: 0.01 10*3/MM3 (ref 0–0.3)
BASOPHILS NFR BLD AUTO: 0.1 % (ref 0–2)
BILIRUB UR QL STRIP: NEGATIVE
CLARITY UR: CLEAR
COLOR UR: YELLOW
DEPRECATED RDW RBC AUTO: 52.1 FL (ref 37–54)
EOSINOPHIL # BLD AUTO: 0.01 10*3/MM3 (ref 0–0.7)
EOSINOPHIL NFR BLD AUTO: 0.1 % (ref 0–7)
ERYTHROCYTE [DISTWIDTH] IN BLOOD BY AUTOMATED COUNT: 17.9 % (ref 11.5–14.5)
GLUCOSE UR STRIP-MCNC: NEGATIVE MG/DL
HCT VFR BLD AUTO: 31.8 % (ref 37–47)
HGB BLD-MCNC: 10.4 G/DL (ref 12–16)
HGB UR QL STRIP.AUTO: NEGATIVE
HYALINE CASTS UR QL AUTO: ABNORMAL /LPF
IMM GRANULOCYTES # BLD: 0.02 10*3/MM3 (ref 0–0.03)
IMM GRANULOCYTES NFR BLD: 0.2 % (ref 0–0.5)
INR PPP: 1.07 (ref 0.9–1.1)
KETONES UR QL STRIP: NEGATIVE
LEUKOCYTE ESTERASE UR QL STRIP.AUTO: ABNORMAL
LYMPHOCYTES # BLD AUTO: 1.09 10*3/MM3 (ref 1–3)
LYMPHOCYTES NFR BLD AUTO: 9.7 % (ref 16–46)
MCH RBC QN AUTO: 26.6 PG (ref 27–33)
MCHC RBC AUTO-ENTMCNC: 32.7 G/DL (ref 33–37)
MCV RBC AUTO: 81.3 FL (ref 80–94)
MONOCYTES # BLD AUTO: 0.85 10*3/MM3 (ref 0.1–0.9)
MONOCYTES NFR BLD AUTO: 7.5 % (ref 0–12)
NEUTROPHILS # BLD AUTO: 9.3 10*3/MM3 (ref 1.4–6.5)
NEUTROPHILS NFR BLD AUTO: 82.4 % (ref 40–75)
NITRITE UR QL STRIP: NEGATIVE
PH UR STRIP.AUTO: 6 [PH] (ref 5–8)
PLATELET # BLD AUTO: 214 10*3/MM3 (ref 130–400)
PMV BLD AUTO: 9.9 FL (ref 6–10)
PROT UR QL STRIP: ABNORMAL
PROTHROMBIN TIME: 14.1 SECONDS (ref 11–15.4)
RBC # BLD AUTO: 3.91 10*6/MM3 (ref 4.2–5.4)
RBC # UR: ABNORMAL /HPF
REF LAB TEST METHOD: ABNORMAL
SP GR UR STRIP: 1.01 (ref 1–1.03)
SQUAMOUS #/AREA URNS HPF: ABNORMAL /HPF
UROBILINOGEN UR QL STRIP: ABNORMAL
WBC NRBC COR # BLD: 11.28 10*3/MM3 (ref 4.5–12.5)
WBC UR QL AUTO: ABNORMAL /HPF

## 2018-06-04 PROCEDURE — 85025 COMPLETE CBC W/AUTO DIFF WBC: CPT | Performed by: EMERGENCY MEDICINE

## 2018-06-04 PROCEDURE — 73502 X-RAY EXAM HIP UNI 2-3 VIEWS: CPT

## 2018-06-04 PROCEDURE — 84484 ASSAY OF TROPONIN QUANT: CPT | Performed by: EMERGENCY MEDICINE

## 2018-06-04 PROCEDURE — 80053 COMPREHEN METABOLIC PANEL: CPT | Performed by: EMERGENCY MEDICINE

## 2018-06-04 PROCEDURE — 70450 CT HEAD/BRAIN W/O DYE: CPT

## 2018-06-04 PROCEDURE — 74176 CT ABD & PELVIS W/O CONTRAST: CPT

## 2018-06-04 PROCEDURE — 83874 ASSAY OF MYOGLOBIN: CPT | Performed by: EMERGENCY MEDICINE

## 2018-06-04 PROCEDURE — 73080 X-RAY EXAM OF ELBOW: CPT | Performed by: RADIOLOGY

## 2018-06-04 PROCEDURE — 93005 ELECTROCARDIOGRAM TRACING: CPT | Performed by: EMERGENCY MEDICINE

## 2018-06-04 PROCEDURE — 85610 PROTHROMBIN TIME: CPT | Performed by: EMERGENCY MEDICINE

## 2018-06-04 PROCEDURE — 74176 CT ABD & PELVIS W/O CONTRAST: CPT | Performed by: RADIOLOGY

## 2018-06-04 PROCEDURE — 71045 X-RAY EXAM CHEST 1 VIEW: CPT

## 2018-06-04 PROCEDURE — 83690 ASSAY OF LIPASE: CPT | Performed by: EMERGENCY MEDICINE

## 2018-06-04 PROCEDURE — 99284 EMERGENCY DEPT VISIT MOD MDM: CPT

## 2018-06-04 PROCEDURE — 72131 CT LUMBAR SPINE W/O DYE: CPT

## 2018-06-04 PROCEDURE — 73080 X-RAY EXAM OF ELBOW: CPT

## 2018-06-04 PROCEDURE — 70450 CT HEAD/BRAIN W/O DYE: CPT | Performed by: RADIOLOGY

## 2018-06-04 PROCEDURE — 82550 ASSAY OF CK (CPK): CPT | Performed by: EMERGENCY MEDICINE

## 2018-06-04 PROCEDURE — 93010 ELECTROCARDIOGRAM REPORT: CPT | Performed by: INTERNAL MEDICINE

## 2018-06-04 PROCEDURE — 85730 THROMBOPLASTIN TIME PARTIAL: CPT | Performed by: EMERGENCY MEDICINE

## 2018-06-04 PROCEDURE — 81001 URINALYSIS AUTO W/SCOPE: CPT | Performed by: EMERGENCY MEDICINE

## 2018-06-04 PROCEDURE — 71045 X-RAY EXAM CHEST 1 VIEW: CPT | Performed by: RADIOLOGY

## 2018-06-04 PROCEDURE — 72125 CT NECK SPINE W/O DYE: CPT | Performed by: RADIOLOGY

## 2018-06-04 PROCEDURE — 72131 CT LUMBAR SPINE W/O DYE: CPT | Performed by: RADIOLOGY

## 2018-06-04 PROCEDURE — 72125 CT NECK SPINE W/O DYE: CPT

## 2018-06-04 PROCEDURE — 87086 URINE CULTURE/COLONY COUNT: CPT | Performed by: EMERGENCY MEDICINE

## 2018-06-04 PROCEDURE — 73502 X-RAY EXAM HIP UNI 2-3 VIEWS: CPT | Performed by: RADIOLOGY

## 2018-06-05 ENCOUNTER — APPOINTMENT (OUTPATIENT)
Dept: CT IMAGING | Facility: HOSPITAL | Age: 83
End: 2018-06-05

## 2018-06-05 VITALS
TEMPERATURE: 97.9 F | HEIGHT: 63 IN | DIASTOLIC BLOOD PRESSURE: 60 MMHG | WEIGHT: 133 LBS | OXYGEN SATURATION: 96 % | RESPIRATION RATE: 18 BRPM | SYSTOLIC BLOOD PRESSURE: 140 MMHG | BODY MASS INDEX: 23.57 KG/M2 | HEART RATE: 61 BPM

## 2018-06-05 LAB
ALBUMIN SERPL-MCNC: 4.1 G/DL (ref 3.4–4.8)
ALBUMIN/GLOB SERPL: 1.6 G/DL (ref 1.5–2.5)
ALP SERPL-CCNC: 64 U/L (ref 35–104)
ALT SERPL W P-5'-P-CCNC: 11 U/L (ref 10–36)
ANION GAP SERPL CALCULATED.3IONS-SCNC: 10 MMOL/L (ref 3.6–11.2)
AST SERPL-CCNC: 29 U/L (ref 10–30)
BILIRUB SERPL-MCNC: 0.6 MG/DL (ref 0.2–1.8)
BNP SERPL-MCNC: 383 PG/ML (ref 0–100)
BUN BLD-MCNC: 29 MG/DL (ref 7–21)
BUN/CREAT SERPL: 24 (ref 7–25)
CALCIUM SPEC-SCNC: 9.1 MG/DL (ref 7.7–10)
CHLORIDE SERPL-SCNC: 102 MMOL/L (ref 99–112)
CK SERPL-CCNC: 107 U/L (ref 24–173)
CK SERPL-CCNC: 108 U/L (ref 24–173)
CO2 SERPL-SCNC: 23 MMOL/L (ref 24.3–31.9)
CREAT BLD-MCNC: 1.21 MG/DL (ref 0.43–1.29)
GFR SERPL CREATININE-BSD FRML MDRD: 42 ML/MIN/1.73
GLOBULIN UR ELPH-MCNC: 2.6 GM/DL
GLUCOSE BLD-MCNC: 111 MG/DL (ref 70–110)
LIPASE SERPL-CCNC: 39 U/L (ref 13–60)
MYOGLOBIN SERPL-MCNC: 319 NG/ML (ref 0–109)
OSMOLALITY SERPL CALC.SUM OF ELEC: 276.6 MOSM/KG (ref 273–305)
POTASSIUM BLD-SCNC: 4.5 MMOL/L (ref 3.5–5.3)
PROT SERPL-MCNC: 6.7 G/DL (ref 6–8)
SODIUM BLD-SCNC: 135 MMOL/L (ref 135–153)
TROPONIN I SERPL-MCNC: 0.1 NG/ML
TROPONIN I SERPL-MCNC: 0.11 NG/ML

## 2018-06-05 PROCEDURE — 70450 CT HEAD/BRAIN W/O DYE: CPT

## 2018-06-05 PROCEDURE — 25010000002 TDAP 5-2.5-18.5 LF-MCG/0.5 SUSPENSION: Performed by: EMERGENCY MEDICINE

## 2018-06-05 PROCEDURE — 96361 HYDRATE IV INFUSION ADD-ON: CPT

## 2018-06-05 PROCEDURE — 90471 IMMUNIZATION ADMIN: CPT | Performed by: EMERGENCY MEDICINE

## 2018-06-05 PROCEDURE — 83880 ASSAY OF NATRIURETIC PEPTIDE: CPT | Performed by: EMERGENCY MEDICINE

## 2018-06-05 PROCEDURE — 96365 THER/PROPH/DIAG IV INF INIT: CPT

## 2018-06-05 PROCEDURE — 25010000002 CEFTRIAXONE: Performed by: EMERGENCY MEDICINE

## 2018-06-05 PROCEDURE — 82550 ASSAY OF CK (CPK): CPT | Performed by: EMERGENCY MEDICINE

## 2018-06-05 PROCEDURE — 84484 ASSAY OF TROPONIN QUANT: CPT | Performed by: EMERGENCY MEDICINE

## 2018-06-05 PROCEDURE — 70450 CT HEAD/BRAIN W/O DYE: CPT | Performed by: RADIOLOGY

## 2018-06-05 PROCEDURE — 90715 TDAP VACCINE 7 YRS/> IM: CPT | Performed by: EMERGENCY MEDICINE

## 2018-06-05 RX ORDER — ACETAMINOPHEN 500 MG
1000 TABLET ORAL ONCE
Status: COMPLETED | OUTPATIENT
Start: 2018-06-05 | End: 2018-06-05

## 2018-06-05 RX ORDER — BACITRACIN ZINC 500 [USP'U]/G
OINTMENT TOPICAL ONCE
Status: COMPLETED | OUTPATIENT
Start: 2018-06-05 | End: 2018-06-05

## 2018-06-05 RX ORDER — CEFUROXIME AXETIL 500 MG/1
500 TABLET ORAL 2 TIMES DAILY
Qty: 14 TABLET | Refills: 0 | OUTPATIENT
Start: 2018-06-05 | End: 2019-04-15

## 2018-06-05 RX ORDER — SODIUM CHLORIDE 9 MG/ML
INJECTION, SOLUTION INTRAVENOUS
Status: DISCONTINUED
Start: 2018-06-05 | End: 2018-06-05 | Stop reason: HOSPADM

## 2018-06-05 RX ADMIN — CEFTRIAXONE 1 G: 1 INJECTION, POWDER, FOR SOLUTION INTRAMUSCULAR; INTRAVENOUS at 01:02

## 2018-06-05 RX ADMIN — ACETAMINOPHEN 1000 MG: 500 TABLET ORAL at 07:45

## 2018-06-05 RX ADMIN — SODIUM CHLORIDE 1000 ML: 9 INJECTION, SOLUTION INTRAVENOUS at 00:45

## 2018-06-05 RX ADMIN — TETANUS TOXOID, REDUCED DIPHTHERIA TOXOID AND ACELLULAR PERTUSSIS VACCINE, ADSORBED 0.5 ML: 5; 2.5; 8; 8; 2.5 SUSPENSION INTRAMUSCULAR at 07:04

## 2018-06-05 RX ADMIN — BACITRACIN ZINC 1 EACH: 500 OINTMENT TOPICAL at 07:48

## 2018-06-05 RX ADMIN — ACETAMINOPHEN 1000 MG: 500 TABLET ORAL at 01:50

## 2018-06-05 NOTE — DISCHARGE INSTRUCTIONS
Read and follow all instructions in this handout  Fill and take all medications as directed.  Follow up with primary doctor or clinic in the next two days  Return to ED if symptoms persist or worsen.  Return to ED if you have any other medical concerns.  As discussed if he develop a headache, trouble walking or talking, any weakness or numbness in arms or legs, or any other stroke symptoms return number Department immediately.  If he develop any chest pain or shortness of breath return the emergency Department immediately.  Otherwise have your 3 staples removed in 10 days
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
## 2018-06-07 LAB — BACTERIA SPEC AEROBE CULT: NORMAL

## 2018-06-18 ENCOUNTER — TELEPHONE (OUTPATIENT)
Dept: CARDIAC SURGERY | Facility: CLINIC | Age: 83
End: 2018-06-18

## 2018-06-18 NOTE — TELEPHONE ENCOUNTER
Pt received recall letter [facility] for 1 yr f/u pt claimed that she does not need to be seen by our office she under the care of her cardiologist 6/18/18

## 2018-07-24 ENCOUNTER — OFFICE VISIT (OUTPATIENT)
Dept: PULMONOLOGY | Facility: CLINIC | Age: 83
End: 2018-07-24

## 2018-07-24 VITALS
BODY MASS INDEX: 23.99 KG/M2 | DIASTOLIC BLOOD PRESSURE: 68 MMHG | SYSTOLIC BLOOD PRESSURE: 157 MMHG | OXYGEN SATURATION: 97 % | HEART RATE: 61 BPM | HEIGHT: 63 IN | WEIGHT: 135.4 LBS

## 2018-07-24 DIAGNOSIS — I50.22 CHRONIC SYSTOLIC CONGESTIVE HEART FAILURE (HCC): ICD-10-CM

## 2018-07-24 DIAGNOSIS — J44.9 COPD, SEVERE (HCC): Primary | ICD-10-CM

## 2018-07-24 PROCEDURE — 99214 OFFICE O/P EST MOD 30 MIN: CPT | Performed by: INTERNAL MEDICINE

## 2018-07-24 RX ORDER — IPRATROPIUM BROMIDE AND ALBUTEROL SULFATE 2.5; .5 MG/3ML; MG/3ML
3 SOLUTION RESPIRATORY (INHALATION) EVERY 4 HOURS PRN
Qty: 120 VIAL | Refills: 11 | Status: SHIPPED | OUTPATIENT
Start: 2018-07-24 | End: 2019-01-01 | Stop reason: SDUPTHER
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
## 2018-07-30 ENCOUNTER — TELEPHONE (OUTPATIENT)
Dept: CARDIAC SURGERY | Facility: CLINIC | Age: 83
End: 2018-07-30

## 2018-08-06 ENCOUNTER — TELEPHONE (OUTPATIENT)
Dept: CARDIAC SURGERY | Facility: CLINIC | Age: 83
End: 2018-08-06

## 2018-08-06 NOTE — TELEPHONE ENCOUNTER
Pt called in regards to her recall letter. She does not wish to come back to see Dr. Montague at this time. She stated that it is a long drive for her. I let her know that if she changes her mind to please call the office.

## 2018-11-26 RX ORDER — IRON POLYSACCHARIDE COMPLEX 150 MG
CAPSULE ORAL
Qty: 90 CAPSULE | Refills: 1 | OUTPATIENT
Start: 2018-11-26

## 2018-12-28 ENCOUNTER — TRANSCRIBE ORDERS (OUTPATIENT)
Dept: ADMINISTRATIVE | Facility: HOSPITAL | Age: 83
End: 2018-12-28

## 2018-12-28 ENCOUNTER — LAB (OUTPATIENT)
Dept: LAB | Facility: HOSPITAL | Age: 83
End: 2018-12-28

## 2018-12-28 DIAGNOSIS — E78.5 HYPERLIPIDEMIA, UNSPECIFIED HYPERLIPIDEMIA TYPE: Primary | ICD-10-CM

## 2018-12-28 DIAGNOSIS — E78.5 HYPERLIPIDEMIA, UNSPECIFIED HYPERLIPIDEMIA TYPE: ICD-10-CM

## 2018-12-28 LAB
CHOLEST SERPL-MCNC: 122 MG/DL (ref 0–200)
CREAT UR-MCNC: 93 MG/DL
HDLC SERPL-MCNC: 45 MG/DL (ref 60–100)
LDLC SERPL CALC-MCNC: 65 MG/DL (ref 0–100)
LDLC/HDLC SERPL: 1.45 {RATIO}
PROT UR-MCNC: 30 MG/DL
PROT/CREAT UR: 322.6 MG/G CREA (ref 0–200)
TRIGL SERPL-MCNC: 58 MG/DL (ref 0–150)
VLDLC SERPL-MCNC: 11.6 MG/DL

## 2018-12-28 PROCEDURE — 82570 ASSAY OF URINE CREATININE: CPT

## 2018-12-28 PROCEDURE — 84156 ASSAY OF PROTEIN URINE: CPT

## 2018-12-28 PROCEDURE — 80061 LIPID PANEL: CPT

## 2018-12-28 PROCEDURE — 36415 COLL VENOUS BLD VENIPUNCTURE: CPT

## 2019-01-01 ENCOUNTER — LAB REQUISITION (OUTPATIENT)
Dept: LAB | Facility: HOSPITAL | Age: 84
End: 2019-01-01

## 2019-01-01 ENCOUNTER — OFFICE VISIT (OUTPATIENT)
Dept: PULMONOLOGY | Facility: CLINIC | Age: 84
End: 2019-01-01

## 2019-01-01 ENCOUNTER — TRANSCRIBE ORDERS (OUTPATIENT)
Dept: ADMINISTRATIVE | Facility: HOSPITAL | Age: 84
End: 2019-01-01

## 2019-01-01 ENCOUNTER — LAB (OUTPATIENT)
Dept: LAB | Facility: HOSPITAL | Age: 84
End: 2019-01-01

## 2019-01-01 VITALS
DIASTOLIC BLOOD PRESSURE: 64 MMHG | HEART RATE: 88 BPM | TEMPERATURE: 98 F | SYSTOLIC BLOOD PRESSURE: 102 MMHG | WEIGHT: 137.6 LBS | OXYGEN SATURATION: 98 % | HEIGHT: 63 IN | BODY MASS INDEX: 24.38 KG/M2

## 2019-01-01 DIAGNOSIS — I10 ESSENTIAL (PRIMARY) HYPERTENSION: ICD-10-CM

## 2019-01-01 DIAGNOSIS — J30.2 SEASONAL ALLERGIES: Primary | ICD-10-CM

## 2019-01-01 DIAGNOSIS — J43.2 CENTRILOBULAR EMPHYSEMA (HCC): Primary | ICD-10-CM

## 2019-01-01 DIAGNOSIS — D64.9 ANEMIA: ICD-10-CM

## 2019-01-01 DIAGNOSIS — R60.9 EDEMA: ICD-10-CM

## 2019-01-01 DIAGNOSIS — I50.9 HEART FAILURE, UNSPECIFIED HF CHRONICITY, UNSPECIFIED HEART FAILURE TYPE (HCC): ICD-10-CM

## 2019-01-01 DIAGNOSIS — I25.10 ATHEROSCLEROTIC HEART DISEASE OF NATIVE CORONARY ARTERY WITHOUT ANGINA PECTORIS: ICD-10-CM

## 2019-01-01 DIAGNOSIS — I50.33 ACUTE ON CHRONIC DIASTOLIC CONGESTIVE HEART FAILURE (HCC): ICD-10-CM

## 2019-01-01 DIAGNOSIS — I50.9 HEART FAILURE, UNSPECIFIED HF CHRONICITY, UNSPECIFIED HEART FAILURE TYPE (HCC): Primary | ICD-10-CM

## 2019-01-01 DIAGNOSIS — J44.9 CHRONIC OBSTRUCTIVE PULMONARY DISEASE (HCC): ICD-10-CM

## 2019-01-01 LAB
ALBUMIN SERPL-MCNC: 3.37 G/DL (ref 3.5–5.2)
ALBUMIN SERPL-MCNC: 3.38 G/DL (ref 3.5–5.2)
ALBUMIN SERPL-MCNC: 3.41 G/DL (ref 3.5–5.2)
ALBUMIN SERPL-MCNC: 3.44 G/DL (ref 3.5–5.2)
ALBUMIN/GLOB SERPL: 1.3 G/DL
ALBUMIN/GLOB SERPL: 1.4 G/DL
ALP SERPL-CCNC: 49 U/L (ref 39–117)
ALP SERPL-CCNC: 50 U/L (ref 39–117)
ALP SERPL-CCNC: 53 U/L (ref 39–117)
ALP SERPL-CCNC: 54 U/L (ref 39–117)
ALT SERPL W P-5'-P-CCNC: 11 U/L (ref 1–33)
ALT SERPL W P-5'-P-CCNC: 13 U/L (ref 1–33)
ALT SERPL W P-5'-P-CCNC: 9 U/L (ref 1–33)
ALT SERPL W P-5'-P-CCNC: 9 U/L (ref 1–33)
ANION GAP SERPL CALCULATED.3IONS-SCNC: 11.1 MMOL/L (ref 5–15)
ANION GAP SERPL CALCULATED.3IONS-SCNC: 11.9 MMOL/L (ref 5–15)
ANION GAP SERPL CALCULATED.3IONS-SCNC: 12.3 MMOL/L (ref 5–15)
ANION GAP SERPL CALCULATED.3IONS-SCNC: 13.8 MMOL/L (ref 5–15)
ANION GAP SERPL CALCULATED.3IONS-SCNC: 13.8 MMOL/L (ref 5–15)
AST SERPL-CCNC: 14 U/L (ref 1–32)
AST SERPL-CCNC: 14 U/L (ref 1–32)
AST SERPL-CCNC: 15 U/L (ref 1–32)
AST SERPL-CCNC: 18 U/L (ref 1–32)
BASOPHILS # BLD AUTO: 0.01 10*3/MM3 (ref 0–0.2)
BASOPHILS # BLD AUTO: 0.02 10*3/MM3 (ref 0–0.2)
BASOPHILS NFR BLD AUTO: 0.1 % (ref 0–1.5)
BASOPHILS NFR BLD AUTO: 0.2 % (ref 0–1.5)
BASOPHILS NFR BLD AUTO: 0.3 % (ref 0–1.5)
BILIRUB SERPL-MCNC: 0.2 MG/DL (ref 0.2–1.2)
BILIRUB SERPL-MCNC: 0.2 MG/DL (ref 0.2–1.2)
BILIRUB SERPL-MCNC: 0.3 MG/DL (ref 0.2–1.2)
BILIRUB SERPL-MCNC: 0.3 MG/DL (ref 0.2–1.2)
BUN BLD-MCNC: 32 MG/DL (ref 8–23)
BUN BLD-MCNC: 38 MG/DL (ref 8–23)
BUN BLD-MCNC: 41 MG/DL (ref 8–23)
BUN BLD-MCNC: 44 MG/DL (ref 8–23)
BUN BLD-MCNC: 44 MG/DL (ref 8–23)
BUN/CREAT SERPL: 21.8 (ref 7–25)
BUN/CREAT SERPL: 25.7 (ref 7–25)
BUN/CREAT SERPL: 26.7 (ref 7–25)
BUN/CREAT SERPL: 28.3 (ref 7–25)
BUN/CREAT SERPL: 33.1 (ref 7–25)
CALCIUM SPEC-SCNC: 8.4 MG/DL (ref 8.2–9.6)
CALCIUM SPEC-SCNC: 8.6 MG/DL (ref 8.2–9.6)
CALCIUM SPEC-SCNC: 8.7 MG/DL (ref 8.2–9.6)
CALCIUM SPEC-SCNC: 8.9 MG/DL (ref 8.2–9.6)
CALCIUM SPEC-SCNC: 9.3 MG/DL (ref 8.2–9.6)
CHLORIDE SERPL-SCNC: 104 MMOL/L (ref 98–107)
CHLORIDE SERPL-SCNC: 105 MMOL/L (ref 98–107)
CHLORIDE SERPL-SCNC: 105 MMOL/L (ref 98–107)
CHLORIDE SERPL-SCNC: 98 MMOL/L (ref 98–107)
CHLORIDE SERPL-SCNC: 99 MMOL/L (ref 98–107)
CHOLEST SERPL-MCNC: 116 MG/DL (ref 0–200)
CO2 SERPL-SCNC: 21.9 MMOL/L (ref 22–29)
CO2 SERPL-SCNC: 22.7 MMOL/L (ref 22–29)
CO2 SERPL-SCNC: 23.1 MMOL/L (ref 22–29)
CO2 SERPL-SCNC: 24.2 MMOL/L (ref 22–29)
CO2 SERPL-SCNC: 26.2 MMOL/L (ref 22–29)
CREAT BLD-MCNC: 1.33 MG/DL (ref 0.57–1)
CREAT BLD-MCNC: 1.45 MG/DL (ref 0.57–1)
CREAT BLD-MCNC: 1.47 MG/DL (ref 0.57–1)
CREAT BLD-MCNC: 1.48 MG/DL (ref 0.57–1)
CREAT BLD-MCNC: 1.65 MG/DL (ref 0.57–1)
DEPRECATED RDW RBC AUTO: 49.5 FL (ref 37–54)
DEPRECATED RDW RBC AUTO: 49.6 FL (ref 37–54)
DEPRECATED RDW RBC AUTO: 50.1 FL (ref 37–54)
DEPRECATED RDW RBC AUTO: 50.6 FL (ref 37–54)
DEPRECATED RDW RBC AUTO: 51.4 FL (ref 37–54)
EOSINOPHIL # BLD AUTO: 0.12 10*3/MM3 (ref 0–0.4)
EOSINOPHIL # BLD AUTO: 0.18 10*3/MM3 (ref 0–0.4)
EOSINOPHIL # BLD AUTO: 0.18 10*3/MM3 (ref 0–0.4)
EOSINOPHIL # BLD AUTO: 0.19 10*3/MM3 (ref 0–0.4)
EOSINOPHIL # BLD AUTO: 0.22 10*3/MM3 (ref 0–0.4)
EOSINOPHIL NFR BLD AUTO: 2 % (ref 0.3–6.2)
EOSINOPHIL NFR BLD AUTO: 2.3 % (ref 0.3–6.2)
EOSINOPHIL NFR BLD AUTO: 3.4 % (ref 0.3–6.2)
EOSINOPHIL NFR BLD AUTO: 3.5 % (ref 0.3–6.2)
EOSINOPHIL NFR BLD AUTO: 3.8 % (ref 0.3–6.2)
ERYTHROCYTE [DISTWIDTH] IN BLOOD BY AUTOMATED COUNT: 15.2 % (ref 12.3–15.4)
ERYTHROCYTE [DISTWIDTH] IN BLOOD BY AUTOMATED COUNT: 15.3 % (ref 12.3–15.4)
ERYTHROCYTE [DISTWIDTH] IN BLOOD BY AUTOMATED COUNT: 15.4 % (ref 12.3–15.4)
ERYTHROCYTE [DISTWIDTH] IN BLOOD BY AUTOMATED COUNT: 15.5 % (ref 12.3–15.4)
ERYTHROCYTE [DISTWIDTH] IN BLOOD BY AUTOMATED COUNT: 15.9 % (ref 12.3–15.4)
GFR SERPL CREATININE-BSD FRML MDRD: 29 ML/MIN/1.73
GFR SERPL CREATININE-BSD FRML MDRD: 33 ML/MIN/1.73
GFR SERPL CREATININE-BSD FRML MDRD: 33 ML/MIN/1.73
GFR SERPL CREATININE-BSD FRML MDRD: 34 ML/MIN/1.73
GFR SERPL CREATININE-BSD FRML MDRD: 37 ML/MIN/1.73
GLOBULIN UR ELPH-MCNC: 2.3 GM/DL
GLOBULIN UR ELPH-MCNC: 2.5 GM/DL
GLUCOSE BLD-MCNC: 100 MG/DL (ref 65–99)
GLUCOSE BLD-MCNC: 105 MG/DL (ref 65–99)
GLUCOSE BLD-MCNC: 91 MG/DL (ref 65–99)
GLUCOSE BLD-MCNC: 94 MG/DL (ref 65–99)
GLUCOSE BLD-MCNC: 96 MG/DL (ref 65–99)
HCT VFR BLD AUTO: 29.2 % (ref 34–46.6)
HCT VFR BLD AUTO: 30.1 % (ref 34–46.6)
HCT VFR BLD AUTO: 30.3 % (ref 34–46.6)
HCT VFR BLD AUTO: 30.6 % (ref 34–46.6)
HCT VFR BLD AUTO: 33.8 % (ref 34–46.6)
HDLC SERPL-MCNC: 47 MG/DL (ref 40–60)
HGB BLD-MCNC: 11.1 G/DL (ref 12–15.9)
HGB BLD-MCNC: 9.4 G/DL (ref 12–15.9)
HGB BLD-MCNC: 9.6 G/DL (ref 12–15.9)
HGB BLD-MCNC: 9.8 G/DL (ref 12–15.9)
HGB BLD-MCNC: 9.8 G/DL (ref 12–15.9)
IMM GRANULOCYTES # BLD AUTO: 0 10*3/MM3 (ref 0–0.05)
IMM GRANULOCYTES # BLD AUTO: 0.01 10*3/MM3 (ref 0–0.05)
IMM GRANULOCYTES # BLD AUTO: 0.01 10*3/MM3 (ref 0–0.05)
IMM GRANULOCYTES # BLD AUTO: 0.02 10*3/MM3 (ref 0–0.05)
IMM GRANULOCYTES # BLD AUTO: 0.04 10*3/MM3 (ref 0–0.05)
IMM GRANULOCYTES NFR BLD AUTO: 0 % (ref 0–0.5)
IMM GRANULOCYTES NFR BLD AUTO: 0.2 % (ref 0–0.5)
IMM GRANULOCYTES NFR BLD AUTO: 0.2 % (ref 0–0.5)
IMM GRANULOCYTES NFR BLD AUTO: 0.4 % (ref 0–0.5)
IMM GRANULOCYTES NFR BLD AUTO: 0.4 % (ref 0–0.5)
IRON 24H UR-MRATE: 16 MCG/DL (ref 37–145)
IRON 24H UR-MRATE: 61 MCG/DL (ref 37–145)
IRON SATN MFR SERPL: 24 % (ref 20–50)
IRON SATN MFR SERPL: 7 % (ref 20–50)
LDLC SERPL CALC-MCNC: 60 MG/DL (ref 0–100)
LDLC/HDLC SERPL: 1.29 {RATIO}
LYMPHOCYTES # BLD AUTO: 1.54 10*3/MM3 (ref 0.7–3.1)
LYMPHOCYTES # BLD AUTO: 1.73 10*3/MM3 (ref 0.7–3.1)
LYMPHOCYTES # BLD AUTO: 1.75 10*3/MM3 (ref 0.7–3.1)
LYMPHOCYTES # BLD AUTO: 1.89 10*3/MM3 (ref 0.7–3.1)
LYMPHOCYTES # BLD AUTO: 1.94 10*3/MM3 (ref 0.7–3.1)
LYMPHOCYTES NFR BLD AUTO: 21.2 % (ref 19.6–45.3)
LYMPHOCYTES NFR BLD AUTO: 29.7 % (ref 19.6–45.3)
LYMPHOCYTES NFR BLD AUTO: 31.3 % (ref 19.6–45.3)
LYMPHOCYTES NFR BLD AUTO: 33 % (ref 19.6–45.3)
LYMPHOCYTES NFR BLD AUTO: 34.1 % (ref 19.6–45.3)
MCH RBC QN AUTO: 28.5 PG (ref 26.6–33)
MCH RBC QN AUTO: 28.7 PG (ref 26.6–33)
MCH RBC QN AUTO: 28.7 PG (ref 26.6–33)
MCH RBC QN AUTO: 28.8 PG (ref 26.6–33)
MCH RBC QN AUTO: 28.9 PG (ref 26.6–33)
MCHC RBC AUTO-ENTMCNC: 31.7 G/DL (ref 31.5–35.7)
MCHC RBC AUTO-ENTMCNC: 32 G/DL (ref 31.5–35.7)
MCHC RBC AUTO-ENTMCNC: 32.2 G/DL (ref 31.5–35.7)
MCHC RBC AUTO-ENTMCNC: 32.6 G/DL (ref 31.5–35.7)
MCHC RBC AUTO-ENTMCNC: 32.8 G/DL (ref 31.5–35.7)
MCV RBC AUTO: 88 FL (ref 79–97)
MCV RBC AUTO: 88 FL (ref 79–97)
MCV RBC AUTO: 89 FL (ref 79–97)
MCV RBC AUTO: 89.6 FL (ref 79–97)
MCV RBC AUTO: 90.7 FL (ref 79–97)
MONOCYTES # BLD AUTO: 0.78 10*3/MM3 (ref 0.1–0.9)
MONOCYTES # BLD AUTO: 0.84 10*3/MM3 (ref 0.1–0.9)
MONOCYTES # BLD AUTO: 0.89 10*3/MM3 (ref 0.1–0.9)
MONOCYTES # BLD AUTO: 0.95 10*3/MM3 (ref 0.1–0.9)
MONOCYTES # BLD AUTO: 1.58 10*3/MM3 (ref 0.1–0.9)
MONOCYTES NFR BLD AUTO: 14.1 % (ref 5–12)
MONOCYTES NFR BLD AUTO: 16.2 % (ref 5–12)
MONOCYTES NFR BLD AUTO: 16.6 % (ref 5–12)
MONOCYTES NFR BLD AUTO: 17.3 % (ref 5–12)
MONOCYTES NFR BLD AUTO: 17.3 % (ref 5–12)
NEUTROPHILS # BLD AUTO: 2.29 10*3/MM3 (ref 1.7–7)
NEUTROPHILS # BLD AUTO: 2.63 10*3/MM3 (ref 1.7–7)
NEUTROPHILS # BLD AUTO: 2.65 10*3/MM3 (ref 1.7–7)
NEUTROPHILS # BLD AUTO: 2.81 10*3/MM3 (ref 1.7–7)
NEUTROPHILS # BLD AUTO: 5.4 10*3/MM3 (ref 1.7–7)
NEUTROPHILS NFR BLD AUTO: 44.7 % (ref 42.7–76)
NEUTROPHILS NFR BLD AUTO: 46.1 % (ref 42.7–76)
NEUTROPHILS NFR BLD AUTO: 51 % (ref 42.7–76)
NEUTROPHILS NFR BLD AUTO: 51.2 % (ref 42.7–76)
NEUTROPHILS NFR BLD AUTO: 59 % (ref 42.7–76)
NT-PROBNP SERPL-MCNC: 2258 PG/ML (ref 5–1800)
NT-PROBNP SERPL-MCNC: 2415 PG/ML (ref 5–1800)
NT-PROBNP SERPL-MCNC: 3622 PG/ML (ref 5–1800)
PLATELET # BLD AUTO: 216 10*3/MM3 (ref 140–450)
PLATELET # BLD AUTO: 229 10*3/MM3 (ref 140–450)
PLATELET # BLD AUTO: 231 10*3/MM3 (ref 140–450)
PLATELET # BLD AUTO: 268 10*3/MM3 (ref 140–450)
PLATELET # BLD AUTO: 289 10*3/MM3 (ref 140–450)
PMV BLD AUTO: 10.4 FL (ref 6–12)
PMV BLD AUTO: 10.5 FL (ref 6–12)
PMV BLD AUTO: 10.7 FL (ref 6–12)
PMV BLD AUTO: 10.7 FL (ref 6–12)
PMV BLD AUTO: 10.8 FL (ref 6–12)
POTASSIUM BLD-SCNC: 4.9 MMOL/L (ref 3.5–5.2)
POTASSIUM BLD-SCNC: 4.9 MMOL/L (ref 3.5–5.2)
POTASSIUM BLD-SCNC: 5.1 MMOL/L (ref 3.5–5.2)
POTASSIUM BLD-SCNC: 5.1 MMOL/L (ref 3.5–5.2)
POTASSIUM BLD-SCNC: 5.4 MMOL/L (ref 3.5–5.2)
PROT SERPL-MCNC: 5.7 G/DL (ref 6–8.5)
PROT SERPL-MCNC: 5.9 G/DL (ref 6–8.5)
RBC # BLD AUTO: 3.26 10*6/MM3 (ref 3.77–5.28)
RBC # BLD AUTO: 3.34 10*6/MM3 (ref 3.77–5.28)
RBC # BLD AUTO: 3.42 10*6/MM3 (ref 3.77–5.28)
RBC # BLD AUTO: 3.44 10*6/MM3 (ref 3.77–5.28)
RBC # BLD AUTO: 3.84 10*6/MM3 (ref 3.77–5.28)
SODIUM BLD-SCNC: 137 MMOL/L (ref 136–145)
SODIUM BLD-SCNC: 138 MMOL/L (ref 136–145)
SODIUM BLD-SCNC: 138 MMOL/L (ref 136–145)
SODIUM BLD-SCNC: 139 MMOL/L (ref 136–145)
SODIUM BLD-SCNC: 140 MMOL/L (ref 136–145)
TIBC SERPL-MCNC: 243 MCG/DL (ref 298–536)
TIBC SERPL-MCNC: 258 MCG/DL (ref 298–536)
TRANSFERRIN SERPL-MCNC: 163 MG/DL (ref 200–360)
TRANSFERRIN SERPL-MCNC: 173 MG/DL (ref 200–360)
TRIGL SERPL-MCNC: 43 MG/DL (ref 0–150)
VLDLC SERPL-MCNC: 8.6 MG/DL
WBC NRBC COR # BLD: 5.13 10*3/MM3 (ref 3.4–10.8)
WBC NRBC COR # BLD: 5.18 10*3/MM3 (ref 3.4–10.8)
WBC NRBC COR # BLD: 5.52 10*3/MM3 (ref 3.4–10.8)
WBC NRBC COR # BLD: 5.72 10*3/MM3 (ref 3.4–10.8)
WBC NRBC COR # BLD: 9.15 10*3/MM3 (ref 3.4–10.8)

## 2019-01-01 PROCEDURE — 80053 COMPREHEN METABOLIC PANEL: CPT | Performed by: NURSE PRACTITIONER

## 2019-01-01 PROCEDURE — 84466 ASSAY OF TRANSFERRIN: CPT | Performed by: INTERNAL MEDICINE

## 2019-01-01 PROCEDURE — 80061 LIPID PANEL: CPT | Performed by: NURSE PRACTITIONER

## 2019-01-01 PROCEDURE — 83880 ASSAY OF NATRIURETIC PEPTIDE: CPT | Performed by: INTERNAL MEDICINE

## 2019-01-01 PROCEDURE — 80048 BASIC METABOLIC PNL TOTAL CA: CPT

## 2019-01-01 PROCEDURE — 99213 OFFICE O/P EST LOW 20 MIN: CPT | Performed by: INTERNAL MEDICINE

## 2019-01-01 PROCEDURE — 80053 COMPREHEN METABOLIC PANEL: CPT | Performed by: INTERNAL MEDICINE

## 2019-01-01 PROCEDURE — 94618 PULMONARY STRESS TESTING: CPT | Performed by: INTERNAL MEDICINE

## 2019-01-01 PROCEDURE — 85025 COMPLETE CBC W/AUTO DIFF WBC: CPT | Performed by: INTERNAL MEDICINE

## 2019-01-01 PROCEDURE — 36415 COLL VENOUS BLD VENIPUNCTURE: CPT

## 2019-01-01 PROCEDURE — 83540 ASSAY OF IRON: CPT | Performed by: INTERNAL MEDICINE

## 2019-01-01 PROCEDURE — 85025 COMPLETE CBC W/AUTO DIFF WBC: CPT | Performed by: NURSE PRACTITIONER

## 2019-01-01 RX ORDER — IRON POLYSACCHARIDE COMPLEX 150 MG
CAPSULE ORAL
Qty: 30 CAPSULE | Refills: 0 | Status: ON HOLD | OUTPATIENT
Start: 2019-01-01 | End: 2020-01-01 | Stop reason: DRUGHIGH

## 2019-01-01 RX ORDER — IPRATROPIUM BROMIDE AND ALBUTEROL SULFATE 2.5; .5 MG/3ML; MG/3ML
3 SOLUTION RESPIRATORY (INHALATION) EVERY 4 HOURS PRN
Qty: 120 VIAL | Refills: 11 | Status: ON HOLD | OUTPATIENT
Start: 2019-01-01 | End: 2020-01-01

## 2019-02-06 ENCOUNTER — HOSPITAL ENCOUNTER (EMERGENCY)
Facility: HOSPITAL | Age: 84
Discharge: ANOTHER HOSPITAL | End: 2019-02-06
Attending: FAMILY MEDICINE | Admitting: FAMILY MEDICINE

## 2019-02-06 ENCOUNTER — APPOINTMENT (OUTPATIENT)
Dept: GENERAL RADIOLOGY | Facility: HOSPITAL | Age: 84
End: 2019-02-06

## 2019-02-06 VITALS
OXYGEN SATURATION: 91 % | HEIGHT: 63 IN | DIASTOLIC BLOOD PRESSURE: 86 MMHG | TEMPERATURE: 97.5 F | HEART RATE: 67 BPM | RESPIRATION RATE: 16 BRPM | WEIGHT: 133 LBS | SYSTOLIC BLOOD PRESSURE: 169 MMHG | BODY MASS INDEX: 23.57 KG/M2

## 2019-02-06 DIAGNOSIS — I20.0 UNSTABLE ANGINA (HCC): Primary | ICD-10-CM

## 2019-02-06 LAB
ALBUMIN SERPL-MCNC: 3.9 G/DL (ref 3.4–4.8)
ALBUMIN/GLOB SERPL: 1.6 G/DL (ref 1.5–2.5)
ALP SERPL-CCNC: 59 U/L (ref 35–104)
ALT SERPL W P-5'-P-CCNC: 7 U/L (ref 10–36)
ANION GAP SERPL CALCULATED.3IONS-SCNC: 8 MMOL/L (ref 3.6–11.2)
APTT PPP: 28.3 SECONDS (ref 23.8–36.1)
AST SERPL-CCNC: 18 U/L (ref 10–30)
BACTERIA UR QL AUTO: NORMAL /HPF
BASOPHILS # BLD AUTO: 0.01 10*3/MM3 (ref 0–0.3)
BASOPHILS NFR BLD AUTO: 0.1 % (ref 0–2)
BILIRUB SERPL-MCNC: 0.3 MG/DL (ref 0.2–1.8)
BILIRUB UR QL STRIP: NEGATIVE
BNP SERPL-MCNC: 280 PG/ML (ref 0–100)
BUN BLD-MCNC: 29 MG/DL (ref 7–21)
BUN/CREAT SERPL: 19.2 (ref 7–25)
CALCIUM SPEC-SCNC: 8.8 MG/DL (ref 7.7–10)
CHLORIDE SERPL-SCNC: 101 MMOL/L (ref 99–112)
CLARITY UR: ABNORMAL
CO2 SERPL-SCNC: 25 MMOL/L (ref 24.3–31.9)
COLOR UR: YELLOW
CREAT BLD-MCNC: 1.51 MG/DL (ref 0.43–1.29)
CRP SERPL-MCNC: <0.5 MG/DL (ref 0–0.99)
DEPRECATED RDW RBC AUTO: 47.1 FL (ref 37–54)
EOSINOPHIL # BLD AUTO: 0.13 10*3/MM3 (ref 0–0.7)
EOSINOPHIL NFR BLD AUTO: 1.8 % (ref 0–7)
ERYTHROCYTE [DISTWIDTH] IN BLOOD BY AUTOMATED COUNT: 14.9 % (ref 11.5–14.5)
GFR SERPL CREATININE-BSD FRML MDRD: 32 ML/MIN/1.73
GLOBULIN UR ELPH-MCNC: 2.4 GM/DL
GLUCOSE BLD-MCNC: 109 MG/DL (ref 70–110)
GLUCOSE UR STRIP-MCNC: NEGATIVE MG/DL
HCT VFR BLD AUTO: 33 % (ref 37–47)
HGB BLD-MCNC: 10.4 G/DL (ref 12–16)
HGB UR QL STRIP.AUTO: NEGATIVE
HYALINE CASTS UR QL AUTO: NORMAL /LPF
IMM GRANULOCYTES # BLD AUTO: 0.01 10*3/MM3 (ref 0–0.03)
IMM GRANULOCYTES NFR BLD AUTO: 0.1 % (ref 0–0.5)
INR PPP: 1.03 (ref 0.9–1.1)
KETONES UR QL STRIP: NEGATIVE
LEUKOCYTE ESTERASE UR QL STRIP.AUTO: ABNORMAL
LIPASE SERPL-CCNC: 38 U/L (ref 13–60)
LYMPHOCYTES # BLD AUTO: 1.46 10*3/MM3 (ref 1–3)
LYMPHOCYTES NFR BLD AUTO: 20 % (ref 16–46)
MCH RBC QN AUTO: 27.7 PG (ref 27–33)
MCHC RBC AUTO-ENTMCNC: 31.5 G/DL (ref 33–37)
MCV RBC AUTO: 88 FL (ref 80–94)
MONOCYTES # BLD AUTO: 0.63 10*3/MM3 (ref 0.1–0.9)
MONOCYTES NFR BLD AUTO: 8.6 % (ref 0–12)
NEUTROPHILS # BLD AUTO: 5.06 10*3/MM3 (ref 1.4–6.5)
NEUTROPHILS NFR BLD AUTO: 69.4 % (ref 40–75)
NITRITE UR QL STRIP: NEGATIVE
OSMOLALITY SERPL CALC.SUM OF ELEC: 274.7 MOSM/KG (ref 273–305)
PH UR STRIP.AUTO: 7 [PH] (ref 5–8)
PLATELET # BLD AUTO: 214 10*3/MM3 (ref 130–400)
PMV BLD AUTO: 10 FL (ref 6–10)
POTASSIUM BLD-SCNC: 4.8 MMOL/L (ref 3.5–5.3)
PROT SERPL-MCNC: 6.3 G/DL (ref 6–8)
PROT UR QL STRIP: NEGATIVE
PROTHROMBIN TIME: 13.7 SECONDS (ref 11–15.4)
RBC # BLD AUTO: 3.75 10*6/MM3 (ref 4.2–5.4)
RBC # UR: NORMAL /HPF
REF LAB TEST METHOD: NORMAL
SODIUM BLD-SCNC: 134 MMOL/L (ref 135–153)
SP GR UR STRIP: 1.01 (ref 1–1.03)
SQUAMOUS #/AREA URNS HPF: NORMAL /HPF
TROPONIN I SERPL-MCNC: 0.06 NG/ML
TROPONIN I SERPL-MCNC: 0.07 NG/ML
TROPONIN I SERPL-MCNC: 0.09 NG/ML
UROBILINOGEN UR QL STRIP: ABNORMAL
WBC NRBC COR # BLD: 7.3 10*3/MM3 (ref 4.5–12.5)
WBC UR QL AUTO: NORMAL /HPF

## 2019-02-06 PROCEDURE — 81001 URINALYSIS AUTO W/SCOPE: CPT | Performed by: FAMILY MEDICINE

## 2019-02-06 PROCEDURE — 85610 PROTHROMBIN TIME: CPT | Performed by: FAMILY MEDICINE

## 2019-02-06 PROCEDURE — 85025 COMPLETE CBC W/AUTO DIFF WBC: CPT | Performed by: FAMILY MEDICINE

## 2019-02-06 PROCEDURE — 83880 ASSAY OF NATRIURETIC PEPTIDE: CPT | Performed by: FAMILY MEDICINE

## 2019-02-06 PROCEDURE — 84484 ASSAY OF TROPONIN QUANT: CPT | Performed by: FAMILY MEDICINE

## 2019-02-06 PROCEDURE — 99285 EMERGENCY DEPT VISIT HI MDM: CPT

## 2019-02-06 PROCEDURE — 93010 ELECTROCARDIOGRAM REPORT: CPT | Performed by: INTERNAL MEDICINE

## 2019-02-06 PROCEDURE — 83690 ASSAY OF LIPASE: CPT | Performed by: FAMILY MEDICINE

## 2019-02-06 PROCEDURE — 86140 C-REACTIVE PROTEIN: CPT | Performed by: FAMILY MEDICINE

## 2019-02-06 PROCEDURE — 93005 ELECTROCARDIOGRAM TRACING: CPT | Performed by: FAMILY MEDICINE

## 2019-02-06 PROCEDURE — 85730 THROMBOPLASTIN TIME PARTIAL: CPT | Performed by: FAMILY MEDICINE

## 2019-02-06 PROCEDURE — 96360 HYDRATION IV INFUSION INIT: CPT

## 2019-02-06 PROCEDURE — 80053 COMPREHEN METABOLIC PANEL: CPT | Performed by: FAMILY MEDICINE

## 2019-02-06 PROCEDURE — 36415 COLL VENOUS BLD VENIPUNCTURE: CPT

## 2019-02-06 RX ORDER — ASPIRIN 81 MG/1
243 TABLET, CHEWABLE ORAL ONCE
Status: COMPLETED | OUTPATIENT
Start: 2019-02-06 | End: 2019-02-06

## 2019-02-06 RX ORDER — SODIUM CHLORIDE 0.9 % (FLUSH) 0.9 %
10 SYRINGE (ML) INJECTION AS NEEDED
Status: DISCONTINUED | OUTPATIENT
Start: 2019-02-06 | End: 2019-02-06 | Stop reason: HOSPADM

## 2019-02-06 RX ORDER — SODIUM CHLORIDE 9 MG/ML
125 INJECTION, SOLUTION INTRAVENOUS CONTINUOUS
Status: DISCONTINUED | OUTPATIENT
Start: 2019-02-06 | End: 2019-02-06 | Stop reason: HOSPADM

## 2019-02-06 RX ADMIN — ASPIRIN 243 MG: 81 TABLET, CHEWABLE ORAL at 05:05

## 2019-02-06 RX ADMIN — NITROGLYCERIN 1 INCH: 20 OINTMENT TOPICAL at 03:34

## 2019-02-06 RX ADMIN — SODIUM CHLORIDE 125 ML/HR: 9 INJECTION, SOLUTION INTRAVENOUS at 02:49

## 2019-02-06 NOTE — ED NOTES
Isha called back with [employer], they declined due to weather in [place].      Symes, Heather  02/06/19 0501
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
## 2019-02-06 NOTE — ED NOTES
Called [facility], Lan has not returned our call. Marco A, stated that she did talked to [facility], He did not give her a reason for the denial, and that he was suppose to call us back.      Marcia Marcial  02/06/19 0805

## 2019-02-06 NOTE — ED NOTES
Report given to [facility]. VSS. Pt A&O x4. Belonging sent with family. IV patent. SR on the monitor. Pt denies any CP at this time.     Jaleesa Rosas RN  02/06/19 8195

## 2019-02-06 NOTE — ED NOTES
Called [facility] to see if they can take the patient to [facility] in [place]. Waiting on a call back.       Marcia Marcial  02/06/19 0802

## 2019-02-06 NOTE — ED NOTES
Called [facility], spoke with Carson for transfer. He states that they cant do anything till after shift change.      Symes, Heather  02/06/19 0514

## 2019-02-06 NOTE — ED NOTES
[facility] is here to take patient to [facility].      Marcia Marcial  02/06/19 0990

## 2019-02-06 NOTE — ED NOTES
Venkatesh [facility] called back with [facility]. He states transfer can not be done till after shift change. Advised I will call back if transport is needed.      Symes, Heather  02/06/19 7670

## 2019-02-06 NOTE — ED NOTES
[employer] Called back and advised us that they do not have any ALS trucks at this time.      Marcia Marcial  02/06/19 0804

## 2019-02-06 NOTE — ED NOTES
Jean RIVERA spoke with [facility]. Dr. Ugalde is on the line with Dr. Duron for transfer.      Symes, Heather  02/06/19 6797

## 2019-02-06 NOTE — ED NOTES
"[facility] called back and stated \"Just wanted to let you know that [facility] denied the run.\" Did not know the reason. Dr. Ugalde is made aware. Called back to Have Brandon call us to why he denied the run.      Marcia Marcial  02/06/19 0802    "

## 2019-02-06 NOTE — ED NOTES
Called [facility] for possible transfer. They are suppose to call me back.      Marcia Marcial  02/06/19 0800

## 2019-02-06 NOTE — ED NOTES
Called [facility] in [place], spoke with Alem. Dr. Ugalde is on the line with Dr. Leal.       Symes, Heather  02/06/19 5371

## 2019-02-06 NOTE — ED NOTES
Called [facility] for an update on truck situation. I was advised that they are on their way at this time. Nurse is made aware.      Marcia Marcial  02/06/19 0835

## 2019-02-06 NOTE — ED NOTES
Called [facility] back, no one has returned my call, asked if they had heard anything about the run. I was advised that they had not heard from the [facility] yet, but will call it again over the radio.      Marcia Marcial  02/06/19 0803

## 2019-02-06 NOTE — ED NOTES
Anisha, from [facility] called back and spoke to Dr. Ugalde. Dr. Ugalde informed her on what was going on with the patient and why she is needing to go to [facility]. Explained to her that this is the only facility that does the procedures that the patient is needing. Anisha advised to give her some time and she will get a crew together. Will call back for any updates      Marcia Marcial  02/06/19 0807

## 2019-02-06 NOTE — ED NOTES
Tried to call [facility] multiple times. Phone does not ring, it is just a busy signal.      Symes, Heather  02/06/19 0513

## 2019-02-06 NOTE — ED NOTES
Called [facility] spoke with Ky. Advised of transfer. He will have [facility] call us back.      Symes, Heather  02/06/19 3729

## 2019-02-06 NOTE — ED NOTES
Called [facility], in [facility], to inform them that the patient is now on her way.      Marcia Marcial  02/06/19 0959

## 2019-02-06 NOTE — ED NOTES
Called [employer], spoke with Casey. She is going to do a weather check and call us back.      Symes, Heather  02/06/19 0520

## 2019-02-06 NOTE — ED NOTES
Alem called back with [facility] Patient is going to [facility] room 3014 (She advised room can change but not unit). Report to [phone], fax face sheet to [phone].      Symes, Heather  02/06/19 9829

## 2019-02-06 NOTE — ED NOTES
Anisha from [facility] called back and advised us that the truck that was coming to get the patient, had to go on an emergency run and will come to us after that. Lead Nurse and Patients Nurse is made aware.      Marcia Marcial  02/06/19 0882

## 2019-02-06 NOTE — ED NOTES
Called [facility] for weather check. Spoke with Leona. They had to decline due to weather.      Symes, Heather  02/06/19 0569

## 2019-02-19 ENCOUNTER — LAB REQUISITION (OUTPATIENT)
Dept: LAB | Facility: HOSPITAL | Age: 84
End: 2019-02-19

## 2019-02-19 DIAGNOSIS — E78.5 HYPERLIPIDEMIA: ICD-10-CM

## 2019-02-19 DIAGNOSIS — I10 ESSENTIAL (PRIMARY) HYPERTENSION: ICD-10-CM

## 2019-02-19 DIAGNOSIS — D64.9 ANEMIA: ICD-10-CM

## 2019-02-19 LAB
25(OH)D3 SERPL-MCNC: 58 NG/ML
ALBUMIN SERPL-MCNC: 3.4 G/DL (ref 3.4–4.8)
ALBUMIN/GLOB SERPL: 1.9 G/DL (ref 1.5–2.5)
ALP SERPL-CCNC: 42 U/L (ref 35–104)
ALT SERPL W P-5'-P-CCNC: 24 U/L (ref 10–36)
ANION GAP SERPL CALCULATED.3IONS-SCNC: 5.7 MMOL/L (ref 3.6–11.2)
AST SERPL-CCNC: 21 U/L (ref 10–30)
BILIRUB SERPL-MCNC: 0.5 MG/DL (ref 0.2–1.8)
BUN BLD-MCNC: 38 MG/DL (ref 7–21)
BUN/CREAT SERPL: 26.2 (ref 7–25)
CALCIUM SPEC-SCNC: 8.3 MG/DL (ref 7.7–10)
CHLORIDE SERPL-SCNC: 98 MMOL/L (ref 99–112)
CHOLEST SERPL-MCNC: 136 MG/DL (ref 0–200)
CO2 SERPL-SCNC: 28.3 MMOL/L (ref 24.3–31.9)
CREAT BLD-MCNC: 1.45 MG/DL (ref 0.43–1.29)
DEPRECATED RDW RBC AUTO: 49.7 FL (ref 37–54)
ERYTHROCYTE [DISTWIDTH] IN BLOOD BY AUTOMATED COUNT: 16.2 % (ref 11.5–14.5)
GFR SERPL CREATININE-BSD FRML MDRD: 34 ML/MIN/1.73
GLOBULIN UR ELPH-MCNC: 1.8 GM/DL
GLUCOSE BLD-MCNC: 103 MG/DL (ref 70–110)
HCT VFR BLD AUTO: 26.4 % (ref 37–47)
HDLC SERPL-MCNC: 52 MG/DL (ref 60–100)
HGB BLD-MCNC: 8.9 G/DL (ref 12–16)
LDLC SERPL CALC-MCNC: 73 MG/DL (ref 0–100)
LDLC/HDLC SERPL: 1.4 {RATIO}
MCH RBC QN AUTO: 28.8 PG (ref 27–33)
MCHC RBC AUTO-ENTMCNC: 33.7 G/DL (ref 33–37)
MCV RBC AUTO: 85.4 FL (ref 80–94)
OSMOLALITY SERPL CALC.SUM OF ELEC: 273.8 MOSM/KG (ref 273–305)
PLATELET # BLD AUTO: 274 10*3/MM3 (ref 130–400)
PMV BLD AUTO: 10 FL (ref 6–10)
POTASSIUM BLD-SCNC: 4.6 MMOL/L (ref 3.5–5.3)
PROT SERPL-MCNC: 5.2 G/DL (ref 6–8)
RBC # BLD AUTO: 3.09 10*6/MM3 (ref 4.2–5.4)
SODIUM BLD-SCNC: 132 MMOL/L (ref 135–153)
TRIGL SERPL-MCNC: 56 MG/DL (ref 0–150)
VLDLC SERPL-MCNC: 11.2 MG/DL
WBC NRBC COR # BLD: 9.18 10*3/MM3 (ref 4.5–12.5)

## 2019-02-19 PROCEDURE — 80061 LIPID PANEL: CPT | Performed by: INTERNAL MEDICINE

## 2019-02-19 PROCEDURE — 82306 VITAMIN D 25 HYDROXY: CPT | Performed by: INTERNAL MEDICINE

## 2019-02-19 PROCEDURE — 85027 COMPLETE CBC AUTOMATED: CPT | Performed by: INTERNAL MEDICINE

## 2019-02-19 PROCEDURE — 80053 COMPREHEN METABOLIC PANEL: CPT | Performed by: INTERNAL MEDICINE

## 2019-04-15 ENCOUNTER — APPOINTMENT (OUTPATIENT)
Dept: CT IMAGING | Facility: HOSPITAL | Age: 84
End: 2019-04-15

## 2019-04-15 ENCOUNTER — HOSPITAL ENCOUNTER (EMERGENCY)
Facility: HOSPITAL | Age: 84
Discharge: HOME | End: 2019-04-15
Attending: EMERGENCY MEDICINE | Admitting: EMERGENCY MEDICINE

## 2019-04-15 VITALS
OXYGEN SATURATION: 99 % | SYSTOLIC BLOOD PRESSURE: 174 MMHG | HEART RATE: 62 BPM | TEMPERATURE: 98.3 F | HEIGHT: 64 IN | RESPIRATION RATE: 18 BRPM | DIASTOLIC BLOOD PRESSURE: 67 MMHG | WEIGHT: 134 LBS | BODY MASS INDEX: 22.88 KG/M2
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
## 2019-04-15 DIAGNOSIS — S01.01XA LACERATION OF SCALP, INITIAL ENCOUNTER: ICD-10-CM
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
## 2019-04-15 DIAGNOSIS — S00.03XA CONTUSION OF SCALP, INITIAL ENCOUNTER: ICD-10-CM

## 2019-04-15 PROCEDURE — 70450 CT HEAD/BRAIN W/O DYE: CPT

## 2019-04-15 PROCEDURE — 99284 EMERGENCY DEPT VISIT MOD MDM: CPT

## 2019-04-15 PROCEDURE — 70450 CT HEAD/BRAIN W/O DYE: CPT | Performed by: RADIOLOGY

## 2019-04-15 RX ORDER — LIDOCAINE HYDROCHLORIDE AND EPINEPHRINE 10; 10 MG/ML; UG/ML
10 INJECTION, SOLUTION INFILTRATION; PERINEURAL ONCE
Status: COMPLETED | OUTPATIENT
Start: 2019-04-15 | End: 2019-04-15

## 2019-04-15 RX ADMIN — LIDOCAINE HYDROCHLORIDE AND EPINEPHRINE 5 ML: 10; 10 INJECTION, SOLUTION INFILTRATION; PERINEURAL at 10:35

## 2019-06-06 ENCOUNTER — OFFICE VISIT (OUTPATIENT)
Dept: PULMONOLOGY | Facility: CLINIC | Age: 84
End: 2019-06-06

## 2019-06-06 VITALS
WEIGHT: 136 LBS | SYSTOLIC BLOOD PRESSURE: 116 MMHG | HEIGHT: 63 IN | DIASTOLIC BLOOD PRESSURE: 73 MMHG | OXYGEN SATURATION: 98 % | HEART RATE: 80 BPM | TEMPERATURE: 98 F | BODY MASS INDEX: 24.1 KG/M2

## 2019-06-06 DIAGNOSIS — J43.2 CENTRILOBULAR EMPHYSEMA (HCC): Primary | ICD-10-CM

## 2019-06-06 DIAGNOSIS — J30.2 SEASONAL ALLERGIES: ICD-10-CM

## 2019-06-06 PROCEDURE — 94664 DEMO&/EVAL PT USE INHALER: CPT | Performed by: NURSE PRACTITIONER

## 2019-06-06 PROCEDURE — 99214 OFFICE O/P EST MOD 30 MIN: CPT | Performed by: NURSE PRACTITIONER
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
## 2019-06-07 PROBLEM — J30.2 SEASONAL ALLERGIES: Status: ACTIVE | Noted: 2019-06-07

## 2019-06-07 RX ORDER — FLUTICASONE PROPIONATE 50 MCG
2 SPRAY, SUSPENSION (ML) NASAL DAILY
Qty: 16 G | Refills: 2 | Status: ON HOLD | OUTPATIENT
Start: 2019-06-07 | End: 2020-01-01

## 2019-06-24 ENCOUNTER — DOCUMENTATION (OUTPATIENT)
Dept: PULMONOLOGY | Facility: CLINIC | Age: 84
End: 2019-06-24
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
## 2020-01-01 ENCOUNTER — HOSPITAL ENCOUNTER (INPATIENT)
Facility: HOSPITAL | Age: 85
LOS: 5 days | Discharge: HOME HEALTH | End: 2020-04-12
Attending: FAMILY MEDICINE | Admitting: INTERNAL MEDICINE

## 2020-01-01 ENCOUNTER — APPOINTMENT (OUTPATIENT)
Dept: CT IMAGING | Facility: HOSPITAL | Age: 85
End: 2020-01-01

## 2020-01-01 ENCOUNTER — LAB REQUISITION (OUTPATIENT)
Dept: LAB | Facility: HOSPITAL | Age: 85
End: 2020-01-01

## 2020-01-01 ENCOUNTER — TRANSCRIBE ORDERS (OUTPATIENT)
Dept: ADMINISTRATIVE | Facility: HOSPITAL | Age: 85
End: 2020-01-01

## 2020-01-01 ENCOUNTER — READMISSION MANAGEMENT (OUTPATIENT)
Dept: CALL CENTER | Facility: HOSPITAL | Age: 85
End: 2020-01-01

## 2020-01-01 ENCOUNTER — APPOINTMENT (OUTPATIENT)
Dept: GENERAL RADIOLOGY | Facility: HOSPITAL | Age: 85
End: 2020-01-01

## 2020-01-01 ENCOUNTER — HOSPITAL ENCOUNTER (EMERGENCY)
Facility: HOSPITAL | Age: 85
Discharge: ANOTHER HOSPITAL | End: 2020-03-13
Attending: EMERGENCY MEDICINE | Admitting: EMERGENCY MEDICINE

## 2020-01-01 ENCOUNTER — APPOINTMENT (OUTPATIENT)
Dept: CARDIOLOGY | Facility: HOSPITAL | Age: 85
End: 2020-01-01

## 2020-01-01 ENCOUNTER — HOSPITAL ENCOUNTER (EMERGENCY)
Facility: HOSPITAL | Age: 85
Discharge: ANOTHER HOSPITAL | End: 2020-03-27
Attending: EMERGENCY MEDICINE | Admitting: EMERGENCY MEDICINE

## 2020-01-01 ENCOUNTER — CLINICAL SUPPORT (OUTPATIENT)
Dept: PULMONOLOGY | Facility: CLINIC | Age: 85
End: 2020-01-01

## 2020-01-01 ENCOUNTER — HOSPITAL ENCOUNTER (OUTPATIENT)
Facility: HOSPITAL | Age: 85
Setting detail: OBSERVATION
Discharge: NURSING, REHAB OR LONG-TERM CARE | End: 2020-06-12
Attending: FAMILY MEDICINE | Admitting: INTERNAL MEDICINE

## 2020-01-01 ENCOUNTER — HOSPITAL ENCOUNTER (INPATIENT)
Facility: HOSPITAL | Age: 85
LOS: 5 days | End: 2020-07-17
Attending: INTERNAL MEDICINE | Admitting: FAMILY MEDICINE

## 2020-01-01 ENCOUNTER — HOSPITAL ENCOUNTER (INPATIENT)
Facility: HOSPITAL | Age: 85
LOS: 3 days | Discharge: NURSING, REHAB OR LONG-TERM CARE | End: 2020-06-04
Attending: EMERGENCY MEDICINE | Admitting: INTERNAL MEDICINE

## 2020-01-01 ENCOUNTER — LAB (OUTPATIENT)
Dept: LAB | Facility: HOSPITAL | Age: 85
End: 2020-01-01

## 2020-01-01 ENCOUNTER — HOSPITAL ENCOUNTER (EMERGENCY)
Facility: HOSPITAL | Age: 85
Discharge: ANOTHER HOSPITAL | End: 2020-07-12
Attending: EMERGENCY MEDICINE | Admitting: EMERGENCY MEDICINE

## 2020-01-01 ENCOUNTER — HOSPITAL ENCOUNTER (INPATIENT)
Facility: HOSPITAL | Age: 85
LOS: 4 days | End: 2020-07-21
Attending: INTERNAL MEDICINE | Admitting: INTERNAL MEDICINE

## 2020-01-01 ENCOUNTER — CONSULT (OUTPATIENT)
Dept: ONCOLOGY | Facility: CLINIC | Age: 85
End: 2020-01-01

## 2020-01-01 VITALS
WEIGHT: 123.25 LBS | HEIGHT: 65 IN | SYSTOLIC BLOOD PRESSURE: 148 MMHG | HEART RATE: 82 BPM | OXYGEN SATURATION: 97 % | DIASTOLIC BLOOD PRESSURE: 68 MMHG | TEMPERATURE: 97.8 F | BODY MASS INDEX: 20.54 KG/M2 | RESPIRATION RATE: 20 BRPM

## 2020-01-01 VITALS
BODY MASS INDEX: 22.2 KG/M2 | DIASTOLIC BLOOD PRESSURE: 73 MMHG | WEIGHT: 130 LBS | SYSTOLIC BLOOD PRESSURE: 144 MMHG | RESPIRATION RATE: 18 BRPM | HEART RATE: 92 BPM | HEIGHT: 64 IN | TEMPERATURE: 97.3 F | OXYGEN SATURATION: 100 %

## 2020-01-01 VITALS
HEIGHT: 63 IN | HEART RATE: 117 BPM | BODY MASS INDEX: 23.74 KG/M2 | RESPIRATION RATE: 18 BRPM | DIASTOLIC BLOOD PRESSURE: 70 MMHG | OXYGEN SATURATION: 99 % | TEMPERATURE: 97.4 F | SYSTOLIC BLOOD PRESSURE: 122 MMHG | WEIGHT: 134 LBS

## 2020-01-01 VITALS
OXYGEN SATURATION: 98 % | HEIGHT: 63 IN | WEIGHT: 134.8 LBS | TEMPERATURE: 97.3 F | SYSTOLIC BLOOD PRESSURE: 144 MMHG | HEART RATE: 60 BPM | RESPIRATION RATE: 18 BRPM | DIASTOLIC BLOOD PRESSURE: 61 MMHG | BODY MASS INDEX: 23.88 KG/M2

## 2020-01-01 VITALS
HEART RATE: 109 BPM | TEMPERATURE: 98.1 F | RESPIRATION RATE: 18 BRPM | SYSTOLIC BLOOD PRESSURE: 147 MMHG | OXYGEN SATURATION: 97 % | BODY MASS INDEX: 20.4 KG/M2 | HEIGHT: 67 IN | DIASTOLIC BLOOD PRESSURE: 81 MMHG | WEIGHT: 130 LBS

## 2020-01-01 VITALS
SYSTOLIC BLOOD PRESSURE: 137 MMHG | HEIGHT: 63 IN | RESPIRATION RATE: 18 BRPM | DIASTOLIC BLOOD PRESSURE: 55 MMHG | BODY MASS INDEX: 22.39 KG/M2 | TEMPERATURE: 97.8 F | HEART RATE: 60 BPM | OXYGEN SATURATION: 96 % | WEIGHT: 126.4 LBS

## 2020-01-01 VITALS
HEART RATE: 86 BPM | DIASTOLIC BLOOD PRESSURE: 79 MMHG | WEIGHT: 126.1 LBS | TEMPERATURE: 97.9 F | SYSTOLIC BLOOD PRESSURE: 157 MMHG | BODY MASS INDEX: 20.27 KG/M2 | OXYGEN SATURATION: 97 % | RESPIRATION RATE: 18 BRPM | HEIGHT: 66 IN

## 2020-01-01 VITALS
WEIGHT: 132.6 LBS | HEIGHT: 67 IN | OXYGEN SATURATION: 95 % | BODY MASS INDEX: 20.81 KG/M2 | RESPIRATION RATE: 18 BRPM | SYSTOLIC BLOOD PRESSURE: 123 MMHG | TEMPERATURE: 98 F | HEART RATE: 109 BPM | DIASTOLIC BLOOD PRESSURE: 92 MMHG

## 2020-01-01 VITALS — RESPIRATION RATE: 16 BRPM

## 2020-01-01 DIAGNOSIS — I10 ESSENTIAL (PRIMARY) HYPERTENSION: ICD-10-CM

## 2020-01-01 DIAGNOSIS — R13.10 DYSPHAGIA, UNSPECIFIED TYPE: ICD-10-CM

## 2020-01-01 DIAGNOSIS — J10.1 INFLUENZA A: Primary | ICD-10-CM

## 2020-01-01 DIAGNOSIS — R07.9 NONSPECIFIC CHEST PAIN: Primary | ICD-10-CM

## 2020-01-01 DIAGNOSIS — Z74.09 IMPAIRED MOBILITY AND ADLS: ICD-10-CM

## 2020-01-01 DIAGNOSIS — D64.9 ANEMIA, UNSPECIFIED TYPE: Primary | ICD-10-CM

## 2020-01-01 DIAGNOSIS — R47.01 APHASIA: ICD-10-CM

## 2020-01-01 DIAGNOSIS — R50.9 FEVER AND CHILLS: Primary | ICD-10-CM

## 2020-01-01 DIAGNOSIS — I48.91 ATRIAL FIBRILLATION WITH RVR (HCC): ICD-10-CM

## 2020-01-01 DIAGNOSIS — I63.9 ACUTE EMBOLIC STROKE (HCC): Primary | ICD-10-CM

## 2020-01-01 DIAGNOSIS — R74.8 CARDIAC ENZYMES ELEVATED: ICD-10-CM

## 2020-01-01 DIAGNOSIS — Z78.9 IMPAIRED MOBILITY AND ADLS: ICD-10-CM

## 2020-01-01 DIAGNOSIS — J81.0 ACUTE PULMONARY EDEMA (HCC): ICD-10-CM

## 2020-01-01 DIAGNOSIS — R50.9 FEVER AND CHILLS: ICD-10-CM

## 2020-01-01 DIAGNOSIS — D64.9 ANEMIA, UNSPECIFIED: ICD-10-CM

## 2020-01-01 DIAGNOSIS — I50.9 HEART FAILURE, UNSPECIFIED (HCC): ICD-10-CM

## 2020-01-01 DIAGNOSIS — R77.8 ELEVATED TROPONIN: ICD-10-CM

## 2020-01-01 DIAGNOSIS — N17.9 ACUTE KIDNEY INJURY (HCC): ICD-10-CM

## 2020-01-01 DIAGNOSIS — I50.9 ACUTE ON CHRONIC CONGESTIVE HEART FAILURE, UNSPECIFIED HEART FAILURE TYPE (HCC): Primary | ICD-10-CM

## 2020-01-01 DIAGNOSIS — I50.812 CHRONIC RIGHT HEART FAILURE (HCC): ICD-10-CM

## 2020-01-01 DIAGNOSIS — R07.9 CHEST PAIN, UNSPECIFIED TYPE: Primary | ICD-10-CM

## 2020-01-01 DIAGNOSIS — J18.9 COMMUNITY ACQUIRED PNEUMONIA OF LEFT LOWER LOBE OF LUNG: ICD-10-CM

## 2020-01-01 DIAGNOSIS — J44.9 CHRONIC OBSTRUCTIVE PULMONARY DISEASE, UNSPECIFIED (HCC): ICD-10-CM

## 2020-01-01 DIAGNOSIS — N18.30 CHRONIC KIDNEY DISEASE, STAGE 3 (MODERATE): ICD-10-CM

## 2020-01-01 DIAGNOSIS — I20.0 UNSTABLE ANGINA (HCC): ICD-10-CM

## 2020-01-01 DIAGNOSIS — I50.9 ACUTE ON CHRONIC CONGESTIVE HEART FAILURE, UNSPECIFIED HEART FAILURE TYPE (HCC): ICD-10-CM

## 2020-01-01 DIAGNOSIS — I10 ESSENTIAL HYPERTENSION: ICD-10-CM

## 2020-01-01 DIAGNOSIS — I63.512 ACUTE ISCHEMIC LEFT MCA STROKE (HCC): Primary | ICD-10-CM

## 2020-01-01 LAB
6-ACETYL MORPHINE: NEGATIVE
A-A DO2: 78.5 MMHG (ref 0–300)
ALBUMIN SERPL-MCNC: 2.7 G/DL (ref 2.9–4.4)
ALBUMIN SERPL-MCNC: 2.92 G/DL (ref 3.5–5.2)
ALBUMIN SERPL-MCNC: 2.93 G/DL (ref 3.5–5.2)
ALBUMIN SERPL-MCNC: 3.06 G/DL (ref 3.5–5.2)
ALBUMIN SERPL-MCNC: 3.06 G/DL (ref 3.5–5.2)
ALBUMIN SERPL-MCNC: 3.25 G/DL (ref 3.5–5.2)
ALBUMIN SERPL-MCNC: 3.25 G/DL (ref 3.5–5.2)
ALBUMIN SERPL-MCNC: 3.26 G/DL (ref 3.5–5.2)
ALBUMIN SERPL-MCNC: 3.3 G/DL (ref 3.5–5.2)
ALBUMIN SERPL-MCNC: 3.3 G/DL (ref 3.5–5.2)
ALBUMIN SERPL-MCNC: 3.33 G/DL (ref 3.5–5.2)
ALBUMIN SERPL-MCNC: 3.48 G/DL (ref 3.5–5.2)
ALBUMIN/GLOB SERPL: 0.8 G/DL
ALBUMIN/GLOB SERPL: 0.8 {RATIO} (ref 0.7–1.7)
ALBUMIN/GLOB SERPL: 0.9 G/DL
ALBUMIN/GLOB SERPL: 0.9 G/DL
ALBUMIN/GLOB SERPL: 1 G/DL
ALBUMIN/GLOB SERPL: 1.1 G/DL
ALP SERPL-CCNC: 64 U/L (ref 39–117)
ALP SERPL-CCNC: 65 U/L (ref 39–117)
ALP SERPL-CCNC: 66 U/L (ref 39–117)
ALP SERPL-CCNC: 70 U/L (ref 39–117)
ALP SERPL-CCNC: 73 U/L (ref 39–117)
ALP SERPL-CCNC: 75 U/L (ref 39–117)
ALP SERPL-CCNC: 76 U/L (ref 39–117)
ALP SERPL-CCNC: 79 U/L (ref 39–117)
ALP SERPL-CCNC: 82 U/L (ref 39–117)
ALP SERPL-CCNC: 82 U/L (ref 39–117)
ALPHA1 GLOB FLD ELPH-MCNC: 0.4 G/DL (ref 0–0.4)
ALPHA2 GLOB SERPL ELPH-MCNC: 1.3 G/DL (ref 0.4–1)
ALT SERPL W P-5'-P-CCNC: 10 U/L (ref 1–33)
ALT SERPL W P-5'-P-CCNC: 11 U/L (ref 1–33)
ALT SERPL W P-5'-P-CCNC: 11 U/L (ref 1–33)
ALT SERPL W P-5'-P-CCNC: 12 U/L (ref 1–33)
ALT SERPL W P-5'-P-CCNC: 12 U/L (ref 1–33)
ALT SERPL W P-5'-P-CCNC: 9 U/L (ref 1–33)
ALT SERPL W P-5'-P-CCNC: 9 U/L (ref 1–33)
AMPHET+METHAMPHET UR QL: NEGATIVE
ANION GAP SERPL CALCULATED.3IONS-SCNC: 10 MMOL/L (ref 5–15)
ANION GAP SERPL CALCULATED.3IONS-SCNC: 10.2 MMOL/L (ref 5–15)
ANION GAP SERPL CALCULATED.3IONS-SCNC: 10.8 MMOL/L (ref 5–15)
ANION GAP SERPL CALCULATED.3IONS-SCNC: 10.9 MMOL/L (ref 5–15)
ANION GAP SERPL CALCULATED.3IONS-SCNC: 10.9 MMOL/L (ref 5–15)
ANION GAP SERPL CALCULATED.3IONS-SCNC: 11.1 MMOL/L (ref 5–15)
ANION GAP SERPL CALCULATED.3IONS-SCNC: 11.8 MMOL/L (ref 5–15)
ANION GAP SERPL CALCULATED.3IONS-SCNC: 12 MMOL/L (ref 5–15)
ANION GAP SERPL CALCULATED.3IONS-SCNC: 12.1 MMOL/L (ref 5–15)
ANION GAP SERPL CALCULATED.3IONS-SCNC: 12.2 MMOL/L (ref 5–15)
ANION GAP SERPL CALCULATED.3IONS-SCNC: 12.4 MMOL/L (ref 5–15)
ANION GAP SERPL CALCULATED.3IONS-SCNC: 12.7 MMOL/L (ref 5–15)
ANION GAP SERPL CALCULATED.3IONS-SCNC: 13.2 MMOL/L (ref 5–15)
ANION GAP SERPL CALCULATED.3IONS-SCNC: 13.2 MMOL/L (ref 5–15)
ANION GAP SERPL CALCULATED.3IONS-SCNC: 13.3 MMOL/L (ref 5–15)
ANION GAP SERPL CALCULATED.3IONS-SCNC: 13.5 MMOL/L (ref 5–15)
ANION GAP SERPL CALCULATED.3IONS-SCNC: 13.7 MMOL/L (ref 5–15)
ANION GAP SERPL CALCULATED.3IONS-SCNC: 13.8 MMOL/L (ref 5–15)
ANION GAP SERPL CALCULATED.3IONS-SCNC: 14 MMOL/L (ref 5–15)
ANION GAP SERPL CALCULATED.3IONS-SCNC: 14.1 MMOL/L (ref 5–15)
ANION GAP SERPL CALCULATED.3IONS-SCNC: 14.1 MMOL/L (ref 5–15)
ANION GAP SERPL CALCULATED.3IONS-SCNC: 14.2 MMOL/L (ref 5–15)
ANION GAP SERPL CALCULATED.3IONS-SCNC: 14.5 MMOL/L (ref 5–15)
ANION GAP SERPL CALCULATED.3IONS-SCNC: 14.6 MMOL/L (ref 5–15)
ANION GAP SERPL CALCULATED.3IONS-SCNC: 14.7 MMOL/L (ref 5–15)
ANION GAP SERPL CALCULATED.3IONS-SCNC: 14.9 MMOL/L (ref 5–15)
ANION GAP SERPL CALCULATED.3IONS-SCNC: 15.2 MMOL/L (ref 5–15)
ANION GAP SERPL CALCULATED.3IONS-SCNC: 16 MMOL/L (ref 5–15)
ANION GAP SERPL CALCULATED.3IONS-SCNC: 17.2 MMOL/L (ref 5–15)
ANION GAP SERPL CALCULATED.3IONS-SCNC: 8 MMOL/L (ref 5–15)
ANION GAP SERPL CALCULATED.3IONS-SCNC: 9.1 MMOL/L (ref 5–15)
ANION GAP SERPL CALCULATED.3IONS-SCNC: 9.5 MMOL/L (ref 5–15)
APTT PPP: 32.9 SECONDS (ref 25.6–35.3)
APTT PPP: 34.1 SECONDS (ref 23.8–36.1)
ARTERIAL PATENCY WRIST A: POSITIVE
AST SERPL-CCNC: 13 U/L (ref 1–32)
AST SERPL-CCNC: 14 U/L (ref 1–32)
AST SERPL-CCNC: 15 U/L (ref 1–32)
AST SERPL-CCNC: 16 U/L (ref 1–32)
AST SERPL-CCNC: 17 U/L (ref 1–32)
AST SERPL-CCNC: 18 U/L (ref 1–32)
AST SERPL-CCNC: 19 U/L (ref 1–32)
AST SERPL-CCNC: 20 U/L (ref 1–32)
ATMOSPHERIC PRESS: 726 MMHG
B-GLOBULIN SERPL ELPH-MCNC: 0.7 G/DL (ref 0.7–1.3)
BACTERIA SPEC AEROBE CULT: NORMAL
BACTERIA UR QL AUTO: NORMAL /HPF
BACTERIA UR QL AUTO: NORMAL /HPF
BARBITURATES UR QL SCN: NEGATIVE
BASE EXCESS BLDA CALC-SCNC: -2.9 MMOL/L (ref 0–2)
BASOPHILS # BLD AUTO: 0.01 10*3/MM3 (ref 0–0.2)
BASOPHILS # BLD AUTO: 0.02 10*3/MM3 (ref 0–0.2)
BASOPHILS # BLD AUTO: 0.03 10*3/MM3 (ref 0–0.2)
BASOPHILS NFR BLD AUTO: 0.1 % (ref 0–1.5)
BASOPHILS NFR BLD AUTO: 0.1 % (ref 0–1.5)
BASOPHILS NFR BLD AUTO: 0.2 % (ref 0–1.5)
BASOPHILS NFR BLD AUTO: 0.3 % (ref 0–1.5)
BASOPHILS NFR BLD AUTO: 0.4 % (ref 0–1.5)
BASOPHILS NFR BLD AUTO: 0.5 % (ref 0–1.5)
BDY SITE: ABNORMAL
BENZODIAZ UR QL SCN: NEGATIVE
BH CV ECHO MEAS - % IVS THICK: -0.43 %
BH CV ECHO MEAS - % LVPW THICK: 31.4 %
BH CV ECHO MEAS - ACS: 1.4 CM
BH CV ECHO MEAS - ACS: 1.5 CM
BH CV ECHO MEAS - AO MAX PG (FULL): 14.3 MMHG
BH CV ECHO MEAS - AO MAX PG (FULL): 8.2 MMHG
BH CV ECHO MEAS - AO MAX PG: 14 MMHG
BH CV ECHO MEAS - AO MAX PG: 16.1 MMHG
BH CV ECHO MEAS - AO MAX PG: 9.9 MMHG
BH CV ECHO MEAS - AO MEAN PG (FULL): 4 MMHG
BH CV ECHO MEAS - AO MEAN PG (FULL): 8.8 MMHG
BH CV ECHO MEAS - AO MEAN PG: 5 MMHG
BH CV ECHO MEAS - AO MEAN PG: 8.5 MMHG
BH CV ECHO MEAS - AO MEAN PG: 9.8 MMHG
BH CV ECHO MEAS - AO ROOT AREA (BSA CORRECTED): 1.7
BH CV ECHO MEAS - AO ROOT AREA (BSA CORRECTED): 1.8
BH CV ECHO MEAS - AO ROOT AREA (BSA CORRECTED): 1.9
BH CV ECHO MEAS - AO ROOT AREA: 6.3 CM^2
BH CV ECHO MEAS - AO ROOT AREA: 6.6 CM^2
BH CV ECHO MEAS - AO ROOT AREA: 7.1 CM^2
BH CV ECHO MEAS - AO ROOT DIAM: 2.8 CM
BH CV ECHO MEAS - AO ROOT DIAM: 2.9 CM
BH CV ECHO MEAS - AO ROOT DIAM: 3 CM
BH CV ECHO MEAS - AO V2 MAX: 157 CM/SEC
BH CV ECHO MEAS - AO V2 MAX: 187 CM/SEC
BH CV ECHO MEAS - AO V2 MAX: 200.5 CM/SEC
BH CV ECHO MEAS - AO V2 MEAN: 103 CM/SEC
BH CV ECHO MEAS - AO V2 MEAN: 135.4 CM/SEC
BH CV ECHO MEAS - AO V2 MEAN: 148.5 CM/SEC
BH CV ECHO MEAS - AO V2 VTI: 33.5 CM
BH CV ECHO MEAS - AO V2 VTI: 35.8 CM
BH CV ECHO MEAS - AO V2 VTI: 54.2 CM
BH CV ECHO MEAS - AVA(I,A): 0.78 CM^2
BH CV ECHO MEAS - AVA(I,A): 1 CM^2
BH CV ECHO MEAS - AVA(I,D): 1 CM^2
BH CV ECHO MEAS - AVA(I,D): 1.7 CM^2
BH CV ECHO MEAS - AVA(V,A): 0.85 CM^2
BH CV ECHO MEAS - AVA(V,A): 1.2 CM^2
BH CV ECHO MEAS - AVA(V,D): 0.85 CM^2
BH CV ECHO MEAS - AVA(V,D): 1.2 CM^2
BH CV ECHO MEAS - BSA(HAYCOCK): 1.6 M^2
BH CV ECHO MEAS - BSA(HAYCOCK): 1.6 M^2
BH CV ECHO MEAS - BSA(HAYCOCK): 1.7 M^2
BH CV ECHO MEAS - BSA: 1.6 M^2
BH CV ECHO MEAS - BSA: 1.6 M^2
BH CV ECHO MEAS - BSA: 1.7 M^2
BH CV ECHO MEAS - BZI_BMI: 20.4 KILOGRAMS/M^2
BH CV ECHO MEAS - BZI_BMI: 20.5 KILOGRAMS/M^2
BH CV ECHO MEAS - BZI_BMI: 22.7 KILOGRAMS/M^2
BH CV ECHO MEAS - BZI_METRIC_HEIGHT: 160 CM
BH CV ECHO MEAS - BZI_METRIC_HEIGHT: 165.1 CM
BH CV ECHO MEAS - BZI_METRIC_HEIGHT: 170.2 CM
BH CV ECHO MEAS - BZI_METRIC_WEIGHT: 55.8 KG
BH CV ECHO MEAS - BZI_METRIC_WEIGHT: 58.1 KG
BH CV ECHO MEAS - BZI_METRIC_WEIGHT: 59 KG
BH CV ECHO MEAS - EDV(CUBED): 153.6 ML
BH CV ECHO MEAS - EDV(CUBED): 71.2 ML
BH CV ECHO MEAS - EDV(MOD-SP2): 46 ML
BH CV ECHO MEAS - EDV(MOD-SP4): 101 ML
BH CV ECHO MEAS - EDV(MOD-SP4): 64 ML
BH CV ECHO MEAS - EDV(TEICH): 138.6 ML
BH CV ECHO MEAS - EDV(TEICH): 76.2 ML
BH CV ECHO MEAS - EF(CUBED): 50 %
BH CV ECHO MEAS - EF(CUBED): 65.1 %
BH CV ECHO MEAS - EF(MOD-BP): 44 %
BH CV ECHO MEAS - EF(MOD-BP): 64 %
BH CV ECHO MEAS - EF(MOD-SP2): 21.7 %
BH CV ECHO MEAS - EF(MOD-SP4): 43.8 %
BH CV ECHO MEAS - EF(MOD-SP4): 64.3 %
BH CV ECHO MEAS - EF(TEICH): 42.5 %
BH CV ECHO MEAS - EF(TEICH): 56.1 %
BH CV ECHO MEAS - EF{MOD-BP}: 34 %
BH CV ECHO MEAS - ESV(CUBED): 35.6 ML
BH CV ECHO MEAS - ESV(CUBED): 53.6 ML
BH CV ECHO MEAS - ESV(MOD-SP2): 36 ML
BH CV ECHO MEAS - ESV(MOD-SP4): 36 ML
BH CV ECHO MEAS - ESV(MOD-SP4): 36.1 ML
BH CV ECHO MEAS - ESV(TEICH): 43.8 ML
BH CV ECHO MEAS - ESV(TEICH): 60.8 ML
BH CV ECHO MEAS - FS: 20.6 %
BH CV ECHO MEAS - FS: 29.6 %
BH CV ECHO MEAS - IVS/LVPW: 1.1
BH CV ECHO MEAS - IVS/LVPW: 1.3
BH CV ECHO MEAS - IVSD: 1.2 CM
BH CV ECHO MEAS - IVSD: 1.7 CM
BH CV ECHO MEAS - IVSS: 1.2 CM
BH CV ECHO MEAS - LA DIMENSION: 4.1 CM
BH CV ECHO MEAS - LA DIMENSION: 4.8 CM
BH CV ECHO MEAS - LA/AO: 1.5
BH CV ECHO MEAS - LA/AO: 1.6
BH CV ECHO MEAS - LAD MAJOR: 5.6 CM
BH CV ECHO MEAS - LAT PEAK E' VEL: 9.9 CM/SEC
BH CV ECHO MEAS - LATERAL E/E' RATIO: 12.5
BH CV ECHO MEAS - LV DIASTOLIC VOL/BSA (35-75): 38 ML/M^2
BH CV ECHO MEAS - LV DIASTOLIC VOL/BSA (35-75): 63.1 ML/M^2
BH CV ECHO MEAS - LV MASS(C)D: 232.3 GRAMS
BH CV ECHO MEAS - LV MASS(C)D: 237.5 GRAMS
BH CV ECHO MEAS - LV MASS(C)DI: 141.1 GRAMS/M^2
BH CV ECHO MEAS - LV MASS(C)DI: 145.2 GRAMS/M^2
BH CV ECHO MEAS - LV MASS(C)S: 164.1 GRAMS
BH CV ECHO MEAS - LV MASS(C)SI: 102.6 GRAMS/M^2
BH CV ECHO MEAS - LV MAX PG: 1.7 MMHG
BH CV ECHO MEAS - LV MAX PG: 1.8 MMHG
BH CV ECHO MEAS - LV MEAN PG: 1 MMHG
BH CV ECHO MEAS - LV MEAN PG: 1 MMHG
BH CV ECHO MEAS - LV SYSTOLIC VOL/BSA (12-30): 21.4 ML/M^2
BH CV ECHO MEAS - LV SYSTOLIC VOL/BSA (12-30): 22.6 ML/M^2
BH CV ECHO MEAS - LV V1 MAX: 64.3 CM/SEC
BH CV ECHO MEAS - LV V1 MAX: 67.2 CM/SEC
BH CV ECHO MEAS - LV V1 MEAN: 35.8 CM/SEC
BH CV ECHO MEAS - LV V1 MEAN: 51.2 CM/SEC
BH CV ECHO MEAS - LV V1 VTI: 11.8 CM
BH CV ECHO MEAS - LV V1 VTI: 16.5 CM
BH CV ECHO MEAS - LVIDD: 4.1 CM
BH CV ECHO MEAS - LVIDD: 5.4 CM
BH CV ECHO MEAS - LVIDS: 3.3 CM
BH CV ECHO MEAS - LVIDS: 3.8 CM
BH CV ECHO MEAS - LVLD AP2: 6.1 CM
BH CV ECHO MEAS - LVLD AP4: 6.5 CM
BH CV ECHO MEAS - LVLD AP4: 6.6 CM
BH CV ECHO MEAS - LVLS AP2: 5.8 CM
BH CV ECHO MEAS - LVLS AP4: 5.6 CM
BH CV ECHO MEAS - LVLS AP4: 6.2 CM
BH CV ECHO MEAS - LVOT AREA (M): 2.5 CM^2
BH CV ECHO MEAS - LVOT AREA (M): 2.5 CM^2
BH CV ECHO MEAS - LVOT AREA (M): 2.8 CM^2
BH CV ECHO MEAS - LVOT AREA: 2.4 CM^2
BH CV ECHO MEAS - LVOT AREA: 2.5 CM^2
BH CV ECHO MEAS - LVOT AREA: 2.8 CM^2
BH CV ECHO MEAS - LVOT DIAM: 1.8 CM
BH CV ECHO MEAS - LVOT DIAM: 1.8 CM
BH CV ECHO MEAS - LVOT DIAM: 1.9 CM
BH CV ECHO MEAS - LVPWD: 1.1 CM
BH CV ECHO MEAS - LVPWD: 1.3 CM
BH CV ECHO MEAS - LVPWS: 1.4 CM
BH CV ECHO MEAS - MED PEAK E' VEL: 2.5 CM/SEC
BH CV ECHO MEAS - MEDIAL E/E' RATIO: 48.4
BH CV ECHO MEAS - MV DEC TIME: 0.15 SEC
BH CV ECHO MEAS - MV E MAX VEL: 124.5 CM/SEC
BH CV ECHO MEAS - MV E MAX VEL: 143 CM/SEC
BH CV ECHO MEAS - MV MAX PG: 8.2 MMHG
BH CV ECHO MEAS - MV MAX PG: 8.2 MMHG
BH CV ECHO MEAS - MV MEAN PG: 2 MMHG
BH CV ECHO MEAS - MV MEAN PG: 3.6 MMHG
BH CV ECHO MEAS - MV V2 MAX: 143 CM/SEC
BH CV ECHO MEAS - MV V2 MAX: 143.1 CM/SEC
BH CV ECHO MEAS - MV V2 MEAN: 64.2 CM/SEC
BH CV ECHO MEAS - MV V2 MEAN: 82 CM/SEC
BH CV ECHO MEAS - MV V2 VTI: 21.1 CM
BH CV ECHO MEAS - MV V2 VTI: 29.4 CM
BH CV ECHO MEAS - MVA(VTI): 1.1 CM^2
BH CV ECHO MEAS - PA ACC SLOPE: 903.3 CM/SEC^2
BH CV ECHO MEAS - PA ACC TIME: 0.1 SEC
BH CV ECHO MEAS - PA ACC TIME: 0.11 SEC
BH CV ECHO MEAS - PA MAX PG: 5.2 MMHG
BH CV ECHO MEAS - PA PR(ACCEL): 28.3 MMHG
BH CV ECHO MEAS - PA PR(ACCEL): 36.3 MMHG
BH CV ECHO MEAS - PA V2 MAX: 114 CM/SEC
BH CV ECHO MEAS - PI END-D VEL: 192.1 CM/SEC
BH CV ECHO MEAS - RAP SYSTOLE: 10 MMHG
BH CV ECHO MEAS - RVSP: 30 MMHG
BH CV ECHO MEAS - RVSP: 43.9 MMHG
BH CV ECHO MEAS - SI(AO): 133.1 ML/M^2
BH CV ECHO MEAS - SI(AO): 148 ML/M^2
BH CV ECHO MEAS - SI(AO): 222.4 ML/M^2
BH CV ECHO MEAS - SI(CUBED): 21.2 ML/M^2
BH CV ECHO MEAS - SI(CUBED): 62.5 ML/M^2
BH CV ECHO MEAS - SI(LVOT): 20.9 ML/M^2
BH CV ECHO MEAS - SI(LVOT): 26.1 ML/M^2
BH CV ECHO MEAS - SI(MOD-SP2): 5.9 ML/M^2
BH CV ECHO MEAS - SI(MOD-SP4): 16.6 ML/M^2
BH CV ECHO MEAS - SI(MOD-SP4): 40.6 ML/M^2
BH CV ECHO MEAS - SI(TEICH): 19.2 ML/M^2
BH CV ECHO MEAS - SI(TEICH): 48.6 ML/M^2
BH CV ECHO MEAS - SV(AO): 224.1 ML
BH CV ECHO MEAS - SV(AO): 236.8 ML
BH CV ECHO MEAS - SV(AO): 357.8 ML
BH CV ECHO MEAS - SV(CUBED): 100 ML
BH CV ECHO MEAS - SV(CUBED): 35.6 ML
BH CV ECHO MEAS - SV(LVOT): 33.5 ML
BH CV ECHO MEAS - SV(LVOT): 42 ML
BH CV ECHO MEAS - SV(MOD-SP2): 10 ML
BH CV ECHO MEAS - SV(MOD-SP4): 28 ML
BH CV ECHO MEAS - SV(MOD-SP4): 64.9 ML
BH CV ECHO MEAS - SV(TEICH): 32.4 ML
BH CV ECHO MEAS - SV(TEICH): 77.8 ML
BH CV ECHO MEAS - TAPSE (>1.6): 1.8 CM2
BH CV ECHO MEAS - TR MAX PG: 27 MMHG
BH CV ECHO MEAS - TR MAX VEL: 256.4 CM/SEC
BH CV ECHO MEAS - TR MAX VEL: 287.7 CM/SEC
BH CV ECHO MEASUREMENTS AVERAGE E/E' RATIO: 20.08
BH CV VAS BP RIGHT ARM: NORMAL MMHG
BH CV XLRA - RV BASE: 2.5 CM
BH CV XLRA - RV LENGTH: 4.4 CM
BH CV XLRA - RV MID: 1.7 CM
BH CV XLRA - TDI S': 13.4 CM/SEC
BILIRUB SERPL-MCNC: 0.2 MG/DL (ref 0.2–1.2)
BILIRUB SERPL-MCNC: 0.2 MG/DL (ref 0–1.2)
BILIRUB SERPL-MCNC: 0.2 MG/DL (ref 0–1.2)
BILIRUB SERPL-MCNC: 0.3 MG/DL (ref 0.2–1.2)
BILIRUB SERPL-MCNC: 0.3 MG/DL (ref 0.2–1.2)
BILIRUB SERPL-MCNC: 0.3 MG/DL (ref 0–1.2)
BILIRUB SERPL-MCNC: 0.4 MG/DL (ref 0.2–1.2)
BILIRUB UR QL STRIP: NEGATIVE
BODY TEMPERATURE: 0 C
BUN BLD-MCNC: 22 MG/DL (ref 8–23)
BUN BLD-MCNC: 23 MG/DL (ref 8–23)
BUN BLD-MCNC: 26 MG/DL (ref 8–23)
BUN BLD-MCNC: 27 MG/DL (ref 8–23)
BUN BLD-MCNC: 27 MG/DL (ref 8–23)
BUN BLD-MCNC: 28 MG/DL (ref 8–23)
BUN BLD-MCNC: 29 MG/DL (ref 8–23)
BUN BLD-MCNC: 30 MG/DL (ref 8–23)
BUN BLD-MCNC: 32 MG/DL (ref 8–23)
BUN BLD-MCNC: 33 MG/DL (ref 8–23)
BUN BLD-MCNC: 34 MG/DL (ref 8–23)
BUN BLD-MCNC: 37 MG/DL (ref 8–23)
BUN BLD-MCNC: 38 MG/DL (ref 8–23)
BUN BLD-MCNC: 39 MG/DL (ref 8–23)
BUN BLD-MCNC: 40 MG/DL (ref 8–23)
BUN BLD-MCNC: 41 MG/DL (ref 8–23)
BUN BLD-MCNC: 42 MG/DL (ref 8–23)
BUN BLD-MCNC: 45 MG/DL (ref 8–23)
BUN BLD-MCNC: 46 MG/DL (ref 8–23)
BUN BLD-MCNC: 46 MG/DL (ref 8–23)
BUN SERPL-MCNC: 26 MG/DL (ref 8–23)
BUN SERPL-MCNC: 28 MG/DL (ref 8–23)
BUN SERPL-MCNC: 29 MG/DL (ref 8–23)
BUN SERPL-MCNC: 30 MG/DL (ref 8–23)
BUN SERPL-MCNC: 41 MG/DL (ref 8–23)
BUN SERPL-MCNC: 52 MG/DL (ref 8–23)
BUN SERPL-MCNC: 58 MG/DL (ref 8–23)
BUN/CREAT SERPL: 18.1 (ref 7–25)
BUN/CREAT SERPL: 18.5 (ref 7–25)
BUN/CREAT SERPL: 19.1 (ref 7–25)
BUN/CREAT SERPL: 20.9 (ref 7–25)
BUN/CREAT SERPL: 21.6 (ref 7–25)
BUN/CREAT SERPL: 22.9 (ref 7–25)
BUN/CREAT SERPL: 23 (ref 7–25)
BUN/CREAT SERPL: 23.8 (ref 7–25)
BUN/CREAT SERPL: 23.9 (ref 7–25)
BUN/CREAT SERPL: 24.1 (ref 7–25)
BUN/CREAT SERPL: 24.5 (ref 7–25)
BUN/CREAT SERPL: 24.8 (ref 7–25)
BUN/CREAT SERPL: 25.7 (ref 7–25)
BUN/CREAT SERPL: 26.4 (ref 7–25)
BUN/CREAT SERPL: 26.8 (ref 7–25)
BUN/CREAT SERPL: 27 (ref 7–25)
BUN/CREAT SERPL: 27.1 (ref 7–25)
BUN/CREAT SERPL: 27.5 (ref 7–25)
BUN/CREAT SERPL: 27.9 (ref 7–25)
BUN/CREAT SERPL: 28.2 (ref 7–25)
BUN/CREAT SERPL: 28.3 (ref 7–25)
BUN/CREAT SERPL: 28.3 (ref 7–25)
BUN/CREAT SERPL: 28.4 (ref 7–25)
BUN/CREAT SERPL: 28.7 (ref 7–25)
BUN/CREAT SERPL: 28.8 (ref 7–25)
BUN/CREAT SERPL: 29.7 (ref 7–25)
BUN/CREAT SERPL: 29.9 (ref 7–25)
BUN/CREAT SERPL: 30.1 (ref 7–25)
BUN/CREAT SERPL: 30.2 (ref 7–25)
BUN/CREAT SERPL: 30.5 (ref 7–25)
BUN/CREAT SERPL: 30.8 (ref 7–25)
BUN/CREAT SERPL: 31.1 (ref 7–25)
BUN/CREAT SERPL: 31.3 (ref 7–25)
BUN/CREAT SERPL: 40 (ref 7–25)
BUPRENORPHINE SERPL-MCNC: NEGATIVE NG/ML
CALCIUM SPEC-SCNC: 8 MG/DL (ref 8.2–9.6)
CALCIUM SPEC-SCNC: 8.1 MG/DL (ref 8.2–9.6)
CALCIUM SPEC-SCNC: 8.3 MG/DL (ref 8.2–9.6)
CALCIUM SPEC-SCNC: 8.4 MG/DL (ref 8.2–9.6)
CALCIUM SPEC-SCNC: 8.4 MG/DL (ref 8.2–9.6)
CALCIUM SPEC-SCNC: 8.5 MG/DL (ref 8.2–9.6)
CALCIUM SPEC-SCNC: 8.6 MG/DL (ref 8.2–9.6)
CALCIUM SPEC-SCNC: 8.7 MG/DL (ref 8.2–9.6)
CALCIUM SPEC-SCNC: 8.8 MG/DL (ref 8.2–9.6)
CALCIUM SPEC-SCNC: 8.9 MG/DL (ref 8.2–9.6)
CALCIUM SPEC-SCNC: 9 MG/DL (ref 8.2–9.6)
CALCIUM SPEC-SCNC: 9.2 MG/DL (ref 8.2–9.6)
CALCIUM SPEC-SCNC: 9.6 MG/DL (ref 8.2–9.6)
CANNABINOIDS SERPL QL: NEGATIVE
CHLORIDE SERPL-SCNC: 100 MMOL/L (ref 98–107)
CHLORIDE SERPL-SCNC: 100 MMOL/L (ref 98–107)
CHLORIDE SERPL-SCNC: 101 MMOL/L (ref 98–107)
CHLORIDE SERPL-SCNC: 101 MMOL/L (ref 98–107)
CHLORIDE SERPL-SCNC: 102 MMOL/L (ref 98–107)
CHLORIDE SERPL-SCNC: 103 MMOL/L (ref 98–107)
CHLORIDE SERPL-SCNC: 103 MMOL/L (ref 98–107)
CHLORIDE SERPL-SCNC: 104 MMOL/L (ref 98–107)
CHLORIDE SERPL-SCNC: 107 MMOL/L (ref 98–107)
CHLORIDE SERPL-SCNC: 108 MMOL/L (ref 98–107)
CHLORIDE SERPL-SCNC: 89 MMOL/L (ref 98–107)
CHLORIDE SERPL-SCNC: 90 MMOL/L (ref 98–107)
CHLORIDE SERPL-SCNC: 90 MMOL/L (ref 98–107)
CHLORIDE SERPL-SCNC: 91 MMOL/L (ref 98–107)
CHLORIDE SERPL-SCNC: 92 MMOL/L (ref 98–107)
CHLORIDE SERPL-SCNC: 94 MMOL/L (ref 98–107)
CHLORIDE SERPL-SCNC: 95 MMOL/L (ref 98–107)
CHLORIDE SERPL-SCNC: 97 MMOL/L (ref 98–107)
CHLORIDE SERPL-SCNC: 97 MMOL/L (ref 98–107)
CHLORIDE SERPL-SCNC: 99 MMOL/L (ref 98–107)
CHOLEST SERPL-MCNC: 100 MG/DL (ref 0–200)
CHOLEST SERPL-MCNC: 115 MG/DL (ref 0–200)
CHOLEST SERPL-MCNC: 173 MG/DL (ref 0–200)
CHOLEST SERPL-MCNC: 178 MG/DL (ref 0–200)
CK SERPL-CCNC: 43 U/L (ref 20–180)
CLARITY UR: CLEAR
CO2 BLDA-SCNC: 21.9 MMOL/L (ref 22–33)
CO2 SERPL-SCNC: 18.3 MMOL/L (ref 22–29)
CO2 SERPL-SCNC: 18.8 MMOL/L (ref 22–29)
CO2 SERPL-SCNC: 19.1 MMOL/L (ref 22–29)
CO2 SERPL-SCNC: 19.4 MMOL/L (ref 22–29)
CO2 SERPL-SCNC: 19.5 MMOL/L (ref 22–29)
CO2 SERPL-SCNC: 19.8 MMOL/L (ref 22–29)
CO2 SERPL-SCNC: 19.9 MMOL/L (ref 22–29)
CO2 SERPL-SCNC: 19.9 MMOL/L (ref 22–29)
CO2 SERPL-SCNC: 20 MMOL/L (ref 22–29)
CO2 SERPL-SCNC: 20.6 MMOL/L (ref 22–29)
CO2 SERPL-SCNC: 20.8 MMOL/L (ref 22–29)
CO2 SERPL-SCNC: 20.9 MMOL/L (ref 22–29)
CO2 SERPL-SCNC: 21 MMOL/L (ref 22–29)
CO2 SERPL-SCNC: 21 MMOL/L (ref 22–29)
CO2 SERPL-SCNC: 21.2 MMOL/L (ref 22–29)
CO2 SERPL-SCNC: 21.2 MMOL/L (ref 22–29)
CO2 SERPL-SCNC: 21.5 MMOL/L (ref 22–29)
CO2 SERPL-SCNC: 21.8 MMOL/L (ref 22–29)
CO2 SERPL-SCNC: 22 MMOL/L (ref 22–29)
CO2 SERPL-SCNC: 22.2 MMOL/L (ref 22–29)
CO2 SERPL-SCNC: 22.7 MMOL/L (ref 22–29)
CO2 SERPL-SCNC: 22.8 MMOL/L (ref 22–29)
CO2 SERPL-SCNC: 23 MMOL/L (ref 22–29)
CO2 SERPL-SCNC: 23.1 MMOL/L (ref 22–29)
CO2 SERPL-SCNC: 23.3 MMOL/L (ref 22–29)
CO2 SERPL-SCNC: 23.9 MMOL/L (ref 22–29)
CO2 SERPL-SCNC: 24 MMOL/L (ref 22–29)
CO2 SERPL-SCNC: 24.1 MMOL/L (ref 22–29)
CO2 SERPL-SCNC: 24.3 MMOL/L (ref 22–29)
CO2 SERPL-SCNC: 24.5 MMOL/L (ref 22–29)
CO2 SERPL-SCNC: 24.8 MMOL/L (ref 22–29)
CO2 SERPL-SCNC: 24.8 MMOL/L (ref 22–29)
CO2 SERPL-SCNC: 25.9 MMOL/L (ref 22–29)
COCAINE UR QL: NEGATIVE
COHGB MFR BLD: 0.6 % (ref 0–5)
COLOR UR: YELLOW
CORTIS AM PEAK SERPL-MCNC: 16.58 MCG/DL
CREAT BLD-MCNC: 1 MG/DL (ref 0.57–1)
CREAT BLD-MCNC: 1.03 MG/DL (ref 0.57–1)
CREAT BLD-MCNC: 1.13 MG/DL (ref 0.57–1)
CREAT BLD-MCNC: 1.15 MG/DL (ref 0.57–1)
CREAT BLD-MCNC: 1.23 MG/DL (ref 0.57–1)
CREAT BLD-MCNC: 1.24 MG/DL (ref 0.57–1)
CREAT BLD-MCNC: 1.26 MG/DL (ref 0.57–1)
CREAT BLD-MCNC: 1.28 MG/DL (ref 0.57–1)
CREAT BLD-MCNC: 1.29 MG/DL (ref 0.57–1)
CREAT BLD-MCNC: 1.32 MG/DL (ref 0.57–1)
CREAT BLD-MCNC: 1.33 MG/DL (ref 0.57–1)
CREAT BLD-MCNC: 1.34 MG/DL (ref 0.57–1)
CREAT BLD-MCNC: 1.35 MG/DL (ref 0.57–1)
CREAT BLD-MCNC: 1.36 MG/DL (ref 0.57–1)
CREAT BLD-MCNC: 1.36 MG/DL (ref 0.57–1)
CREAT BLD-MCNC: 1.38 MG/DL (ref 0.57–1)
CREAT BLD-MCNC: 1.38 MG/DL (ref 0.57–1)
CREAT BLD-MCNC: 1.39 MG/DL (ref 0.57–1)
CREAT BLD-MCNC: 1.4 MG/DL (ref 0.57–1)
CREAT BLD-MCNC: 1.41 MG/DL (ref 0.57–1)
CREAT BLD-MCNC: 1.49 MG/DL (ref 0.57–1)
CREAT BLD-MCNC: 1.51 MG/DL (ref 0.57–1)
CREAT BLD-MCNC: 1.53 MG/DL (ref 0.57–1)
CREAT BLD-MCNC: 1.75 MG/DL (ref 0.57–1)
CREAT SERPL-MCNC: 1.08 MG/DL (ref 0.57–1)
CREAT SERPL-MCNC: 1.34 MG/DL (ref 0.57–1)
CREAT SERPL-MCNC: 1.34 MG/DL (ref 0.57–1)
CREAT SERPL-MCNC: 1.45 MG/DL (ref 0.57–1)
CREAT SERPL-MCNC: 1.45 MG/DL (ref 0.57–1)
CREAT SERPL-MCNC: 1.57 MG/DL (ref 0.57–1)
CREAT SERPL-MCNC: 1.66 MG/DL (ref 0.57–1)
CRP SERPL-MCNC: 5.12 MG/DL (ref 0–0.5)
CRP SERPL-MCNC: 6.38 MG/DL (ref 0–0.5)
CRP SERPL-MCNC: 6.48 MG/DL (ref 0–0.5)
CRP SERPL-MCNC: 7.73 MG/DL (ref 0–0.5)
CYTOLOGIST CVX/VAG CYTO: NORMAL
D-LACTATE SERPL-SCNC: 0.9 MMOL/L (ref 0.5–2)
D-LACTATE SERPL-SCNC: 1.5 MMOL/L (ref 0.5–2)
D-LACTATE SERPL-SCNC: 2.2 MMOL/L (ref 0.5–2)
DEPRECATED RDW RBC AUTO: 47.3 FL (ref 37–54)
DEPRECATED RDW RBC AUTO: 48.2 FL (ref 37–54)
DEPRECATED RDW RBC AUTO: 48.2 FL (ref 37–54)
DEPRECATED RDW RBC AUTO: 48.5 FL (ref 37–54)
DEPRECATED RDW RBC AUTO: 49.8 FL (ref 37–54)
DEPRECATED RDW RBC AUTO: 50.4 FL (ref 37–54)
DEPRECATED RDW RBC AUTO: 50.5 FL (ref 37–54)
DEPRECATED RDW RBC AUTO: 50.8 FL (ref 37–54)
DEPRECATED RDW RBC AUTO: 50.8 FL (ref 37–54)
DEPRECATED RDW RBC AUTO: 50.9 FL (ref 37–54)
DEPRECATED RDW RBC AUTO: 52 FL (ref 37–54)
DEPRECATED RDW RBC AUTO: 53.7 FL (ref 37–54)
DEPRECATED RDW RBC AUTO: 54.6 FL (ref 37–54)
DEPRECATED RDW RBC AUTO: 55.4 FL (ref 37–54)
DEPRECATED RDW RBC AUTO: 57.6 FL (ref 37–54)
DEPRECATED RDW RBC AUTO: 58 FL (ref 37–54)
DEPRECATED RDW RBC AUTO: 58.4 FL (ref 37–54)
DEPRECATED RDW RBC AUTO: 58.8 FL (ref 37–54)
DEPRECATED RDW RBC AUTO: 59.1 FL (ref 37–54)
DEPRECATED RDW RBC AUTO: 59.1 FL (ref 37–54)
DEPRECATED RDW RBC AUTO: 59.3 FL (ref 37–54)
DEPRECATED RDW RBC AUTO: 59.4 FL (ref 37–54)
DEPRECATED RDW RBC AUTO: 59.5 FL (ref 37–54)
DEPRECATED RDW RBC AUTO: 60.4 FL (ref 37–54)
EOSINOPHIL # BLD AUTO: 0.03 10*3/MM3 (ref 0–0.4)
EOSINOPHIL # BLD AUTO: 0.06 10*3/MM3 (ref 0–0.4)
EOSINOPHIL # BLD AUTO: 0.08 10*3/MM3 (ref 0–0.4)
EOSINOPHIL # BLD AUTO: 0.08 10*3/MM3 (ref 0–0.4)
EOSINOPHIL # BLD AUTO: 0.09 10*3/MM3 (ref 0–0.4)
EOSINOPHIL # BLD AUTO: 0.1 10*3/MM3 (ref 0–0.4)
EOSINOPHIL # BLD AUTO: 0.11 10*3/MM3 (ref 0–0.4)
EOSINOPHIL # BLD AUTO: 0.13 10*3/MM3 (ref 0–0.4)
EOSINOPHIL # BLD AUTO: 0.13 10*3/MM3 (ref 0–0.4)
EOSINOPHIL # BLD AUTO: 0.14 10*3/MM3 (ref 0–0.4)
EOSINOPHIL # BLD AUTO: 0.14 10*3/MM3 (ref 0–0.4)
EOSINOPHIL # BLD AUTO: 0.16 10*3/MM3 (ref 0–0.4)
EOSINOPHIL # BLD AUTO: 0.16 10*3/MM3 (ref 0–0.4)
EOSINOPHIL # BLD AUTO: 0.17 10*3/MM3 (ref 0–0.4)
EOSINOPHIL # BLD AUTO: 0.18 10*3/MM3 (ref 0–0.4)
EOSINOPHIL NFR BLD AUTO: 0.3 % (ref 0.3–6.2)
EOSINOPHIL NFR BLD AUTO: 1 % (ref 0.3–6.2)
EOSINOPHIL NFR BLD AUTO: 1.2 % (ref 0.3–6.2)
EOSINOPHIL NFR BLD AUTO: 1.2 % (ref 0.3–6.2)
EOSINOPHIL NFR BLD AUTO: 1.3 % (ref 0.3–6.2)
EOSINOPHIL NFR BLD AUTO: 1.4 % (ref 0.3–6.2)
EOSINOPHIL NFR BLD AUTO: 1.4 % (ref 0.3–6.2)
EOSINOPHIL NFR BLD AUTO: 1.5 % (ref 0.3–6.2)
EOSINOPHIL NFR BLD AUTO: 1.6 % (ref 0.3–6.2)
EOSINOPHIL NFR BLD AUTO: 1.6 % (ref 0.3–6.2)
EOSINOPHIL NFR BLD AUTO: 1.7 % (ref 0.3–6.2)
EOSINOPHIL NFR BLD AUTO: 1.8 % (ref 0.3–6.2)
EOSINOPHIL NFR BLD AUTO: 1.8 % (ref 0.3–6.2)
EOSINOPHIL NFR BLD AUTO: 1.9 % (ref 0.3–6.2)
EOSINOPHIL NFR BLD AUTO: 2 % (ref 0.3–6.2)
EOSINOPHIL NFR BLD AUTO: 2.3 % (ref 0.3–6.2)
EOSINOPHIL NFR BLD AUTO: 2.4 % (ref 0.3–6.2)
EOSINOPHIL NFR BLD AUTO: 2.6 % (ref 0.3–6.2)
EOSINOPHIL NFR BLD AUTO: 2.8 % (ref 0.3–6.2)
EOSINOPHIL NFR BLD AUTO: 3.1 % (ref 0.3–6.2)
ERYTHROCYTE [DISTWIDTH] IN BLOOD BY AUTOMATED COUNT: 15.6 % (ref 12.3–15.4)
ERYTHROCYTE [DISTWIDTH] IN BLOOD BY AUTOMATED COUNT: 15.6 % (ref 12.3–15.4)
ERYTHROCYTE [DISTWIDTH] IN BLOOD BY AUTOMATED COUNT: 15.8 % (ref 12.3–15.4)
ERYTHROCYTE [DISTWIDTH] IN BLOOD BY AUTOMATED COUNT: 15.9 % (ref 12.3–15.4)
ERYTHROCYTE [DISTWIDTH] IN BLOOD BY AUTOMATED COUNT: 16.1 % (ref 12.3–15.4)
ERYTHROCYTE [DISTWIDTH] IN BLOOD BY AUTOMATED COUNT: 16.3 % (ref 12.3–15.4)
ERYTHROCYTE [DISTWIDTH] IN BLOOD BY AUTOMATED COUNT: 16.4 % (ref 12.3–15.4)
ERYTHROCYTE [DISTWIDTH] IN BLOOD BY AUTOMATED COUNT: 16.5 % (ref 12.3–15.4)
ERYTHROCYTE [DISTWIDTH] IN BLOOD BY AUTOMATED COUNT: 16.6 % (ref 12.3–15.4)
ERYTHROCYTE [DISTWIDTH] IN BLOOD BY AUTOMATED COUNT: 17 % (ref 12.3–15.4)
ERYTHROCYTE [DISTWIDTH] IN BLOOD BY AUTOMATED COUNT: 17.1 % (ref 12.3–15.4)
ERYTHROCYTE [DISTWIDTH] IN BLOOD BY AUTOMATED COUNT: 17.4 % (ref 12.3–15.4)
ERYTHROCYTE [DISTWIDTH] IN BLOOD BY AUTOMATED COUNT: 17.6 % (ref 12.3–15.4)
ERYTHROCYTE [DISTWIDTH] IN BLOOD BY AUTOMATED COUNT: 18.3 % (ref 12.3–15.4)
ERYTHROCYTE [DISTWIDTH] IN BLOOD BY AUTOMATED COUNT: 18.4 % (ref 12.3–15.4)
ERYTHROCYTE [DISTWIDTH] IN BLOOD BY AUTOMATED COUNT: 18.5 % (ref 12.3–15.4)
ERYTHROCYTE [DISTWIDTH] IN BLOOD BY AUTOMATED COUNT: 18.5 % (ref 12.3–15.4)
ERYTHROCYTE [DISTWIDTH] IN BLOOD BY AUTOMATED COUNT: 18.6 % (ref 12.3–15.4)
ERYTHROCYTE [DISTWIDTH] IN BLOOD BY AUTOMATED COUNT: 18.8 % (ref 12.3–15.4)
ERYTHROCYTE [DISTWIDTH] IN BLOOD BY AUTOMATED COUNT: 18.9 % (ref 12.3–15.4)
ERYTHROCYTE [DISTWIDTH] IN BLOOD BY AUTOMATED COUNT: 19 % (ref 12.3–15.4)
ERYTHROCYTE [SEDIMENTATION RATE] IN BLOOD: 86 MM/HR (ref 0–30)
ERYTHROCYTE [SEDIMENTATION RATE] IN BLOOD: 99 MM/HR (ref 0–30)
ERYTHROCYTE [SEDIMENTATION RATE] IN BLOOD: >100 MM/HR (ref 0–30)
FERRITIN SERPL-MCNC: 340.1 NG/ML (ref 13–150)
FERRITIN SERPL-MCNC: 582.2 NG/ML (ref 13–150)
FLUAV AG NPH QL: POSITIVE
FLUBV AG NPH QL IA: NEGATIVE
FOLATE SERPL-MCNC: 19.2 NG/ML (ref 4.78–24.2)
FOLATE SERPL-MCNC: >20 NG/ML (ref 4.78–24.2)
GAMMA GLOB SERPL ELPH-MCNC: 0.8 G/DL (ref 0.4–1.8)
GFR SERPL CREATININE-BSD FRML MDRD: 27 ML/MIN/1.73
GFR SERPL CREATININE-BSD FRML MDRD: 29 ML/MIN/1.73
GFR SERPL CREATININE-BSD FRML MDRD: 32 ML/MIN/1.73
GFR SERPL CREATININE-BSD FRML MDRD: 32 ML/MIN/1.73
GFR SERPL CREATININE-BSD FRML MDRD: 33 ML/MIN/1.73
GFR SERPL CREATININE-BSD FRML MDRD: 34 ML/MIN/1.73
GFR SERPL CREATININE-BSD FRML MDRD: 34 ML/MIN/1.73
GFR SERPL CREATININE-BSD FRML MDRD: 35 ML/MIN/1.73
GFR SERPL CREATININE-BSD FRML MDRD: 36 ML/MIN/1.73
GFR SERPL CREATININE-BSD FRML MDRD: 37 ML/MIN/1.73
GFR SERPL CREATININE-BSD FRML MDRD: 38 ML/MIN/1.73
GFR SERPL CREATININE-BSD FRML MDRD: 39 ML/MIN/1.73
GFR SERPL CREATININE-BSD FRML MDRD: 40 ML/MIN/1.73
GFR SERPL CREATININE-BSD FRML MDRD: 40 ML/MIN/1.73
GFR SERPL CREATININE-BSD FRML MDRD: 41 ML/MIN/1.73
GFR SERPL CREATININE-BSD FRML MDRD: 44 ML/MIN/1.73
GFR SERPL CREATININE-BSD FRML MDRD: 45 ML/MIN/1.73
GFR SERPL CREATININE-BSD FRML MDRD: 47 ML/MIN/1.73
GFR SERPL CREATININE-BSD FRML MDRD: 50 ML/MIN/1.73
GFR SERPL CREATININE-BSD FRML MDRD: 52 ML/MIN/1.73
GLOBULIN SER CALC-MCNC: 3.2 G/DL (ref 2.2–3.9)
GLOBULIN UR ELPH-MCNC: 2.9 GM/DL
GLOBULIN UR ELPH-MCNC: 3 GM/DL
GLOBULIN UR ELPH-MCNC: 3.4 GM/DL
GLOBULIN UR ELPH-MCNC: 3.4 GM/DL
GLOBULIN UR ELPH-MCNC: 3.5 GM/DL
GLOBULIN UR ELPH-MCNC: 3.5 GM/DL
GLOBULIN UR ELPH-MCNC: 3.6 GM/DL
GLOBULIN UR ELPH-MCNC: 3.7 GM/DL
GLOBULIN UR ELPH-MCNC: 3.7 GM/DL
GLUCOSE BLD-MCNC: 100 MG/DL (ref 65–99)
GLUCOSE BLD-MCNC: 101 MG/DL (ref 65–99)
GLUCOSE BLD-MCNC: 101 MG/DL (ref 65–99)
GLUCOSE BLD-MCNC: 102 MG/DL (ref 65–99)
GLUCOSE BLD-MCNC: 103 MG/DL (ref 65–99)
GLUCOSE BLD-MCNC: 104 MG/DL (ref 65–99)
GLUCOSE BLD-MCNC: 104 MG/DL (ref 65–99)
GLUCOSE BLD-MCNC: 106 MG/DL (ref 65–99)
GLUCOSE BLD-MCNC: 107 MG/DL (ref 65–99)
GLUCOSE BLD-MCNC: 108 MG/DL (ref 65–99)
GLUCOSE BLD-MCNC: 113 MG/DL (ref 65–99)
GLUCOSE BLD-MCNC: 114 MG/DL (ref 65–99)
GLUCOSE BLD-MCNC: 116 MG/DL (ref 65–99)
GLUCOSE BLD-MCNC: 118 MG/DL (ref 65–99)
GLUCOSE BLD-MCNC: 123 MG/DL (ref 65–99)
GLUCOSE BLD-MCNC: 128 MG/DL (ref 65–99)
GLUCOSE BLD-MCNC: 153 MG/DL (ref 65–99)
GLUCOSE BLD-MCNC: 86 MG/DL (ref 65–99)
GLUCOSE BLD-MCNC: 90 MG/DL (ref 65–99)
GLUCOSE BLD-MCNC: 92 MG/DL (ref 65–99)
GLUCOSE BLD-MCNC: 92 MG/DL (ref 65–99)
GLUCOSE BLD-MCNC: 99 MG/DL (ref 65–99)
GLUCOSE BLD-MCNC: 99 MG/DL (ref 65–99)
GLUCOSE BLDC GLUCOMTR-MCNC: 108 MG/DL (ref 70–130)
GLUCOSE BLDC GLUCOMTR-MCNC: 109 MG/DL (ref 70–130)
GLUCOSE BLDC GLUCOMTR-MCNC: 113 MG/DL (ref 70–130)
GLUCOSE BLDC GLUCOMTR-MCNC: 118 MG/DL (ref 70–130)
GLUCOSE BLDC GLUCOMTR-MCNC: 123 MG/DL (ref 70–130)
GLUCOSE BLDC GLUCOMTR-MCNC: 124 MG/DL (ref 70–130)
GLUCOSE BLDC GLUCOMTR-MCNC: 127 MG/DL (ref 70–130)
GLUCOSE BLDC GLUCOMTR-MCNC: 129 MG/DL (ref 70–130)
GLUCOSE BLDC GLUCOMTR-MCNC: 147 MG/DL (ref 70–130)
GLUCOSE BLDC GLUCOMTR-MCNC: 148 MG/DL (ref 70–130)
GLUCOSE SERPL-MCNC: 100 MG/DL (ref 65–99)
GLUCOSE SERPL-MCNC: 105 MG/DL (ref 65–99)
GLUCOSE SERPL-MCNC: 112 MG/DL (ref 65–99)
GLUCOSE SERPL-MCNC: 113 MG/DL (ref 65–99)
GLUCOSE SERPL-MCNC: 114 MG/DL (ref 65–99)
GLUCOSE SERPL-MCNC: 116 MG/DL (ref 65–99)
GLUCOSE SERPL-MCNC: 119 MG/DL (ref 65–99)
GLUCOSE UR STRIP-MCNC: NEGATIVE MG/DL
HAPTOGLOB SERPL-MCNC: 514 MG/DL (ref 30–200)
HBA1C MFR BLD: 5.1 % (ref 4.8–5.6)
HCO3 BLDA-SCNC: 20.9 MMOL/L (ref 20–26)
HCT VFR BLD AUTO: 23.6 % (ref 34–46.6)
HCT VFR BLD AUTO: 24.4 % (ref 34–46.6)
HCT VFR BLD AUTO: 25.5 % (ref 34–46.6)
HCT VFR BLD AUTO: 25.7 % (ref 34–46.6)
HCT VFR BLD AUTO: 26.9 % (ref 34–46.6)
HCT VFR BLD AUTO: 27 % (ref 34–46.6)
HCT VFR BLD AUTO: 27.6 % (ref 34–46.6)
HCT VFR BLD AUTO: 27.9 % (ref 34–46.6)
HCT VFR BLD AUTO: 28.5 % (ref 34–46.6)
HCT VFR BLD AUTO: 29.1 % (ref 34–46.6)
HCT VFR BLD AUTO: 29.1 % (ref 34–46.6)
HCT VFR BLD AUTO: 29.4 % (ref 34–46.6)
HCT VFR BLD AUTO: 29.5 % (ref 34–46.6)
HCT VFR BLD AUTO: 29.6 % (ref 34–46.6)
HCT VFR BLD AUTO: 29.7 % (ref 34–46.6)
HCT VFR BLD AUTO: 30 % (ref 34–46.6)
HCT VFR BLD AUTO: 30.2 % (ref 34–46.6)
HCT VFR BLD AUTO: 30.9 % (ref 34–46.6)
HCT VFR BLD AUTO: 31.1 % (ref 34–46.6)
HCT VFR BLD AUTO: 31.2 % (ref 34–46.6)
HCT VFR BLD AUTO: 31.8 % (ref 34–46.6)
HCT VFR BLD AUTO: 31.8 % (ref 34–46.6)
HCT VFR BLD AUTO: 32.1 % (ref 34–46.6)
HCT VFR BLD AUTO: 32.8 % (ref 34–46.6)
HCT VFR BLD AUTO: 33.8 % (ref 34–46.6)
HCT VFR BLD AUTO: 35.5 % (ref 34–46.6)
HCT VFR BLD AUTO: 37.7 % (ref 34–46.6)
HCT VFR BLD CALC: 27.7 % (ref 38–51)
HDLC SERPL-MCNC: 34 MG/DL (ref 40–60)
HDLC SERPL-MCNC: 35 MG/DL (ref 40–60)
HDLC SERPL-MCNC: 45 MG/DL (ref 40–60)
HEMOCCULT STL QL: NEGATIVE
HGB BLD-MCNC: 10.1 G/DL (ref 12–15.9)
HGB BLD-MCNC: 10.2 G/DL (ref 12–15.9)
HGB BLD-MCNC: 10.6 G/DL (ref 12–15.9)
HGB BLD-MCNC: 11 G/DL (ref 12–15.9)
HGB BLD-MCNC: 11.4 G/DL (ref 12–15.9)
HGB BLD-MCNC: 7.3 G/DL (ref 12–15.9)
HGB BLD-MCNC: 7.5 G/DL (ref 12–15.9)
HGB BLD-MCNC: 7.5 G/DL (ref 12–15.9)
HGB BLD-MCNC: 7.7 G/DL (ref 12–15.9)
HGB BLD-MCNC: 8.2 G/DL (ref 12–15.9)
HGB BLD-MCNC: 8.3 G/DL (ref 12–15.9)
HGB BLD-MCNC: 8.5 G/DL (ref 12–15.9)
HGB BLD-MCNC: 8.6 G/DL (ref 12–15.9)
HGB BLD-MCNC: 8.7 G/DL (ref 12–15.9)
HGB BLD-MCNC: 8.8 G/DL (ref 12–15.9)
HGB BLD-MCNC: 8.8 G/DL (ref 12–15.9)
HGB BLD-MCNC: 8.9 G/DL (ref 12–15.9)
HGB BLD-MCNC: 9 G/DL (ref 12–15.9)
HGB BLD-MCNC: 9 G/DL (ref 12–15.9)
HGB BLD-MCNC: 9.3 G/DL (ref 12–15.9)
HGB BLD-MCNC: 9.3 G/DL (ref 12–15.9)
HGB BLD-MCNC: 9.4 G/DL (ref 12–15.9)
HGB BLD-MCNC: 9.5 G/DL (ref 12–15.9)
HGB BLD-MCNC: 9.6 G/DL (ref 12–15.9)
HGB BLD-MCNC: 9.7 G/DL (ref 12–15.9)
HGB BLDA-MCNC: 9 G/DL (ref 13.5–17.5)
HGB UR QL STRIP.AUTO: NEGATIVE
HOLD SPECIMEN: NORMAL
HOROWITZ INDEX BLD+IHG-RTO: 28 %
HYALINE CASTS UR QL AUTO: NORMAL /LPF
HYALINE CASTS UR QL AUTO: NORMAL /LPF
IMM GRANULOCYTES # BLD AUTO: 0.01 10*3/MM3 (ref 0–0.05)
IMM GRANULOCYTES # BLD AUTO: 0.01 10*3/MM3 (ref 0–0.05)
IMM GRANULOCYTES # BLD AUTO: 0.02 10*3/MM3 (ref 0–0.05)
IMM GRANULOCYTES # BLD AUTO: 0.03 10*3/MM3 (ref 0–0.05)
IMM GRANULOCYTES # BLD AUTO: 0.04 10*3/MM3 (ref 0–0.05)
IMM GRANULOCYTES # BLD AUTO: 0.04 10*3/MM3 (ref 0–0.05)
IMM GRANULOCYTES # BLD AUTO: 0.3 10*3/MM3 (ref 0–0.05)
IMM GRANULOCYTES NFR BLD AUTO: 0.2 % (ref 0–0.5)
IMM GRANULOCYTES NFR BLD AUTO: 0.3 % (ref 0–0.5)
IMM GRANULOCYTES NFR BLD AUTO: 0.4 % (ref 0–0.5)
IMM GRANULOCYTES NFR BLD AUTO: 0.5 % (ref 0–0.5)
IMM GRANULOCYTES NFR BLD AUTO: 0.5 % (ref 0–0.5)
IMM GRANULOCYTES NFR BLD AUTO: 0.6 % (ref 0–0.5)
IMM GRANULOCYTES NFR BLD AUTO: 4.9 % (ref 0–0.5)
INR PPP: 0.98 (ref 0.9–1.1)
INR PPP: 1 (ref 0.9–1.1)
INR PPP: 1.01 (ref 0.9–1.1)
INR PPP: 1.01 (ref 0.9–1.1)
IRON 24H UR-MRATE: 18 MCG/DL (ref 37–145)
IRON 24H UR-MRATE: 34 MCG/DL (ref 37–145)
IRON SATN MFR SERPL: 16 % (ref 20–50)
IRON SATN MFR SERPL: 9 % (ref 20–50)
KETONES UR QL STRIP: NEGATIVE
LACTATE HOLD SPECIMEN: NORMAL
LDH SERPL-CCNC: 192 U/L (ref 135–214)
LDLC SERPL CALC-MCNC: 114 MG/DL (ref 0–100)
LDLC SERPL CALC-MCNC: 56 MG/DL (ref 0–100)
LDLC SERPL CALC-MCNC: 66 MG/DL (ref 0–100)
LDLC/HDLC SERPL: 1.59 {RATIO}
LDLC/HDLC SERPL: 1.94 {RATIO}
LDLC/HDLC SERPL: 2.53 {RATIO}
LEFT ATRIUM VOLUME INDEX: 40.4 ML/M^2
LEFT ATRIUM VOLUME: 68 ML
LEUKOCYTE ESTERASE UR QL STRIP.AUTO: ABNORMAL
LEUKOCYTE ESTERASE UR QL STRIP.AUTO: ABNORMAL
LEUKOCYTE ESTERASE UR QL STRIP.AUTO: NEGATIVE
LIPASE SERPL-CCNC: 47 U/L (ref 13–60)
LV EF 2D ECHO EST: 41 %
LYMPHOCYTES # BLD AUTO: 1.09 10*3/MM3 (ref 0.7–3.1)
LYMPHOCYTES # BLD AUTO: 1.14 10*3/MM3 (ref 0.7–3.1)
LYMPHOCYTES # BLD AUTO: 1.19 10*3/MM3 (ref 0.7–3.1)
LYMPHOCYTES # BLD AUTO: 1.36 10*3/MM3 (ref 0.7–3.1)
LYMPHOCYTES # BLD AUTO: 1.42 10*3/MM3 (ref 0.7–3.1)
LYMPHOCYTES # BLD AUTO: 1.43 10*3/MM3 (ref 0.7–3.1)
LYMPHOCYTES # BLD AUTO: 1.46 10*3/MM3 (ref 0.7–3.1)
LYMPHOCYTES # BLD AUTO: 1.48 10*3/MM3 (ref 0.7–3.1)
LYMPHOCYTES # BLD AUTO: 1.56 10*3/MM3 (ref 0.7–3.1)
LYMPHOCYTES # BLD AUTO: 1.57 10*3/MM3 (ref 0.7–3.1)
LYMPHOCYTES # BLD AUTO: 1.58 10*3/MM3 (ref 0.7–3.1)
LYMPHOCYTES # BLD AUTO: 1.59 10*3/MM3 (ref 0.7–3.1)
LYMPHOCYTES # BLD AUTO: 1.67 10*3/MM3 (ref 0.7–3.1)
LYMPHOCYTES # BLD AUTO: 1.72 10*3/MM3 (ref 0.7–3.1)
LYMPHOCYTES # BLD AUTO: 1.75 10*3/MM3 (ref 0.7–3.1)
LYMPHOCYTES # BLD AUTO: 1.77 10*3/MM3 (ref 0.7–3.1)
LYMPHOCYTES # BLD AUTO: 1.77 10*3/MM3 (ref 0.7–3.1)
LYMPHOCYTES # BLD AUTO: 1.78 10*3/MM3 (ref 0.7–3.1)
LYMPHOCYTES # BLD AUTO: 1.8 10*3/MM3 (ref 0.7–3.1)
LYMPHOCYTES # BLD AUTO: 2.37 10*3/MM3 (ref 0.7–3.1)
LYMPHOCYTES NFR BLD AUTO: 10.3 % (ref 19.6–45.3)
LYMPHOCYTES NFR BLD AUTO: 16 % (ref 19.6–45.3)
LYMPHOCYTES NFR BLD AUTO: 17 % (ref 19.6–45.3)
LYMPHOCYTES NFR BLD AUTO: 17.6 % (ref 19.6–45.3)
LYMPHOCYTES NFR BLD AUTO: 19.5 % (ref 19.6–45.3)
LYMPHOCYTES NFR BLD AUTO: 21.2 % (ref 19.6–45.3)
LYMPHOCYTES NFR BLD AUTO: 22.3 % (ref 19.6–45.3)
LYMPHOCYTES NFR BLD AUTO: 22.5 % (ref 19.6–45.3)
LYMPHOCYTES NFR BLD AUTO: 22.8 % (ref 19.6–45.3)
LYMPHOCYTES NFR BLD AUTO: 22.8 % (ref 19.6–45.3)
LYMPHOCYTES NFR BLD AUTO: 23.2 % (ref 19.6–45.3)
LYMPHOCYTES NFR BLD AUTO: 25.3 % (ref 19.6–45.3)
LYMPHOCYTES NFR BLD AUTO: 25.8 % (ref 19.6–45.3)
LYMPHOCYTES NFR BLD AUTO: 26.4 % (ref 19.6–45.3)
LYMPHOCYTES NFR BLD AUTO: 27.2 % (ref 19.6–45.3)
LYMPHOCYTES NFR BLD AUTO: 27.3 % (ref 19.6–45.3)
LYMPHOCYTES NFR BLD AUTO: 29.6 % (ref 19.6–45.3)
LYMPHOCYTES NFR BLD AUTO: 29.9 % (ref 19.6–45.3)
LYMPHOCYTES NFR BLD AUTO: 31.4 % (ref 19.6–45.3)
LYMPHOCYTES NFR BLD AUTO: 39.2 % (ref 19.6–45.3)
Lab: ABNORMAL
Lab: ABNORMAL
M-SPIKE: ABNORMAL G/DL
MAGNESIUM SERPL-MCNC: 1.8 MG/DL (ref 1.7–2.3)
MAGNESIUM SERPL-MCNC: 1.9 MG/DL (ref 1.7–2.3)
MAGNESIUM SERPL-MCNC: 1.9 MG/DL (ref 1.7–2.3)
MAGNESIUM SERPL-MCNC: 2 MG/DL (ref 1.7–2.3)
MAGNESIUM SERPL-MCNC: 2 MG/DL (ref 1.7–2.3)
MAGNESIUM SERPL-MCNC: 2.4 MG/DL (ref 1.7–2.3)
MAXIMAL PREDICTED HEART RATE: 126 BPM
MCH RBC QN AUTO: 25.3 PG (ref 26.6–33)
MCH RBC QN AUTO: 25.4 PG (ref 26.6–33)
MCH RBC QN AUTO: 25.4 PG (ref 26.6–33)
MCH RBC QN AUTO: 25.6 PG (ref 26.6–33)
MCH RBC QN AUTO: 25.7 PG (ref 26.6–33)
MCH RBC QN AUTO: 25.7 PG (ref 26.6–33)
MCH RBC QN AUTO: 25.8 PG (ref 26.6–33)
MCH RBC QN AUTO: 25.9 PG (ref 26.6–33)
MCH RBC QN AUTO: 26 PG (ref 26.6–33)
MCH RBC QN AUTO: 26 PG (ref 26.6–33)
MCH RBC QN AUTO: 26.2 PG (ref 26.6–33)
MCH RBC QN AUTO: 26.3 PG (ref 26.6–33)
MCH RBC QN AUTO: 27 PG (ref 26.6–33)
MCHC RBC AUTO-ENTMCNC: 29.1 G/DL (ref 31.5–35.7)
MCHC RBC AUTO-ENTMCNC: 29.4 G/DL (ref 31.5–35.7)
MCHC RBC AUTO-ENTMCNC: 29.6 G/DL (ref 31.5–35.7)
MCHC RBC AUTO-ENTMCNC: 29.7 G/DL (ref 31.5–35.7)
MCHC RBC AUTO-ENTMCNC: 29.9 G/DL (ref 31.5–35.7)
MCHC RBC AUTO-ENTMCNC: 30 G/DL (ref 31.5–35.7)
MCHC RBC AUTO-ENTMCNC: 30 G/DL (ref 31.5–35.7)
MCHC RBC AUTO-ENTMCNC: 30.2 G/DL (ref 31.5–35.7)
MCHC RBC AUTO-ENTMCNC: 30.4 G/DL (ref 31.5–35.7)
MCHC RBC AUTO-ENTMCNC: 30.4 G/DL (ref 31.5–35.7)
MCHC RBC AUTO-ENTMCNC: 30.5 G/DL (ref 31.5–35.7)
MCHC RBC AUTO-ENTMCNC: 30.6 G/DL (ref 31.5–35.7)
MCHC RBC AUTO-ENTMCNC: 30.7 G/DL (ref 31.5–35.7)
MCHC RBC AUTO-ENTMCNC: 30.8 G/DL (ref 31.5–35.7)
MCHC RBC AUTO-ENTMCNC: 30.8 G/DL (ref 31.5–35.7)
MCHC RBC AUTO-ENTMCNC: 30.9 G/DL (ref 31.5–35.7)
MCHC RBC AUTO-ENTMCNC: 31 G/DL (ref 31.5–35.7)
MCHC RBC AUTO-ENTMCNC: 31.1 G/DL (ref 31.5–35.7)
MCHC RBC AUTO-ENTMCNC: 31.2 G/DL (ref 31.5–35.7)
MCHC RBC AUTO-ENTMCNC: 31.2 G/DL (ref 31.5–35.7)
MCHC RBC AUTO-ENTMCNC: 31.4 G/DL (ref 31.5–35.7)
MCHC RBC AUTO-ENTMCNC: 31.5 G/DL (ref 31.5–35.7)
MCHC RBC AUTO-ENTMCNC: 31.5 G/DL (ref 31.5–35.7)
MCV RBC AUTO: 82.1 FL (ref 79–97)
MCV RBC AUTO: 82.4 FL (ref 79–97)
MCV RBC AUTO: 82.4 FL (ref 79–97)
MCV RBC AUTO: 82.5 FL (ref 79–97)
MCV RBC AUTO: 82.6 FL (ref 79–97)
MCV RBC AUTO: 82.8 FL (ref 79–97)
MCV RBC AUTO: 83.7 FL (ref 79–97)
MCV RBC AUTO: 83.8 FL (ref 79–97)
MCV RBC AUTO: 84.1 FL (ref 79–97)
MCV RBC AUTO: 84.1 FL (ref 79–97)
MCV RBC AUTO: 84.3 FL (ref 79–97)
MCV RBC AUTO: 84.5 FL (ref 79–97)
MCV RBC AUTO: 85.1 FL (ref 79–97)
MCV RBC AUTO: 85.2 FL (ref 79–97)
MCV RBC AUTO: 85.4 FL (ref 79–97)
MCV RBC AUTO: 85.5 FL (ref 79–97)
MCV RBC AUTO: 85.5 FL (ref 79–97)
MCV RBC AUTO: 86 FL (ref 79–97)
MCV RBC AUTO: 86.1 FL (ref 79–97)
MCV RBC AUTO: 86.4 FL (ref 79–97)
MCV RBC AUTO: 86.5 FL (ref 79–97)
MCV RBC AUTO: 87.5 FL (ref 79–97)
MCV RBC AUTO: 87.6 FL (ref 79–97)
MCV RBC AUTO: 88.8 FL (ref 79–97)
METHADONE UR QL SCN: NEGATIVE
METHGB BLD QL: 0.5 % (ref 0–3)
MODALITY: ABNORMAL
MONOCYTES # BLD AUTO: 0.62 10*3/MM3 (ref 0.1–0.9)
MONOCYTES # BLD AUTO: 0.71 10*3/MM3 (ref 0.1–0.9)
MONOCYTES # BLD AUTO: 0.73 10*3/MM3 (ref 0.1–0.9)
MONOCYTES # BLD AUTO: 0.75 10*3/MM3 (ref 0.1–0.9)
MONOCYTES # BLD AUTO: 0.76 10*3/MM3 (ref 0.1–0.9)
MONOCYTES # BLD AUTO: 0.77 10*3/MM3 (ref 0.1–0.9)
MONOCYTES # BLD AUTO: 0.8 10*3/MM3 (ref 0.1–0.9)
MONOCYTES # BLD AUTO: 0.82 10*3/MM3 (ref 0.1–0.9)
MONOCYTES # BLD AUTO: 0.83 10*3/MM3 (ref 0.1–0.9)
MONOCYTES # BLD AUTO: 0.84 10*3/MM3 (ref 0.1–0.9)
MONOCYTES # BLD AUTO: 0.88 10*3/MM3 (ref 0.1–0.9)
MONOCYTES # BLD AUTO: 0.91 10*3/MM3 (ref 0.1–0.9)
MONOCYTES # BLD AUTO: 0.92 10*3/MM3 (ref 0.1–0.9)
MONOCYTES # BLD AUTO: 0.92 10*3/MM3 (ref 0.1–0.9)
MONOCYTES # BLD AUTO: 0.96 10*3/MM3 (ref 0.1–0.9)
MONOCYTES # BLD AUTO: 1 10*3/MM3 (ref 0.1–0.9)
MONOCYTES # BLD AUTO: 1.04 10*3/MM3 (ref 0.1–0.9)
MONOCYTES # BLD AUTO: 1.07 10*3/MM3 (ref 0.1–0.9)
MONOCYTES # BLD AUTO: 1.13 10*3/MM3 (ref 0.1–0.9)
MONOCYTES # BLD AUTO: 1.17 10*3/MM3 (ref 0.1–0.9)
MONOCYTES NFR BLD AUTO: 10.1 % (ref 5–12)
MONOCYTES NFR BLD AUTO: 10.3 % (ref 5–12)
MONOCYTES NFR BLD AUTO: 10.3 % (ref 5–12)
MONOCYTES NFR BLD AUTO: 10.5 % (ref 5–12)
MONOCYTES NFR BLD AUTO: 11 % (ref 5–12)
MONOCYTES NFR BLD AUTO: 11.6 % (ref 5–12)
MONOCYTES NFR BLD AUTO: 12 % (ref 5–12)
MONOCYTES NFR BLD AUTO: 12 % (ref 5–12)
MONOCYTES NFR BLD AUTO: 12.5 % (ref 5–12)
MONOCYTES NFR BLD AUTO: 13.2 % (ref 5–12)
MONOCYTES NFR BLD AUTO: 13.7 % (ref 5–12)
MONOCYTES NFR BLD AUTO: 14.3 % (ref 5–12)
MONOCYTES NFR BLD AUTO: 14.4 % (ref 5–12)
MONOCYTES NFR BLD AUTO: 14.6 % (ref 5–12)
MONOCYTES NFR BLD AUTO: 15 % (ref 5–12)
MONOCYTES NFR BLD AUTO: 15.7 % (ref 5–12)
MONOCYTES NFR BLD AUTO: 16.5 % (ref 5–12)
MONOCYTES NFR BLD AUTO: 16.9 % (ref 5–12)
NEUTROPHILS # BLD AUTO: 1.86 10*3/MM3 (ref 1.7–7)
NEUTROPHILS # BLD AUTO: 2.92 10*3/MM3 (ref 1.7–7)
NEUTROPHILS # BLD AUTO: 3.11 10*3/MM3 (ref 1.7–7)
NEUTROPHILS # BLD AUTO: 3.18 10*3/MM3 (ref 1.7–7)
NEUTROPHILS # BLD AUTO: 3.5 10*3/MM3 (ref 1.7–7)
NEUTROPHILS # BLD AUTO: 3.61 10*3/MM3 (ref 1.7–7)
NEUTROPHILS # BLD AUTO: 3.82 10*3/MM3 (ref 1.7–7)
NEUTROPHILS # BLD AUTO: 3.85 10*3/MM3 (ref 1.7–7)
NEUTROPHILS # BLD AUTO: 3.9 10*3/MM3 (ref 1.7–7)
NEUTROPHILS # BLD AUTO: 4 10*3/MM3 (ref 1.7–7)
NEUTROPHILS # BLD AUTO: 4.44 10*3/MM3 (ref 1.7–7)
NEUTROPHILS # BLD AUTO: 4.52 10*3/MM3 (ref 1.7–7)
NEUTROPHILS # BLD AUTO: 4.56 10*3/MM3 (ref 1.7–7)
NEUTROPHILS # BLD AUTO: 4.85 10*3/MM3 (ref 1.7–7)
NEUTROPHILS # BLD AUTO: 5.08 10*3/MM3 (ref 1.7–7)
NEUTROPHILS # BLD AUTO: 5.26 10*3/MM3 (ref 1.7–7)
NEUTROPHILS # BLD AUTO: 5.76 10*3/MM3 (ref 1.7–7)
NEUTROPHILS # BLD AUTO: 8.32 10*3/MM3 (ref 1.7–7)
NEUTROPHILS NFR BLD AUTO: 3.51 10*3/MM3 (ref 1.7–7)
NEUTROPHILS NFR BLD AUTO: 4.39 10*3/MM3 (ref 1.7–7)
NEUTROPHILS NFR BLD AUTO: 41.2 % (ref 42.7–76)
NEUTROPHILS NFR BLD AUTO: 52.2 % (ref 42.7–76)
NEUTROPHILS NFR BLD AUTO: 53.7 % (ref 42.7–76)
NEUTROPHILS NFR BLD AUTO: 55 % (ref 42.7–76)
NEUTROPHILS NFR BLD AUTO: 56.8 % (ref 42.7–76)
NEUTROPHILS NFR BLD AUTO: 56.9 % (ref 42.7–76)
NEUTROPHILS NFR BLD AUTO: 57 % (ref 42.7–76)
NEUTROPHILS NFR BLD AUTO: 57.4 % (ref 42.7–76)
NEUTROPHILS NFR BLD AUTO: 57.8 % (ref 42.7–76)
NEUTROPHILS NFR BLD AUTO: 57.8 % (ref 42.7–76)
NEUTROPHILS NFR BLD AUTO: 59.1 % (ref 42.7–76)
NEUTROPHILS NFR BLD AUTO: 61.4 % (ref 42.7–76)
NEUTROPHILS NFR BLD AUTO: 63.4 % (ref 42.7–76)
NEUTROPHILS NFR BLD AUTO: 63.6 % (ref 42.7–76)
NEUTROPHILS NFR BLD AUTO: 63.6 % (ref 42.7–76)
NEUTROPHILS NFR BLD AUTO: 65.1 % (ref 42.7–76)
NEUTROPHILS NFR BLD AUTO: 68.5 % (ref 42.7–76)
NEUTROPHILS NFR BLD AUTO: 69.2 % (ref 42.7–76)
NEUTROPHILS NFR BLD AUTO: 70.8 % (ref 42.7–76)
NEUTROPHILS NFR BLD AUTO: 78.6 % (ref 42.7–76)
NITRITE UR QL STRIP: NEGATIVE
NOTE: ABNORMAL
NRBC BLD AUTO-RTO: 0 /100 WBC (ref 0–0.2)
NT-PROBNP SERPL-MCNC: 2669 PG/ML (ref 5–1800)
NT-PROBNP SERPL-MCNC: 2864 PG/ML (ref 5–1800)
NT-PROBNP SERPL-MCNC: 5299 PG/ML (ref 5–1800)
NT-PROBNP SERPL-MCNC: 5551 PG/ML (ref 5–1800)
NT-PROBNP SERPL-MCNC: 5790 PG/ML (ref 5–1800)
NT-PROBNP SERPL-MCNC: 6031 PG/ML (ref 0–1800)
NT-PROBNP SERPL-MCNC: 6637 PG/ML (ref 5–1800)
NT-PROBNP SERPL-MCNC: 7619 PG/ML (ref 5–1800)
NT-PROBNP SERPL-MCNC: 8676 PG/ML (ref 0–1800)
NT-PROBNP SERPL-MCNC: ABNORMAL PG/ML (ref 0–1800)
OPIATES UR QL: NEGATIVE
OSMOLALITY SERPL: 274 MOSM/KG (ref 280–301)
OSMOLALITY UR: 364 MOSM/KG
OSMOLALITY UR: 519 MOSM/KG
OXYCODONE UR QL SCN: NEGATIVE
OXYHGB MFR BLDV: 94.6 % (ref 94–99)
PATH INTERP BLD-IMP: NORMAL
PCO2 BLDA: 31.7 MM HG (ref 35–45)
PCO2 TEMP ADJ BLD: ABNORMAL MM[HG]
PCP UR QL SCN: NEGATIVE
PH BLDA: 7.43 PH UNITS (ref 7.35–7.45)
PH UR STRIP.AUTO: 6 [PH] (ref 5–8)
PH UR STRIP.AUTO: 7 [PH] (ref 5–8)
PH UR STRIP.AUTO: 7.5 [PH] (ref 5–8)
PH, TEMP CORRECTED: ABNORMAL
PHOSPHATE SERPL-MCNC: 4.3 MG/DL (ref 2.5–4.5)
PHOSPHATE SERPL-MCNC: 4.6 MG/DL (ref 2.5–4.5)
PHOSPHATE SERPL-MCNC: 5.3 MG/DL (ref 2.5–4.5)
PLATELET # BLD AUTO: 257 10*3/MM3 (ref 140–450)
PLATELET # BLD AUTO: 259 10*3/MM3 (ref 140–450)
PLATELET # BLD AUTO: 264 10*3/MM3 (ref 140–450)
PLATELET # BLD AUTO: 265 10*3/MM3 (ref 140–450)
PLATELET # BLD AUTO: 268 10*3/MM3 (ref 140–450)
PLATELET # BLD AUTO: 270 10*3/MM3 (ref 140–450)
PLATELET # BLD AUTO: 275 10*3/MM3 (ref 140–450)
PLATELET # BLD AUTO: 277 10*3/MM3 (ref 140–450)
PLATELET # BLD AUTO: 284 10*3/MM3 (ref 140–450)
PLATELET # BLD AUTO: 285 10*3/MM3 (ref 140–450)
PLATELET # BLD AUTO: 288 10*3/MM3 (ref 140–450)
PLATELET # BLD AUTO: 290 10*3/MM3 (ref 140–450)
PLATELET # BLD AUTO: 291 10*3/MM3 (ref 140–450)
PLATELET # BLD AUTO: 292 10*3/MM3 (ref 140–450)
PLATELET # BLD AUTO: 295 10*3/MM3 (ref 140–450)
PLATELET # BLD AUTO: 296 10*3/MM3 (ref 140–450)
PLATELET # BLD AUTO: 300 10*3/MM3 (ref 140–450)
PLATELET # BLD AUTO: 300 10*3/MM3 (ref 140–450)
PLATELET # BLD AUTO: 301 10*3/MM3 (ref 140–450)
PLATELET # BLD AUTO: 306 10*3/MM3 (ref 140–450)
PLATELET # BLD AUTO: 307 10*3/MM3 (ref 140–450)
PLATELET # BLD AUTO: 308 10*3/MM3 (ref 140–450)
PLATELET # BLD AUTO: 308 10*3/MM3 (ref 140–450)
PLATELET # BLD AUTO: 317 10*3/MM3 (ref 140–450)
PLATELET # BLD AUTO: 318 10*3/MM3 (ref 140–450)
PMV BLD AUTO: 10 FL (ref 6–12)
PMV BLD AUTO: 10.1 FL (ref 6–12)
PMV BLD AUTO: 10.1 FL (ref 6–12)
PMV BLD AUTO: 10.2 FL (ref 6–12)
PMV BLD AUTO: 10.2 FL (ref 6–12)
PMV BLD AUTO: 10.3 FL (ref 6–12)
PMV BLD AUTO: 10.4 FL (ref 6–12)
PMV BLD AUTO: 10.5 FL (ref 6–12)
PMV BLD AUTO: 10.6 FL (ref 6–12)
PMV BLD AUTO: 9.3 FL (ref 6–12)
PMV BLD AUTO: 9.4 FL (ref 6–12)
PMV BLD AUTO: 9.5 FL (ref 6–12)
PMV BLD AUTO: 9.5 FL (ref 6–12)
PMV BLD AUTO: 9.6 FL (ref 6–12)
PMV BLD AUTO: 9.6 FL (ref 6–12)
PMV BLD AUTO: 9.7 FL (ref 6–12)
PMV BLD AUTO: 9.7 FL (ref 6–12)
PMV BLD AUTO: 9.8 FL (ref 6–12)
PMV BLD AUTO: 9.8 FL (ref 6–12)
PO2 BLDA: 76.2 MM HG (ref 83–108)
PO2 TEMP ADJ BLD: ABNORMAL MM[HG]
POTASSIUM BLD-SCNC: 4.7 MMOL/L (ref 3.5–5.2)
POTASSIUM BLD-SCNC: 4.7 MMOL/L (ref 3.5–5.2)
POTASSIUM BLD-SCNC: 4.8 MMOL/L (ref 3.5–5.2)
POTASSIUM BLD-SCNC: 4.8 MMOL/L (ref 3.5–5.2)
POTASSIUM BLD-SCNC: 4.9 MMOL/L (ref 3.5–5.2)
POTASSIUM BLD-SCNC: 5 MMOL/L (ref 3.5–5.2)
POTASSIUM BLD-SCNC: 5.1 MMOL/L (ref 3.5–5.2)
POTASSIUM BLD-SCNC: 5.2 MMOL/L (ref 3.5–5.2)
POTASSIUM BLD-SCNC: 5.3 MMOL/L (ref 3.5–5.2)
POTASSIUM BLD-SCNC: 5.5 MMOL/L (ref 3.5–5.2)
POTASSIUM BLD-SCNC: 5.5 MMOL/L (ref 3.5–5.2)
POTASSIUM BLD-SCNC: 5.6 MMOL/L (ref 3.5–5.2)
POTASSIUM BLD-SCNC: 5.6 MMOL/L (ref 3.5–5.2)
POTASSIUM BLD-SCNC: 5.7 MMOL/L (ref 3.5–5.2)
POTASSIUM BLD-SCNC: 5.9 MMOL/L (ref 3.5–5.2)
POTASSIUM SERPL-SCNC: 4.1 MMOL/L (ref 3.5–5.2)
POTASSIUM SERPL-SCNC: 4.3 MMOL/L (ref 3.5–5.2)
POTASSIUM SERPL-SCNC: 4.4 MMOL/L (ref 3.5–5.2)
POTASSIUM SERPL-SCNC: 4.8 MMOL/L (ref 3.5–5.2)
POTASSIUM SERPL-SCNC: 5.4 MMOL/L (ref 3.5–5.2)
POTASSIUM SERPL-SCNC: 5.5 MMOL/L (ref 3.5–5.2)
POTASSIUM SERPL-SCNC: 5.9 MMOL/L (ref 3.5–5.2)
PREALB SERPL-MCNC: 17 MG/DL (ref 20–40)
PROCALCITONIN SERPL-MCNC: 0.13 NG/ML (ref 0.1–0.25)
PROT SERPL-MCNC: 5.9 G/DL (ref 6–8.5)
PROT SERPL-MCNC: 6 G/DL (ref 6–8.5)
PROT SERPL-MCNC: 6.3 G/DL (ref 6–8.5)
PROT SERPL-MCNC: 6.4 G/DL (ref 6–8.5)
PROT SERPL-MCNC: 6.5 G/DL (ref 6–8.5)
PROT SERPL-MCNC: 6.6 G/DL (ref 6–8.5)
PROT SERPL-MCNC: 6.6 G/DL (ref 6–8.5)
PROT SERPL-MCNC: 6.7 G/DL (ref 6–8.5)
PROT SERPL-MCNC: 7.2 G/DL (ref 6–8.5)
PROT UR QL STRIP: NEGATIVE
PROTHROMBIN TIME: 13.1 SECONDS (ref 11.9–14.1)
PROTHROMBIN TIME: 13.1 SECONDS (ref 11.9–14.1)
PROTHROMBIN TIME: 13.5 SECONDS (ref 11–15.4)
PROTHROMBIN TIME: 13.7 SECONDS (ref 11–15.4)
RBC # BLD AUTO: 2.85 10*6/MM3 (ref 3.77–5.28)
RBC # BLD AUTO: 2.91 10*6/MM3 (ref 3.77–5.28)
RBC # BLD AUTO: 2.95 10*6/MM3 (ref 3.77–5.28)
RBC # BLD AUTO: 2.99 10*6/MM3 (ref 3.77–5.28)
RBC # BLD AUTO: 3.16 10*6/MM3 (ref 3.77–5.28)
RBC # BLD AUTO: 3.16 10*6/MM3 (ref 3.77–5.28)
RBC # BLD AUTO: 3.3 10*6/MM3 (ref 3.77–5.28)
RBC # BLD AUTO: 3.35 10*6/MM3 (ref 3.77–5.28)
RBC # BLD AUTO: 3.39 10*6/MM3 (ref 3.77–5.28)
RBC # BLD AUTO: 3.4 10*6/MM3 (ref 3.77–5.28)
RBC # BLD AUTO: 3.42 10*6/MM3 (ref 3.77–5.28)
RBC # BLD AUTO: 3.43 10*6/MM3 (ref 3.77–5.28)
RBC # BLD AUTO: 3.45 10*6/MM3 (ref 3.77–5.28)
RBC # BLD AUTO: 3.48 10*6/MM3 (ref 3.77–5.28)
RBC # BLD AUTO: 3.52 10*6/MM3 (ref 3.77–5.28)
RBC # BLD AUTO: 3.55 10*6/MM3 (ref 3.77–5.28)
RBC # BLD AUTO: 3.63 10*6/MM3 (ref 3.77–5.28)
RBC # BLD AUTO: 3.66 10*6/MM3 (ref 3.77–5.28)
RBC # BLD AUTO: 3.72 10*6/MM3 (ref 3.77–5.28)
RBC # BLD AUTO: 3.79 10*6/MM3 (ref 3.77–5.28)
RBC # BLD AUTO: 3.89 10*6/MM3 (ref 3.77–5.28)
RBC # BLD AUTO: 3.98 10*6/MM3 (ref 3.77–5.28)
RBC # BLD AUTO: 4.09 10*6/MM3 (ref 3.77–5.28)
RBC # BLD AUTO: 4.24 10*6/MM3 (ref 3.77–5.28)
RBC # BLD AUTO: 4.43 10*6/MM3 (ref 3.77–5.28)
RBC # UR: NORMAL /HPF
RBC # UR: NORMAL /HPF
REF LAB TEST METHOD: NORMAL
REF LAB TEST METHOD: NORMAL
RETICS # AUTO: 0.05 10*6/MM3 (ref 0.02–0.13)
RETICS/RBC NFR AUTO: 1.36 % (ref 0.7–1.9)
SAO2 % BLDCOA: 95.7 % (ref 94–99)
SARS-COV-2 RNA PNL SPEC NAA+PROBE: NOT DETECTED
SARS-COV-2 RNA PNL SPEC NAA+PROBE: NOT DETECTED
SARS-COV-2 RNA RESP QL NAA+PROBE: NOT DETECTED
SODIUM BLD-SCNC: 124 MMOL/L (ref 136–145)
SODIUM BLD-SCNC: 124 MMOL/L (ref 136–145)
SODIUM BLD-SCNC: 125 MMOL/L (ref 136–145)
SODIUM BLD-SCNC: 125 MMOL/L (ref 136–145)
SODIUM BLD-SCNC: 126 MMOL/L (ref 136–145)
SODIUM BLD-SCNC: 127 MMOL/L (ref 136–145)
SODIUM BLD-SCNC: 128 MMOL/L (ref 136–145)
SODIUM BLD-SCNC: 129 MMOL/L (ref 136–145)
SODIUM BLD-SCNC: 130 MMOL/L (ref 136–145)
SODIUM BLD-SCNC: 130 MMOL/L (ref 136–145)
SODIUM BLD-SCNC: 131 MMOL/L (ref 136–145)
SODIUM BLD-SCNC: 132 MMOL/L (ref 136–145)
SODIUM BLD-SCNC: 133 MMOL/L (ref 136–145)
SODIUM BLD-SCNC: 134 MMOL/L (ref 136–145)
SODIUM BLD-SCNC: 135 MMOL/L (ref 136–145)
SODIUM BLD-SCNC: 136 MMOL/L (ref 136–145)
SODIUM BLD-SCNC: 136 MMOL/L (ref 136–145)
SODIUM BLD-SCNC: 137 MMOL/L (ref 136–145)
SODIUM BLD-SCNC: 138 MMOL/L (ref 136–145)
SODIUM BLD-SCNC: 139 MMOL/L (ref 136–145)
SODIUM BLD-SCNC: 139 MMOL/L (ref 136–145)
SODIUM SERPL-SCNC: 136 MMOL/L (ref 136–145)
SODIUM SERPL-SCNC: 137 MMOL/L (ref 136–145)
SODIUM SERPL-SCNC: 138 MMOL/L (ref 136–145)
SODIUM SERPL-SCNC: 139 MMOL/L (ref 136–145)
SODIUM SERPL-SCNC: 139 MMOL/L (ref 136–145)
SODIUM SERPL-SCNC: 141 MMOL/L (ref 136–145)
SODIUM SERPL-SCNC: 142 MMOL/L (ref 136–145)
SODIUM UR-SCNC: 27 MMOL/L
SODIUM UR-SCNC: 31 MMOL/L
SP GR UR STRIP: 1.01 (ref 1–1.03)
SP GR UR STRIP: <=1.005 (ref 1–1.03)
SQUAMOUS #/AREA URNS HPF: NORMAL /HPF
SQUAMOUS #/AREA URNS HPF: NORMAL /HPF
STRESS TARGET HR: 107 BPM
T4 FREE SERPL-MCNC: 0.87 NG/DL (ref 0.93–1.7)
T4 FREE SERPL-MCNC: 0.98 NG/DL (ref 0.93–1.7)
TIBC SERPL-MCNC: 198 MCG/DL (ref 298–536)
TIBC SERPL-MCNC: 209 MCG/DL (ref 298–536)
TRANSFERRIN SERPL-MCNC: 133 MG/DL (ref 200–360)
TRANSFERRIN SERPL-MCNC: 140 MG/DL (ref 200–360)
TRIGL SERPL-MCNC: 42 MG/DL (ref 0–150)
TRIGL SERPL-MCNC: 46 MG/DL (ref 0–150)
TRIGL SERPL-MCNC: 70 MG/DL (ref 0–150)
TRIGL SERPL-MCNC: 76 MG/DL (ref 0–150)
TRIGL SERPL-MCNC: 92 MG/DL (ref 0–150)
TROPONIN T SERPL-MCNC: 0.02 NG/ML (ref 0–0.03)
TROPONIN T SERPL-MCNC: 0.03 NG/ML (ref 0–0.03)
TROPONIN T SERPL-MCNC: 0.04 NG/ML (ref 0–0.03)
TROPONIN T SERPL-MCNC: 0.05 NG/ML (ref 0–0.03)
TROPONIN T SERPL-MCNC: 0.07 NG/ML (ref 0–0.03)
TSH SERPL DL<=0.05 MIU/L-ACNC: 1.08 UIU/ML (ref 0.27–4.2)
TSH SERPL DL<=0.05 MIU/L-ACNC: 1.42 UIU/ML (ref 0.27–4.2)
TSH SERPL DL<=0.05 MIU/L-ACNC: 1.64 UIU/ML (ref 0.27–4.2)
TSH SERPL DL<=0.05 MIU/L-ACNC: 1.65 UIU/ML (ref 0.27–4.2)
UROBILINOGEN UR QL STRIP: ABNORMAL
UROBILINOGEN UR QL STRIP: ABNORMAL
UROBILINOGEN UR QL STRIP: NORMAL
VENTILATOR MODE: ABNORMAL
VIT B12 BLD-MCNC: 634 PG/ML (ref 211–946)
VIT B12 BLD-MCNC: 678 PG/ML (ref 211–946)
VLDLC SERPL-MCNC: 14 MG/DL
VLDLC SERPL-MCNC: 15.2 MG/DL
VLDLC SERPL-MCNC: 9.2 MG/DL
WBC # BLD AUTO: 6.08 10*3/MM3 (ref 3.4–10.8)
WBC # BLD AUTO: 6.9 10*3/MM3 (ref 3.4–10.8)
WBC # BLD AUTO: 7.52 10*3/MM3 (ref 3.4–10.8)
WBC # BLD AUTO: 7.64 10*3/MM3 (ref 3.4–10.8)
WBC # BLD AUTO: 7.9 10*3/MM3 (ref 3.4–10.8)
WBC # BLD AUTO: 8.85 10*3/MM3 (ref 3.4–10.8)
WBC NRBC COR # BLD: 10.58 10*3/MM3 (ref 3.4–10.8)
WBC NRBC COR # BLD: 4.52 10*3/MM3 (ref 3.4–10.8)
WBC NRBC COR # BLD: 5.43 10*3/MM3 (ref 3.4–10.8)
WBC NRBC COR # BLD: 5.64 10*3/MM3 (ref 3.4–10.8)
WBC NRBC COR # BLD: 5.93 10*3/MM3 (ref 3.4–10.8)
WBC NRBC COR # BLD: 6.09 10*3/MM3 (ref 3.4–10.8)
WBC NRBC COR # BLD: 6.1 10*3/MM3 (ref 3.4–10.8)
WBC NRBC COR # BLD: 6.15 10*3/MM3 (ref 3.4–10.8)
WBC NRBC COR # BLD: 6.27 10*3/MM3 (ref 3.4–10.8)
WBC NRBC COR # BLD: 6.48 10*3/MM3 (ref 3.4–10.8)
WBC NRBC COR # BLD: 6.73 10*3/MM3 (ref 3.4–10.8)
WBC NRBC COR # BLD: 6.85 10*3/MM3 (ref 3.4–10.8)
WBC NRBC COR # BLD: 6.92 10*3/MM3 (ref 3.4–10.8)
WBC NRBC COR # BLD: 6.94 10*3/MM3 (ref 3.4–10.8)
WBC NRBC COR # BLD: 6.97 10*3/MM3 (ref 3.4–10.8)
WBC NRBC COR # BLD: 7.32 10*3/MM3 (ref 3.4–10.8)
WBC NRBC COR # BLD: 7.44 10*3/MM3 (ref 3.4–10.8)
WBC NRBC COR # BLD: 7.65 10*3/MM3 (ref 3.4–10.8)
WBC NRBC COR # BLD: 7.93 10*3/MM3 (ref 3.4–10.8)
WBC NRBC COR # BLD: 7.99 10*3/MM3 (ref 3.4–10.8)
WBC NRBC COR # BLD: 8.33 10*3/MM3 (ref 3.4–10.8)
WBC UR QL AUTO: NORMAL /HPF
WBC UR QL AUTO: NORMAL /HPF
WHOLE BLOOD HOLD SPECIMEN: NORMAL

## 2020-01-01 PROCEDURE — 85730 THROMBOPLASTIN TIME PARTIAL: CPT | Performed by: EMERGENCY MEDICINE

## 2020-01-01 PROCEDURE — 25010000002 HYDRALAZINE PER 20 MG: Performed by: INTERNAL MEDICINE

## 2020-01-01 PROCEDURE — 94640 AIRWAY INHALATION TREATMENT: CPT

## 2020-01-01 PROCEDURE — 93010 ELECTROCARDIOGRAM REPORT: CPT | Performed by: INTERNAL MEDICINE

## 2020-01-01 PROCEDURE — 74018 RADEX ABDOMEN 1 VIEW: CPT

## 2020-01-01 PROCEDURE — G0378 HOSPITAL OBSERVATION PER HR: HCPCS

## 2020-01-01 PROCEDURE — 93005 ELECTROCARDIOGRAM TRACING: CPT | Performed by: FAMILY MEDICINE

## 2020-01-01 PROCEDURE — 93308 TTE F-UP OR LMTD: CPT

## 2020-01-01 PROCEDURE — 97535 SELF CARE MNGMENT TRAINING: CPT

## 2020-01-01 PROCEDURE — 25010000002 LORAZEPAM PER 2 MG: Performed by: INTERNAL MEDICINE

## 2020-01-01 PROCEDURE — 25010000002 ENOXAPARIN PER 10 MG: Performed by: INTERNAL MEDICINE

## 2020-01-01 PROCEDURE — 0 IOPAMIDOL PER 1 ML: Performed by: INTERNAL MEDICINE

## 2020-01-01 PROCEDURE — 25010000002 FUROSEMIDE PER 20 MG: Performed by: FAMILY MEDICINE

## 2020-01-01 PROCEDURE — 99222 1ST HOSP IP/OBS MODERATE 55: CPT | Performed by: INTERNAL MEDICINE

## 2020-01-01 PROCEDURE — 93005 ELECTROCARDIOGRAM TRACING: CPT | Performed by: INTERNAL MEDICINE

## 2020-01-01 PROCEDURE — 99285 EMERGENCY DEPT VISIT HI MDM: CPT

## 2020-01-01 PROCEDURE — 85610 PROTHROMBIN TIME: CPT | Performed by: EMERGENCY MEDICINE

## 2020-01-01 PROCEDURE — 97166 OT EVAL MOD COMPLEX 45 MIN: CPT

## 2020-01-01 PROCEDURE — 85025 COMPLETE CBC W/AUTO DIFF WBC: CPT | Performed by: FAMILY MEDICINE

## 2020-01-01 PROCEDURE — 25010000002 MORPHINE PER 10 MG: Performed by: FAMILY MEDICINE

## 2020-01-01 PROCEDURE — 92610 EVALUATE SWALLOWING FUNCTION: CPT

## 2020-01-01 PROCEDURE — 80053 COMPREHEN METABOLIC PANEL: CPT | Performed by: FAMILY MEDICINE

## 2020-01-01 PROCEDURE — 82962 GLUCOSE BLOOD TEST: CPT

## 2020-01-01 PROCEDURE — 99239 HOSP IP/OBS DSCHRG MGMT >30: CPT | Performed by: HOSPITALIST

## 2020-01-01 PROCEDURE — 93321 DOPPLER ECHO F-UP/LMTD STD: CPT

## 2020-01-01 PROCEDURE — 93306 TTE W/DOPPLER COMPLETE: CPT

## 2020-01-01 PROCEDURE — 25010000002 FUROSEMIDE PER 20 MG: Performed by: INTERNAL MEDICINE

## 2020-01-01 PROCEDURE — 25010000002 MORPHINE PER 10 MG: Performed by: NURSE PRACTITIONER

## 2020-01-01 PROCEDURE — 84484 ASSAY OF TROPONIN QUANT: CPT | Performed by: PHYSICIAN ASSISTANT

## 2020-01-01 PROCEDURE — 87635 SARS-COV-2 COVID-19 AMP PRB: CPT | Performed by: EMERGENCY MEDICINE

## 2020-01-01 PROCEDURE — 25010000002 MORPHINE PER 10 MG: Performed by: INTERNAL MEDICINE

## 2020-01-01 PROCEDURE — 83935 ASSAY OF URINE OSMOLALITY: CPT | Performed by: INTERNAL MEDICINE

## 2020-01-01 PROCEDURE — 99232 SBSQ HOSP IP/OBS MODERATE 35: CPT | Performed by: INTERNAL MEDICINE

## 2020-01-01 PROCEDURE — 80053 COMPREHEN METABOLIC PANEL: CPT | Performed by: EMERGENCY MEDICINE

## 2020-01-01 PROCEDURE — 99233 SBSQ HOSP IP/OBS HIGH 50: CPT | Performed by: INTERNAL MEDICINE

## 2020-01-01 PROCEDURE — 94799 UNLISTED PULMONARY SVC/PX: CPT

## 2020-01-01 PROCEDURE — 99222 1ST HOSP IP/OBS MODERATE 55: CPT | Performed by: SPECIALIST

## 2020-01-01 PROCEDURE — 85025 COMPLETE CBC W/AUTO DIFF WBC: CPT | Performed by: EMERGENCY MEDICINE

## 2020-01-01 PROCEDURE — 71045 X-RAY EXAM CHEST 1 VIEW: CPT

## 2020-01-01 PROCEDURE — 84100 ASSAY OF PHOSPHORUS: CPT | Performed by: EMERGENCY MEDICINE

## 2020-01-01 PROCEDURE — 85027 COMPLETE CBC AUTOMATED: CPT | Performed by: FAMILY MEDICINE

## 2020-01-01 PROCEDURE — 85025 COMPLETE CBC W/AUTO DIFF WBC: CPT | Performed by: INTERNAL MEDICINE

## 2020-01-01 PROCEDURE — 72170 X-RAY EXAM OF PELVIS: CPT

## 2020-01-01 PROCEDURE — 80048 BASIC METABOLIC PNL TOTAL CA: CPT | Performed by: INTERNAL MEDICINE

## 2020-01-01 PROCEDURE — 99233 SBSQ HOSP IP/OBS HIGH 50: CPT | Performed by: FAMILY MEDICINE

## 2020-01-01 PROCEDURE — 97530 THERAPEUTIC ACTIVITIES: CPT

## 2020-01-01 PROCEDURE — 80048 BASIC METABOLIC PNL TOTAL CA: CPT | Performed by: NURSE PRACTITIONER

## 2020-01-01 PROCEDURE — 84484 ASSAY OF TROPONIN QUANT: CPT | Performed by: EMERGENCY MEDICINE

## 2020-01-01 PROCEDURE — 83880 ASSAY OF NATRIURETIC PEPTIDE: CPT | Performed by: EMERGENCY MEDICINE

## 2020-01-01 PROCEDURE — 84295 ASSAY OF SERUM SODIUM: CPT | Performed by: INTERNAL MEDICINE

## 2020-01-01 PROCEDURE — 83010 ASSAY OF HAPTOGLOBIN QUANT: CPT | Performed by: NURSE PRACTITIONER

## 2020-01-01 PROCEDURE — 99214 OFFICE O/P EST MOD 30 MIN: CPT | Performed by: NURSE PRACTITIONER

## 2020-01-01 PROCEDURE — 25010000002 ONDANSETRON PER 1 MG: Performed by: INTERNAL MEDICINE

## 2020-01-01 PROCEDURE — 25010000002 KETOROLAC TROMETHAMINE PER 15 MG: Performed by: NURSE PRACTITIONER

## 2020-01-01 PROCEDURE — 84466 ASSAY OF TRANSFERRIN: CPT | Performed by: INTERNAL MEDICINE

## 2020-01-01 PROCEDURE — 81003 URINALYSIS AUTO W/O SCOPE: CPT | Performed by: EMERGENCY MEDICINE

## 2020-01-01 PROCEDURE — 71250 CT THORAX DX C-: CPT

## 2020-01-01 PROCEDURE — 93306 TTE W/DOPPLER COMPLETE: CPT | Performed by: INTERNAL MEDICINE

## 2020-01-01 PROCEDURE — 70450 CT HEAD/BRAIN W/O DYE: CPT

## 2020-01-01 PROCEDURE — 99231 SBSQ HOSP IP/OBS SF/LOW 25: CPT | Performed by: INTERNAL MEDICINE

## 2020-01-01 PROCEDURE — 82465 ASSAY BLD/SERUM CHOLESTEROL: CPT

## 2020-01-01 PROCEDURE — 84134 ASSAY OF PREALBUMIN: CPT

## 2020-01-01 PROCEDURE — 86140 C-REACTIVE PROTEIN: CPT | Performed by: EMERGENCY MEDICINE

## 2020-01-01 PROCEDURE — 85027 COMPLETE CBC AUTOMATED: CPT | Performed by: INTERNAL MEDICINE

## 2020-01-01 PROCEDURE — 83880 ASSAY OF NATRIURETIC PEPTIDE: CPT | Performed by: INTERNAL MEDICINE

## 2020-01-01 PROCEDURE — 93325 DOPPLER ECHO COLOR FLOW MAPG: CPT | Performed by: SPECIALIST

## 2020-01-01 PROCEDURE — 86140 C-REACTIVE PROTEIN: CPT | Performed by: NURSE PRACTITIONER

## 2020-01-01 PROCEDURE — 86140 C-REACTIVE PROTEIN: CPT

## 2020-01-01 PROCEDURE — 71045 X-RAY EXAM CHEST 1 VIEW: CPT | Performed by: RADIOLOGY

## 2020-01-01 PROCEDURE — 92507 TX SP LANG VOICE COMM INDIV: CPT

## 2020-01-01 PROCEDURE — 83615 LACTATE (LD) (LDH) ENZYME: CPT | Performed by: NURSE PRACTITIONER

## 2020-01-01 PROCEDURE — 25010000002 HALOPERIDOL LACTATE PER 5 MG: Performed by: INTERNAL MEDICINE

## 2020-01-01 PROCEDURE — 96365 THER/PROPH/DIAG IV INF INIT: CPT

## 2020-01-01 PROCEDURE — 70450 CT HEAD/BRAIN W/O DYE: CPT | Performed by: RADIOLOGY

## 2020-01-01 PROCEDURE — 99232 SBSQ HOSP IP/OBS MODERATE 35: CPT | Performed by: SPECIALIST

## 2020-01-01 PROCEDURE — 83735 ASSAY OF MAGNESIUM: CPT | Performed by: INTERNAL MEDICINE

## 2020-01-01 PROCEDURE — 80048 BASIC METABOLIC PNL TOTAL CA: CPT | Performed by: FAMILY MEDICINE

## 2020-01-01 PROCEDURE — 83880 ASSAY OF NATRIURETIC PEPTIDE: CPT | Performed by: FAMILY MEDICINE

## 2020-01-01 PROCEDURE — 99225 PR SBSQ OBSERVATION CARE/DAY 25 MINUTES: CPT | Performed by: INTERNAL MEDICINE

## 2020-01-01 PROCEDURE — 84165 PROTEIN E-PHORESIS SERUM: CPT | Performed by: INTERNAL MEDICINE

## 2020-01-01 PROCEDURE — 81001 URINALYSIS AUTO W/SCOPE: CPT

## 2020-01-01 PROCEDURE — 82272 OCCULT BLD FECES 1-3 TESTS: CPT | Performed by: INTERNAL MEDICINE

## 2020-01-01 PROCEDURE — 85060 BLOOD SMEAR INTERPRETATION: CPT | Performed by: INTERNAL MEDICINE

## 2020-01-01 PROCEDURE — 80053 COMPREHEN METABOLIC PANEL: CPT

## 2020-01-01 PROCEDURE — 87635 SARS-COV-2 COVID-19 AMP PRB: CPT | Performed by: INTERNAL MEDICINE

## 2020-01-01 PROCEDURE — 82728 ASSAY OF FERRITIN: CPT | Performed by: INTERNAL MEDICINE

## 2020-01-01 PROCEDURE — 70498 CT ANGIOGRAPHY NECK: CPT

## 2020-01-01 PROCEDURE — 93306 TTE W/DOPPLER COMPLETE: CPT | Performed by: SPECIALIST

## 2020-01-01 PROCEDURE — 97116 GAIT TRAINING THERAPY: CPT

## 2020-01-01 PROCEDURE — 82375 ASSAY CARBOXYHB QUANT: CPT

## 2020-01-01 PROCEDURE — 85610 PROTHROMBIN TIME: CPT | Performed by: PHYSICIAN ASSISTANT

## 2020-01-01 PROCEDURE — 99232 SBSQ HOSP IP/OBS MODERATE 35: CPT | Performed by: NURSE PRACTITIONER

## 2020-01-01 PROCEDURE — 80053 COMPREHEN METABOLIC PANEL: CPT | Performed by: INTERNAL MEDICINE

## 2020-01-01 PROCEDURE — 83690 ASSAY OF LIPASE: CPT | Performed by: PHYSICIAN ASSISTANT

## 2020-01-01 PROCEDURE — 93325 DOPPLER ECHO COLOR FLOW MAPG: CPT

## 2020-01-01 PROCEDURE — 82550 ASSAY OF CK (CPK): CPT | Performed by: EMERGENCY MEDICINE

## 2020-01-01 PROCEDURE — 84466 ASSAY OF TRANSFERRIN: CPT | Performed by: NURSE PRACTITIONER

## 2020-01-01 PROCEDURE — 25010000002 KETOROLAC TROMETHAMINE PER 15 MG: Performed by: INTERNAL MEDICINE

## 2020-01-01 PROCEDURE — 99223 1ST HOSP IP/OBS HIGH 75: CPT | Performed by: PHYSICIAN ASSISTANT

## 2020-01-01 PROCEDURE — 85025 COMPLETE CBC W/AUTO DIFF WBC: CPT | Performed by: NURSE PRACTITIONER

## 2020-01-01 PROCEDURE — 36415 COLL VENOUS BLD VENIPUNCTURE: CPT

## 2020-01-01 PROCEDURE — 99233 SBSQ HOSP IP/OBS HIGH 50: CPT | Performed by: PSYCHIATRY & NEUROLOGY

## 2020-01-01 PROCEDURE — 92523 SPEECH SOUND LANG COMPREHEN: CPT

## 2020-01-01 PROCEDURE — 84478 ASSAY OF TRIGLYCERIDES: CPT | Performed by: INTERNAL MEDICINE

## 2020-01-01 PROCEDURE — 81001 URINALYSIS AUTO W/SCOPE: CPT | Performed by: EMERGENCY MEDICINE

## 2020-01-01 PROCEDURE — 93005 ELECTROCARDIOGRAM TRACING: CPT | Performed by: PHYSICIAN ASSISTANT

## 2020-01-01 PROCEDURE — 82805 BLOOD GASES W/O2 SATURATION: CPT

## 2020-01-01 PROCEDURE — 84484 ASSAY OF TROPONIN QUANT: CPT | Performed by: FAMILY MEDICINE

## 2020-01-01 PROCEDURE — 36415 COLL VENOUS BLD VENIPUNCTURE: CPT | Performed by: NURSE PRACTITIONER

## 2020-01-01 PROCEDURE — 96374 THER/PROPH/DIAG INJ IV PUSH: CPT

## 2020-01-01 PROCEDURE — 25010000002 LORAZEPAM PER 2 MG: Performed by: NURSE PRACTITIONER

## 2020-01-01 PROCEDURE — 84443 ASSAY THYROID STIM HORMONE: CPT | Performed by: NURSE PRACTITIONER

## 2020-01-01 PROCEDURE — 85652 RBC SED RATE AUTOMATED: CPT | Performed by: EMERGENCY MEDICINE

## 2020-01-01 PROCEDURE — 99231 SBSQ HOSP IP/OBS SF/LOW 25: CPT | Performed by: CLINICAL NURSE SPECIALIST

## 2020-01-01 PROCEDURE — 99222 1ST HOSP IP/OBS MODERATE 55: CPT | Performed by: PSYCHIATRY & NEUROLOGY

## 2020-01-01 PROCEDURE — 84132 ASSAY OF SERUM POTASSIUM: CPT | Performed by: INTERNAL MEDICINE

## 2020-01-01 PROCEDURE — 0042T HC CT CEREBRAL PERFUSION W/WO CONTRAST: CPT

## 2020-01-01 PROCEDURE — 97162 PT EVAL MOD COMPLEX 30 MIN: CPT

## 2020-01-01 PROCEDURE — 82607 VITAMIN B-12: CPT | Performed by: INTERNAL MEDICINE

## 2020-01-01 PROCEDURE — 99213 OFFICE O/P EST LOW 20 MIN: CPT | Performed by: SPECIALIST

## 2020-01-01 PROCEDURE — 85045 AUTOMATED RETICULOCYTE COUNT: CPT | Performed by: NURSE PRACTITIONER

## 2020-01-01 PROCEDURE — 87804 INFLUENZA ASSAY W/OPTIC: CPT | Performed by: EMERGENCY MEDICINE

## 2020-01-01 PROCEDURE — 83880 ASSAY OF NATRIURETIC PEPTIDE: CPT | Performed by: PHYSICIAN ASSISTANT

## 2020-01-01 PROCEDURE — 80307 DRUG TEST PRSMV CHEM ANLYZR: CPT | Performed by: EMERGENCY MEDICINE

## 2020-01-01 PROCEDURE — 80048 BASIC METABOLIC PNL TOTAL CA: CPT | Performed by: PHYSICIAN ASSISTANT

## 2020-01-01 PROCEDURE — 85652 RBC SED RATE AUTOMATED: CPT | Performed by: NURSE PRACTITIONER

## 2020-01-01 PROCEDURE — 84478 ASSAY OF TRIGLYCERIDES: CPT

## 2020-01-01 PROCEDURE — 84484 ASSAY OF TROPONIN QUANT: CPT | Performed by: INTERNAL MEDICINE

## 2020-01-01 PROCEDURE — 25010000002 DIGOXIN PER 500 MCG: Performed by: EMERGENCY MEDICINE

## 2020-01-01 PROCEDURE — 84443 ASSAY THYROID STIM HORMONE: CPT | Performed by: INTERNAL MEDICINE

## 2020-01-01 PROCEDURE — P9612 CATHETERIZE FOR URINE SPEC: HCPCS

## 2020-01-01 PROCEDURE — 82746 ASSAY OF FOLIC ACID SERUM: CPT | Performed by: NURSE PRACTITIONER

## 2020-01-01 PROCEDURE — 86140 C-REACTIVE PROTEIN: CPT | Performed by: INTERNAL MEDICINE

## 2020-01-01 PROCEDURE — 85025 COMPLETE CBC W/AUTO DIFF WBC: CPT | Performed by: PHYSICIAN ASSISTANT

## 2020-01-01 PROCEDURE — 96375 TX/PRO/DX INJ NEW DRUG ADDON: CPT

## 2020-01-01 PROCEDURE — 84100 ASSAY OF PHOSPHORUS: CPT

## 2020-01-01 PROCEDURE — 97166 OT EVAL MOD COMPLEX 45 MIN: CPT | Performed by: OCCUPATIONAL THERAPIST

## 2020-01-01 PROCEDURE — 87040 BLOOD CULTURE FOR BACTERIA: CPT | Performed by: EMERGENCY MEDICINE

## 2020-01-01 PROCEDURE — 82607 VITAMIN B-12: CPT | Performed by: NURSE PRACTITIONER

## 2020-01-01 PROCEDURE — 99223 1ST HOSP IP/OBS HIGH 75: CPT | Performed by: INTERNAL MEDICINE

## 2020-01-01 PROCEDURE — 99214 OFFICE O/P EST MOD 30 MIN: CPT | Performed by: SPECIALIST

## 2020-01-01 PROCEDURE — 70496 CT ANGIOGRAPHY HEAD: CPT

## 2020-01-01 PROCEDURE — 99217 PR OBSERVATION CARE DISCHARGE MANAGEMENT: CPT | Performed by: INTERNAL MEDICINE

## 2020-01-01 PROCEDURE — 83735 ASSAY OF MAGNESIUM: CPT | Performed by: EMERGENCY MEDICINE

## 2020-01-01 PROCEDURE — 84300 ASSAY OF URINE SODIUM: CPT | Performed by: INTERNAL MEDICINE

## 2020-01-01 PROCEDURE — 97165 OT EVAL LOW COMPLEX 30 MIN: CPT

## 2020-01-01 PROCEDURE — 97110 THERAPEUTIC EXERCISES: CPT

## 2020-01-01 PROCEDURE — 25010000002 FUROSEMIDE PER 20 MG: Performed by: EMERGENCY MEDICINE

## 2020-01-01 PROCEDURE — 82533 TOTAL CORTISOL: CPT | Performed by: INTERNAL MEDICINE

## 2020-01-01 PROCEDURE — 84439 ASSAY OF FREE THYROXINE: CPT | Performed by: NURSE PRACTITIONER

## 2020-01-01 PROCEDURE — 82746 ASSAY OF FOLIC ACID SERUM: CPT | Performed by: INTERNAL MEDICINE

## 2020-01-01 PROCEDURE — 85027 COMPLETE CBC AUTOMATED: CPT | Performed by: NURSE PRACTITIONER

## 2020-01-01 PROCEDURE — 90653 IIV ADJUVANT VACCINE IM: CPT | Performed by: NURSE PRACTITIONER

## 2020-01-01 PROCEDURE — 99239 HOSP IP/OBS DSCHRG MGMT >30: CPT | Performed by: FAMILY MEDICINE

## 2020-01-01 PROCEDURE — 87040 BLOOD CULTURE FOR BACTERIA: CPT | Performed by: FAMILY MEDICINE

## 2020-01-01 PROCEDURE — 97163 PT EVAL HIGH COMPLEX 45 MIN: CPT

## 2020-01-01 PROCEDURE — 99222 1ST HOSP IP/OBS MODERATE 55: CPT | Performed by: NURSE PRACTITIONER

## 2020-01-01 PROCEDURE — 99220 PR INITIAL OBSERVATION CARE/DAY 70 MINUTES: CPT | Performed by: INTERNAL MEDICINE

## 2020-01-01 PROCEDURE — 80061 LIPID PANEL: CPT | Performed by: NURSE PRACTITIONER

## 2020-01-01 PROCEDURE — 99239 HOSP IP/OBS DSCHRG MGMT >30: CPT | Performed by: INTERNAL MEDICINE

## 2020-01-01 PROCEDURE — 92526 ORAL FUNCTION THERAPY: CPT

## 2020-01-01 PROCEDURE — 84155 ASSAY OF PROTEIN SERUM: CPT | Performed by: INTERNAL MEDICINE

## 2020-01-01 PROCEDURE — 51798 US URINE CAPACITY MEASURE: CPT

## 2020-01-01 PROCEDURE — 96376 TX/PRO/DX INJ SAME DRUG ADON: CPT

## 2020-01-01 PROCEDURE — 80069 RENAL FUNCTION PANEL: CPT | Performed by: INTERNAL MEDICINE

## 2020-01-01 PROCEDURE — 83036 HEMOGLOBIN GLYCOSYLATED A1C: CPT | Performed by: NURSE PRACTITIONER

## 2020-01-01 PROCEDURE — 93321 DOPPLER ECHO F-UP/LMTD STD: CPT | Performed by: SPECIALIST

## 2020-01-01 PROCEDURE — 93005 ELECTROCARDIOGRAM TRACING: CPT | Performed by: EMERGENCY MEDICINE

## 2020-01-01 PROCEDURE — 99232 SBSQ HOSP IP/OBS MODERATE 35: CPT | Performed by: FAMILY MEDICINE

## 2020-01-01 PROCEDURE — 83735 ASSAY OF MAGNESIUM: CPT

## 2020-01-01 PROCEDURE — 84439 ASSAY OF FREE THYROXINE: CPT | Performed by: EMERGENCY MEDICINE

## 2020-01-01 PROCEDURE — 85652 RBC SED RATE AUTOMATED: CPT | Performed by: PHYSICIAN ASSISTANT

## 2020-01-01 PROCEDURE — 83605 ASSAY OF LACTIC ACID: CPT | Performed by: EMERGENCY MEDICINE

## 2020-01-01 PROCEDURE — 25010000002 FUROSEMIDE PER 20 MG: Performed by: PHYSICIAN ASSISTANT

## 2020-01-01 PROCEDURE — 80053 COMPREHEN METABOLIC PANEL: CPT | Performed by: PHYSICIAN ASSISTANT

## 2020-01-01 PROCEDURE — 93308 TTE F-UP OR LMTD: CPT | Performed by: SPECIALIST

## 2020-01-01 PROCEDURE — 85730 THROMBOPLASTIN TIME PARTIAL: CPT | Performed by: PHYSICIAN ASSISTANT

## 2020-01-01 PROCEDURE — 93010 ELECTROCARDIOGRAM REPORT: CPT | Performed by: SPECIALIST

## 2020-01-01 PROCEDURE — G0008 ADMIN INFLUENZA VIRUS VAC: HCPCS | Performed by: NURSE PRACTITIONER

## 2020-01-01 PROCEDURE — 84484 ASSAY OF TROPONIN QUANT: CPT | Performed by: NURSE PRACTITIONER

## 2020-01-01 PROCEDURE — 73551 X-RAY EXAM OF FEMUR 1: CPT

## 2020-01-01 PROCEDURE — 25010000002 LEVOFLOXACIN PER 250 MG: Performed by: EMERGENCY MEDICINE

## 2020-01-01 PROCEDURE — 83930 ASSAY OF BLOOD OSMOLALITY: CPT | Performed by: INTERNAL MEDICINE

## 2020-01-01 PROCEDURE — 25010000002 FUROSEMIDE PER 20 MG: Performed by: NURSE PRACTITIONER

## 2020-01-01 PROCEDURE — 80053 COMPREHEN METABOLIC PANEL: CPT | Performed by: NURSE PRACTITIONER

## 2020-01-01 PROCEDURE — 80061 LIPID PANEL: CPT | Performed by: INTERNAL MEDICINE

## 2020-01-01 PROCEDURE — 81003 URINALYSIS AUTO W/O SCOPE: CPT | Performed by: PHYSICIAN ASSISTANT

## 2020-01-01 PROCEDURE — 85610 PROTHROMBIN TIME: CPT | Performed by: FAMILY MEDICINE

## 2020-01-01 PROCEDURE — 84443 ASSAY THYROID STIM HORMONE: CPT | Performed by: EMERGENCY MEDICINE

## 2020-01-01 PROCEDURE — 83540 ASSAY OF IRON: CPT | Performed by: INTERNAL MEDICINE

## 2020-01-01 PROCEDURE — 83605 ASSAY OF LACTIC ACID: CPT | Performed by: FAMILY MEDICINE

## 2020-01-01 PROCEDURE — 84145 PROCALCITONIN (PCT): CPT | Performed by: EMERGENCY MEDICINE

## 2020-01-01 PROCEDURE — 36600 WITHDRAWAL OF ARTERIAL BLOOD: CPT

## 2020-01-01 PROCEDURE — 83050 HGB METHEMOGLOBIN QUAN: CPT

## 2020-01-01 PROCEDURE — 99284 EMERGENCY DEPT VISIT MOD MDM: CPT

## 2020-01-01 PROCEDURE — 83540 ASSAY OF IRON: CPT | Performed by: NURSE PRACTITIONER

## 2020-01-01 RX ORDER — IRON POLYSACCHARIDE COMPLEX 150 MG
150 CAPSULE ORAL DAILY
Status: DISCONTINUED | OUTPATIENT
Start: 2020-01-01 | End: 2020-01-01 | Stop reason: HOSPADM

## 2020-01-01 RX ORDER — CLOPIDOGREL BISULFATE 75 MG/1
75 TABLET ORAL DAILY
Status: DISCONTINUED | OUTPATIENT
Start: 2020-01-01 | End: 2020-01-01 | Stop reason: HOSPADM

## 2020-01-01 RX ORDER — ONDANSETRON 4 MG/1
4 TABLET, FILM COATED ORAL EVERY 6 HOURS PRN
Status: CANCELLED | OUTPATIENT
Start: 2020-01-01

## 2020-01-01 RX ORDER — ROSUVASTATIN CALCIUM 5 MG/1
5 TABLET, COATED ORAL NIGHTLY
Status: ON HOLD | COMMUNITY
End: 2020-01-01

## 2020-01-01 RX ORDER — 3% SODIUM CHLORIDE 3 G/100ML
100 INJECTION, SOLUTION INTRAVENOUS ONCE
Status: COMPLETED | OUTPATIENT
Start: 2020-01-01 | End: 2020-01-01

## 2020-01-01 RX ORDER — HYDROXYZINE HYDROCHLORIDE 25 MG/1
25 TABLET, FILM COATED ORAL 3 TIMES DAILY PRN
Status: DISCONTINUED | OUTPATIENT
Start: 2020-01-01 | End: 2020-01-01 | Stop reason: HOSPADM

## 2020-01-01 RX ORDER — NEBIVOLOL 5 MG/1
2.5 TABLET ORAL
Status: DISCONTINUED | OUTPATIENT
Start: 2020-01-01 | End: 2020-01-01

## 2020-01-01 RX ORDER — SODIUM PHOSPHATE, DIBASIC AND SODIUM PHOSPHATE, MONOBASIC 7; 19 G/133ML; G/133ML
1 ENEMA RECTAL EVERY 12 HOURS PRN
COMMUNITY

## 2020-01-01 RX ORDER — LORAZEPAM 2 MG/ML
0.25 INJECTION INTRAMUSCULAR
Status: DISCONTINUED | OUTPATIENT
Start: 2020-01-01 | End: 2020-01-01

## 2020-01-01 RX ORDER — METOPROLOL TARTRATE 50 MG/1
50 TABLET, FILM COATED ORAL EVERY 12 HOURS SCHEDULED
Qty: 60 TABLET | Refills: 0 | Status: ON HOLD | OUTPATIENT
Start: 2020-01-01 | End: 2020-01-01

## 2020-01-01 RX ORDER — BISACODYL 10 MG
10 SUPPOSITORY, RECTAL RECTAL DAILY PRN
Status: DISCONTINUED | OUTPATIENT
Start: 2020-01-01 | End: 2020-01-01 | Stop reason: HOSPADM

## 2020-01-01 RX ORDER — ISOSORBIDE MONONITRATE 30 MG/1
30 TABLET, EXTENDED RELEASE ORAL
Qty: 30 TABLET | Refills: 0 | Status: SHIPPED | OUTPATIENT
Start: 2020-01-01 | End: 2020-01-01 | Stop reason: HOSPADM

## 2020-01-01 RX ORDER — ONDANSETRON 4 MG/1
4 TABLET, FILM COATED ORAL EVERY 6 HOURS PRN
Status: DISCONTINUED | OUTPATIENT
Start: 2020-01-01 | End: 2020-01-01 | Stop reason: HOSPADM

## 2020-01-01 RX ORDER — ASPIRIN 81 MG/1
81 TABLET, CHEWABLE ORAL DAILY
Status: DISCONTINUED | OUTPATIENT
Start: 2020-01-01 | End: 2020-01-01

## 2020-01-01 RX ORDER — LISINOPRIL 2.5 MG/1
5 TABLET ORAL
Status: DISCONTINUED | OUTPATIENT
Start: 2020-01-01 | End: 2020-01-01 | Stop reason: HOSPADM

## 2020-01-01 RX ORDER — MORPHINE SULFATE 2 MG/ML
2 INJECTION, SOLUTION INTRAMUSCULAR; INTRAVENOUS EVERY 4 HOURS
Status: DISCONTINUED | OUTPATIENT
Start: 2020-01-01 | End: 2020-01-01 | Stop reason: HOSPADM

## 2020-01-01 RX ORDER — ASPIRIN 81 MG/1
81 TABLET ORAL DAILY
Status: DISCONTINUED | OUTPATIENT
Start: 2020-01-01 | End: 2020-01-01 | Stop reason: HOSPADM

## 2020-01-01 RX ORDER — MORPHINE SULFATE 2 MG/ML
1 INJECTION, SOLUTION INTRAMUSCULAR; INTRAVENOUS NIGHTLY
Status: DISCONTINUED | OUTPATIENT
Start: 2020-01-01 | End: 2020-01-01 | Stop reason: HOSPADM

## 2020-01-01 RX ORDER — BUMETANIDE 1 MG/1
1 TABLET ORAL DAILY
Status: DISCONTINUED | OUTPATIENT
Start: 2020-01-01 | End: 2020-01-01

## 2020-01-01 RX ORDER — MAGNESIUM SULFATE HEPTAHYDRATE 40 MG/ML
4 INJECTION, SOLUTION INTRAVENOUS AS NEEDED
Status: DISCONTINUED | OUTPATIENT
Start: 2020-01-01 | End: 2020-01-01

## 2020-01-01 RX ORDER — SODIUM CHLORIDE 0.9 % (FLUSH) 0.9 %
10 SYRINGE (ML) INJECTION EVERY 12 HOURS SCHEDULED
Status: DISCONTINUED | OUTPATIENT
Start: 2020-01-01 | End: 2020-01-01 | Stop reason: HOSPADM

## 2020-01-01 RX ORDER — ACETAMINOPHEN 160 MG/5ML
500 SOLUTION ORAL EVERY 6 HOURS PRN
Status: DISCONTINUED | OUTPATIENT
Start: 2020-01-01 | End: 2020-01-01

## 2020-01-01 RX ORDER — RANOLAZINE 500 MG/1
500 TABLET, EXTENDED RELEASE ORAL EVERY 12 HOURS SCHEDULED
Status: DISCONTINUED | OUTPATIENT
Start: 2020-01-01 | End: 2020-01-01

## 2020-01-01 RX ORDER — RANOLAZINE 500 MG/1
500 TABLET, EXTENDED RELEASE ORAL
Status: DISCONTINUED | OUTPATIENT
Start: 2020-01-01 | End: 2020-01-01 | Stop reason: HOSPADM

## 2020-01-01 RX ORDER — LACTULOSE 10 G/15ML
20 SOLUTION ORAL EVERY 12 HOURS PRN
COMMUNITY

## 2020-01-01 RX ORDER — ONDANSETRON 2 MG/ML
4 INJECTION INTRAMUSCULAR; INTRAVENOUS EVERY 6 HOURS PRN
Status: DISCONTINUED | OUTPATIENT
Start: 2020-01-01 | End: 2020-01-01 | Stop reason: HOSPADM

## 2020-01-01 RX ORDER — LORAZEPAM 2 MG/ML
0.25 INJECTION INTRAMUSCULAR EVERY 6 HOURS PRN
Status: CANCELLED | OUTPATIENT
Start: 2020-01-01 | End: 2020-07-26

## 2020-01-01 RX ORDER — SODIUM POLYSTYRENE SULFONATE 4.1 MEQ/G
15 POWDER, FOR SUSPENSION ORAL; RECTAL EVERY 12 HOURS SCHEDULED
Status: COMPLETED | OUTPATIENT
Start: 2020-01-01 | End: 2020-01-01

## 2020-01-01 RX ORDER — LISINOPRIL 20 MG/1
20 TABLET ORAL
COMMUNITY
End: 2020-01-01 | Stop reason: HOSPADM

## 2020-01-01 RX ORDER — FUROSEMIDE 10 MG/ML
40 INJECTION INTRAMUSCULAR; INTRAVENOUS ONCE
Status: COMPLETED | OUTPATIENT
Start: 2020-01-01 | End: 2020-01-01

## 2020-01-01 RX ORDER — ACETAMINOPHEN 160 MG/5ML
240 SOLUTION ORAL 3 TIMES DAILY
Status: DISCONTINUED | OUTPATIENT
Start: 2020-01-01 | End: 2020-01-01

## 2020-01-01 RX ORDER — SODIUM CHLORIDE 9 MG/ML
50 INJECTION, SOLUTION INTRAVENOUS CONTINUOUS
Status: DISCONTINUED | OUTPATIENT
Start: 2020-01-01 | End: 2020-01-01

## 2020-01-01 RX ORDER — I-VITE, TAB 1000-60-2MG (60/BT) 300MCG-200
1 TAB ORAL DAILY
Status: DISCONTINUED | OUTPATIENT
Start: 2020-01-01 | End: 2020-01-01 | Stop reason: HOSPADM

## 2020-01-01 RX ORDER — METOPROLOL SUCCINATE 50 MG/1
100 TABLET, EXTENDED RELEASE ORAL
Status: DISCONTINUED | OUTPATIENT
Start: 2020-01-01 | End: 2020-01-01

## 2020-01-01 RX ORDER — DILTIAZEM HYDROCHLORIDE 120 MG/1
120 CAPSULE, COATED, EXTENDED RELEASE ORAL DAILY
COMMUNITY

## 2020-01-01 RX ORDER — ACETAMINOPHEN 325 MG/1
325 TABLET ORAL EVERY 4 HOURS PRN
COMMUNITY
End: 2020-01-01

## 2020-01-01 RX ORDER — LACTULOSE 10 G/15ML
20 SOLUTION ORAL EVERY 12 HOURS PRN
Status: CANCELLED | OUTPATIENT
Start: 2020-01-01

## 2020-01-01 RX ORDER — SODIUM CHLORIDE 0.9 % (FLUSH) 0.9 %
10 SYRINGE (ML) INJECTION AS NEEDED
Status: DISCONTINUED | OUTPATIENT
Start: 2020-01-01 | End: 2020-01-01 | Stop reason: HOSPADM

## 2020-01-01 RX ORDER — FUROSEMIDE 20 MG/1
20 TABLET ORAL
Status: CANCELLED | OUTPATIENT
Start: 2020-01-01

## 2020-01-01 RX ORDER — ACETAMINOPHEN 650 MG/1
650 SUPPOSITORY RECTAL EVERY 4 HOURS PRN
Status: DISCONTINUED | OUTPATIENT
Start: 2020-01-01 | End: 2020-01-01 | Stop reason: HOSPADM

## 2020-01-01 RX ORDER — LIDOCAINE 50 MG/G
1 PATCH TOPICAL
Status: DISCONTINUED | OUTPATIENT
Start: 2020-01-01 | End: 2020-01-01

## 2020-01-01 RX ORDER — LORAZEPAM 2 MG/ML
0.5 INJECTION INTRAMUSCULAR EVERY 4 HOURS PRN
Status: DISCONTINUED | OUTPATIENT
Start: 2020-01-01 | End: 2020-01-01 | Stop reason: HOSPADM

## 2020-01-01 RX ORDER — ASPIRIN 300 MG/1
300 SUPPOSITORY RECTAL DAILY
Status: CANCELLED | OUTPATIENT
Start: 2020-01-01

## 2020-01-01 RX ORDER — ASPIRIN 300 MG/1
300 SUPPOSITORY RECTAL ONCE
Status: COMPLETED | OUTPATIENT
Start: 2020-01-01 | End: 2020-01-01

## 2020-01-01 RX ORDER — ROSUVASTATIN CALCIUM 10 MG/1
10 TABLET, COATED ORAL NIGHTLY
Status: DISCONTINUED | OUTPATIENT
Start: 2020-01-01 | End: 2020-01-01

## 2020-01-01 RX ORDER — HALOPERIDOL 5 MG/ML
0.5 INJECTION INTRAMUSCULAR EVERY 4 HOURS PRN
Status: DISCONTINUED | OUTPATIENT
Start: 2020-01-01 | End: 2020-01-01 | Stop reason: HOSPADM

## 2020-01-01 RX ORDER — KETOROLAC TROMETHAMINE 15 MG/ML
15 INJECTION, SOLUTION INTRAMUSCULAR; INTRAVENOUS EVERY 6 HOURS PRN
Status: DISCONTINUED | OUTPATIENT
Start: 2020-01-01 | End: 2020-01-01 | Stop reason: HOSPADM

## 2020-01-01 RX ORDER — TORSEMIDE 10 MG/1
10 TABLET ORAL DAILY
Status: DISCONTINUED | OUTPATIENT
Start: 2020-01-01 | End: 2020-01-01

## 2020-01-01 RX ORDER — BUMETANIDE 1 MG/1
1 TABLET ORAL DAILY
COMMUNITY
End: 2020-01-01 | Stop reason: HOSPADM

## 2020-01-01 RX ORDER — SODIUM CHLORIDE 0.9 % (FLUSH) 0.9 %
10 SYRINGE (ML) INJECTION EVERY 12 HOURS SCHEDULED
Status: CANCELLED | OUTPATIENT
Start: 2020-01-01

## 2020-01-01 RX ORDER — MAGNESIUM SULFATE HEPTAHYDRATE 40 MG/ML
2 INJECTION, SOLUTION INTRAVENOUS AS NEEDED
Status: DISCONTINUED | OUTPATIENT
Start: 2020-01-01 | End: 2020-01-01

## 2020-01-01 RX ORDER — KETOROLAC TROMETHAMINE 15 MG/ML
15 INJECTION, SOLUTION INTRAMUSCULAR; INTRAVENOUS EVERY 6 HOURS SCHEDULED
Status: DISCONTINUED | OUTPATIENT
Start: 2020-01-01 | End: 2020-01-01 | Stop reason: HOSPADM

## 2020-01-01 RX ORDER — FUROSEMIDE 10 MG/ML
20 INJECTION INTRAMUSCULAR; INTRAVENOUS ONCE
Status: COMPLETED | OUTPATIENT
Start: 2020-01-01 | End: 2020-01-01

## 2020-01-01 RX ORDER — DILTIAZEM HYDROCHLORIDE 120 MG/1
120 CAPSULE, COATED, EXTENDED RELEASE ORAL DAILY
Status: DISCONTINUED | OUTPATIENT
Start: 2020-01-01 | End: 2020-01-01 | Stop reason: HOSPADM

## 2020-01-01 RX ORDER — ACETAMINOPHEN 325 MG/1
325 TABLET ORAL EVERY 6 HOURS PRN
Status: DISCONTINUED | OUTPATIENT
Start: 2020-01-01 | End: 2020-01-01 | Stop reason: HOSPADM

## 2020-01-01 RX ORDER — POTASSIUM CHLORIDE 7.45 MG/ML
10 INJECTION INTRAVENOUS
Status: DISCONTINUED | OUTPATIENT
Start: 2020-01-01 | End: 2020-01-01

## 2020-01-01 RX ORDER — ISOSORBIDE MONONITRATE 30 MG/1
30 TABLET, EXTENDED RELEASE ORAL
Qty: 30 TABLET | Refills: 1 | Status: SHIPPED | OUTPATIENT
Start: 2020-01-01

## 2020-01-01 RX ORDER — FUROSEMIDE 10 MG/ML
40 INJECTION INTRAMUSCULAR; INTRAVENOUS DAILY
Status: DISCONTINUED | OUTPATIENT
Start: 2020-01-01 | End: 2020-01-01

## 2020-01-01 RX ORDER — METOPROLOL TARTRATE 50 MG/1
50 TABLET, FILM COATED ORAL EVERY 12 HOURS SCHEDULED
Status: DISCONTINUED | OUTPATIENT
Start: 2020-01-01 | End: 2020-01-01 | Stop reason: HOSPADM

## 2020-01-01 RX ORDER — METOPROLOL TARTRATE 5 MG/5ML
5 INJECTION INTRAVENOUS EVERY 4 HOURS PRN
Status: DISCONTINUED | OUTPATIENT
Start: 2020-01-01 | End: 2020-01-01 | Stop reason: HOSPADM

## 2020-01-01 RX ORDER — ACETAMINOPHEN 650 MG/1
650 SUPPOSITORY RECTAL EVERY 4 HOURS PRN
Status: CANCELLED | OUTPATIENT
Start: 2020-01-01

## 2020-01-01 RX ORDER — IRON ASPGLY,PS/C/B12/FA/CA/SUC 150-25-1
1 CAPSULE ORAL
Status: CANCELLED | OUTPATIENT
Start: 2020-01-01

## 2020-01-01 RX ORDER — ASPIRIN 81 MG/1
81 TABLET ORAL DAILY
Status: DISCONTINUED | OUTPATIENT
Start: 2020-01-01 | End: 2020-01-01

## 2020-01-01 RX ORDER — ASPIRIN 300 MG/1
300 SUPPOSITORY RECTAL DAILY
Status: DISCONTINUED | OUTPATIENT
Start: 2020-01-01 | End: 2020-01-01

## 2020-01-01 RX ORDER — MORPHINE SULFATE 2 MG/ML
1 INJECTION, SOLUTION INTRAMUSCULAR; INTRAVENOUS NIGHTLY
Status: CANCELLED | OUTPATIENT
Start: 2020-01-01 | End: 2020-01-01

## 2020-01-01 RX ORDER — BUMETANIDE 0.25 MG/ML
0.5 INJECTION INTRAMUSCULAR; INTRAVENOUS ONCE
Status: COMPLETED | OUTPATIENT
Start: 2020-01-01 | End: 2020-01-01

## 2020-01-01 RX ORDER — SODIUM CHLORIDE 0.9 % (FLUSH) 0.9 %
10 SYRINGE (ML) INJECTION AS NEEDED
Status: CANCELLED | OUTPATIENT
Start: 2020-01-01

## 2020-01-01 RX ORDER — ROSUVASTATIN CALCIUM 10 MG/1
5 TABLET, COATED ORAL NIGHTLY
Status: CANCELLED | OUTPATIENT
Start: 2020-01-01

## 2020-01-01 RX ORDER — ROSUVASTATIN CALCIUM 10 MG/1
5 TABLET, COATED ORAL DAILY
Status: DISCONTINUED | OUTPATIENT
Start: 2020-01-01 | End: 2020-01-01 | Stop reason: HOSPADM

## 2020-01-01 RX ORDER — LISINOPRIL 5 MG/1
5 TABLET ORAL DAILY
COMMUNITY

## 2020-01-01 RX ORDER — CYCLOBENZAPRINE HCL 10 MG
5 TABLET ORAL ONCE
Status: COMPLETED | OUTPATIENT
Start: 2020-01-01 | End: 2020-01-01

## 2020-01-01 RX ORDER — RANOLAZINE 500 MG/1
500 TABLET, EXTENDED RELEASE ORAL
Status: ON HOLD | COMMUNITY
End: 2020-01-01

## 2020-01-01 RX ORDER — NITROGLYCERIN 0.4 MG/1
0.4 TABLET SUBLINGUAL
Status: DISCONTINUED | OUTPATIENT
Start: 2020-01-01 | End: 2020-01-01 | Stop reason: HOSPADM

## 2020-01-01 RX ORDER — BISACODYL 10 MG
10 SUPPOSITORY, RECTAL RECTAL EVERY 12 HOURS PRN
Status: CANCELLED | OUTPATIENT
Start: 2020-01-01

## 2020-01-01 RX ORDER — ACETAMINOPHEN 325 MG/1
650 TABLET ORAL EVERY 6 HOURS PRN
Status: DISCONTINUED | OUTPATIENT
Start: 2020-01-01 | End: 2020-01-01 | Stop reason: HOSPADM

## 2020-01-01 RX ORDER — LORAZEPAM 2 MG/ML
0.25 INJECTION INTRAMUSCULAR EVERY 6 HOURS PRN
Status: DISCONTINUED | OUTPATIENT
Start: 2020-01-01 | End: 2020-01-01

## 2020-01-01 RX ORDER — NITROGLYCERIN 0.4 MG/1
0.4 TABLET SUBLINGUAL
Status: CANCELLED | OUTPATIENT
Start: 2020-01-01

## 2020-01-01 RX ORDER — LACTULOSE 10 G/15ML
60 SOLUTION ORAL ONCE
Status: COMPLETED | OUTPATIENT
Start: 2020-01-01 | End: 2020-01-01

## 2020-01-01 RX ORDER — CLONIDINE HYDROCHLORIDE 0.1 MG/1
0.1 TABLET ORAL ONCE
Status: COMPLETED | OUTPATIENT
Start: 2020-01-01 | End: 2020-01-01

## 2020-01-01 RX ORDER — BISACODYL 10 MG
10 SUPPOSITORY, RECTAL RECTAL EVERY 12 HOURS PRN
COMMUNITY

## 2020-01-01 RX ORDER — METOPROLOL TARTRATE 5 MG/5ML
2.5 INJECTION INTRAVENOUS ONCE
Status: COMPLETED | OUTPATIENT
Start: 2020-01-01 | End: 2020-01-01

## 2020-01-01 RX ORDER — FUROSEMIDE 20 MG/1
20 TABLET ORAL DAILY
Status: DISCONTINUED | OUTPATIENT
Start: 2020-01-01 | End: 2020-01-01 | Stop reason: HOSPADM

## 2020-01-01 RX ORDER — SODIUM CHLORIDE 9 MG/ML
75 INJECTION, SOLUTION INTRAVENOUS CONTINUOUS
Status: DISCONTINUED | OUTPATIENT
Start: 2020-01-01 | End: 2020-01-01

## 2020-01-01 RX ORDER — HYDRALAZINE HYDROCHLORIDE 20 MG/ML
10 INJECTION INTRAMUSCULAR; INTRAVENOUS EVERY 6 HOURS PRN
Status: DISCONTINUED | OUTPATIENT
Start: 2020-01-01 | End: 2020-01-01 | Stop reason: HOSPADM

## 2020-01-01 RX ORDER — LIDOCAINE 50 MG/G
3 PATCH TOPICAL
Status: DISCONTINUED | OUTPATIENT
Start: 2020-01-01 | End: 2020-01-01 | Stop reason: HOSPADM

## 2020-01-01 RX ORDER — MORPHINE SULFATE 2 MG/ML
2 INJECTION, SOLUTION INTRAMUSCULAR; INTRAVENOUS EVERY 6 HOURS
Status: DISCONTINUED | OUTPATIENT
Start: 2020-01-01 | End: 2020-01-01

## 2020-01-01 RX ORDER — ISOSORBIDE MONONITRATE 30 MG/1
30 TABLET, EXTENDED RELEASE ORAL
Status: DISCONTINUED | OUTPATIENT
Start: 2020-01-01 | End: 2020-01-01

## 2020-01-01 RX ORDER — MORPHINE SULFATE 2 MG/ML
0.5 INJECTION, SOLUTION INTRAMUSCULAR; INTRAVENOUS
Status: DISCONTINUED | OUTPATIENT
Start: 2020-01-01 | End: 2020-01-01

## 2020-01-01 RX ORDER — SODIUM POLYSTYRENE SULFONATE 4.1 MEQ/G
30 POWDER, FOR SUSPENSION ORAL; RECTAL ONCE
Status: COMPLETED | OUTPATIENT
Start: 2020-01-01 | End: 2020-01-01

## 2020-01-01 RX ORDER — LEVOFLOXACIN 5 MG/ML
500 INJECTION, SOLUTION INTRAVENOUS ONCE
Status: COMPLETED | OUTPATIENT
Start: 2020-01-01 | End: 2020-01-01

## 2020-01-01 RX ORDER — ACETAMINOPHEN 160 MG/5ML
240 SOLUTION ORAL EVERY 6 HOURS PRN
Status: DISCONTINUED | OUTPATIENT
Start: 2020-01-01 | End: 2020-01-01

## 2020-01-01 RX ORDER — DILTIAZEM HCL IN NACL,ISO-OSM 125 MG/125
5-15 PLASTIC BAG, INJECTION (ML) INTRAVENOUS
Status: DISCONTINUED | OUTPATIENT
Start: 2020-01-01 | End: 2020-01-01

## 2020-01-01 RX ORDER — FUROSEMIDE 40 MG/1
40 TABLET ORAL DAILY
Status: CANCELLED | OUTPATIENT
Start: 2020-01-01

## 2020-01-01 RX ORDER — ACETAMINOPHEN 325 MG/1
325 TABLET ORAL EVERY 4 HOURS PRN
Status: DISCONTINUED | OUTPATIENT
Start: 2020-01-01 | End: 2020-01-01 | Stop reason: HOSPADM

## 2020-01-01 RX ORDER — FUROSEMIDE 40 MG/1
40 TABLET ORAL DAILY
COMMUNITY
End: 2020-01-01 | Stop reason: HOSPADM

## 2020-01-01 RX ORDER — ASPIRIN 81 MG/1
81 TABLET, CHEWABLE ORAL DAILY
Status: DISCONTINUED | OUTPATIENT
Start: 2020-01-01 | End: 2020-01-01 | Stop reason: HOSPADM

## 2020-01-01 RX ORDER — METOPROLOL TARTRATE 5 MG/5ML
5 INJECTION INTRAVENOUS EVERY 4 HOURS
Status: DISCONTINUED | OUTPATIENT
Start: 2020-01-01 | End: 2020-01-01

## 2020-01-01 RX ORDER — LORAZEPAM 2 MG/ML
0.12 INJECTION INTRAMUSCULAR EVERY 12 HOURS PRN
Status: DISCONTINUED | OUTPATIENT
Start: 2020-01-01 | End: 2020-01-01

## 2020-01-01 RX ORDER — FUROSEMIDE 40 MG/1
40 TABLET ORAL DAILY
Status: DISCONTINUED | OUTPATIENT
Start: 2020-01-01 | End: 2020-01-01

## 2020-01-01 RX ORDER — ACETAMINOPHEN 325 MG/1
650 TABLET ORAL EVERY 6 HOURS PRN
Status: CANCELLED | OUTPATIENT
Start: 2020-01-01

## 2020-01-01 RX ORDER — IRON ASPGLY,PS/C/B12/FA/CA/SUC 150-25-1
1 CAPSULE ORAL
Status: ON HOLD | COMMUNITY
End: 2020-01-01

## 2020-01-01 RX ORDER — ALPRAZOLAM 0.25 MG/1
0.25 TABLET ORAL EVERY 12 HOURS PRN
COMMUNITY

## 2020-01-01 RX ORDER — LORAZEPAM 2 MG/ML
0.12 INJECTION INTRAMUSCULAR EVERY 4 HOURS PRN
Status: DISCONTINUED | OUTPATIENT
Start: 2020-01-01 | End: 2020-01-01 | Stop reason: HOSPADM

## 2020-01-01 RX ORDER — FUROSEMIDE 10 MG/ML
20 INJECTION INTRAMUSCULAR; INTRAVENOUS EVERY 6 HOURS PRN
Status: DISCONTINUED | OUTPATIENT
Start: 2020-01-01 | End: 2020-01-01 | Stop reason: HOSPADM

## 2020-01-01 RX ORDER — ASPIRIN 81 MG/1
324 TABLET, CHEWABLE ORAL ONCE
Status: COMPLETED | OUTPATIENT
Start: 2020-01-01 | End: 2020-01-01

## 2020-01-01 RX ORDER — MORPHINE SULFATE 2 MG/ML
2 INJECTION, SOLUTION INTRAMUSCULAR; INTRAVENOUS
Status: DISCONTINUED | OUTPATIENT
Start: 2020-01-01 | End: 2020-01-01 | Stop reason: HOSPADM

## 2020-01-01 RX ORDER — GLYCOPYRROLATE 0.2 MG/ML
0.4 INJECTION INTRAMUSCULAR; INTRAVENOUS EVERY 6 HOURS PRN
Status: DISCONTINUED | OUTPATIENT
Start: 2020-01-01 | End: 2020-01-01 | Stop reason: HOSPADM

## 2020-01-01 RX ORDER — ONDANSETRON 2 MG/ML
4 INJECTION INTRAMUSCULAR; INTRAVENOUS EVERY 6 HOURS PRN
Status: CANCELLED | OUTPATIENT
Start: 2020-01-01

## 2020-01-01 RX ORDER — HYDRALAZINE HYDROCHLORIDE 10 MG/1
10 TABLET, FILM COATED ORAL EVERY 6 HOURS SCHEDULED
Qty: 120 TABLET | Refills: 1 | Status: SHIPPED | OUTPATIENT
Start: 2020-01-01

## 2020-01-01 RX ORDER — IRON POLYSACCHARIDE COMPLEX 150 MG
150 CAPSULE ORAL DAILY
COMMUNITY

## 2020-01-01 RX ORDER — ASPIRIN 325 MG
325 TABLET ORAL ONCE
Status: DISCONTINUED | OUTPATIENT
Start: 2020-01-01 | End: 2020-01-01

## 2020-01-01 RX ORDER — DIGOXIN 0.25 MG/ML
250 INJECTION INTRAMUSCULAR; INTRAVENOUS ONCE
Status: COMPLETED | OUTPATIENT
Start: 2020-01-01 | End: 2020-01-01

## 2020-01-01 RX ORDER — METOPROLOL SUCCINATE 50 MG/1
50 TABLET, EXTENDED RELEASE ORAL DAILY
Status: DISCONTINUED | OUTPATIENT
Start: 2020-01-01 | End: 2020-01-01

## 2020-01-01 RX ORDER — METOPROLOL TARTRATE 5 MG/5ML
2.5 INJECTION INTRAVENOUS EVERY 12 HOURS SCHEDULED
Status: DISCONTINUED | OUTPATIENT
Start: 2020-01-01 | End: 2020-01-01 | Stop reason: HOSPADM

## 2020-01-01 RX ORDER — CLOPIDOGREL BISULFATE 75 MG/1
75 TABLET ORAL DAILY
Status: DISCONTINUED | OUTPATIENT
Start: 2020-01-01 | End: 2020-01-01

## 2020-01-01 RX ORDER — LISINOPRIL 2.5 MG/1
5 TABLET ORAL
Status: CANCELLED | OUTPATIENT
Start: 2020-01-01

## 2020-01-01 RX ORDER — MORPHINE SULFATE 2 MG/ML
0.5 INJECTION, SOLUTION INTRAMUSCULAR; INTRAVENOUS
Status: DISCONTINUED | OUTPATIENT
Start: 2020-01-01 | End: 2020-01-01 | Stop reason: HOSPADM

## 2020-01-01 RX ORDER — IPRATROPIUM BROMIDE AND ALBUTEROL SULFATE 2.5; .5 MG/3ML; MG/3ML
3 SOLUTION RESPIRATORY (INHALATION) EVERY 4 HOURS PRN
Status: DISCONTINUED | OUTPATIENT
Start: 2020-01-01 | End: 2020-01-01

## 2020-01-01 RX ORDER — MORPHINE SULFATE 2 MG/ML
1 INJECTION, SOLUTION INTRAMUSCULAR; INTRAVENOUS
Status: DISCONTINUED | OUTPATIENT
Start: 2020-01-01 | End: 2020-01-01

## 2020-01-01 RX ORDER — LACTULOSE 10 G/15ML
20 SOLUTION ORAL 2 TIMES DAILY
Status: DISCONTINUED | OUTPATIENT
Start: 2020-01-01 | End: 2020-01-01 | Stop reason: HOSPADM

## 2020-01-01 RX ORDER — CLONIDINE HYDROCHLORIDE 0.1 MG/1
0.1 TABLET ORAL ONCE
Status: DISCONTINUED | OUTPATIENT
Start: 2020-01-01 | End: 2020-01-01

## 2020-01-01 RX ORDER — LISINOPRIL 10 MG/1
20 TABLET ORAL
Status: DISCONTINUED | OUTPATIENT
Start: 2020-01-01 | End: 2020-01-01

## 2020-01-01 RX ORDER — ISOSORBIDE MONONITRATE 30 MG/1
30 TABLET, EXTENDED RELEASE ORAL
Status: DISCONTINUED | OUTPATIENT
Start: 2020-01-01 | End: 2020-01-01 | Stop reason: HOSPADM

## 2020-01-01 RX ORDER — MULTIVITAMIN
1 TABLET ORAL
Status: DISCONTINUED | OUTPATIENT
Start: 2020-01-01 | End: 2020-01-01 | Stop reason: HOSPADM

## 2020-01-01 RX ORDER — DOCUSATE SODIUM 100 MG/1
100 CAPSULE, LIQUID FILLED ORAL DAILY
Status: DISCONTINUED | OUTPATIENT
Start: 2020-01-01 | End: 2020-01-01 | Stop reason: HOSPADM

## 2020-01-01 RX ORDER — RANOLAZINE 500 MG/1
500 TABLET, EXTENDED RELEASE ORAL EVERY 12 HOURS SCHEDULED
Status: DISCONTINUED | OUTPATIENT
Start: 2020-01-01 | End: 2020-01-01 | Stop reason: HOSPADM

## 2020-01-01 RX ORDER — METOPROLOL TARTRATE 5 MG/5ML
5 INJECTION INTRAVENOUS ONCE
Status: COMPLETED | OUTPATIENT
Start: 2020-01-01 | End: 2020-01-01

## 2020-01-01 RX ORDER — LORAZEPAM 2 MG/ML
0.12 INJECTION INTRAMUSCULAR EVERY 4 HOURS PRN
Status: CANCELLED | OUTPATIENT
Start: 2020-01-01

## 2020-01-01 RX ORDER — LIDOCAINE 50 MG/G
3 PATCH TOPICAL
Status: CANCELLED | OUTPATIENT
Start: 2020-01-01

## 2020-01-01 RX ORDER — FUROSEMIDE 20 MG/1
20 TABLET ORAL DAILY
Qty: 30 TABLET | Refills: 1 | Status: SHIPPED | OUTPATIENT
Start: 2020-01-01

## 2020-01-01 RX ORDER — MORPHINE SULFATE 2 MG/ML
1 INJECTION, SOLUTION INTRAMUSCULAR; INTRAVENOUS NIGHTLY
Status: DISCONTINUED | OUTPATIENT
Start: 2020-01-01 | End: 2020-01-01

## 2020-01-01 RX ORDER — METOPROLOL SUCCINATE 50 MG/1
50 TABLET, EXTENDED RELEASE ORAL DAILY
COMMUNITY
End: 2020-01-01 | Stop reason: HOSPADM

## 2020-01-01 RX ORDER — ASPIRIN 81 MG/1
81 TABLET, CHEWABLE ORAL DAILY
Status: CANCELLED | OUTPATIENT
Start: 2020-01-01

## 2020-01-01 RX ORDER — HYDRALAZINE HYDROCHLORIDE 10 MG/1
10 TABLET, FILM COATED ORAL EVERY 6 HOURS SCHEDULED
Status: DISCONTINUED | OUTPATIENT
Start: 2020-01-01 | End: 2020-01-01 | Stop reason: HOSPADM

## 2020-01-01 RX ORDER — METOPROLOL SUCCINATE 100 MG/1
100 TABLET, EXTENDED RELEASE ORAL
COMMUNITY
End: 2020-01-01 | Stop reason: HOSPADM

## 2020-01-01 RX ORDER — BISACODYL 5 MG/1
5 TABLET, DELAYED RELEASE ORAL DAILY PRN
Status: DISCONTINUED | OUTPATIENT
Start: 2020-01-01 | End: 2020-01-01 | Stop reason: HOSPADM

## 2020-01-01 RX ORDER — MORPHINE SULFATE 4 MG/ML
4 INJECTION, SOLUTION INTRAMUSCULAR; INTRAVENOUS
Status: DISCONTINUED | OUTPATIENT
Start: 2020-01-01 | End: 2020-01-01 | Stop reason: HOSPADM

## 2020-01-01 RX ORDER — ALPRAZOLAM 0.25 MG/1
0.25 TABLET ORAL 2 TIMES DAILY PRN
Status: DISCONTINUED | OUTPATIENT
Start: 2020-01-01 | End: 2020-01-01 | Stop reason: HOSPADM

## 2020-01-01 RX ORDER — LISINOPRIL 5 MG/1
5 TABLET ORAL
Qty: 30 TABLET | Refills: 0 | Status: SHIPPED | OUTPATIENT
Start: 2020-01-01 | End: 2020-01-01 | Stop reason: HOSPADM

## 2020-01-01 RX ORDER — BISACODYL 10 MG
10 SUPPOSITORY, RECTAL RECTAL ONCE
Status: DISCONTINUED | OUTPATIENT
Start: 2020-01-01 | End: 2020-01-01

## 2020-01-01 RX ORDER — METOPROLOL TARTRATE 5 MG/5ML
5 INJECTION INTRAVENOUS ONCE
Status: DISCONTINUED | OUTPATIENT
Start: 2020-01-01 | End: 2020-01-01

## 2020-01-01 RX ORDER — OSELTAMIVIR PHOSPHATE 30 MG/1
30 CAPSULE ORAL ONCE
Status: COMPLETED | OUTPATIENT
Start: 2020-01-01 | End: 2020-01-01

## 2020-01-01 RX ORDER — FUROSEMIDE 20 MG/1
20 TABLET ORAL
Status: DISCONTINUED | OUTPATIENT
Start: 2020-01-01 | End: 2020-01-01

## 2020-01-01 RX ORDER — ACETAMINOPHEN 325 MG/1
325 TABLET ORAL EVERY 4 HOURS PRN
COMMUNITY

## 2020-01-01 RX ORDER — IRON ASPGLY,PS/C/B12/FA/CA/SUC 150-25-1
1 CAPSULE ORAL
Status: DISCONTINUED | OUTPATIENT
Start: 2020-01-01 | End: 2020-01-01 | Stop reason: HOSPADM

## 2020-01-01 RX ORDER — MULTIVITAMIN
1 TABLET ORAL
Status: ON HOLD | COMMUNITY
End: 2020-01-01

## 2020-01-01 RX ORDER — METOPROLOL TARTRATE 5 MG/5ML
2.5 INJECTION INTRAVENOUS EVERY 12 HOURS SCHEDULED
Status: CANCELLED | OUTPATIENT
Start: 2020-01-01

## 2020-01-01 RX ORDER — METOPROLOL TARTRATE 5 MG/5ML
5 INJECTION INTRAVENOUS EVERY 4 HOURS PRN
Status: CANCELLED | OUTPATIENT
Start: 2020-01-01

## 2020-01-01 RX ORDER — ASPIRIN 300 MG/1
300 SUPPOSITORY RECTAL DAILY
Status: DISCONTINUED | OUTPATIENT
Start: 2020-01-01 | End: 2020-01-01 | Stop reason: HOSPADM

## 2020-01-01 RX ORDER — LACTULOSE 10 G/15ML
20 SOLUTION ORAL EVERY 12 HOURS PRN
Status: DISCONTINUED | OUTPATIENT
Start: 2020-01-01 | End: 2020-01-01 | Stop reason: HOSPADM

## 2020-01-01 RX ORDER — LORAZEPAM 2 MG/ML
0.25 INJECTION INTRAMUSCULAR EVERY 12 HOURS PRN
Status: DISCONTINUED | OUTPATIENT
Start: 2020-01-01 | End: 2020-01-01

## 2020-01-01 RX ORDER — ASPIRIN 325 MG
325 TABLET ORAL ONCE
Status: COMPLETED | OUTPATIENT
Start: 2020-01-01 | End: 2020-01-01

## 2020-01-01 RX ORDER — MORPHINE SULFATE 2 MG/ML
1 INJECTION, SOLUTION INTRAMUSCULAR; INTRAVENOUS EVERY 4 HOURS PRN
Status: DISCONTINUED | OUTPATIENT
Start: 2020-01-01 | End: 2020-01-01

## 2020-01-01 RX ORDER — ASPIRIN 81 MG/1
100 TABLET, CHEWABLE ORAL ONCE
Status: COMPLETED | OUTPATIENT
Start: 2020-01-01 | End: 2020-01-01

## 2020-01-01 RX ORDER — ALPRAZOLAM 0.25 MG/1
0.25 TABLET ORAL EVERY 12 HOURS PRN
Status: DISCONTINUED | OUTPATIENT
Start: 2020-01-01 | End: 2020-01-01 | Stop reason: HOSPADM

## 2020-01-01 RX ORDER — MORPHINE SULFATE 2 MG/ML
0.5 INJECTION, SOLUTION INTRAMUSCULAR; INTRAVENOUS
Status: CANCELLED | OUTPATIENT
Start: 2020-01-01 | End: 2020-01-01

## 2020-01-01 RX ORDER — RANOLAZINE 500 MG/1
500 TABLET, EXTENDED RELEASE ORAL EVERY 12 HOURS SCHEDULED
Qty: 60 TABLET | Refills: 0 | Status: SHIPPED | OUTPATIENT
Start: 2020-01-01 | End: 2020-01-01 | Stop reason: HOSPADM

## 2020-01-01 RX ORDER — FUROSEMIDE 20 MG/1
20 TABLET ORAL 2 TIMES DAILY
Status: ON HOLD | COMMUNITY
End: 2020-01-01

## 2020-01-01 RX ORDER — LORAZEPAM 2 MG/ML
0.25 INJECTION INTRAMUSCULAR EVERY 6 HOURS PRN
Status: DISCONTINUED | OUTPATIENT
Start: 2020-01-01 | End: 2020-01-01 | Stop reason: HOSPADM

## 2020-01-01 RX ORDER — ACETAMINOPHEN 650 MG/1
650 SUPPOSITORY RECTAL ONCE
Status: DISCONTINUED | OUTPATIENT
Start: 2020-01-01 | End: 2020-01-01 | Stop reason: HOSPADM

## 2020-01-01 RX ORDER — BISACODYL 10 MG
10 SUPPOSITORY, RECTAL RECTAL DAILY PRN
Status: CANCELLED | OUTPATIENT
Start: 2020-01-01

## 2020-01-01 RX ADMIN — LIDOCAINE 2 PATCH: 50 PATCH CUTANEOUS at 18:32

## 2020-01-01 RX ADMIN — CLOPIDOGREL 75 MG: 75 TABLET, FILM COATED ORAL at 09:53

## 2020-01-01 RX ADMIN — IPRATROPIUM BROMIDE 0.5 MG: 0.5 SOLUTION RESPIRATORY (INHALATION) at 16:22

## 2020-01-01 RX ADMIN — DICLOFENAC 2 G: 10 GEL TOPICAL at 22:59

## 2020-01-01 RX ADMIN — IPRATROPIUM BROMIDE 0.5 MG: 0.5 SOLUTION RESPIRATORY (INHALATION) at 16:01

## 2020-01-01 RX ADMIN — ENOXAPARIN SODIUM 30 MG: 30 INJECTION SUBCUTANEOUS at 08:13

## 2020-01-01 RX ADMIN — MORPHINE SULFATE 2 MG: 2 INJECTION, SOLUTION INTRAMUSCULAR; INTRAVENOUS at 00:12

## 2020-01-01 RX ADMIN — SODIUM CHLORIDE, PRESERVATIVE FREE 10 ML: 5 INJECTION INTRAVENOUS at 08:41

## 2020-01-01 RX ADMIN — HYDROXYZINE HYDROCHLORIDE 25 MG: 25 TABLET, FILM COATED ORAL at 19:39

## 2020-01-01 RX ADMIN — I-VITE, TAB 1000-60-2MG (60/BT) 1 TABLET: TAB at 09:53

## 2020-01-01 RX ADMIN — FUROSEMIDE 20 MG: 20 TABLET ORAL at 17:22

## 2020-01-01 RX ADMIN — MINERAL OIL: 1000 LIQUID ORAL at 09:44

## 2020-01-01 RX ADMIN — BUMETANIDE 0.5 MG: 0.25 INJECTION, SOLUTION INTRAMUSCULAR; INTRAVENOUS at 05:39

## 2020-01-01 RX ADMIN — GLYCOPYRROLATE 0.4 MG: 0.2 INJECTION INTRAMUSCULAR; INTRAVENOUS at 01:06

## 2020-01-01 RX ADMIN — ACETAMINOPHEN 325 MG: 325 TABLET ORAL at 13:49

## 2020-01-01 RX ADMIN — BISACODYL 10 MG: 10 SUPPOSITORY RECTAL at 09:02

## 2020-01-01 RX ADMIN — KETOROLAC TROMETHAMINE 15 MG: 15 INJECTION, SOLUTION INTRAMUSCULAR; INTRAVENOUS at 13:22

## 2020-01-01 RX ADMIN — HYDROCODONE BITARTRATE AND ACETAMINOPHEN 5 ML: 7.5; 325 SOLUTION ORAL at 13:16

## 2020-01-01 RX ADMIN — DILTIAZEM HYDROCHLORIDE 120 MG: 120 CAPSULE, COATED, EXTENDED RELEASE ORAL at 10:22

## 2020-01-01 RX ADMIN — MINERAL OIL: 1000 LIQUID ORAL at 20:31

## 2020-01-01 RX ADMIN — ISOSORBIDE MONONITRATE 30 MG: 30 TABLET, EXTENDED RELEASE ORAL at 08:14

## 2020-01-01 RX ADMIN — POLYETHYLENE GLYCOL (3350) 17 G: 17 POWDER, FOR SOLUTION ORAL at 08:13

## 2020-01-01 RX ADMIN — MORPHINE SULFATE 1 MG: 2 INJECTION, SOLUTION INTRAMUSCULAR; INTRAVENOUS at 13:46

## 2020-01-01 RX ADMIN — HYDRALAZINE HYDROCHLORIDE 10 MG: 10 TABLET, FILM COATED ORAL at 05:32

## 2020-01-01 RX ADMIN — CYCLOBENZAPRINE HYDROCHLORIDE 5 MG: 10 TABLET, FILM COATED ORAL at 21:20

## 2020-01-01 RX ADMIN — LORAZEPAM 0.25 MG: 2 INJECTION INTRAMUSCULAR; INTRAVENOUS at 16:42

## 2020-01-01 RX ADMIN — RANOLAZINE 500 MG: 500 TABLET, FILM COATED, EXTENDED RELEASE ORAL at 16:24

## 2020-01-01 RX ADMIN — SODIUM CHLORIDE 250 ML: 9 INJECTION, SOLUTION INTRAVENOUS at 03:44

## 2020-01-01 RX ADMIN — METOPROLOL TARTRATE 2.5 MG: 5 INJECTION INTRAVENOUS at 15:45

## 2020-01-01 RX ADMIN — CLOPIDOGREL 75 MG: 75 TABLET, FILM COATED ORAL at 08:13

## 2020-01-01 RX ADMIN — ASPIRIN 300 MG: 300 SUPPOSITORY RECTAL at 08:55

## 2020-01-01 RX ADMIN — KETOROLAC TROMETHAMINE 15 MG: 15 INJECTION, SOLUTION INTRAMUSCULAR; INTRAVENOUS at 23:58

## 2020-01-01 RX ADMIN — LORAZEPAM 0.5 MG: 2 INJECTION INTRAMUSCULAR; INTRAVENOUS at 06:49

## 2020-01-01 RX ADMIN — TORSEMIDE 10 MG: 10 TABLET ORAL at 09:02

## 2020-01-01 RX ADMIN — SODIUM CHLORIDE, PRESERVATIVE FREE 10 ML: 5 INJECTION INTRAVENOUS at 20:12

## 2020-01-01 RX ADMIN — SODIUM CHLORIDE, PRESERVATIVE FREE 10 ML: 5 INJECTION INTRAVENOUS at 20:28

## 2020-01-01 RX ADMIN — ALPRAZOLAM 0.25 MG: 0.25 TABLET ORAL at 20:48

## 2020-01-01 RX ADMIN — MORPHINE SULFATE 1 MG: 2 INJECTION, SOLUTION INTRAMUSCULAR; INTRAVENOUS at 18:14

## 2020-01-01 RX ADMIN — Medication 150 MG: at 08:29

## 2020-01-01 RX ADMIN — POLYVINYL ALCOHOL, POVIDONE 2 DROP: 14; 6 SOLUTION/ DROPS OPHTHALMIC at 09:46

## 2020-01-01 RX ADMIN — MORPHINE SULFATE 2 MG: 2 INJECTION, SOLUTION INTRAMUSCULAR; INTRAVENOUS at 03:10

## 2020-01-01 RX ADMIN — RANOLAZINE 500 MG: 500 TABLET, FILM COATED, EXTENDED RELEASE ORAL at 08:13

## 2020-01-01 RX ADMIN — SODIUM CHLORIDE, PRESERVATIVE FREE 10 ML: 5 INJECTION INTRAVENOUS at 08:35

## 2020-01-01 RX ADMIN — MORPHINE SULFATE 2 MG: 2 INJECTION, SOLUTION INTRAMUSCULAR; INTRAVENOUS at 04:01

## 2020-01-01 RX ADMIN — ROSUVASTATIN CALCIUM 5 MG: 10 TABLET, FILM COATED ORAL at 07:35

## 2020-01-01 RX ADMIN — IPRATROPIUM BROMIDE 0.5 MG: 0.5 SOLUTION RESPIRATORY (INHALATION) at 16:14

## 2020-01-01 RX ADMIN — RANOLAZINE 500 MG: 500 TABLET, FILM COATED, EXTENDED RELEASE ORAL at 21:15

## 2020-01-01 RX ADMIN — Medication 1 CAPSULE: at 09:53

## 2020-01-01 RX ADMIN — SODIUM CHLORIDE, PRESERVATIVE FREE 10 ML: 5 INJECTION INTRAVENOUS at 07:37

## 2020-01-01 RX ADMIN — ACETAMINOPHEN 650 MG: 650 SUPPOSITORY RECTAL at 22:26

## 2020-01-01 RX ADMIN — FUROSEMIDE 20 MG: 20 TABLET ORAL at 17:05

## 2020-01-01 RX ADMIN — METOPROLOL TARTRATE 5 MG: 5 INJECTION INTRAVENOUS at 19:23

## 2020-01-01 RX ADMIN — I-VITE, TAB 1000-60-2MG (60/BT) 1 TABLET: TAB at 08:14

## 2020-01-01 RX ADMIN — RANOLAZINE 500 MG: 500 TABLET, FILM COATED, EXTENDED RELEASE ORAL at 12:22

## 2020-01-01 RX ADMIN — IOPAMIDOL 115 ML: 755 INJECTION, SOLUTION INTRAVENOUS at 09:30

## 2020-01-01 RX ADMIN — MORPHINE SULFATE 1 MG: 2 INJECTION, SOLUTION INTRAMUSCULAR; INTRAVENOUS at 16:42

## 2020-01-01 RX ADMIN — SODIUM CHLORIDE 500 ML: 9 INJECTION, SOLUTION INTRAVENOUS at 04:29

## 2020-01-01 RX ADMIN — Medication 150 MG: at 08:00

## 2020-01-01 RX ADMIN — KETOROLAC TROMETHAMINE 15 MG: 15 INJECTION, SOLUTION INTRAMUSCULAR; INTRAVENOUS at 06:09

## 2020-01-01 RX ADMIN — FUROSEMIDE 20 MG: 10 INJECTION, SOLUTION INTRAMUSCULAR; INTRAVENOUS at 06:17

## 2020-01-01 RX ADMIN — HYDRALAZINE HYDROCHLORIDE 10 MG: 10 TABLET, FILM COATED ORAL at 17:15

## 2020-01-01 RX ADMIN — MORPHINE SULFATE 1 MG: 2 INJECTION, SOLUTION INTRAMUSCULAR; INTRAVENOUS at 08:55

## 2020-01-01 RX ADMIN — METOPROLOL TARTRATE 5 MG: 5 INJECTION INTRAVENOUS at 22:04

## 2020-01-01 RX ADMIN — MORPHINE SULFATE 1 MG: 2 INJECTION, SOLUTION INTRAMUSCULAR; INTRAVENOUS at 13:54

## 2020-01-01 RX ADMIN — LISINOPRIL 20 MG: 10 TABLET ORAL at 16:24

## 2020-01-01 RX ADMIN — IPRATROPIUM BROMIDE 0.5 MG: 0.5 SOLUTION RESPIRATORY (INHALATION) at 07:51

## 2020-01-01 RX ADMIN — MORPHINE SULFATE 1 MG: 2 INJECTION, SOLUTION INTRAMUSCULAR; INTRAVENOUS at 01:00

## 2020-01-01 RX ADMIN — ASPIRIN 300 MG: 300 SUPPOSITORY RECTAL at 08:12

## 2020-01-01 RX ADMIN — Medication 1 CAPSULE: at 07:36

## 2020-01-01 RX ADMIN — ACETAMINOPHEN 325 MG: 325 TABLET ORAL at 13:09

## 2020-01-01 RX ADMIN — SODIUM POLYSTYRENE SULFONATE 15 G: 1 POWDER ORAL; RECTAL at 12:17

## 2020-01-01 RX ADMIN — CLONIDINE HYDROCHLORIDE 0.1 MG: 0.1 TABLET ORAL at 22:18

## 2020-01-01 RX ADMIN — LORAZEPAM 0.25 MG: 2 INJECTION INTRAMUSCULAR; INTRAVENOUS at 15:40

## 2020-01-01 RX ADMIN — METOPROLOL TARTRATE 50 MG: 50 TABLET, FILM COATED ORAL at 07:36

## 2020-01-01 RX ADMIN — LORAZEPAM 0.25 MG: 2 INJECTION INTRAMUSCULAR; INTRAVENOUS at 02:40

## 2020-01-01 RX ADMIN — SODIUM CHLORIDE 500 ML: 9 INJECTION, SOLUTION INTRAVENOUS at 05:14

## 2020-01-01 RX ADMIN — IPRATROPIUM BROMIDE 0.5 MG: 0.5 SOLUTION RESPIRATORY (INHALATION) at 12:35

## 2020-01-01 RX ADMIN — RANOLAZINE 500 MG: 500 TABLET, FILM COATED, EXTENDED RELEASE ORAL at 08:37

## 2020-01-01 RX ADMIN — SODIUM CHLORIDE, PRESERVATIVE FREE 10 ML: 5 INJECTION INTRAVENOUS at 08:38

## 2020-01-01 RX ADMIN — CLOPIDOGREL BISULFATE 75 MG: 75 TABLET ORAL at 18:14

## 2020-01-01 RX ADMIN — LORAZEPAM 0.25 MG: 2 INJECTION INTRAMUSCULAR; INTRAVENOUS at 12:06

## 2020-01-01 RX ADMIN — MORPHINE SULFATE 2 MG: 2 INJECTION, SOLUTION INTRAMUSCULAR; INTRAVENOUS at 06:18

## 2020-01-01 RX ADMIN — MORPHINE SULFATE 2 MG: 2 INJECTION, SOLUTION INTRAMUSCULAR; INTRAVENOUS at 20:20

## 2020-01-01 RX ADMIN — HYDRALAZINE HYDROCHLORIDE 10 MG: 10 TABLET, FILM COATED ORAL at 20:28

## 2020-01-01 RX ADMIN — FUROSEMIDE 20 MG: 20 TABLET ORAL at 08:01

## 2020-01-01 RX ADMIN — DOCUSATE SODIUM 100 MG: 100 CAPSULE ORAL at 08:21

## 2020-01-01 RX ADMIN — HYDRALAZINE HYDROCHLORIDE 10 MG: 20 INJECTION, SOLUTION INTRAMUSCULAR; INTRAVENOUS at 04:34

## 2020-01-01 RX ADMIN — Medication 1 TABLET: at 11:10

## 2020-01-01 RX ADMIN — ACETAMINOPHEN 650 MG: 325 TABLET ORAL at 15:25

## 2020-01-01 RX ADMIN — LIDOCAINE 2 PATCH: 50 PATCH CUTANEOUS at 18:27

## 2020-01-01 RX ADMIN — LORAZEPAM 0.25 MG: 2 INJECTION INTRAMUSCULAR; INTRAVENOUS at 00:58

## 2020-01-01 RX ADMIN — MORPHINE SULFATE 0.5 MG: 2 INJECTION, SOLUTION INTRAMUSCULAR; INTRAVENOUS at 12:01

## 2020-01-01 RX ADMIN — CLOPIDOGREL 75 MG: 75 TABLET, FILM COATED ORAL at 08:01

## 2020-01-01 RX ADMIN — CLOPIDOGREL BISULFATE 75 MG: 75 TABLET ORAL at 08:50

## 2020-01-01 RX ADMIN — KETOROLAC TROMETHAMINE 15 MG: 15 INJECTION, SOLUTION INTRAMUSCULAR; INTRAVENOUS at 02:04

## 2020-01-01 RX ADMIN — ISOSORBIDE MONONITRATE 30 MG: 30 TABLET, EXTENDED RELEASE ORAL at 09:53

## 2020-01-01 RX ADMIN — I-VITE, TAB 1000-60-2MG (60/BT) 1 TABLET: TAB at 12:29

## 2020-01-01 RX ADMIN — METOPROLOL TARTRATE 50 MG: 50 TABLET, FILM COATED ORAL at 20:14

## 2020-01-01 RX ADMIN — MORPHINE SULFATE 1 MG: 2 INJECTION, SOLUTION INTRAMUSCULAR; INTRAVENOUS at 21:37

## 2020-01-01 RX ADMIN — POLYETHYLENE GLYCOL (3350) 17 G: 17 POWDER, FOR SOLUTION ORAL at 17:05

## 2020-01-01 RX ADMIN — MORPHINE SULFATE 2 MG: 2 INJECTION, SOLUTION INTRAMUSCULAR; INTRAVENOUS at 16:21

## 2020-01-01 RX ADMIN — LISINOPRIL 20 MG: 10 TABLET ORAL at 18:31

## 2020-01-01 RX ADMIN — FUROSEMIDE 20 MG: 20 TABLET ORAL at 18:31

## 2020-01-01 RX ADMIN — KETOROLAC TROMETHAMINE 15 MG: 15 INJECTION, SOLUTION INTRAMUSCULAR; INTRAVENOUS at 17:47

## 2020-01-01 RX ADMIN — ACETAMINOPHEN 650 MG: 650 SUPPOSITORY RECTAL at 08:55

## 2020-01-01 RX ADMIN — RANOLAZINE 500 MG: 500 TABLET, FILM COATED, EXTENDED RELEASE ORAL at 17:26

## 2020-01-01 RX ADMIN — SODIUM CHLORIDE 500 ML: 9 INJECTION, SOLUTION INTRAVENOUS at 16:25

## 2020-01-01 RX ADMIN — MORPHINE SULFATE 1 MG: 2 INJECTION, SOLUTION INTRAMUSCULAR; INTRAVENOUS at 00:40

## 2020-01-01 RX ADMIN — ACETAMINOPHEN ORAL SOLUTION 240 MG: 650 SOLUTION ORAL at 22:27

## 2020-01-01 RX ADMIN — RANOLAZINE 500 MG: 500 TABLET, FILM COATED, EXTENDED RELEASE ORAL at 10:21

## 2020-01-01 RX ADMIN — LEVOFLOXACIN 500 MG: 5 INJECTION, SOLUTION INTRAVENOUS at 09:35

## 2020-01-01 RX ADMIN — ALPRAZOLAM 0.25 MG: 0.25 TABLET ORAL at 23:17

## 2020-01-01 RX ADMIN — MORPHINE SULFATE 4 MG: 4 INJECTION, SOLUTION INTRAMUSCULAR; INTRAVENOUS at 06:49

## 2020-01-01 RX ADMIN — METOPROLOL TARTRATE 50 MG: 50 TABLET, FILM COATED ORAL at 08:36

## 2020-01-01 RX ADMIN — SODIUM CHLORIDE, PRESERVATIVE FREE 10 ML: 5 INJECTION INTRAVENOUS at 21:02

## 2020-01-01 RX ADMIN — KETOROLAC TROMETHAMINE 15 MG: 15 INJECTION, SOLUTION INTRAMUSCULAR; INTRAVENOUS at 09:44

## 2020-01-01 RX ADMIN — POLYVINYL ALCOHOL, POVIDONE 2 DROP: 14; 6 SOLUTION/ DROPS OPHTHALMIC at 21:00

## 2020-01-01 RX ADMIN — MINERAL OIL: 1000 LIQUID ORAL at 21:00

## 2020-01-01 RX ADMIN — METOPROLOL TARTRATE 5 MG: 5 INJECTION INTRAVENOUS at 20:49

## 2020-01-01 RX ADMIN — SODIUM CHLORIDE, PRESERVATIVE FREE 10 ML: 5 INJECTION INTRAVENOUS at 08:55

## 2020-01-01 RX ADMIN — SODIUM POLYSTYRENE SULFONATE 15 G: 1 POWDER ORAL; RECTAL at 20:22

## 2020-01-01 RX ADMIN — SODIUM CHLORIDE, PRESERVATIVE FREE 10 ML: 5 INJECTION INTRAVENOUS at 08:24

## 2020-01-01 RX ADMIN — METOPROLOL TARTRATE 5 MG: 5 INJECTION INTRAVENOUS at 04:06

## 2020-01-01 RX ADMIN — HYDRALAZINE HYDROCHLORIDE 10 MG: 10 TABLET, FILM COATED ORAL at 00:51

## 2020-01-01 RX ADMIN — METOPROLOL TARTRATE 50 MG: 50 TABLET, FILM COATED ORAL at 08:14

## 2020-01-01 RX ADMIN — METOPROLOL TARTRATE 2.5 MG: 5 INJECTION INTRAVENOUS at 08:54

## 2020-01-01 RX ADMIN — LISINOPRIL 5 MG: 2.5 TABLET ORAL at 10:21

## 2020-01-01 RX ADMIN — ONDANSETRON 4 MG: 2 INJECTION INTRAMUSCULAR; INTRAVENOUS at 05:39

## 2020-01-01 RX ADMIN — ROSUVASTATIN CALCIUM 5 MG: 10 TABLET, FILM COATED ORAL at 08:36

## 2020-01-01 RX ADMIN — MORPHINE SULFATE 1 MG: 2 INJECTION, SOLUTION INTRAMUSCULAR; INTRAVENOUS at 12:54

## 2020-01-01 RX ADMIN — Medication 1 TABLET: at 11:17

## 2020-01-01 RX ADMIN — METOPROLOL TARTRATE 2.5 MG: 1 INJECTION, SOLUTION INTRAVENOUS at 05:39

## 2020-01-01 RX ADMIN — ALPRAZOLAM 0.25 MG: 0.25 TABLET ORAL at 21:03

## 2020-01-01 RX ADMIN — DILTIAZEM HYDROCHLORIDE 120 MG: 120 CAPSULE, COATED, EXTENDED RELEASE ORAL at 08:01

## 2020-01-01 RX ADMIN — LIDOCAINE 3 PATCH: 50 PATCH CUTANEOUS at 17:23

## 2020-01-01 RX ADMIN — SODIUM CHLORIDE 1000 ML: 9 INJECTION, SOLUTION INTRAVENOUS at 19:46

## 2020-01-01 RX ADMIN — I-VITE, TAB 1000-60-2MG (60/BT) 1 TABLET: TAB at 08:21

## 2020-01-01 RX ADMIN — ISOSORBIDE MONONITRATE 30 MG: 30 TABLET, EXTENDED RELEASE ORAL at 08:21

## 2020-01-01 RX ADMIN — LIDOCAINE 3 PATCH: 50 PATCH CUTANEOUS at 18:13

## 2020-01-01 RX ADMIN — SODIUM CHLORIDE, PRESERVATIVE FREE 10 ML: 5 INJECTION INTRAVENOUS at 20:49

## 2020-01-01 RX ADMIN — IPRATROPIUM BROMIDE 0.5 MG: 0.5 SOLUTION RESPIRATORY (INHALATION) at 08:53

## 2020-01-01 RX ADMIN — IPRATROPIUM BROMIDE 0.5 MG: 0.5 SOLUTION RESPIRATORY (INHALATION) at 20:20

## 2020-01-01 RX ADMIN — KETOROLAC TROMETHAMINE 15 MG: 15 INJECTION, SOLUTION INTRAMUSCULAR; INTRAVENOUS at 13:54

## 2020-01-01 RX ADMIN — Medication 1 TABLET: at 12:29

## 2020-01-01 RX ADMIN — ROSUVASTATIN CALCIUM 5 MG: 10 TABLET, FILM COATED ORAL at 08:21

## 2020-01-01 RX ADMIN — ACETAMINOPHEN 325 MG: 325 TABLET ORAL at 19:45

## 2020-01-01 RX ADMIN — RANOLAZINE 500 MG: 500 TABLET, FILM COATED, EXTENDED RELEASE ORAL at 08:30

## 2020-01-01 RX ADMIN — KETOROLAC TROMETHAMINE 15 MG: 15 INJECTION, SOLUTION INTRAMUSCULAR; INTRAVENOUS at 01:06

## 2020-01-01 RX ADMIN — DICLOFENAC 2 G: 10 GEL TOPICAL at 08:56

## 2020-01-01 RX ADMIN — DILTIAZEM HYDROCHLORIDE 120 MG: 120 CAPSULE, COATED, EXTENDED RELEASE ORAL at 12:41

## 2020-01-01 RX ADMIN — CLOPIDOGREL 75 MG: 75 TABLET, FILM COATED ORAL at 07:36

## 2020-01-01 RX ADMIN — SODIUM CHLORIDE, PRESERVATIVE FREE 10 ML: 5 INJECTION INTRAVENOUS at 08:37

## 2020-01-01 RX ADMIN — KETOROLAC TROMETHAMINE 15 MG: 15 INJECTION, SOLUTION INTRAMUSCULAR; INTRAVENOUS at 06:21

## 2020-01-01 RX ADMIN — LORAZEPAM 0.5 MG: 2 INJECTION INTRAMUSCULAR; INTRAVENOUS at 22:21

## 2020-01-01 RX ADMIN — FUROSEMIDE 40 MG: 40 TABLET ORAL at 18:14

## 2020-01-01 RX ADMIN — IPRATROPIUM BROMIDE 0.5 MG: 0.5 SOLUTION RESPIRATORY (INHALATION) at 07:34

## 2020-01-01 RX ADMIN — NEBIVOLOL HYDROCHLORIDE 2.5 MG: 5 TABLET ORAL at 09:02

## 2020-01-01 RX ADMIN — DICLOFENAC 2 G: 10 GEL TOPICAL at 17:29

## 2020-01-01 RX ADMIN — DICLOFENAC 2 G: 10 GEL TOPICAL at 13:03

## 2020-01-01 RX ADMIN — MORPHINE SULFATE 2 MG: 2 INJECTION, SOLUTION INTRAMUSCULAR; INTRAVENOUS at 13:22

## 2020-01-01 RX ADMIN — LIDOCAINE 2 PATCH: 50 PATCH CUTANEOUS at 18:47

## 2020-01-01 RX ADMIN — ASPIRIN 81 MG: 81 TABLET, COATED ORAL at 08:30

## 2020-01-01 RX ADMIN — SODIUM CHLORIDE, PRESERVATIVE FREE 10 ML: 5 INJECTION INTRAVENOUS at 08:30

## 2020-01-01 RX ADMIN — CLOPIDOGREL 75 MG: 75 TABLET, FILM COATED ORAL at 08:35

## 2020-01-01 RX ADMIN — LACTULOSE 20 G: 10 SOLUTION ORAL at 20:22

## 2020-01-01 RX ADMIN — SODIUM CHLORIDE, PRESERVATIVE FREE 10 ML: 5 INJECTION INTRAVENOUS at 08:14

## 2020-01-01 RX ADMIN — LACTULOSE 20 G: 10 SOLUTION ORAL at 19:40

## 2020-01-01 RX ADMIN — METOPROLOL TARTRATE 50 MG: 50 TABLET, FILM COATED ORAL at 09:53

## 2020-01-01 RX ADMIN — SODIUM CHLORIDE, PRESERVATIVE FREE 10 ML: 5 INJECTION INTRAVENOUS at 21:00

## 2020-01-01 RX ADMIN — POLYETHYLENE GLYCOL (3350) 17 G: 17 POWDER, FOR SOLUTION ORAL at 08:21

## 2020-01-01 RX ADMIN — CLOPIDOGREL 75 MG: 75 TABLET, FILM COATED ORAL at 08:14

## 2020-01-01 RX ADMIN — Medication 150 MG: at 08:30

## 2020-01-01 RX ADMIN — ACETAMINOPHEN ORAL SOLUTION 240 MG: 650 SOLUTION ORAL at 03:23

## 2020-01-01 RX ADMIN — I-VITE, TAB 1000-60-2MG (60/BT) 1 TABLET: TAB at 07:36

## 2020-01-01 RX ADMIN — ASPIRIN 324 MG: 81 TABLET, CHEWABLE ORAL at 04:29

## 2020-01-01 RX ADMIN — CLOPIDOGREL 75 MG: 75 TABLET, FILM COATED ORAL at 10:19

## 2020-01-01 RX ADMIN — ISOSORBIDE MONONITRATE 30 MG: 30 TABLET, EXTENDED RELEASE ORAL at 08:36

## 2020-01-01 RX ADMIN — ISOSORBIDE MONONITRATE 30 MG: 30 TABLET, EXTENDED RELEASE ORAL at 08:01

## 2020-01-01 RX ADMIN — LORAZEPAM 0.12 MG: 2 INJECTION INTRAMUSCULAR; INTRAVENOUS at 03:31

## 2020-01-01 RX ADMIN — SODIUM CHLORIDE, PRESERVATIVE FREE 10 ML: 5 INJECTION INTRAVENOUS at 10:22

## 2020-01-01 RX ADMIN — SODIUM CHLORIDE, PRESERVATIVE FREE 10 ML: 5 INJECTION INTRAVENOUS at 08:16

## 2020-01-01 RX ADMIN — ASPIRIN 81 MG: 81 TABLET, COATED ORAL at 08:14

## 2020-01-01 RX ADMIN — ACETAMINOPHEN 650 MG: 650 SUPPOSITORY RECTAL at 17:29

## 2020-01-01 RX ADMIN — CLOPIDOGREL BISULFATE 75 MG: 75 TABLET ORAL at 09:02

## 2020-01-01 RX ADMIN — CLOPIDOGREL 75 MG: 75 TABLET, FILM COATED ORAL at 08:37

## 2020-01-01 RX ADMIN — DILTIAZEM HYDROCHLORIDE 120 MG: 120 CAPSULE, COATED, EXTENDED RELEASE ORAL at 08:30

## 2020-01-01 RX ADMIN — METOPROLOL TARTRATE 5 MG: 5 INJECTION INTRAVENOUS at 06:46

## 2020-01-01 RX ADMIN — ASPIRIN 101.25 MG: 81 TABLET, CHEWABLE ORAL at 23:37

## 2020-01-01 RX ADMIN — METOPROLOL TARTRATE 50 MG: 50 TABLET, FILM COATED ORAL at 19:40

## 2020-01-01 RX ADMIN — MORPHINE SULFATE 2 MG: 2 INJECTION, SOLUTION INTRAMUSCULAR; INTRAVENOUS at 14:55

## 2020-01-01 RX ADMIN — MORPHINE SULFATE 2 MG: 2 INJECTION, SOLUTION INTRAMUSCULAR; INTRAVENOUS at 20:28

## 2020-01-01 RX ADMIN — RANOLAZINE 500 MG: 500 TABLET, FILM COATED, EXTENDED RELEASE ORAL at 21:02

## 2020-01-01 RX ADMIN — METOPROLOL TARTRATE 50 MG: 50 TABLET, FILM COATED ORAL at 22:43

## 2020-01-01 RX ADMIN — OSELTAMIVIR PHOSPHATE 30 MG: 30 CAPSULE ORAL at 07:57

## 2020-01-01 RX ADMIN — MORPHINE SULFATE 1 MG: 2 INJECTION, SOLUTION INTRAMUSCULAR; INTRAVENOUS at 14:41

## 2020-01-01 RX ADMIN — ASPIRIN 81 MG: 81 TABLET, COATED ORAL at 09:53

## 2020-01-01 RX ADMIN — MORPHINE SULFATE 1 MG: 2 INJECTION, SOLUTION INTRAMUSCULAR; INTRAVENOUS at 00:57

## 2020-01-01 RX ADMIN — DICLOFENAC 2 G: 10 GEL TOPICAL at 08:13

## 2020-01-01 RX ADMIN — ASPIRIN 81 MG: 81 TABLET, CHEWABLE ORAL at 09:02

## 2020-01-01 RX ADMIN — FUROSEMIDE 40 MG: 10 INJECTION, SOLUTION INTRAMUSCULAR; INTRAVENOUS at 07:57

## 2020-01-01 RX ADMIN — Medication 1 CAPSULE: at 08:32

## 2020-01-01 RX ADMIN — TORSEMIDE 10 MG: 10 TABLET ORAL at 08:50

## 2020-01-01 RX ADMIN — ISOSORBIDE MONONITRATE 30 MG: 30 TABLET, EXTENDED RELEASE ORAL at 07:37

## 2020-01-01 RX ADMIN — ROSUVASTATIN CALCIUM 5 MG: 10 TABLET, FILM COATED ORAL at 08:32

## 2020-01-01 RX ADMIN — IPRATROPIUM BROMIDE 0.5 MG: 0.5 SOLUTION RESPIRATORY (INHALATION) at 19:57

## 2020-01-01 RX ADMIN — SODIUM CHLORIDE, PRESERVATIVE FREE 10 ML: 5 INJECTION INTRAVENOUS at 20:36

## 2020-01-01 RX ADMIN — KETOROLAC TROMETHAMINE 15 MG: 15 INJECTION, SOLUTION INTRAMUSCULAR; INTRAVENOUS at 20:28

## 2020-01-01 RX ADMIN — POLYETHYLENE GLYCOL (3350) 17 G: 17 POWDER, FOR SOLUTION ORAL at 09:53

## 2020-01-01 RX ADMIN — MORPHINE SULFATE 2 MG: 2 INJECTION, SOLUTION INTRAMUSCULAR; INTRAVENOUS at 16:22

## 2020-01-01 RX ADMIN — CLOPIDOGREL 75 MG: 75 TABLET, FILM COATED ORAL at 10:22

## 2020-01-01 RX ADMIN — ALPRAZOLAM 0.25 MG: 0.25 TABLET ORAL at 21:20

## 2020-01-01 RX ADMIN — LACTULOSE 60 ML: 10 SOLUTION ORAL at 17:23

## 2020-01-01 RX ADMIN — ASPIRIN 81 MG: 81 TABLET, COATED ORAL at 08:33

## 2020-01-01 RX ADMIN — KETOROLAC TROMETHAMINE 15 MG: 15 INJECTION, SOLUTION INTRAMUSCULAR; INTRAVENOUS at 17:50

## 2020-01-01 RX ADMIN — METOPROLOL TARTRATE 5 MG: 5 INJECTION INTRAVENOUS at 16:22

## 2020-01-01 RX ADMIN — I-VITE, TAB 1000-60-2MG (60/BT) 1 TABLET: TAB at 08:36

## 2020-01-01 RX ADMIN — MORPHINE SULFATE 1 MG: 2 INJECTION, SOLUTION INTRAMUSCULAR; INTRAVENOUS at 09:43

## 2020-01-01 RX ADMIN — LORAZEPAM 0.25 MG: 2 INJECTION INTRAMUSCULAR; INTRAVENOUS at 00:40

## 2020-01-01 RX ADMIN — Medication 150 MG: at 08:54

## 2020-01-01 RX ADMIN — ASPIRIN 81 MG: 81 TABLET, COATED ORAL at 08:37

## 2020-01-01 RX ADMIN — METOPROLOL TARTRATE 50 MG: 50 TABLET, FILM COATED ORAL at 20:00

## 2020-01-01 RX ADMIN — FUROSEMIDE 40 MG: 10 INJECTION, SOLUTION INTRAMUSCULAR; INTRAVENOUS at 12:30

## 2020-01-01 RX ADMIN — FUROSEMIDE 20 MG: 10 INJECTION, SOLUTION INTRAMUSCULAR; INTRAVENOUS at 11:18

## 2020-01-01 RX ADMIN — Medication 5 MG/HR: at 08:44

## 2020-01-01 RX ADMIN — ASPIRIN 300 MG: 300 SUPPOSITORY RECTAL at 06:46

## 2020-01-01 RX ADMIN — ENOXAPARIN SODIUM 30 MG: 30 INJECTION SUBCUTANEOUS at 01:51

## 2020-01-01 RX ADMIN — MORPHINE SULFATE 2 MG: 2 INJECTION, SOLUTION INTRAMUSCULAR; INTRAVENOUS at 12:18

## 2020-01-01 RX ADMIN — ASPIRIN 325 MG: 325 TABLET ORAL at 05:39

## 2020-01-01 RX ADMIN — ISOSORBIDE MONONITRATE 30 MG: 30 TABLET, EXTENDED RELEASE ORAL at 12:41

## 2020-01-01 RX ADMIN — ASPIRIN 81 MG: 81 TABLET, CHEWABLE ORAL at 08:50

## 2020-01-01 RX ADMIN — FUROSEMIDE 20 MG: 10 INJECTION, SOLUTION INTRAMUSCULAR; INTRAVENOUS at 03:18

## 2020-01-01 RX ADMIN — KETOROLAC TROMETHAMINE 15 MG: 15 INJECTION, SOLUTION INTRAMUSCULAR; INTRAVENOUS at 00:11

## 2020-01-01 RX ADMIN — MINERAL OIL: 1000 LIQUID ORAL at 22:21

## 2020-01-01 RX ADMIN — ACETAMINOPHEN ORAL SOLUTION 240 MG: 650 SOLUTION ORAL at 16:41

## 2020-01-01 RX ADMIN — SODIUM CHLORIDE 100 ML: 3 INJECTION, SOLUTION INTRAVENOUS at 04:34

## 2020-01-01 RX ADMIN — Medication 1 CAPSULE: at 08:13

## 2020-01-01 RX ADMIN — MORPHINE SULFATE 1 MG: 2 INJECTION, SOLUTION INTRAMUSCULAR; INTRAVENOUS at 02:04

## 2020-01-01 RX ADMIN — CLOPIDOGREL 75 MG: 75 TABLET, FILM COATED ORAL at 08:21

## 2020-01-01 RX ADMIN — Medication 150 MG: at 08:13

## 2020-01-01 RX ADMIN — SODIUM CHLORIDE, PRESERVATIVE FREE 10 ML: 5 INJECTION INTRAVENOUS at 19:49

## 2020-01-01 RX ADMIN — Medication 10 MG/HR: at 23:18

## 2020-01-01 RX ADMIN — METOPROLOL TARTRATE 2.5 MG: 1 INJECTION, SOLUTION INTRAVENOUS at 07:58

## 2020-01-01 RX ADMIN — KETOROLAC TROMETHAMINE 15 MG: 15 INJECTION, SOLUTION INTRAMUSCULAR; INTRAVENOUS at 06:01

## 2020-01-01 RX ADMIN — ACETAMINOPHEN 325 MG: 325 TABLET ORAL at 01:56

## 2020-01-01 RX ADMIN — MORPHINE SULFATE 2 MG: 2 INJECTION, SOLUTION INTRAMUSCULAR; INTRAVENOUS at 01:06

## 2020-01-01 RX ADMIN — SODIUM CHLORIDE 100 ML: 3 INJECTION, SOLUTION INTRAVENOUS at 11:22

## 2020-01-01 RX ADMIN — SODIUM CHLORIDE, PRESERVATIVE FREE 10 ML: 5 INJECTION INTRAVENOUS at 20:14

## 2020-01-01 RX ADMIN — ASPIRIN 81 MG: 81 TABLET, COATED ORAL at 08:21

## 2020-01-01 RX ADMIN — FUROSEMIDE 40 MG: 10 INJECTION, SOLUTION INTRAMUSCULAR; INTRAVENOUS at 22:59

## 2020-01-01 RX ADMIN — IPRATROPIUM BROMIDE 0.5 MG: 0.5 SOLUTION RESPIRATORY (INHALATION) at 12:47

## 2020-01-01 RX ADMIN — ASPIRIN 81 MG: 81 TABLET, CHEWABLE ORAL at 08:37

## 2020-01-01 RX ADMIN — SODIUM CHLORIDE, PRESERVATIVE FREE 10 ML: 5 INJECTION INTRAVENOUS at 22:43

## 2020-01-01 RX ADMIN — GLYCOPYRROLATE 0.4 MG: 0.2 INJECTION INTRAMUSCULAR; INTRAVENOUS at 06:17

## 2020-01-01 RX ADMIN — POLYETHYLENE GLYCOL 3350 17 G: 17 POWDER, FOR SOLUTION ORAL at 11:02

## 2020-01-01 RX ADMIN — ACETAMINOPHEN 650 MG: 325 TABLET ORAL at 11:17

## 2020-01-01 RX ADMIN — CLOPIDOGREL 75 MG: 75 TABLET, FILM COATED ORAL at 08:30

## 2020-01-01 RX ADMIN — KETOROLAC TROMETHAMINE 15 MG: 15 INJECTION, SOLUTION INTRAMUSCULAR; INTRAVENOUS at 12:18

## 2020-01-01 RX ADMIN — Medication 1 TABLET: at 12:17

## 2020-01-01 RX ADMIN — Medication 1 CAPSULE: at 08:21

## 2020-01-01 RX ADMIN — DILTIAZEM HYDROCHLORIDE 120 MG: 120 CAPSULE, COATED, EXTENDED RELEASE ORAL at 08:13

## 2020-01-01 RX ADMIN — ROSUVASTATIN CALCIUM 5 MG: 10 TABLET, FILM COATED ORAL at 08:13

## 2020-01-01 RX ADMIN — ISOSORBIDE MONONITRATE 30 MG: 30 TABLET, EXTENDED RELEASE ORAL at 18:31

## 2020-01-01 RX ADMIN — SODIUM CHLORIDE, PRESERVATIVE FREE 10 ML: 5 INJECTION INTRAVENOUS at 20:01

## 2020-01-01 RX ADMIN — FUROSEMIDE 20 MG: 20 TABLET ORAL at 08:37

## 2020-01-01 RX ADMIN — CLOPIDOGREL 75 MG: 75 TABLET, FILM COATED ORAL at 08:33

## 2020-01-01 RX ADMIN — ACETAMINOPHEN ORAL SOLUTION 240 MG: 650 SOLUTION ORAL at 09:03

## 2020-01-01 RX ADMIN — RANOLAZINE 500 MG: 500 TABLET, FILM COATED, EXTENDED RELEASE ORAL at 17:05

## 2020-01-01 RX ADMIN — DOCUSATE SODIUM 100 MG: 100 CAPSULE ORAL at 07:36

## 2020-01-01 RX ADMIN — LORAZEPAM 0.5 MG: 2 INJECTION INTRAMUSCULAR; INTRAVENOUS at 09:45

## 2020-01-01 RX ADMIN — ASPIRIN 81 MG: 81 TABLET, COATED ORAL at 07:36

## 2020-01-01 RX ADMIN — LORAZEPAM 0.25 MG: 2 INJECTION INTRAMUSCULAR; INTRAVENOUS at 08:42

## 2020-01-01 RX ADMIN — MORPHINE SULFATE 1 MG: 2 INJECTION, SOLUTION INTRAMUSCULAR; INTRAVENOUS at 10:11

## 2020-01-01 RX ADMIN — MINERAL OIL: 1000 LIQUID ORAL at 09:46

## 2020-01-01 RX ADMIN — LACTULOSE 20 G: 10 SOLUTION ORAL at 08:21

## 2020-01-01 RX ADMIN — POLYETHYLENE GLYCOL (3350) 17 G: 17 POWDER, FOR SOLUTION ORAL at 07:37

## 2020-01-01 RX ADMIN — HYDRALAZINE HYDROCHLORIDE 10 MG: 10 TABLET, FILM COATED ORAL at 23:25

## 2020-01-01 RX ADMIN — Medication 150 MG: at 08:37

## 2020-01-01 RX ADMIN — HYDRALAZINE HYDROCHLORIDE 10 MG: 10 TABLET, FILM COATED ORAL at 12:28

## 2020-01-01 RX ADMIN — DOCUSATE SODIUM 100 MG: 100 CAPSULE ORAL at 08:16

## 2020-01-01 RX ADMIN — LORAZEPAM 0.5 MG: 2 INJECTION INTRAMUSCULAR; INTRAVENOUS at 10:08

## 2020-01-01 RX ADMIN — ALPRAZOLAM 0.25 MG: 0.25 TABLET ORAL at 20:43

## 2020-01-01 RX ADMIN — METOPROLOL SUCCINATE 50 MG: 50 TABLET, EXTENDED RELEASE ORAL at 10:15

## 2020-01-01 RX ADMIN — ENOXAPARIN SODIUM 30 MG: 30 INJECTION SUBCUTANEOUS at 10:20

## 2020-01-01 RX ADMIN — SODIUM CHLORIDE, PRESERVATIVE FREE 10 ML: 5 INJECTION INTRAVENOUS at 08:48

## 2020-01-01 RX ADMIN — DILTIAZEM HYDROCHLORIDE 120 MG: 120 CAPSULE, COATED, EXTENDED RELEASE ORAL at 09:03

## 2020-01-01 RX ADMIN — ROSUVASTATIN CALCIUM 5 MG: 10 TABLET, FILM COATED ORAL at 09:53

## 2020-01-01 RX ADMIN — MINERAL OIL: 1000 LIQUID ORAL at 09:00

## 2020-01-01 RX ADMIN — HYDROCODONE BITARTRATE AND ACETAMINOPHEN 5 ML: 7.5; 325 SOLUTION ORAL at 19:10

## 2020-01-01 RX ADMIN — LORAZEPAM 0.25 MG: 2 INJECTION INTRAMUSCULAR; INTRAVENOUS at 04:07

## 2020-01-01 RX ADMIN — MINERAL OIL: 1000 LIQUID ORAL at 12:05

## 2020-01-01 RX ADMIN — DOCUSATE SODIUM 100 MG: 100 CAPSULE ORAL at 17:05

## 2020-01-01 RX ADMIN — NEBIVOLOL HYDROCHLORIDE 2.5 MG: 5 TABLET ORAL at 08:50

## 2020-01-01 RX ADMIN — SODIUM CHLORIDE, PRESERVATIVE FREE 10 ML: 5 INJECTION INTRAVENOUS at 20:48

## 2020-01-01 RX ADMIN — POLYVINYL ALCOHOL, POVIDONE 2 DROP: 14; 6 SOLUTION/ DROPS OPHTHALMIC at 22:23

## 2020-01-01 RX ADMIN — HYDRALAZINE HYDROCHLORIDE 10 MG: 20 INJECTION INTRAMUSCULAR; INTRAVENOUS at 11:16

## 2020-01-01 RX ADMIN — SODIUM CHLORIDE, PRESERVATIVE FREE 10 ML: 5 INJECTION INTRAVENOUS at 17:25

## 2020-01-01 RX ADMIN — RANOLAZINE 500 MG: 500 TABLET, FILM COATED, EXTENDED RELEASE ORAL at 18:31

## 2020-01-01 RX ADMIN — MINERAL OIL: 1000 LIQUID ORAL at 21:55

## 2020-01-01 RX ADMIN — MORPHINE SULFATE 0.5 MG: 2 INJECTION, SOLUTION INTRAMUSCULAR; INTRAVENOUS at 05:34

## 2020-01-01 RX ADMIN — HALOPERIDOL LACTATE 0.5 MG: 5 INJECTION, SOLUTION INTRAMUSCULAR at 06:50

## 2020-01-01 RX ADMIN — HYDRALAZINE HYDROCHLORIDE 10 MG: 20 INJECTION, SOLUTION INTRAMUSCULAR; INTRAVENOUS at 17:24

## 2020-01-01 RX ADMIN — SODIUM CHLORIDE, PRESERVATIVE FREE 10 ML: 5 INJECTION INTRAVENOUS at 22:18

## 2020-01-01 RX ADMIN — ISOSORBIDE MONONITRATE 30 MG: 30 TABLET, EXTENDED RELEASE ORAL at 08:30

## 2020-01-01 RX ADMIN — RANOLAZINE 500 MG: 500 TABLET, FILM COATED, EXTENDED RELEASE ORAL at 20:35

## 2020-01-01 RX ADMIN — LISINOPRIL 20 MG: 10 TABLET ORAL at 17:04

## 2020-01-01 RX ADMIN — METOPROLOL TARTRATE 50 MG: 50 TABLET, FILM COATED ORAL at 20:22

## 2020-01-01 RX ADMIN — MORPHINE SULFATE 2 MG: 2 INJECTION, SOLUTION INTRAMUSCULAR; INTRAVENOUS at 21:00

## 2020-01-01 RX ADMIN — METOPROLOL TARTRATE 50 MG: 50 TABLET, FILM COATED ORAL at 08:21

## 2020-01-01 RX ADMIN — MORPHINE SULFATE 2 MG: 2 INJECTION, SOLUTION INTRAMUSCULAR; INTRAVENOUS at 08:16

## 2020-01-01 RX ADMIN — ALPRAZOLAM 0.25 MG: 0.25 TABLET ORAL at 08:35

## 2020-01-01 RX ADMIN — SODIUM POLYSTYRENE SULFONATE 30 G: 1 POWDER ORAL; RECTAL at 17:23

## 2020-01-01 RX ADMIN — DOCUSATE SODIUM 100 MG: 100 CAPSULE ORAL at 09:53

## 2020-01-01 RX ADMIN — LIDOCAINE 1 PATCH: 50 PATCH CUTANEOUS at 13:02

## 2020-01-01 RX ADMIN — LISINOPRIL 5 MG: 2.5 TABLET ORAL at 12:39

## 2020-01-01 RX ADMIN — LACTULOSE 20 G: 10 SOLUTION ORAL at 09:53

## 2020-01-01 RX ADMIN — HYDROXYZINE HYDROCHLORIDE 25 MG: 25 TABLET, FILM COATED ORAL at 17:24

## 2020-01-01 RX ADMIN — FUROSEMIDE 40 MG: 10 INJECTION, SOLUTION INTRAMUSCULAR; INTRAVENOUS at 16:22

## 2020-01-01 RX ADMIN — LORAZEPAM 0.25 MG: 2 INJECTION INTRAMUSCULAR; INTRAVENOUS at 13:44

## 2020-01-01 RX ADMIN — DIGOXIN 250 MCG: 0.25 INJECTION INTRAMUSCULAR; INTRAVENOUS at 07:26

## 2020-01-01 RX ADMIN — MORPHINE SULFATE 2 MG: 2 INJECTION, SOLUTION INTRAMUSCULAR; INTRAVENOUS at 09:08

## 2020-01-01 RX ADMIN — Medication 1 TABLET: at 13:11

## 2020-01-01 RX ADMIN — Medication 150 MG: at 10:21

## 2020-01-01 RX ADMIN — LIDOCAINE 3 PATCH: 50 PATCH CUTANEOUS at 17:28

## 2020-01-01 RX ADMIN — SODIUM CHLORIDE, PRESERVATIVE FREE 10 ML: 5 INJECTION INTRAVENOUS at 08:54

## 2020-01-01 RX ADMIN — FUROSEMIDE 40 MG: 10 INJECTION, SOLUTION INTRAMUSCULAR; INTRAVENOUS at 08:37
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
## 2020-03-10 NOTE — ACP (ADVANCE CARE PLANNING)
Cici Veliz does not have an advance care plan and is not interested in making one at this time.  We discussed advance care planning and would be happy to assist with this at any time..  .
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
## 2020-03-13 NOTE — ED NOTES
Called [facility] regarding transfer. They said it would be around an hour before they could get here but did accepted the transfer.      Lan Michelle  03/13/20 0860

## 2020-03-13 NOTE — ED NOTES
Disk obtained from radiology and house supervisor made aware of transfer.      Lan Michelle  03/13/20 0906

## 2020-03-13 NOTE — ED NOTES
Called [facility]. They are going to page the hospitalist and have then call Dr. Mckeon back.      Lan Michelle  03/13/20 4542

## 2020-03-13 NOTE — ED NOTES
[facility] called back. Dr. Herrera is on the line at this time.      Lan Michelle  03/13/20 0814

## 2020-03-13 NOTE — ED NOTES
I called [facility] in [facility] and spoke to house supervisor about getting medical records on the patient. I faxed a signed release to house supervisor.     Gregor Ledesma  03/13/20 4743

## 2020-03-13 NOTE — ED NOTES
"Attempted to call report, nurse states \"I dont have a nurse to take this patient, I will have to talk to my manager and call you back.\"      Jenn Baldwin, RN  03/13/20 0874    "

## 2020-03-13 NOTE — ED NOTES
[facility] called back with bed assignment and number for report. Facesheet faxed.      Lan Michelle  03/13/20 0789

## 2020-03-13 NOTE — ED NOTES
Continuity of care form, also ross pcs given to ems, copy placed in chart.     Jenn Baldwin, CALLI  03/13/20 2391

## 2020-03-13 NOTE — ED NOTES
I called [facility] and spoke to house supervisor, Keila, about obtaining medical records on the patient. I faxed a signed release to [phone].     Gregor Ledesma  03/13/20 0573

## 2020-03-13 NOTE — ED NOTES
Pt alert and oriented, skin pwd, no resp distress. afib noted. Pt denies pain. 20 gauge in left wrist remains patent and continued at time of transfer. Bedside report given to [facility].      Jenn Baldwin RN  03/13/20 1040
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
## 2020-04-07 NOTE — PLAN OF CARE
Pt resting in bed. States her breathing is a little better today. No reports of chest pain this shift. Will continue to monitor.

## 2020-04-07 NOTE — PROGRESS NOTES
Discharge Planning Assessment  [facility]     Patient Name: Cici Veliz  MRN: [id number]  Today's Date: 4/7/2020    Admit Date: 4/6/2020    Discharge Needs Assessment     Row Name 04/07/20 1712       Living Environment    Lives With  alone    Name(s) of Who Lives With Patient  Lives at home alone    Current Living Arrangements  home/apartment/condo    Primary Care Provided by  self    Provides Primary Care For  no one    Quality of Family Relationships  helpful;involved;supportive    Able to Return to Prior Arrangements  yes       Transition Planning    Patient/Family Anticipates Transition to  home       Discharge Needs Assessment    Concerns to be Addressed  discharge planning    Equipment Currently Used at Home  wheelchair;commode        Discharge Plan     Row Name 04/07/20 1714       Plan    Plan  Pt admitted on 4/6/20.  SS received consult per [facility] for discharge planning.  SS spoke with pt on this date.  Pt lives at home alone and plans to return home at discharge.  Pt currently utilizes [facility].  Pt will need new home health referral at discharge.  Pt currently utilizes walker, wheelchair and bedside commode via unknown provider.  Pt's PCP is Dr. Urias.  SS will follow and assist with discharge needs.             Demographic Summary     Row Name 04/07/20 1712       General Information    Admission Type  inpatient    Reason for Consult  discharge planning    Preferred Language  English              AD Edwards
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
## 2020-04-07 NOTE — PLAN OF CARE
Patient admitted this shift.  No complaints of chest pain.  A&O x 4.  No acute distress noted.  Patient denies needing anything at this time.  Will continue to monitor.

## 2020-04-07 NOTE — PROGRESS NOTES
H&P reviewed.  Patient seen with nursing staff.  No family present at bedside.  Patient states that she is feeling better compared to last night.  Patient denies any chest pain.  She states that she still continues to have shortness of breath when she tries to get out of bed.  Patient denies any dizziness, lightheadedness, nausea or vomiting.  Examination reveals elderly female lying comfortably in bed and lung examination revealed end inspiratory Velcro type bibasilar crackles.  Patient has mild troponin elevation of 0.041 from 0.035.  Creatinine is improved 1.35 from 1.5 yesterday.  Cardiology on board and planning for stress test.  Patient states that she will discuss with her daughter if cardiology recommends a stress test.  Patient has previous history of coronary artery disease with multiple cardiac catheterization and stent placement.  Continue patient on aspirin, Plavix, statin, metoprolol, Ranexa and lisinopril.
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
## 2020-04-08 NOTE — PROGRESS NOTES
[facility] HOSPITALIST PROGRESS NOTE     Patient Identification:  Name:  Cici Veliz  Age:  89 y.o.  Sex:  female  :  1925  MRN:  [id number]  Visit Number:  [id number]  Primary Care Provider:  Edgar Urias MD    Length of stay:  1    Chief complaint:  89 y.o. old female admitted with Chest Pain          Subjective:    Patient seen with nursing staff.  No family present at bedside.  Patient is lying comfortably in bed and states that she is feeling better.  Patient states that she still continues to get dyspneic when she tries to do anything but her dyspnea is improved when she is resting.         Current Hospital Meds:    aspirin 81 mg Oral Daily   clopidogrel 75 mg Oral Daily   furosemide 20 mg Intravenous Once   furosemide 20 mg Oral BID   I-shakir 1 tablet Oral Daily   iron polysacch complex-B12-VitC-FA-Succ 1 capsule Oral Daily With Breakfast   isosorbide mononitrate 30 mg Oral Q24H   lisinopril 20 mg Oral Daily With Dinner   metoprolol tartrate 50 mg Oral Q12H   multivitamin 1 tablet Oral Daily Before Lunch   ranolazine 500 mg Oral Daily With Dinner   rosuvastatin 5 mg Oral Daily   sodium chloride 10 mL Intravenous Q12H        ----------------------------------------------------------------------------------------------------------------------  Vital Signs:  Temp:  [97.8 °F (36.6 °C)-98.2 °F (36.8 °C)] 98.2 °F (36.8 °C)  Heart Rate:  [] 101  Resp:  [18] 18  BP: (117-147)/(51-75) 138/72      20  2336 20  0502 20  0500   Weight: 59 kg (130 lb) 58.1 kg (128 lb 2 oz) 58.1 kg (128 lb)     Body mass index is 22.67 kg/m².    Intake/Output Summary (Last 24 hours) at 2020 1108  Last data filed at 2020 0900  Gross per 24 hour   Intake 720 ml   Output 1650 ml   Net -930 ml     Diet Regular; Cardiac  ----------------------------------------------------------------------------------------------------------------------  Physical exam:  Constitutional:  Well-developed  and well-nourished.     HENT:  Head:  Normocephalic and atraumatic.  Mouth:  Moist mucous membranes.    Eyes:  Conjunctivae and EOM are normal.  Pupils are equal, round, and reactive to light.   Neck:  Neck supple.  No JVD present.    Cardiovascular:  Regular rate and rhythm. S1+S2. No murmur, rubs or gallops.   Pulmonary/Chest: Clear to auscultation bilaterally.   Abdominal:  Soft. Non-tender. No viscera palpable.  Bowel sounds audible.   Musculoskeletal: No deformity or joint swelling.   Peripheral vascular: Bilateral dorsalis pedis palpable. No edema.   Neurological:  Alert and oriented to person, place, and time.  Cranial nerves grossly intact. Strength bilaterally symmetrical in upper and lower extremities.   Skin:  Skin is warm and dry. No rash noted. No pallor.   ----------------------------------------------------------------------------------------------------------------------  Tele:    ----------------------------------------------------------------------------------------------------------------------  Results from last 7 days   Lab Units 04/07/20  1337 04/07/20  0813 04/07/20  0154   TROPONIN T ng/mL 0.031* 0.041* 0.035*     Results from last 7 days   Lab Units 04/07/20  0813 04/06/20  2351   WBC 10*3/mm3 7.32 7.65   HEMOGLOBIN g/dL 11.0* 10.6*   HEMATOCRIT % 35.5 33.8*   MCV fL 83.7 82.6   MCHC g/dL 31.0* 31.4*   PLATELETS 10*3/mm3 301 292   INR   --  0.98         Results from last 7 days   Lab Units 04/08/20  0725 04/08/20  0209 04/07/20  1728  04/06/20  2351   SODIUM mmol/L 127* 126* 130*   < > 127*   POTASSIUM mmol/L 5.1 5.1 5.1   < > 5.1   CHLORIDE mmol/L 91* 92* 94*   < > 90*   CO2 mmol/L 21.5* 19.9* 23.3   < > 24.8   BUN mg/dL 37* 38* 40*   < > 46*   CREATININE mg/dL 1.40* 1.36* 1.34*   < > 1.51*   EGFR IF NONAFRICN AM mL/min/1.73 35* 36* 37*   < > 32*   CALCIUM mg/dL 8.5 8.5 9.0   < > 8.8   GLUCOSE mg/dL 107* 99 102*   < > 114*   ALBUMIN g/dL  --   --   --   --  3.25*   BILIRUBIN mg/dL  --   --    --   --  0.2   ALK PHOS U/L  --   --   --   --  82   AST (SGOT) U/L  --   --   --   --  16   ALT (SGPT) U/L  --   --   --   --  11    < > = values in this interval not displayed.   Estimated Creatinine Clearance: 22.5 mL/min (A) (by C-G formula based on SCr of 1.4 mg/dL (H)).    No results found for: AMMONIA  Results from last 7 days   Lab Units 04/07/20  0154   CHOLESTEROL mg/dL 115   TRIGLYCERIDES mg/dL 76   HDL CHOL mg/dL 34*   LDL CHOL mg/dL 66     No results found for: BLOODCX  No results found for: URINECX  No results found for: WOUNDCX  No results found for: STOOLCX    I have personally looked at the labs and they are summarized above.  ----------------------------------------------------------------------------------------------------------------------  Imaging Results (Last 24 Hours)     ** No results found for the last 24 hours. **        ----------------------------------------------------------------------------------------------------------------------  Assessment and Plan:    -Chest pain  Pt is chest pain free. Troponins were elevated.   Cardiology on board and advised to continue medical management. Continue aspirin, plavix, metoprolol and lisinopril. Continue statin, imdur and ranexa. Monitor H&H.    -Acute CHF exacerbation  Continue diuresis with lasix and close monitoring of I/O. Dyspnea is improving.     -Hyponatremia  Likely due to underlying CHF. Continue to monitor sodium level.  Consider holding diuresis due to worsening sodium level.    -CKD stage III  Creatinine stable around 1.4.  Continue to monitor renal function and avoid nephrotoxic agents.    -COPD  No signs symptoms of acute exacerbation.  Continue duo nebs as needed.    -Paroxysmal A. Fib s/p pacemaker in place  Rate controlled.  Continue metoprolol for rate control.  Patient is on anticoagulation due to advanced age and risk of fall.    -Essential HTN  Blood pressure well controlled.  Continue metoprolol, lisinopril and  nitrate.    Activity: Up with assist  Nutrition: Cardiac diet  Fluids: None  DVT prophylaxis: Heparin SQ    The patient is high risk due to: ASCVD, CHF, hyponatremia, CKD, COPD    I discussed the patients findings and my recommendations with patient and nursing staff.    Rush Minor MD  04/08/20  11:08

## 2020-04-08 NOTE — NURSING NOTE
JULIANA ADMISSION NOTE:  Patient enrolled in the Transition program to assist with education and support during transition home from hospital. Transition home  will see patient at home within 48 hours of discharge and will follow up with patient in home and telephonically for 6 weeks post discharge under the [facility] Model.    Clinical Assessment Instrument Scores:  Subjective Health Rating-good  Short Portable Mental Status Questionnaire- 7  Geriatric Depression Scale-4  Generalized Anxiety Scale-0  Instrumental Activities of Daily Living-4  AYALA Activities of Daily Living-5  Literacy screening-not confident

## 2020-04-08 NOTE — DISCHARGE INSTRUCTIONS
You have been enrolled into the transition from hospital to home program.  A nurse will be contacting you after you discharge to home to set up a time to come out to your home for a follow-up visit.  If you have any questions or concerns you can call this number anytime and a nurse will contact you:  [facility]  [phone].

## 2020-04-08 NOTE — PROGRESS NOTES
LOS: 1 day     Name: Cici Veliz  Age/Sex: 89 y.o. female  :  1925        PCP: Edgar Urias MD  REF: No ref. provider found    Active Problems:    * No active hospital problems. *      Reason for follow-up: Chest pains and mild elevated troponin.    Subjective       Subjective Ms. Veliz is a 89-year-old  female with history of known coronary artery disease, status post previous multiple coronary interventions, was admitted with complaints of shortness of breath and chest discomfort with marginal elevated troponin with a flat trend.      Interval History:    ROS    Vital Signs  Temp:  [97.8 °F (36.6 °C)-98.2 °F (36.8 °C)] 98.2 °F (36.8 °C)  Heart Rate:  [] 118  Resp:  [18] 18  BP: (117-161)/(51-99) 130/75  Vital Signs (last 72 hrs)       04/ 0700  -  04/06 0659 04/06 0700  -   0659  07  -   0659  0700  -   0925   Most Recent    Temp (°F)   97.4 -  98    97.8 -  98.2      98.2     98.2 (36.8)    Heart Rate   69 -  100    69 -  99    100 -  118     118    Resp   16 -  19      18      18     18    BP   106/69 -  162/67    117/51 -  161/99      130/75     130/75    SpO2 (%)   96 -  100    96 -  99      97     97        Body mass index is 22.67 kg/m².    Intake/Output Summary (Last 24 hours) at 2020 0925  Last data filed at 2020 0900  Gross per 24 hour   Intake 720 ml   Output 1650 ml   Net -930 ml     Objective    Objective       Physical Exam:     General Appearance:    Alert, cooperative, in no acute distress   Head:    Normocephalic, without obvious abnormality, atraumatic   Eyes:            Conjunctivae and sclerae normal, no   icterus, no pallor, corneas clear.   Neck:   No adenopathy, supple, trachea midline, no thyromegaly, no   carotid bruit, no JVD   Lungs:    She is scattered rales bilaterally,respirations regular, even and                  unlabored    Heart:   Irregularly irregular rhythm with normal rate, normal S1 and S2, no  murmur, no  gallop, no rub, no click   Chest Wall:    No abnormalities observed   Abdomen:     Normal bowel sounds, no masses, no organomegaly, soft        non-tender, non-distended, no guarding, no rebound                tenderness   Extremities:   Moves all extremities well, no edema, no cyanosis, no             redness   Pulses:   Pulses palpable and equal bilaterally   Skin:   No bleeding, bruising or rash              Procedures    Results review       Results Review:   Results from last 7 days   Lab Units 04/07/20  0813 04/06/20  2351   WBC 10*3/mm3 7.32 7.65   HEMOGLOBIN g/dL 11.0* 10.6*   PLATELETS 10*3/mm3 301 292     Results from last 7 days   Lab Units 04/08/20  0725 04/08/20  0209 04/07/20  1728 04/07/20  1337 04/07/20  0813 04/06/20  2351   SODIUM mmol/L 127* 126* 130* 128* 128* 127*   POTASSIUM mmol/L 5.1 5.1 5.1 5.2 5.2 5.1   CHLORIDE mmol/L 91* 92* 94* 91* 92* 90*   CO2 mmol/L 21.5* 19.9* 23.3 23.0 22.7 24.8   BUN mg/dL 37* 38* 40* 41* 42* 46*   CREATININE mg/dL 1.40* 1.36* 1.34* 1.33* 1.35* 1.51*   CALCIUM mg/dL 8.5 8.5 9.0 8.9 8.9 8.8   GLUCOSE mg/dL 107* 99 102* 104* 100* 114*   ALT (SGPT) U/L  --   --   --   --   --  11   AST (SGOT) U/L  --   --   --   --   --  16     Results from last 7 days   Lab Units 04/07/20  1337 04/07/20  0813 04/07/20  0154 04/06/20  2351   TROPONIN T ng/mL 0.031* 0.041* 0.035* 0.045*     Lab Results   Component Value Date    INR 0.98 04/06/2020    INR 1.00 03/13/2020    INR 1.03 02/06/2019    INR 1.07 06/04/2018    INR 1.05 03/12/2018    INR 0.98 01/25/2018    INR 1.04 11/23/2017     Lab Results   Component Value Date    MG 1.8 03/13/2020    MG 1.9 03/06/2017     Lab Results   Component Value Date    TSH 1.420 04/07/2020    CHLPL 156 05/09/2016    TRIG 76 04/07/2020    HDL 34 (L) 04/07/2020    LDL 66 04/07/2020      Imaging Results (Last 48 Hours)     Procedure Component Value Units Date/Time    XR Chest 1 View [[id number]] Collected:  04/07/20 0255     Updated:  04/07/20 0257     Narrative:       CHEST X-RAY, 4/7/2020      HISTORY:    89 year-old female the ED complaining of chest pain.      TECHNIQUE:  AP portable chest x-ray.    COMPARISON:  *  Chest x-ray, 3/27/2020 and 3/13/2020.    FINDINGS:  Mild to moderate cardiomegaly is stable. Mild diffuse interstitial pulmonary edema suggests probable vascular congestion or volume overload. This is superimposed on a background of mild diffuse chronic interstitial changes. There is no airspace  consolidation or visible pleural effusion. Dual-chamber cardiac pacemaker in good position.      Impression:       1. Cardiomegaly and mild diffuse interstitial pulmonary edema.  2. Background mild diffuse chronic interstitial fibrotic changes.    Signer Name: Sonny Vazquez MD   Signed: 4/7/2020 2:55 AM   [facility]              ECG      Echo   Results for orders placed during the hospital encounter of 04/06/20   Adult Transthoracic Echo Complete W/ Cont if Necessary Per Protocol    Narrative · Left ventricular wall thickness is consistent with concentric   hypertrophy.  · Left ventricular systolic function is normal, EF 61-65%.  · Left ventricular diastolic dysfunction (grade III) consistent with   reversible restrictive pattern.  · Left atrial cavity size is severely dilated.  · There is mild calcification of the aortic valve.  · Moderate to severe aortic valve stenosis is present, [I suspect aortic   valve area is grossly underestimated].  · Severe mitral valve regurgitation is present  · Mild-to-moderate mitral valve stenosis is present  · Mild tricuspid valve regurgitation is present.           I reviewed the patient's new clinical results.    Telemetry: Ventricular paced rhythm       Medication Review:     aspirin 81 mg Oral Daily   clopidogrel 75 mg Oral Daily   furosemide 20 mg Oral BID   I-shakir 1 tablet Oral Daily   iron polysacch complex-B12-VitC-FA-Succ 1 capsule Oral Daily With  Breakfast   isosorbide mononitrate 30 mg Oral Q24H   lisinopril 20 mg Oral Daily With Dinner   metoprolol tartrate 50 mg Oral Q12H   multivitamin 1 tablet Oral Daily Before Lunch   ranolazine 500 mg Oral Daily With Dinner   rosuvastatin 5 mg Oral Daily   sodium chloride 10 mL Intravenous Q12H            Assessment    1. ASCVD, status post previous multiple PCI's with CCS class III-IV angina, patient is asymptomatic and clinically stable at this time.  Troponin is mildly elevated with a flat trend.  2. Possible mild acute diastolic heart failure, patient currently seems to be doing better lying flat in bed with no dyspnea or orthopnea.  3. Chronic kidney disease (stage III-IV)  4. Paroxysmal atrial fibrillation with chads vas score of 6 but not on anticoagulation apparently due to the risk of fall and bleeding due to her age and frailty and history of GI bleed.  5. Sick sinus syndrome, status post permanent pacemaker implantation.      Plan   1. I agree given her age and frailty and chronic kidney disease, conservative medical management would be the best option for this lady.  Hence would continue with low-dose aspirin and Plavix as tolerated for now and monitor her H&H closely.  2. Continue with metoprolol and rosuvastatin as tolerated.  3. Continue with the lisinopril for now and continue to monitor the kidney function closely.  4. Cautious diuresis as needed and monitor the kidney function closely.      I discussed the patients findings and my recommendations with patient and family      BLAIR Harry  04/08/20  09:25    Please note that portions of this note were completed with a voice recognition program.
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
## 2020-04-09 NOTE — PROGRESS NOTES
LOS: 2 days     Name: Cici Veliz  Age/Sex: 89 y.o. female  :  1925        PCP: Edgar Urias MD  REF: No ref. provider found    Active Problems:    * No active hospital problems. *      Reason for follow-up:    Subjective       Subjective  Ms. Veliz is a 89-year-old  female with history of known coronary artery disease, status post previous multiple coronary interve  ntions, was admitted with complaints of shortness of breath and chest discomfort with marginal elevated troponin with a flat trend.    Interval History:  She denies any chest pains. Breathing better.Fairly good urine output in the last 24hrs. Na levesl dropping    ROS    Vital Signs  Temp:  [98.2 °F (36.8 °C)-98.7 °F (37.1 °C)] 98.6 °F (37 °C)  Heart Rate:  [] 68  Resp:  [14-20] 14  BP: (110-138)/(54-72) 122/60  Vital Signs (last 72 hrs)       / 0700  -  / 0659 / 0700  -  /08 0659 /08 0700  -   0659 / 0700  -   0916   Most Recent    Temp (°F) 97.4 -  98    97.8 -  98.2    98.2 -  98.7       98.6 (37)    Heart Rate 69 -  100    69 -  100    68 -  118       68    Resp 16 -  19      18    14 -  20       14    /69 -  162/67    117/51 -  161/99    110/54 -  138/72       122/60    SpO2 (%) 96 -  100    96 -  99    96 -  98       96        Body mass index is 22.16 kg/m².    Intake/Output Summary (Last 24 hours) at 2020 0916  Last data filed at 2020 0700  Gross per 24 hour   Intake 600 ml   Output 1350 ml   Net -750 ml     Objective    Objective       Physical Exam:     General Appearance:    Alert, cooperative, in no acute distress   Head:    Normocephalic, without obvious abnormality, atraumatic   Eyes:            Conjunctivae and sclerae normal, no   icterus, no pallor, corneas clear.   Neck:   No adenopathy, supple, trachea midline, no thyromegaly, no   carotid bruit, no JVD   Lungs:     Clear to auscultation,respirations regular, even and                  unlabored    Heart:     Regular rhythm and normal rate, normal S1 and S2, no            murmur, no gallop, no rub, no click   Chest Wall:    No abnormalities observed   Abdomen:     Normal bowel sounds, no masses, no organomegaly, soft        non-tender, non-distended, no guarding, no rebound                tenderness   Extremities:   Moves all extremities well, no edema, no cyanosis, no             redness   Pulses:   Pulses palpable and equal bilaterally   Skin:   No bleeding, bruising or rash              Procedures    Results review       Results Review:   Results from last 7 days   Lab Units 04/09/20  0037 04/07/20  0813 04/06/20  2351   WBC 10*3/mm3 6.85 7.32 7.65   HEMOGLOBIN g/dL 10.2* 11.0* 10.6*   PLATELETS 10*3/mm3 288 301 292     Results from last 7 days   Lab Units 04/09/20  0502 04/09/20  0037 04/08/20  1736 04/08/20  1214 04/08/20  0725 04/08/20  0209 04/07/20  1728  04/06/20  2351   SODIUM mmol/L 124* 125* 124* 126* 127* 126* 130*   < > 127*   POTASSIUM mmol/L 5.2 5.1 5.3* 5.0 5.1 5.1 5.1   < > 5.1   CHLORIDE mmol/L 91* 91* 89* 91* 91* 92* 94*   < > 90*   CO2 mmol/L 20.6* 19.4* 21.0* 21.2* 21.5* 19.9* 23.3   < > 24.8   BUN mg/dL 39* 41* 41* 38* 37* 38* 40*   < > 46*   CREATININE mg/dL 1.38* 1.38* 1.49* 1.40* 1.40* 1.36* 1.34*   < > 1.51*   CALCIUM mg/dL 8.3 8.5 8.7 8.6 8.5 8.5 9.0   < > 8.8   GLUCOSE mg/dL 100* 99 100* 118* 107* 99 102*   < > 114*   ALT (SGPT) U/L  --   --   --   --   --   --   --   --  11   AST (SGOT) U/L  --   --   --   --   --   --   --   --  16    < > = values in this interval not displayed.     Results from last 7 days   Lab Units 04/07/20  1337 04/07/20  0813 04/07/20  0154 04/06/20  2351   TROPONIN T ng/mL 0.031* 0.041* 0.035* 0.045*     Lab Results   Component Value Date    INR 0.98 04/06/2020    INR 1.00 03/13/2020    INR 1.03 02/06/2019    INR 1.07 06/04/2018    INR 1.05 03/12/2018    INR 0.98 01/25/2018    INR 1.04 11/23/2017     Lab Results   Component Value Date    MG 1.8 03/13/2020    MG 1.9  03/06/2017     Lab Results   Component Value Date    TSH 1.420 04/07/2020    CHLPL 156 05/09/2016    TRIG 76 04/07/2020    HDL 34 (L) 04/07/2020    LDL 66 04/07/2020      Imaging Results (Last 48 Hours)     ** No results found for the last 48 hours. **        Lab Results   Component Value Date    .0 (H) 02/06/2019       ECG      Echo   Results for orders placed during the hospital encounter of 04/06/20   Adult Transthoracic Echo Complete W/ Cont if Necessary Per Protocol    Narrative · Left ventricular wall thickness is consistent with concentric   hypertrophy.  · Left ventricular systolic function is normal, EF 61-65%.  · Left ventricular diastolic dysfunction (grade III) consistent with   reversible restrictive pattern.  · Left atrial cavity size is severely dilated.  · There is mild calcification of the aortic valve.  · Moderate to severe aortic valve stenosis is present, [I suspect aortic   valve area is grossly underestimated].  · Severe mitral valve regurgitation is present  · Mild-to-moderate mitral valve stenosis is present  · Mild tricuspid valve regurgitation is present.           I reviewed the patient's new clinical results.    Telemetry:       Medication Review:     aspirin 81 mg Oral Daily   clopidogrel 75 mg Oral Daily   docusate sodium 100 mg Oral Daily   I-shakir 1 tablet Oral Daily   iron polysacch complex-B12-VitC-FA-Succ 1 capsule Oral Daily With Breakfast   isosorbide mononitrate 30 mg Oral Q24H   lisinopril 20 mg Oral Daily With Dinner   metoprolol tartrate 50 mg Oral Q12H   multivitamin 1 tablet Oral Daily Before Lunch   polyethylene glycol 17 g Oral Daily   ranolazine 500 mg Oral Daily With Dinner   rosuvastatin 5 mg Oral Daily   sodium chloride 10 mL Intravenous Q12H            Assessment      1. ASCVD, status post previous multiple PCI's with CCS class III-IV angina, patient is asymptomatic and clinically stable at this time.  Troponin is mildly elevated with a flat trend.  2. Possible  mild acute diastolic heart failure, patient currently seems to be doing better lying flat in bed with no dyspnea or orthopnea.  3. Chronic kidney disease (stage III-IV),stable.  4. Paroxysmal atrial fibrillation with chads vas score of 6 but not on anticoagulation apparently due to the risk of fall and bleeding due to her age and frailty and history of GI bleed.  5. Sick sinus syndrome, status post permanent pacemaker implantation.  6. Hyponatremia with Na level of 124 today.      Plan     1. For CAD,continue with current medical therapy.  2. For Acute diastolic heart failure and hyponatremia,place on fluid restriction of 1000cc/24hrs.    I discussed the patients findings and my recommendations with patient and family      Chung Rivas MDPeaceHealth  04/09/20  09:16    Please note that portions of this note were completed with a voice recognition program.

## 2020-04-09 NOTE — PROGRESS NOTES
[facility] HOSPITALIST PROGRESS NOTE     Patient Identification:  Name:  Cici Veliz  Age:  89 y.o.  Sex:  female  :  1925  MRN:  [id number]  Visit Number:  [id number]  Primary Care Provider:  Edgar Urias MD    Length of stay:  2    Chief complaint:  89 y.o. old female admitted with Chest Pain          Subjective:    Patient seen with nursing staff.  No family present at bedside.  Patient is lying comfortably in bed and states that she is feeling okay.  Nursing staff reports that patient was able to ambulate in the hallway yesterday but became dyspneic.  Patient admits to getting dyspneic on exertion but states that it is gradually improving.  Patient denies any chest pain or palpitations.           Current Hospital Meds:    aspirin 81 mg Oral Daily   clopidogrel 75 mg Oral Daily   docusate sodium 100 mg Oral Daily   I-shakir 1 tablet Oral Daily   iron polysacch complex-B12-VitC-FA-Succ 1 capsule Oral Daily With Breakfast   isosorbide mononitrate 30 mg Oral Q24H   lisinopril 20 mg Oral Daily With Dinner   metoprolol tartrate 50 mg Oral Q12H   multivitamin 1 tablet Oral Daily Before Lunch   polyethylene glycol 17 g Oral Daily   ranolazine 500 mg Oral Daily With Dinner   rosuvastatin 5 mg Oral Daily   sodium chloride 10 mL Intravenous Q12H        ----------------------------------------------------------------------------------------------------------------------  Vital Signs:  Temp:  [98.5 °F (36.9 °C)-98.7 °F (37.1 °C)] 98.5 °F (36.9 °C)  Heart Rate:  [] 67  Resp:  [14-20] 18  BP: (110-122)/(54-70) 122/70      20  0502 20  0500 20  0500   Weight: 58.1 kg (128 lb 2 oz) 58.1 kg (128 lb) 56.8 kg (125 lb 2 oz)     Body mass index is 22.16 kg/m².    Intake/Output Summary (Last 24 hours) at 2020 1406  Last data filed at 2020 1300  Gross per 24 hour   Intake 960 ml   Output 1700 ml   Net -740 ml     Diet Regular;  Cardiac  ----------------------------------------------------------------------------------------------------------------------  Physical exam:  Constitutional:  Well-developed and well-nourished.     HENT:  Head:  Normocephalic and atraumatic.  Mouth:  Moist mucous membranes.    Eyes:  Conjunctivae and EOM are normal.  Pupils are equal, round, and reactive to light.   Neck:  Neck supple.  No JVD present.    Cardiovascular:  Regular rate and rhythm. S1+S2. No murmur, rubs or gallops.   Pulmonary/Chest: Clear to auscultation bilaterally.   Abdominal:  Soft. Non-tender. No viscera palpable.  Bowel sounds audible.   Musculoskeletal: No deformity or joint swelling.   Peripheral vascular: Bilateral dorsalis pedis palpable. No edema.   Neurological:  Alert and oriented to person, place, and time.  Cranial nerves grossly intact. Strength bilaterally symmetrical in upper and lower extremities.   Skin:  Skin is warm and dry. No rash noted. No pallor.   ----------------------------------------------------------------------------------------------------------------------  Tele:    ----------------------------------------------------------------------------------------------------------------------  Results from last 7 days   Lab Units 04/07/20  1337 04/07/20  0813 04/07/20  0154   TROPONIN T ng/mL 0.031* 0.041* 0.035*     Results from last 7 days   Lab Units 04/09/20  0037 04/07/20  0813 04/06/20  2351   WBC 10*3/mm3 6.85 7.32 7.65   HEMOGLOBIN g/dL 10.2* 11.0* 10.6*   HEMATOCRIT % 32.8* 35.5 33.8*   MCV fL 82.4 83.7 82.6   MCHC g/dL 31.1* 31.0* 31.4*   PLATELETS 10*3/mm3 288 301 292   INR   --   --  0.98         Results from last 7 days   Lab Units 04/09/20  1153 04/09/20  0502 04/09/20  0037  04/06/20  2351   SODIUM mmol/L 126* 124* 125*   < > 127*   POTASSIUM mmol/L 5.0 5.2 5.1   < > 5.1   CHLORIDE mmol/L 90* 91* 91*   < > 90*   CO2 mmol/L 22.8 20.6* 19.4*   < > 24.8   BUN mg/dL 39* 39* 41*   < > 46*   CREATININE mg/dL  1.36* 1.38* 1.38*   < > 1.51*   EGFR IF NONAFRICN AM mL/min/1.73 36* 36* 36*   < > 32*   CALCIUM mg/dL 8.7 8.3 8.5   < > 8.8   GLUCOSE mg/dL 107* 100* 99   < > 114*   ALBUMIN g/dL  --   --   --   --  3.25*   BILIRUBIN mg/dL  --   --   --   --  0.2   ALK PHOS U/L  --   --   --   --  82   AST (SGOT) U/L  --   --   --   --  16   ALT (SGPT) U/L  --   --   --   --  11    < > = values in this interval not displayed.   Estimated Creatinine Clearance: 22.7 mL/min (A) (by C-G formula based on SCr of 1.36 mg/dL (H)).    No results found for: AMMONIA  Results from last 7 days   Lab Units 04/09/20  1153 04/07/20  0154   CHOLESTEROL mg/dL  --  115   TRIGLYCERIDES mg/dL 42 76   HDL CHOL mg/dL  --  34*   LDL CHOL mg/dL  --  66     No results found for: BLOODCX  No results found for: URINECX  No results found for: WOUNDCX  No results found for: STOOLCX    I have personally looked at the labs and they are summarized above.  ----------------------------------------------------------------------------------------------------------------------  Imaging Results (Last 24 Hours)     ** No results found for the last 24 hours. **        ----------------------------------------------------------------------------------------------------------------------  Assessment and Plan:    -Chest pain  Pt is chest pain free. Troponins were elevated were elevated to 0.045 on admission and trended down.  EKG showed ventricular paced rhythm.   Cardiology on board and advised to continue medical management. Continue aspirin, plavix, metoprolol and lisinopril. Continue statin, imdur and ranexa. Monitor H&H.    -Acute on chronic diastolic CHF exacerbation  Dyspnea is improving.  Patient has diuresed well and net fluid balance is -3100 mL.  Lasix was held due to worsening hyponatremia.  Continue to monitor intake and output, electrolytes, daily weight and renal function.    Echocardiogram showed concentric LVH, normal LV SF, EF of 61 to 65%, grade 3 diastolic  dysfunction, severely dilated LA cavity, moderate to severe AVS, severe MVR, mild to moderate MDS and a mild TVR.  Cardiology on board and appreciate help from cardiology.    -Hyponatremia  Likely due to underlying CHF and due to diuretics.  TSH is within normal limits.  Continue to keep Lasix on hold. Continue to monitor sodium level.  Nephrology consulted since patient continues to go down and is down to 124 today.  Continue to monitor sodium level closely.    -CKD stage III  Creatinine stable around 1.4.  Continue to monitor renal function and avoid nephrotoxic agents.    -COPD  No signs symptoms of acute exacerbation.  Continue duo nebs as needed.    -Paroxysmal A. Fib s/p pacemaker in place  Rate controlled.  Continue metoprolol for rate control.  Patient is on anticoagulation due to advanced age and risk of fall.    -Essential HTN  Blood pressure well controlled.  Continue metoprolol, lisinopril and nitrate.    Activity: Up with assist  Nutrition: Cardiac diet  Fluids: None  DVT prophylaxis: Heparin SQ    The patient is high risk due to: ASCVD, CHF, hyponatremia, CKD, COPD    I discussed the patients findings and my recommendations with patient and nursing staff.    Rush Minor MD  04/09/20  14:06

## 2020-04-10 NOTE — PROGRESS NOTES
[facility] HOSPITALIST PROGRESS NOTE     Patient Identification:  Name:  Cici Veliz  Age:  89 y.o.  Sex:  female  :  1925  MRN:  [id number]  Visit Number:  [id number]  Primary Care Provider:  Edgar Urias MD    Length of stay:  3    Chief complaint:  89 y.o. old female admitted with Chest Pain          Subjective:    Patient seen with nursing staff.  No family present at bedside.  Pt has had a change in mental status and since last hour pt has been non-verbal. She is trying but not able to speak. Pt also has been having left sided gaze. She is moving all 4 extremities.   Is trying to talk but not able to answer any questions.  She is understanding question and shaking her head in answer to the questions.         Current Hospital Meds:    aspirin 81 mg Oral Daily   clopidogrel 75 mg Oral Daily   docusate sodium 100 mg Oral Daily   I-shakir 1 tablet Oral Daily   iron polysacch complex-B12-VitC-FA-Succ 1 capsule Oral Daily With Breakfast   isosorbide mononitrate 30 mg Oral Q24H   metoprolol tartrate 50 mg Oral Q12H   multivitamin 1 tablet Oral Daily Before Lunch   polyethylene glycol 17 g Oral Daily   ranolazine 500 mg Oral Daily With Dinner   rosuvastatin 5 mg Oral Daily   sodium chloride 100 mL Intravenous Once   sodium chloride 10 mL Intravenous Q12H        ----------------------------------------------------------------------------------------------------------------------  Vital Signs:  Temp:  [97.4 °F (36.3 °C)-98.5 °F (36.9 °C)] 97.9 °F (36.6 °C)  Heart Rate:  [] 118  Resp:  [16-20] 18  BP: (113-137)/(63-82) 130/82      20  0500 20  0500 04/10/20  0500   Weight: 58.1 kg (128 lb) 56.8 kg (125 lb 2 oz) 58.7 kg (129 lb 6.4 oz)     Body mass index is 22.92 kg/m².    Intake/Output Summary (Last 24 hours) at 4/10/2020 1102  Last data filed at 4/10/2020 0900  Gross per 24 hour   Intake 1297.77 ml   Output 1150 ml   Net 147.77 ml     Diet Regular;  Cardiac  ----------------------------------------------------------------------------------------------------------------------  Physical exam:  Constitutional:  Well-developed and well-nourished.     HENT:  Head:  Normocephalic and atraumatic.  Mouth:  Moist mucous membranes.    Eyes:  Conjunctivae and EOM are normal.  Pupils are equal, round, and reactive to light.   Neck:  Neck supple.  No JVD present.    Cardiovascular:  Regular rate and rhythm. S1+S2. No murmur, rubs or gallops.   Pulmonary/Chest: Clear to auscultation bilaterally.   Abdominal:  Soft. Non-tender. No viscera palpable.  Bowel sounds audible.   Musculoskeletal: No deformity or joint swelling.   Peripheral vascular: Bilateral dorsalis pedis palpable. No edema.   Neurological: Patient is awake and alert.  She has her gaze towards the left side and is not looking at the right side.  Patient is moving her eyes but not following the fingers to assess extraocular muscle movement.  Patient understands a question and follows commands but not speaking.  Strength is bilaterally symmetrical in upper lower extremities though patient has generalized weakness overall which does not seem any change since yesterday.  Skin:  Skin is warm and dry. No rash noted. No pallor.   ----------------------------------------------------------------------------------------------------------------------  Tele:    ----------------------------------------------------------------------------------------------------------------------  Results from last 7 days   Lab Units 04/07/20  1337 04/07/20  0813 04/07/20  0154   TROPONIN T ng/mL 0.031* 0.041* 0.035*     Results from last 7 days   Lab Units 04/10/20  0024 04/09/20  0037 04/07/20  0813 04/06/20  2351   WBC 10*3/mm3 6.09 6.85 7.32 7.65   HEMOGLOBIN g/dL 10.1* 10.2* 11.0* 10.6*   HEMATOCRIT % 32.1* 32.8* 35.5 33.8*   MCV fL 82.5 82.4 83.7 82.6   MCHC g/dL 31.5 31.1* 31.0* 31.4*   PLATELETS 10*3/mm3 275 288 301 292   INR   --   --    --  0.98         Results from last 7 days   Lab Units 04/10/20  0550 04/10/20  0024 04/09/20  1719  04/06/20  2351   SODIUM mmol/L 127* 126* 126*   < > 127*   POTASSIUM mmol/L 5.7* 5.5* 5.5*   < > 5.1   CHLORIDE mmol/L 92* 92* 91*   < > 90*   CO2 mmol/L 19.8* 20.8* 20.9*   < > 24.8   BUN mg/dL 39* 40* 40*   < > 46*   CREATININE mg/dL 1.29* 1.28* 1.41*   < > 1.51*   EGFR IF NONAFRICN AM mL/min/1.73 38* 39* 35*   < > 32*   CALCIUM mg/dL 8.5 8.4 8.5   < > 8.8   GLUCOSE mg/dL 106* 104* 108*   < > 114*   ALBUMIN g/dL  --   --   --   --  3.25*   BILIRUBIN mg/dL  --   --   --   --  0.2   ALK PHOS U/L  --   --   --   --  82   AST (SGOT) U/L  --   --   --   --  16   ALT (SGPT) U/L  --   --   --   --  11    < > = values in this interval not displayed.   Estimated Creatinine Clearance: 24.7 mL/min (A) (by C-G formula based on SCr of 1.29 mg/dL (H)).    No results found for: AMMONIA  Results from last 7 days   Lab Units 04/09/20  1153 04/07/20  0154   CHOLESTEROL mg/dL  --  115   TRIGLYCERIDES mg/dL 42 76   HDL CHOL mg/dL  --  34*   LDL CHOL mg/dL  --  66     No results found for: BLOODCX  No results found for: URINECX  No results found for: WOUNDCX  No results found for: STOOLCX    I have personally looked at the labs and they are summarized above.  ----------------------------------------------------------------------------------------------------------------------  Imaging Results (Last 24 Hours)     Procedure Component Value Units Date/Time    CT Head Without Contrast [[id number]] Resulted:  04/10/20 1035     Updated:  04/10/20 1042        ----------------------------------------------------------------------------------------------------------------------  Assessment and Plan:    -Altered mental status, concern for acute stroke  Patient has neurological deficits however patient does not have any loss of strength in her arms and legs and only seem to have a aphasia and left-sided gaze preference.  Stat CT scan of the head  was done that showed no acute intracranial abnormality.  Will obtain MRI with and without contrast to rule out acute stroke.  Patient has hyponatremia though sodium is improving and unlikely cause of her mental status changes.  Patient is on aspirin, Plavix and statin and will continue.  She has history of A. fib and not on anticoagulation due to advanced age and risk of fall and therefore is at risk for acute stroke.    -Chest pain  Pt is chest pain free. Troponins were elevated were elevated to 0.045 on admission and trended down.  EKG continues to show ventricular paced rhythm.   Cardiology on board and advised to continue medical management. Continue aspirin, plavix and metoprolol.  Lisinopril held due to hyperkalemia. Continue statin, imdur and ranexa. Monitor H&H.    -Acute on chronic diastolic CHF exacerbation  Dyspnea is improving.  Has had good diuresis and net fluid balance is about 3 L.  Lasix has been held due to hyponatremia and patient has not diuresed much last 24 hours.  Continue to monitor intake and output, electrolytes, daily weight and renal function.    Echocardiogram showed concentric LVH, normal LV SF, EF of 61 to 65%, grade 3 diastolic dysfunction, severely dilated LA cavity, moderate to severe AVS, severe MVR, mild to moderate MDS and a mild TVR.  Cardiology on board and appreciate help from cardiology.    -Hyponatremia, likely due to SIADH.  Improving.  Sodium level is up to 127 today.  She has been started on 100 mL of 3% normal saline due to mental changes with a goal of keeping sodium above 130. TSH is within normal limits. Continue to keep Lasix on hold. Continue to monitor sodium level.  Nephrology on board.  Continue to monitor sodium level closely.    -CKD stage III  At noon is improved to 1.3 from 1.5 on admission.  Continue to hold Lasix and monitor renal function avoid nephrotoxic agents.      -COPD  No signs symptoms of acute exacerbation.  Continue duo nebs as  needed.    -Paroxysmal A. Fib s/p pacemaker in place  Rate controlled.  Continue metoprolol for rate control.  Patient is on anticoagulation due to advanced age and risk of fall.    -Essential HTN  Blood pressure well controlled.  Continue metoprolol, lisinopril and nitrate.    Activity: Up with assist  Nutrition: Cardiac diet  Fluids: None  DVT prophylaxis: Heparin SQ    The patient is high risk due to: ASCVD, CHF, hyponatremia, CKD, COPD    I discussed the patients findings and my recommendations with patient and nursing staff.    Rush Minor MD  04/10/20  11:02

## 2020-04-10 NOTE — PROGRESS NOTES
Discharge Planning Assessment   Fabrice     Patient Name: Cici Veliz  MRN: [id number]  Today's Date: 4/10/2020    Admit Date: 4/6/2020      Discharge Plan     Row Name 04/10/20 1206       Plan    Plan  Pt admitted on 4/6/20.  Pt lives at home alone and plans to return home at discharge if medically able.  Pt currently utilizes [facility], walker, wheelchair and bedside commode via unknown provider.  SS will follow and assist with discharge needs.           MEHNAZ EdwardsW

## 2020-04-10 NOTE — PROGRESS NOTES
LOS: 3 days     Name: Cici Veliz  Age/Sex: 89 y.o. female  :  1925        PCP: Edgar Urias MD  REF: No ref. provider found    Active Problems:    * No active hospital problems. *      Reason for follow-up: ASCVD and paroxysmal atrial fibrillation.    Subjective       Subjective Ms. Veliz is a 89-year-old  female with history of coronary artery disease, status post previous multiple PCI's, was admitted with complaints of chest pains and shortness of breath and mildly elevated troponin with flat trend.  She also has paroxysmal atrial fibrillation.  She has previous history of GI bleed.      Interval History: Patient has become aphasic earlier a short while ago.  She is moving all of her extremities.    ROS    Vital Signs  Temp:  [97.4 °F (36.3 °C)-98.5 °F (36.9 °C)] 97.9 °F (36.6 °C)  Heart Rate:  [] 118  Resp:  [16-20] 18  BP: (113-137)/(63-82) 130/82  Vital Signs (last 72 hrs)       04/07 0700  -  04/08 0659 04/08 0700  -  /09 0659 04/09 0700  -  04/10 0659 04/10 0700  -  04/10 1015   Most Recent    Temp (°F) 97.8 -  98.2    98.2 -  98.7    97.4 -  98.5       97.9 (36.6)    Heart Rate 69 -  100    68 -  118    67 -  120      118     118    Resp   18    14 -  20    16 -  20      18     18    /51 -  161/99    110/54 -  138/72    113/74 -  137/63      130/82     130/82    SpO2 (%) 96 -  99    96 -  98    96 -  98      98     98        Body mass index is 22.92 kg/m².    Intake/Output Summary (Last 24 hours) at 4/10/2020 1015  Last data filed at 4/10/2020 0545  Gross per 24 hour   Intake 1297.77 ml   Output 1150 ml   Net 147.77 ml     Objective    Objective       Physical Exam:     General Appearance:    Alert, cooperative, in no acute distress   Head:    Normocephalic, without obvious abnormality, atraumatic   Eyes:            Conjunctivae and sclerae normal, no   icterus, no pallor, corneas clear.   Neck:   No adenopathy, supple, trachea midline, no thyromegaly, no   carotid  bruit, no JVD   Lungs:     Clear to auscultation,respirations regular, even and                  unlabored    Heart:    Regular rhythm and normal rate, normal S1 and S2, no            murmur, no gallop, no rub, no click   Chest Wall:    No abnormalities observed   Abdomen:     Normal bowel sounds, no masses, no organomegaly, soft        non-tender, non-distended, no guarding, no rebound                tenderness   Extremities:   Moves all extremities well, no edema, no cyanosis, no             redness   Pulses:   Pulses palpable and equal bilaterally   Skin  Neurological:   No bleeding, bruising or      Patient is aphasic.  She seems to be moving all the 4 extremities symmetrically at this point.  Tongue is in midline.  No facial asymmetry noted.                Results review       Results Review:   Results from last 7 days   Lab Units 04/10/20  0024 04/09/20  0037 04/07/20  0813 04/06/20  2351   WBC 10*3/mm3 6.09 6.85 7.32 7.65   HEMOGLOBIN g/dL 10.1* 10.2* 11.0* 10.6*   PLATELETS 10*3/mm3 275 288 301 292     Results from last 7 days   Lab Units 04/10/20  0550 04/10/20  0024 04/09/20  1719 04/09/20  1153 04/09/20  0502 04/09/20  0037 04/08/20  1736  04/06/20  2351   SODIUM mmol/L 127* 126* 126* 126* 124* 125* 124*   < > 127*   POTASSIUM mmol/L 5.7* 5.5* 5.5* 5.0 5.2 5.1 5.3*   < > 5.1   CHLORIDE mmol/L 92* 92* 91* 90* 91* 91* 89*   < > 90*   CO2 mmol/L 19.8* 20.8* 20.9* 22.8 20.6* 19.4* 21.0*   < > 24.8   BUN mg/dL 39* 40* 40* 39* 39* 41* 41*   < > 46*   CREATININE mg/dL 1.29* 1.28* 1.41* 1.36* 1.38* 1.38* 1.49*   < > 1.51*   CALCIUM mg/dL 8.5 8.4 8.5 8.7 8.3 8.5 8.7   < > 8.8   GLUCOSE mg/dL 106* 104* 108* 107* 100* 99 100*   < > 114*   ALT (SGPT) U/L  --   --   --   --   --   --   --   --  11   AST (SGOT) U/L  --   --   --   --   --   --   --   --  16    < > = values in this interval not displayed.     Results from last 7 days   Lab Units 04/07/20  1337 04/07/20  0813 04/07/20  0154 04/06/20  7081   TROPONIN T  ng/mL 0.031* 0.041* 0.035* 0.045*     Lab Results   Component Value Date    INR 0.98 04/06/2020    INR 1.00 03/13/2020    INR 1.03 02/06/2019    INR 1.07 06/04/2018    INR 1.05 03/12/2018    INR 0.98 01/25/2018    INR 1.04 11/23/2017     Lab Results   Component Value Date    MG 1.8 03/13/2020    MG 1.9 03/06/2017     Lab Results   Component Value Date    TSH 1.640 04/09/2020    CHLPL 156 05/09/2016    TRIG 42 04/09/2020    HDL 34 (L) 04/07/2020    LDL 66 04/07/2020      Imaging Results (Last 48 Hours)     ** No results found for the last 48 hours. **        Lab Results   Component Value Date    .0 (H) 02/06/2019         ECG      Echo   Results for orders placed during the hospital encounter of 04/06/20   Adult Transthoracic Echo Complete W/ Cont if Necessary Per Protocol    Narrative · Left ventricular wall thickness is consistent with concentric   hypertrophy.  · Left ventricular systolic function is normal, EF 61-65%.  · Left ventricular diastolic dysfunction (grade III) consistent with   reversible restrictive pattern.  · Left atrial cavity size is severely dilated.  · There is mild calcification of the aortic valve.  · Moderate to severe aortic valve stenosis is present, [I suspect aortic   valve area is grossly underestimated].  · Severe mitral valve regurgitation is present  · Mild-to-moderate mitral valve stenosis is present  · Mild tricuspid valve regurgitation is present.           I reviewed the patient's new clinical results.    Telemetry: Earlier revealed paced in the 70s.     Medication Review:     aspirin 81 mg Oral Daily   clopidogrel 75 mg Oral Daily   docusate sodium 100 mg Oral Daily   I-shakir 1 tablet Oral Daily   iron polysacch complex-B12-VitC-FA-Succ 1 capsule Oral Daily With Breakfast   isosorbide mononitrate 30 mg Oral Q24H   lisinopril 20 mg Oral Daily With Dinner   metoprolol tartrate 50 mg Oral Q12H   multivitamin 1 tablet Oral Daily Before Lunch   polyethylene glycol 17 g Oral Daily    ranolazine 500 mg Oral Daily With Dinner   rosuvastatin 5 mg Oral Daily   sodium chloride 10 mL Intravenous Q12H            Assessment    1. Acute aphasia, possible CVA.  2. Paroxysmal atrial fibrillation, deemed to be high risk for anticoagulation due to history of GI bleed, frailty and tendency to fall and her age.  3. ASCVD, status post multiple PCI's with recurrent class IV anginal symptoms.  4. Hyperkalemia.  5. Hyponatremia.    Plan   1. For her possible CVA, will obtain a CT scan of head without contrast stat.  2. We will try to contact her daughter.  I have asked to contact her ASAP.  3. For her hyperkalemia, will discontinue the lisinopril and consider giving Kayexalate if needed.  4. For hyponatremia, continue with fluid restriction for now.      I discussed the patients findings and my recommendations with patient and her nurse and Dr. Minor..      BLAIR Harry  04/10/20  10:15    Please note that portions of this note were completed with a voice recognition program.

## 2020-04-10 NOTE — PLAN OF CARE
Pt with no complaints at this time, no s/s of any distress, no complaints of any pain or discomfort, will continue with current plan of care

## 2020-04-10 NOTE — NURSING NOTE
Dr. Rivas called me to the patient's room and questioned if Ms. Veliz has had any changes this am.  Previously Ms. Veliz was anxious this am and wanted staff to hold her hand.  Ms. Veliz spoke about seeing her children and wished they could be with here at the facility.  Ms. Veliz spoke with the Nursing assistance where they prayed together.  Ms. Veliz states she is use to having her children with her when she is ill.  Dr. Quintana contacted about the patient increased anxiety this am, waiting on new orders.  Ms. Veliz is now aphasic.  Dr. Quintana notified.  See new Orders for follow up care.  Daughter Xenia Hamilton notified and kept apprised about the changes and care being provided for Ms. Veliz.  Chart updated with the correct phone numbers as well as the primary care physician.  Will continue to keep family updated about changes and the care being provided.

## 2020-04-10 NOTE — PROGRESS NOTES
Nephrology Progress Note      Subjective     Patient is not fully oriented and alert, according to nurse she was fine until this morning.      Objective       Vital signs :     Temp:  [97.4 °F (36.3 °C)-98.5 °F (36.9 °C)] 97.9 °F (36.6 °C)  Heart Rate:  [] 118  Resp:  [16-20] 18  BP: (113-137)/(63-82) 130/82      Intake/Output Summary (Last 24 hours) at 4/10/2020 0943  Last data filed at 4/10/2020 0545  Gross per 24 hour   Intake 1297.77 ml   Output 1150 ml   Net 147.77 ml       Physical Exam:    General Appearance : not fully oriented  Lungs : clear to auscultation, respirations regular  Heart :  regular rhythm & normal rate, normal S1, S2 and no murmur, no rub  Abdomen : normal bowel sounds, no masses, no hepatomegaly, no splenomegaly, soft non-tender and no guarding  Extremities : moves extremities well, no edema, no cyanosis and no redness  Neurologic :   orientated to person, place, time and situation, Grossly no focal deficits    Laboratory Data :     Albumin No results found for: ALBUMIN   Magnesium No results found for: MG       PTH               No results found for: PTH    CBC and coagulation:  Results from last 7 days   Lab Units 04/10/20  0024 04/09/20  0037 04/07/20  0813 04/06/20  2351   WBC 10*3/mm3 6.09 6.85 7.32 7.65   HEMOGLOBIN g/dL 10.1* 10.2* 11.0* 10.6*   HEMATOCRIT % 32.1* 32.8* 35.5 33.8*   MCV fL 82.5 82.4 83.7 82.6   MCHC g/dL 31.5 31.1* 31.0* 31.4*   PLATELETS 10*3/mm3 275 288 301 292   INR   --   --   --  0.98     Acid/base balance:      Renal and electrolytes:  Results from last 7 days   Lab Units 04/10/20  0550 04/10/20  0024 04/09/20  1719 04/09/20  1153 04/09/20  0502   SODIUM mmol/L 127* 126* 126* 126* 124*   POTASSIUM mmol/L 5.7* 5.5* 5.5* 5.0 5.2   CHLORIDE mmol/L 92* 92* 91* 90* 91*   CO2 mmol/L 19.8* 20.8* 20.9* 22.8 20.6*   BUN mg/dL 39* 40* 40* 39* 39*   CREATININE mg/dL 1.29* 1.28* 1.41* 1.36* 1.38*   EGFR IF NONAFRICN AM mL/min/1.73 38* 39* 35* 36* 36*   CALCIUM mg/dL  8.5 8.4 8.5 8.7 8.3     Estimated Creatinine Clearance: 24.7 mL/min (A) (by C-G formula based on SCr of 1.29 mg/dL (H)).    Liver and pancreatic function:  Results from last 7 days   Lab Units 04/06/20  2351   ALBUMIN g/dL 3.25*   BILIRUBIN mg/dL 0.2   ALK PHOS U/L 82   AST (SGOT) U/L 16   ALT (SGPT) U/L 11         Cardiac:  Results from last 7 days   Lab Units 04/06/20  2351   PROBNP pg/mL 7,619.0*     Liver and pancreatic function:  Results from last 7 days   Lab Units 04/06/20  2351   ALBUMIN g/dL 3.25*   BILIRUBIN mg/dL 0.2   ALK PHOS U/L 82   AST (SGOT) U/L 16   ALT (SGPT) U/L 11       Medications :       aspirin 81 mg Oral Daily   clopidogrel 75 mg Oral Daily   docusate sodium 100 mg Oral Daily   I-shakir 1 tablet Oral Daily   iron polysacch complex-B12-VitC-FA-Succ 1 capsule Oral Daily With Breakfast   isosorbide mononitrate 30 mg Oral Q24H   lisinopril 20 mg Oral Daily With Dinner   metoprolol tartrate 50 mg Oral Q12H   multivitamin 1 tablet Oral Daily Before Lunch   polyethylene glycol 17 g Oral Daily   ranolazine 500 mg Oral Daily With Dinner   rosuvastatin 5 mg Oral Daily   sodium chloride 10 mL Intravenous Q12H            Assessment/Plan        1. Acue on chronic diastolic heart failure  2. Hyponatremia  3. 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 Essential hypertension  5. AF  6. Acute metabolic encephalopathy  7. 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 Continue on free water restriction 800ml/day  Talked to Dr. Minor and discussed my findings.       Rush Santizo MD  04/10/20  09:43

## 2020-04-10 NOTE — PROGRESS NOTES
Patient has had some improvement in her mental status.  She is able to speak some and and able to write.  Patient denies any complaints other than some pain in her right arm.  She denies any chest pain or palpitations.  Strength is bilaterally symmetrical in upper lower extremity.  Vitals elevated blood pressure will start patient on hydralazine.  Patient also appears anxious and will start as needed hydroxyzine.  If patient does not have improvement, will consult psychiatry.  I called and discussed with patient's daughter in detail patient's current condition and plan.  Questions from patient's daughter and son were answered in detail.  I will check TSH, vitamin B12 and folic acid level.  We will continue to monitor patient closely.  Discussed with the nursing staff.

## 2020-04-11 NOTE — PROGRESS NOTES
LOS: 4 days     Name: Cici Veliz  Age/Sex: 89 y.o. female  :  1925        PCP: Edgar Urias MD    Active Problems:    * No active hospital problems. *      Admission Information: Cici Veliz is a 89 y.o. female with a past medical history significant for coronary artery disease, status post previous multiple PCI's, was admitted with complaints of chest pains and shortness of breath and mildly elevated troponin with flat trend.  She also has paroxysmal atrial fibrillation.  She has previous history of GI bleed.    Chief Complaint: Follow-up acute aphasia, paroxysmal atrial fibrillation    Interval history: The patient was seen and examined.  She remains aphasic. She denies chest pain and palpitations.      Subjective     ROS    Vital Signs  Vital Signs (last 72 hrs)        0700  -   0659  07  -  04/10 0659 04/10 07  -   0659 700  -   1231   Most Recent    Temp (°F) 98.2 -  98.7    97.4 -  98.5    97.9 -  98.4      98.4     98.4 (36.9)    Heart Rate 68 -  118    67 -  120    65 -  121      70     70    Resp 14 -  20    16 -  20    18 -  24      18     18    /54 -  138/72    113/74 -  137/63    130/82 -  202/82      162/72     162/72    SpO2 (%) 96 -  98    96 -  98    94 -  98      96     96        Temp:  [97.9 °F (36.6 °C)-98.4 °F (36.9 °C)] 98.4 °F (36.9 °C)  Heart Rate:  [65-94] 70  Resp:  [18-24] 18  BP: (156-202)/(58-82) 162/72  Body mass index is 22.89 kg/m².      Intake/Output Summary (Last 24 hours) at 2020 1231  Last data filed at 2020 1100  Gross per 24 hour   Intake 340 ml   Output 850 ml   Net -510 ml       Physical Exam   Constitutional: She appears well-developed and well-nourished.   Neck: No JVD present. Carotid bruit is not present.   Cardiovascular: Normal rate and regular rhythm.   No lower extremity edema   Pulmonary/Chest: Effort normal and breath sounds normal. No respiratory distress. She has no wheezes. She has no rales.    Abdominal: Soft. Bowel sounds are normal. She exhibits no distension. There is no tenderness.   Neurological: She is alert.   Equal strength bilateral extremities   Skin: Skin is warm and dry.   Psychiatric: She has a normal mood and affect. Cognition and memory are normal.   Vitals reviewed.      Telemetry:  Sinus 60s. Paced at times 60s       Results Review:     Results from last 7 days   Lab Units 04/11/20  0108 04/10/20  0024 04/09/20  0037 04/07/20  0813 04/06/20  2351   WBC 10*3/mm3 10.58 6.09 6.85 7.32 7.65   HEMOGLOBIN g/dL 11.4* 10.1* 10.2* 11.0* 10.6*   PLATELETS 10*3/mm3 308 275 288 301 292     Results from last 7 days   Lab Units 04/11/20  1127 04/11/20  0727 04/11/20  0412 04/11/20  0108 04/10/20  1908 04/10/20  1616 04/10/20  1405 04/10/20  0550 04/10/20  0024 04/09/20  1719 04/09/20  1153 04/09/20  0502 04/09/20  0037 04/08/20  1736   SODIUM mmol/L 132* 129* 129* 125* 128* 129* 130* 127* 126* 126* 126* 124* 125* 124*   POTASSIUM mmol/L  --  5.0  --   --   --   --   --  5.7* 5.5* 5.5* 5.0 5.2 5.1 5.3*   CHLORIDE mmol/L  --   --   --   --   --   --   --  92* 92* 91* 90* 91* 91* 89*   CO2 mmol/L  --   --   --   --   --   --   --  19.8* 20.8* 20.9* 22.8 20.6* 19.4* 21.0*   BUN mg/dL  --   --   --   --   --   --   --  39* 40* 40* 39* 39* 41* 41*   CREATININE mg/dL  --   --   --   --   --   --   --  1.29* 1.28* 1.41* 1.36* 1.38* 1.38* 1.49*   CALCIUM mg/dL  --   --   --   --   --   --   --  8.5 8.4 8.5 8.7 8.3 8.5 8.7   GLUCOSE mg/dL  --   --   --   --   --   --   --  106* 104* 108* 107* 100* 99 100*     Results from last 7 days   Lab Units 04/10/20  1908 04/07/20  1337 04/07/20  0813 04/07/20  0154 04/06/20  2351   TROPONIN T ng/mL 0.027 0.031* 0.041* 0.035* 0.045*       Results from last 7 days   Lab Units 04/06/20  2351   INR  0.98       I reviewed the patient's new clinical results.  I reviewed the patient's new imaging results and agree with the interpretation.  I personally viewed and interpreted  the patient's EKG/Telemetry data      Medication Review:     aspirin 81 mg Oral Daily   clopidogrel 75 mg Oral Daily   docusate sodium 100 mg Oral Daily   I-shakir 1 tablet Oral Daily   iron polysacch complex-B12-VitC-FA-Succ 1 capsule Oral Daily With Breakfast   isosorbide mononitrate 30 mg Oral Q24H   lactulose 20 g Oral BID   metoprolol tartrate 50 mg Oral Q12H   multivitamin 1 tablet Oral Daily Before Lunch   polyethylene glycol 17 g Oral Daily   ranolazine 500 mg Oral Daily With Dinner   rosuvastatin 5 mg Oral Daily   sodium chloride 10 mL Intravenous Q12H          Assessment:  1. Acute expressive aphasia, CT of head without contrast showed no CT evidence of acute intracranial abnormality, managed per primary.  2. Paroxysmal atrial fibrillation.  ASCVD, status post multiple PCI's with recurrent class IV anginal symptoms, stable  3. Hyperkalemia  4. Hyponatremia      Recommendations:    1. With regard to paroxysmal atrial fibrillation, patient is not on anticoagulation due to history of GI bleed, frailty, tendency to fall, and age.  Expressive aphasia concerning for stroke in the setting of paroxysmal atrial fibrillation without oral anticoagulation.    2. Nephrology is managing patient's hyponatremia.  Pt is on fluid restriction and was given hypertonic solution earlier today.  Continue to monitor.  3. ASCVD is stable.  Continue ASA, Plavix, metoprolol and rosuvastatin.      I discussed the patients findings and my recommendations with patient and family    ОЛЬГА Forbes  04/11/20  12:31 PM    Addendum:

## 2020-04-11 NOTE — NURSING NOTE
"Samina family member called, answered phone for Mrs. Veliz and she handed phone back and said \" No, only uyen or Ramona.\" Family member notified about what patient said and was upset.    "

## 2020-04-11 NOTE — PLAN OF CARE
Patient has rested comfortably throughout shift, she has been up ambulating around the room and to the bedside.  Reports shoulder aches, PRN given.  Will continue to monitor.

## 2020-04-11 NOTE — PLAN OF CARE
Per RN shift change report, pt became increasingly confused during the day w/ ltd speech. Pt continues to reply w/ one word responses and is unable to tell me where she is and is confused as to situation. Na+ redraw was 125. On call nephrology phoned. See orders.  Pt has slept throughout the night w/ no complaints. Family has phoned repeatedly throughout the night to check on status of pt. Reassurance provided to family and status updates given. Will continue to monitor.

## 2020-04-11 NOTE — PROGRESS NOTES
[facility] HOSPITALIST PROGRESS NOTE     Patient Identification:  Name:  Cici Veliz  Age:  89 y.o.  Sex:  female  :  1925  MRN:  [id number]  Visit Number:  [id number]  Primary Care Provider:  Edgar Urias MD    Length of stay:  4    Chief complaint:  89 y.o. old female admitted with Chest Pain          Subjective:    Patient seen with nursing staff.  No family present at bedside.  No acute issues reported by the nursing staff.  Patient still continues to have some word finding difficulty but her speech is slightly improved since yesterday.  Patient denies any complaints today.  No other issues reported by the nursing staff.  Patient stated that she slept well last night.             Current Hospital Meds:    aspirin 81 mg Oral Daily   clopidogrel 75 mg Oral Daily   docusate sodium 100 mg Oral Daily   I-shakir 1 tablet Oral Daily   iron polysacch complex-B12-VitC-FA-Succ 1 capsule Oral Daily With Breakfast   isosorbide mononitrate 30 mg Oral Q24H   lactulose 20 g Oral BID   metoprolol tartrate 50 mg Oral Q12H   multivitamin 1 tablet Oral Daily Before Lunch   polyethylene glycol 17 g Oral Daily   ranolazine 500 mg Oral Daily With Dinner   rosuvastatin 5 mg Oral Daily   sodium chloride 10 mL Intravenous Q12H        ----------------------------------------------------------------------------------------------------------------------  Vital Signs:  Temp:  [97.9 °F (36.6 °C)-98.4 °F (36.9 °C)] 98.4 °F (36.9 °C)  Heart Rate:  [65-94] 70  Resp:  [18-24] 18  BP: (156-202)/(58-82) 162/72      20  0500 04/10/20  0500 20  0500   Weight: 56.8 kg (125 lb 2 oz) 58.7 kg (129 lb 6.4 oz) 58.6 kg (129 lb 3.2 oz)     Body mass index is 22.89 kg/m².    Intake/Output Summary (Last 24 hours) at 2020 1442  Last data filed at 2020 1100  Gross per 24 hour   Intake 340 ml   Output 600 ml   Net -260 ml     Diet Regular;  Cardiac  ----------------------------------------------------------------------------------------------------------------------  Physical exam:  Constitutional:  Well-developed and well-nourished.     HENT:  Head:  Normocephalic and atraumatic.  Mouth:  Moist mucous membranes.    Eyes:  Conjunctivae and EOM are normal.  Pupils are equal, round, and reactive to light.   Neck:  Neck supple.  No JVD present.    Cardiovascular:  Regular rate and rhythm. S1+S2. No murmur, rubs or gallops.   Pulmonary/Chest: Clear to auscultation bilaterally.   Abdominal:  Soft. Non-tender. No viscera palpable.  Bowel sounds audible.   Musculoskeletal: No deformity or joint swelling.   Peripheral vascular: Bilateral dorsalis pedis palpable. No edema.   Neurological: Patient is awake and alert.  Patient has had improvement in her left-sided gaze preference and extraocular movement appears intact. Pt still continues to have word finding difficulty.  Strength remains bilateral symmetrical in upper and lower extremities.  No other cranial nerve deficit noted.   Skin:  Skin is warm and dry. No rash noted. No pallor.   ----------------------------------------------------------------------------------------------------------------------  Tele:    ----------------------------------------------------------------------------------------------------------------------  Results from last 7 days   Lab Units 04/10/20  1908 04/07/20  1337 04/07/20  0813   TROPONIN T ng/mL 0.027 0.031* 0.041*     Results from last 7 days   Lab Units 04/11/20  0108 04/10/20  0024 04/09/20  0037  04/06/20  2351   WBC 10*3/mm3 10.58 6.09 6.85   < > 7.65   HEMOGLOBIN g/dL 11.4* 10.1* 10.2*   < > 10.6*   HEMATOCRIT % 37.7 32.1* 32.8*   < > 33.8*   MCV fL 85.1 82.5 82.4   < > 82.6   MCHC g/dL 30.2* 31.5 31.1*   < > 31.4*   PLATELETS 10*3/mm3 308 275 288   < > 292   INR   --   --   --   --  0.98    < > = values in this interval not displayed.         Results from last 7 days    Lab Units 04/11/20  1127 04/11/20  0727 04/11/20  0412  04/10/20  0550 04/10/20  0024 04/09/20  1719  04/06/20  2351   SODIUM mmol/L 132* 129* 129*   < > 127* 126* 126*   < > 127*   POTASSIUM mmol/L  --  5.0  --   --  5.7* 5.5* 5.5*   < > 5.1   CHLORIDE mmol/L  --   --   --   --  92* 92* 91*   < > 90*   CO2 mmol/L  --   --   --   --  19.8* 20.8* 20.9*   < > 24.8   BUN mg/dL  --   --   --   --  39* 40* 40*   < > 46*   CREATININE mg/dL  --   --   --   --  1.29* 1.28* 1.41*   < > 1.51*   EGFR IF NONAFRICN AM mL/min/1.73  --   --   --   --  38* 39* 35*   < > 32*   CALCIUM mg/dL  --   --   --   --  8.5 8.4 8.5   < > 8.8   GLUCOSE mg/dL  --   --   --   --  106* 104* 108*   < > 114*   ALBUMIN g/dL  --   --   --   --   --   --   --   --  3.25*   BILIRUBIN mg/dL  --   --   --   --   --   --   --   --  0.2   ALK PHOS U/L  --   --   --   --   --   --   --   --  82   AST (SGOT) U/L  --   --   --   --   --   --   --   --  16   ALT (SGPT) U/L  --   --   --   --   --   --   --   --  11    < > = values in this interval not displayed.   Estimated Creatinine Clearance: 24.7 mL/min (A) (by C-G formula based on SCr of 1.29 mg/dL (H)).    No results found for: AMMONIA  Results from last 7 days   Lab Units 04/09/20  1153 04/07/20  0154   CHOLESTEROL mg/dL  --  115   TRIGLYCERIDES mg/dL 42 76   HDL CHOL mg/dL  --  34*   LDL CHOL mg/dL  --  66     No results found for: BLOODCX  No results found for: URINECX  No results found for: WOUNDCX  No results found for: STOOLCX    I have personally looked at the labs and they are summarized above.  ----------------------------------------------------------------------------------------------------------------------  Imaging Results (Last 24 Hours)     ** No results found for the last 24 hours. **        ----------------------------------------------------------------------------------------------------------------------  Assessment and Plan:    -Acute CVA vs TIA  Patient still continues to have  word finding difficulty.  CT scan of the head showed no acute intracranial abnormality.  MRI could not be done due to the presence of pacemaker.    Patient has had some improvement in her neurological deficits since yesterday.    Vitamin B12, folic acid level and TSH is within normal limits.    Patient has history of atrial fibrillation and is not anticoagulated due to history of GI bleeding, advanced age, frailty and risk of fall and therefore is at risk for CVA.  Will order speech therapy and continue PT/OT.  Continue aspirin, Plavix and statin.  Will consider repeating CT scan in 48 to 72 hours if neurological deficits persist.      -Chest pain  Pt is chest pain free. Troponins were elevated to 0.045 on admission and trended down.  EKG continues to show ventricular paced rhythm.   Cardiology on board and advised to continue medical management. Continue aspirin, plavix, statin, Imdur, Ranexa and metoprolol.  Lisinopril held due to hyperkalemia.     -Acute on chronic diastolic CHF exacerbation  Dyspnea is improving.  Patient has diuresed well and net fluid balance is 3200 mL.  Diuretics held due to hyponatremia and patient has had that diuresis in last 44 hours in spite of being off of diuretics.  Continue to monitor intake/output, daily weight, electrolytes renal function.    Echocardiogram showed concentric LVH, normal LV SF, EF of 61 to 65%, grade 3 diastolic dysfunction, severely dilated LA cavity, moderate to severe AVS, severe MVR, mild to moderate MDS and a mild TVR.  Cardiology on board and appreciate help from cardiology.    -Hyponatremia, likely due to SIADH.  Improving.  Sodium level is up to 129 today.  Patient received hypertonic normal saline yesterday per nephrology recommendations.  TSH is within normal limits. Continue to keep Lasix on hold. Continue to monitor sodium level.  Nephrology on board.  Continue to monitor sodium level closely.    -CKD stage III  Creatinine is improved to 1.3 from 1.5 on  admission.  Continue to hold Lasix and monitor renal function avoid nephrotoxic agents.      -COPD  No signs symptoms of acute exacerbation.  Continue duo nebs as needed.    -Paroxysmal A. Fib s/p pacemaker in place  Rate controlled.  Continue metoprolol for rate control.  Patient is on anticoagulation due to advanced age and risk of fall.    -Essential HTN  Blood pressure well controlled.  Continue metoprolol, lisinopril and nitrate.    Activity: Up with assist, advised nursing staff to ambulate the patient.  Nutrition: Cardiac diet  Fluids: None  DVT prophylaxis: Heparin SQ    The patient is high risk due to: ASCVD, CHF, hyponatremia, CKD, COPD    I discussed the patients findings and my recommendations with patient and nursing staff.    Rush Minor MD  04/11/20  14:42

## 2020-04-11 NOTE — PROGRESS NOTES
Nephrology Progress Note      Subjective     Patient is not fully oriented and alert, according to nurse she was fine until this morning.      Objective       Vital signs :     Temp:  [97.9 °F (36.6 °C)-98.4 °F (36.9 °C)] 97.9 °F (36.6 °C)  Heart Rate:  [] 72  Resp:  [18-24] 18  BP: (137-202)/(58-82) 159/58      Intake/Output Summary (Last 24 hours) at 4/11/2020 1030  Last data filed at 4/11/2020 0849  Gross per 24 hour   Intake 340 ml   Output 700 ml   Net -360 ml       Physical Exam:    General Appearance : not fully oriented  Lungs : clear to auscultation, respirations regular  Heart :  regular rhythm & normal rate, normal S1, S2 and no murmur, no rub  Abdomen : normal bowel sounds, no masses, no hepatomegaly, no splenomegaly, soft non-tender and no guarding  Extremities : moves extremities well, no edema, no cyanosis and no redness  Neurologic :   orientated to person, place, time and situation, Grossly no focal deficits    Laboratory Data :     Albumin No results found for: ALBUMIN   Magnesium No results found for: MG       PTH               No results found for: PTH    CBC and coagulation:  Results from last 7 days   Lab Units 04/11/20  0108 04/10/20  0024 04/09/20  0037  04/06/20  2351   WBC 10*3/mm3 10.58 6.09 6.85   < > 7.65   HEMOGLOBIN g/dL 11.4* 10.1* 10.2*   < > 10.6*   HEMATOCRIT % 37.7 32.1* 32.8*   < > 33.8*   MCV fL 85.1 82.5 82.4   < > 82.6   MCHC g/dL 30.2* 31.5 31.1*   < > 31.4*   PLATELETS 10*3/mm3 308 275 288   < > 292   INR   --   --   --   --  0.98    < > = values in this interval not displayed.     Acid/base balance:      Renal and electrolytes:  Results from last 7 days   Lab Units 04/11/20  0727 04/11/20  0412 04/11/20  0108 04/10/20  1908 04/10/20  1616  04/10/20  0550 04/10/20  0024 04/09/20  1719 04/09/20  1153 04/09/20  0502   SODIUM mmol/L 129* 129* 125* 128* 129*   < > 127* 126* 126* 126* 124*   POTASSIUM mmol/L 5.0  --   --   --   --   --  5.7* 5.5* 5.5* 5.0 5.2   CHLORIDE  mmol/L  --   --   --   --   --   --  92* 92* 91* 90* 91*   CO2 mmol/L  --   --   --   --   --   --  19.8* 20.8* 20.9* 22.8 20.6*   BUN mg/dL  --   --   --   --   --   --  39* 40* 40* 39* 39*   CREATININE mg/dL  --   --   --   --   --   --  1.29* 1.28* 1.41* 1.36* 1.38*   EGFR IF NONAFRICN AM mL/min/1.73  --   --   --   --   --   --  38* 39* 35* 36* 36*   CALCIUM mg/dL  --   --   --   --   --   --  8.5 8.4 8.5 8.7 8.3    < > = values in this interval not displayed.     Estimated Creatinine Clearance: 24.7 mL/min (A) (by C-G formula based on SCr of 1.29 mg/dL (H)).    Liver and pancreatic function:  Results from last 7 days   Lab Units 04/06/20  2351   ALBUMIN g/dL 3.25*   BILIRUBIN mg/dL 0.2   ALK PHOS U/L 82   AST (SGOT) U/L 16   ALT (SGPT) U/L 11         Cardiac:  Results from last 7 days   Lab Units 04/06/20  2351   PROBNP pg/mL 7,619.0*     Liver and pancreatic function:  Results from last 7 days   Lab Units 04/06/20  2351   ALBUMIN g/dL 3.25*   BILIRUBIN mg/dL 0.2   ALK PHOS U/L 82   AST (SGOT) U/L 16   ALT (SGPT) U/L 11       Medications :       aspirin 81 mg Oral Daily   clopidogrel 75 mg Oral Daily   docusate sodium 100 mg Oral Daily   I-shakir 1 tablet Oral Daily   iron polysacch complex-B12-VitC-FA-Succ 1 capsule Oral Daily With Breakfast   isosorbide mononitrate 30 mg Oral Q24H   lactulose 20 g Oral BID   metoprolol tartrate 50 mg Oral Q12H   multivitamin 1 tablet Oral Daily Before Lunch   polyethylene glycol 17 g Oral Daily   ranolazine 500 mg Oral Daily With Dinner   rosuvastatin 5 mg Oral Daily   sodium chloride 10 mL Intravenous Q12H   sodium polystyrene 15 g Oral Q12H            Assessment/Plan        1. Acue on chronic diastolic heart failure  2. Hyponatremia  3. 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 Essential hypertension  5. AF  6. Acute metabolic encephalopathy  7. Hyperkalemia     Na is appropriately improving 129 today, received 100ml of 3% saline  overnight.     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   Continue on free water restriction 800ml/day  F/U urine lytes.       Rush Santizo MD  04/11/20  10:30

## 2020-04-12 NOTE — PROGRESS NOTES
LOS: 5 days     Name: Cici Veliz  Age/Sex: 89 y.o. female  :  1925        PCP: Edgar Urias MD    Active Problems:    * No active hospital problems. *      Admission Information: Cici Veliz is a 89 y.o. female with a past medical history significant for coronary artery disease, status post previous multiple PCI's, was admitted with complaints of chest pains and shortness of breath and mildly elevated troponin with flat trend.  She also has paroxysmal atrial fibrillation.  She has previous history of GI bleed.    Chief Complaint: follow up acute aphasia, paroxysmal atrial fibrillation    Interval history: The patient was seen and examined.   She contiue to exhibit expressive aphasia, however she is able to say additional words today.  She answers questions appropriately.  She denies chest pain and palpitations.  She report that her legs feel weak.      Subjective     Vital Signs  Vital Signs (last 72 hrs)        07  -  04/10 0659 04/10 07  -   0659  07  -   0659 700  -   1118   Most Recent    Temp (°F) 97.4 -  98.5    97.9 -  98.4    97.5 -  98.4       97.5 (36.4)    Heart Rate 67 -  120    65 -  121    60 -  71       60    Resp 16 -  20    18 -  24    18 -  20       18    /74 -  137/63    130/82 -  202/82    141/67 -  162/72       151/63    SpO2 (%) 96 -  98    94 -  98    94 -  98       94        Temp:  [97.5 °F (36.4 °C)-98.1 °F (36.7 °C)] 97.5 °F (36.4 °C)  Heart Rate:  [60-71] 60  Resp:  [18-20] 18  BP: (141-160)/(60-72) 151/63  Body mass index is 22.39 kg/m².      Intake/Output Summary (Last 24 hours) at 2020 1118  Last data filed at 2020 0500  Gross per 24 hour   Intake 360 ml   Output 200 ml   Net 160 ml       Physical Exam   Constitutional: She is oriented to person, place, and time. She appears well-developed and well-nourished.   Neck: No JVD present. Carotid bruit is not present.   Cardiovascular: Normal rate and regular rhythm.    No lower extremity edema   Pulmonary/Chest: Effort normal and breath sounds normal. No respiratory distress. She has no wheezes. She has no rales.   Neurological: She is alert and oriented to person, place, and time.   Skin: Skin is warm and dry.   Psychiatric: She has a normal mood and affect. Cognition and memory are normal.   Vitals reviewed.      Telemetry: Paced 60s       Results Review:     Results from last 7 days   Lab Units 04/12/20  0045 04/11/20  0108 04/10/20  0024 04/09/20  0037 04/07/20  0813 04/06/20  2351   WBC 10*3/mm3 6.27 10.58 6.09 6.85 7.32 7.65   HEMOGLOBIN g/dL 9.6* 11.4* 10.1* 10.2* 11.0* 10.6*   PLATELETS 10*3/mm3 265 308 275 288 301 292     Results from last 7 days   Lab Units 04/12/20  0759 04/12/20  0555 04/12/20  0045 04/11/20  2017 04/11/20  1711 04/11/20  1127 04/11/20  0727  04/10/20  0550 04/10/20  0024 04/09/20  1719 04/09/20  1153 04/09/20  0502 04/09/20  0037   SODIUM mmol/L 134* 134* 133* 132* 132* 132* 129*   < > 127* 126* 126* 126* 124* 125*   POTASSIUM mmol/L  --   --  4.8  --   --   --  5.0  --  5.7* 5.5* 5.5* 5.0 5.2 5.1   CHLORIDE mmol/L  --   --  101  --   --   --   --   --  92* 92* 91* 90* 91* 91*   CO2 mmol/L  --   --  18.3*  --   --   --   --   --  19.8* 20.8* 20.9* 22.8 20.6* 19.4*   BUN mg/dL  --   --  33*  --   --   --   --   --  39* 40* 40* 39* 39* 41*   CREATININE mg/dL  --   --  1.23*  --   --   --   --   --  1.29* 1.28* 1.41* 1.36* 1.38* 1.38*   CALCIUM mg/dL  --   --  8.3  --   --   --   --   --  8.5 8.4 8.5 8.7 8.3 8.5   GLUCOSE mg/dL  --   --  92  --   --   --   --   --  106* 104* 108* 107* 100* 99    < > = values in this interval not displayed.     Results from last 7 days   Lab Units 04/10/20  1908 04/07/20  1337 04/07/20  0813 04/07/20  0154 04/06/20  2351   TROPONIN T ng/mL 0.027 0.031* 0.041* 0.035* 0.045*       Results from last 7 days   Lab Units 04/06/20  2351   INR  0.98       I reviewed the patient's new clinical results.  I reviewed the patient's  new imaging results and agree with the interpretation.  I personally viewed and interpreted the patient's EKG/Telemetry data      Medication Review:     aspirin 81 mg Oral Daily   clopidogrel 75 mg Oral Daily   docusate sodium 100 mg Oral Daily   I-shakir 1 tablet Oral Daily   iron polysacch complex-B12-VitC-FA-Succ 1 capsule Oral Daily With Breakfast   isosorbide mononitrate 30 mg Oral Q24H   lactulose 20 g Oral BID   metoprolol tartrate 50 mg Oral Q12H   multivitamin 1 tablet Oral Daily Before Lunch   polyethylene glycol 17 g Oral Daily   ranolazine 500 mg Oral Daily With Dinner   rosuvastatin 5 mg Oral Daily   sodium chloride 10 mL Intravenous Q12H          Assessment:  1. Acute expressive aphasia, CT of head without contrast showed no CT evidence of acute intracranial abnormality, managed per primary.  2. Paroxysmal atrial fibrillation.  ASCVD, status post multiple PCI's with recurrent class IV anginal symptoms, stable  3. Hyperkalemia  4. Hyponatremia, resolving      Recommendations:  1. With regard to paroxysmal atrial fibrillation, patient is not on anticoagulation due to history of GI bleed, frailty, tendency to fall, and age.  Expressive aphasia concerning for stroke in the setting of paroxysmal atrial fibrillation without oral anticoagulation.    2. Nephrology is managing patient's hyponatremia.  Pt is on fluid restriction.  Sodium up to 134 today.    3. ASCVD is stable.  Continue ASA, Plavix, metoprolol and rosuvastatin.      I discussed the patients findings and my recommendations with patient and family    Venita Babin, ОЛЬГА  04/12/20  11:18 AM    Addendum:

## 2020-04-12 NOTE — DISCHARGE SUMMARY
[facility] Medicine Services  DISCHARGE SUMMARY    Patient Identification:  Name:  Cici Veliz  Age:  89 y.o.  Sex:  female  :  1925  MRN:  [id number]  Visit Number:  [id number]    Date of Admission: 2020  Date of Discharge:  2020    PCP: Edgar Urias MD      Admission/Discharge Diagnoses     Discharge Diagnoses:  · Acute CVA versus TIA causing word finding difficulty  · Chest pain, resolved  · ASCVD  · Acute decompensation of chronic diastolic CHF, improved  · Hyponatremia due to SIADH, improved  · CKD stage III, stable  · COPD  · Paroxysmal atrial fibrillation status post pacemaker in place  · Essential HTN    Consults/Procedures     Consults:   Consults     Date and Time Order Name Status Description    2020 0944 Inpatient Nephrology Consult Completed     2020 1045 Inpatient Cardiology Consult            Procedures Performed:         History of Presenting Illness   Patient is a 89 y.o. female presented to [facility] complaining of chest pain.  Please see the admitting history and physical for further details.    Hospital Course   Cici Veliz is a 89 y.o. female with PMH significant for anemia, bradycardia, cerebrovascular disease, CKD stage III, COPD, CAD s/p cardiac catheterization 2015 with patent stents revealed, degenerative disc disease, diastolic heart failure, essential hypertension, hyperlipidemia, hyperparathyroidism, paroxysmal atrial fibrillation, peptic ulcer disease, CVA and urinary incontinence presented to the emergency department complaints of shortness of breath and chest pain.  Patient was admitted for further evaluation management.    Patient had complaints of chest pain initially but remained chest pain-free during the course of hospitalization.  Her troponins were elevated to 0.045 on admission and then trended down.  EKG showed ventricular paced rhythm.  Cardiology was consulted initially patient was advised stress  test but patient did not want stress test done.  Cardiology agreed in the light of patient's advanced age and the fact that patient did not have any further chest pain.  She was continued on aspirin, Plavix, statin, Imdur, Ranexa and metoprolol.  Her lisinopril was held due to hyperkalemia.    During the course of hospitalization patient had an acute episode where she developed altered mental status and was unable to speak.  CT scan of the head showed no acute intracranial abnormality.  MRI could not be done due to the presence of pacemaker.  She had some improvement in her neurological deficits but still continues to have word finding difficulty.  No other focal neurological weakness or deficit observed with strength bilaterally symmetrical in upper and lower extremities. Vitamin B12, folic acid level and TSH is within normal limits.   Patient has history of atrial fibrillation and is not anticoagulated due to history of GI bleeding, advanced age, frailty and risk of fall and therefore is at risk for CVA.  She was continue aspirin, Plavix and statin.    Patient had dyspnea on admission which was thought to be secondary to CHF decompensation.  She was diuresed and net fluid balance was -3200 mL during the course of hospitalization.  Patient developed hyponatremia and her diuretics were held.  She had improvement in her hyponatremia and was restarted back on her diuretics per nephrology recommendations.  Echocardiogram showed concentric LVH, normal LV SF, EF of 61 to 65%, grade 3 diastolic dysfunction, severely dilated LA cavity, moderate to severe AVS, severe MVR, mild to moderate MDS and a mild TVR.    Patient has developed hyponatremia during the course of hospitalization was seen by nephrology.  She had also developed mental status changes as outlined above and was given hypertonic normal saline per nephrology recommendation.  She had improvement in her sodium level.    She has chronic kidney disease stage III  and creatinine most remained stable during the course of hospitalization.  She was continued on metoprolol for rate control of her atrial fibrillation.  Patient is on anticoagulation due to advanced age and risk of fall.    Patient is felt to have achieved max benefit of hospitalization and is being discharged in stable condition.       Discharge Vitals/Physical Examination     Vital Signs:  Temp:  [97.5 °F (36.4 °C)-98.1 °F (36.7 °C)] 97.8 °F (36.6 °C)  Heart Rate:  [60-71] 60  Resp:  [18-20] 18  BP: (137-160)/(55-72) 137/55  Mean Arterial Pressure (Non-Invasive) for the past 24 hrs (Last 3 readings):   Noninvasive MAP (mmHg)   04/12/20 1030 75   04/12/20 0625 100   04/12/20 0313 100     SpO2 Percentage    04/12/20 0313 04/12/20 0625 04/12/20 1030   SpO2: 96% 94% 96%     SpO2:  [94 %-98 %] 96 %  on   ;   Device (Oxygen Therapy): room air    Body mass index is 22.39 kg/m².  Wt Readings from Last 3 Encounters:   04/12/20 57.3 kg (126 lb 6.4 oz)   03/27/20 59 kg (130 lb)   03/13/20 60.8 kg (134 lb)         Physical Exam:  GEN: Well-developed and well-nourished.     EYES: Pupils are equal, round, and reactive to light.   HENT: Neck supple.  No JVD present.   CV: Regular rate and rhythm. S1+S2. No murmur, rubs or gallops.   PULM: Lungs are clear to auscultation bilaterally.  GI: Abdomen is soft and nontender, no viscera palpable and bowel sounds audible.  CNS: Patient is awake and alert.  Patient has had improvement in her left-sided gaze preference and extraocular movement appears intact. Pt still continues to have word finding difficulty.  Strength remains bilateral symmetrical in upper and lower extremities.  No other cranial nerve deficit noted  SKIN: Skin is warm and dry.  No rash noted.  No pallor.  MSK: No deformity or joint swelling.      Pertinent Laboratory/Radiology Results     Pertinent Laboratory Results:  Results from last 7 days   Lab Units 04/10/20  1908 04/07/20  1337 04/07/20  0813   TROPONIN T ng/mL  0.027 0.031* 0.041*     Results from last 7 days   Lab Units 04/06/20  2351   PROBNP pg/mL 7,619.0*     Results from last 7 days   Lab Units 04/09/20  1153 04/07/20  0154   CHOLESTEROL mg/dL  --  115   TRIGLYCERIDES mg/dL 42 76   HDL CHOL mg/dL  --  34*   LDL CHOL mg/dL  --  66       Results from last 7 days   Lab Units 04/12/20  0045 04/11/20  0108 04/10/20  0024  04/06/20  2351   WBC 10*3/mm3 6.27 10.58 6.09   < > 7.65   HEMOGLOBIN g/dL 9.6* 11.4* 10.1*   < > 10.6*   HEMATOCRIT % 31.2* 37.7 32.1*   < > 33.8*   MCV fL 85.2 85.1 82.5   < > 82.6   MCHC g/dL 30.8* 30.2* 31.5   < > 31.4*   PLATELETS 10*3/mm3 265 308 275   < > 292   INR   --   --   --   --  0.98    < > = values in this interval not displayed.     Results from last 7 days   Lab Units 04/12/20  1129 04/12/20  0759 04/12/20  0555 04/12/20  0045  04/11/20  0727  04/10/20  0550 04/10/20  0024  04/06/20  2351   SODIUM mmol/L 135* 134* 134* 133*   < > 129*   < > 127* 126*   < > 127*   POTASSIUM mmol/L  --   --   --  4.8  --  5.0  --  5.7* 5.5*   < > 5.1   CHLORIDE mmol/L  --   --   --  101  --   --   --  92* 92*   < > 90*   CO2 mmol/L  --   --   --  18.3*  --   --   --  19.8* 20.8*   < > 24.8   BUN mg/dL  --   --   --  33*  --   --   --  39* 40*   < > 46*   CREATININE mg/dL  --   --   --  1.23*  --   --   --  1.29* 1.28*   < > 1.51*   EGFR IF NONAFRICN AM mL/min/1.73  --   --   --  41*  --   --   --  38* 39*   < > 32*   CALCIUM mg/dL  --   --   --  8.3  --   --   --  8.5 8.4   < > 8.8   GLUCOSE mg/dL  --   --   --  92  --   --   --  106* 104*   < > 114*   ALBUMIN g/dL  --   --   --   --   --   --   --   --   --   --  3.25*   BILIRUBIN mg/dL  --   --   --   --   --   --   --   --   --   --  0.2   ALK PHOS U/L  --   --   --   --   --   --   --   --   --   --  82   AST (SGOT) U/L  --   --   --   --   --   --   --   --   --   --  16   ALT (SGPT) U/L  --   --   --   --   --   --   --   --   --   --  11    < > = values in this interval not displayed.   Estimated  Creatinine Clearance: 25.3 mL/min (A) (by C-G formula based on SCr of 1.23 mg/dL (H)).  No results found for: AMMONIA    No results found for: HGBA1C, POCGLU  Lab Results   Component Value Date    HGBA1C 5.50 05/19/2017     Lab Results   Component Value Date    TSH 1.640 04/09/2020    FREET4 0.98 04/07/2020       No results found for: BLOODCX  No results found for: URINECX  No results found for: WOUNDCX  No results found for: STOOLCX  No results found for: RESPCX  Microbiology Results (last 10 days)     ** No results found for the last 240 hours. **        Pain Management Panel     Pain Management Panel Latest Ref Rng & Units 12/28/2018 1/22/2017    CREATININE UR mg/dL 93.0 -    AMPHETAMINES SCREEN, URINE Negative - Negative    BARBITURATES SCREEN Negative - Negative    BENZODIAZEPINE SCREEN, URINE Negative - Negative    COCAINE SCREEN, URINE Negative - Negative    METHADONE SCREEN, URINE Negative - Negative          Pertinent Radiology Results:  Imaging Results (All)     Procedure Component Value Units Date/Time    CT Head Without Contrast [[id number]] Collected:  04/10/20 1107     Updated:  04/10/20 1110    Narrative:       EXAMINATION: CT HEAD WO CONTRAST-      CLINICAL INDICATION:     Focal neuro deficit, new, fixed or worsening,  <6 hours; R07.9-Chest pain, unspecified; J81.0-Acute pulmonary edema;  R79.89-Other specified abnormal findings of blood chemistry     COMPARISON:    04/15/2019     Technique: Multiple CT axial images were obtained through the level of  the brain without IV contrast administration. Reformatted images in the  coronal and/or sagittal plane(s) were generated from the axial data set  to facilitate diagnostic accuracy and/or surgical planning.     Radiation dose reduction techniques were utilized per ALARA protocol.  Automated exposure control was initiated through either or [employer] or  DoseRight software packages by  protocol.       DOSE (DLP mGy-cm): 1069.15 mGy.cm      FINDINGS:     Brain: Unremarkable. No parenchymal hemorrhage or mass. No white matter  abnormality. No areas of mass effect. Stable senescent changes again  with age-related atrophy and mild chronic small vessel ischemic disease.  Ventricles: Unremarkable. No hydrocephalus.  Extra-axial spaces: Stable ossification along the falx with no  convincing evidence of extra-axial hemorrhage.  Bones: Unremarkable. No acute fracture identified.  Sinuses: Unremarkable as visualized. No air-fluid levels.  Mastoids: Unremarkable. No mastoid effusions.  Soft Tissues: Unremarkable.          Impression:       1. No CT evidence of acute intracranial abnormality.  2. Stable senescent changes.     This report was finalized on 4/10/2020 11:08 AM by Dr. Williams Lamas MD.       XR Chest 1 View [[id number]] Collected:  04/07/20 0255     Updated:  04/07/20 0257    Narrative:       CHEST X-RAY, 4/7/2020      HISTORY:    89 year-old female the ED complaining of chest pain.      TECHNIQUE:  AP portable chest x-ray.    COMPARISON:  *  Chest x-ray, 3/27/2020 and 3/13/2020.    FINDINGS:  Mild to moderate cardiomegaly is stable. Mild diffuse interstitial pulmonary edema suggests probable vascular congestion or volume overload. This is superimposed on a background of mild diffuse chronic interstitial changes. There is no airspace  consolidation or visible pleural effusion. Dual-chamber cardiac pacemaker in good position.      Impression:       1. Cardiomegaly and mild diffuse interstitial pulmonary edema.  2. Background mild diffuse chronic interstitial fibrotic changes.    Signer Name: Sonny Vazquez MD   Signed: 4/7/2020 2:55 AM   Workstation Name: [email or web address]    [facility]          Test Results Pending at Discharge:      Discharge Disposition/Discharge Medications/Discharge Appointments     Discharge Disposition:   Home-Health Care c    Condition at Discharge:  Stable       Discharge Diet:  Diet Instructions      Diet: Regular, Cardiac      Discharge Diet:   Regular  Cardiac       Please restrict to 1-1.5 L free water restriction        Diet as Tolerated                  Discharge Activity:  Activity Instructions     Activity as Tolerated      Additional Activity Instructions:      Activity as Tolerated                  Code Status While Inpatient:  Code Status and Medical Interventions:   Ordered at: 04/07/20 0339     Level Of Support Discussed With:    Patient     Code Status:    CPR     Medical Interventions (Level of Support Prior to Arrest):    Full       Discharge Medications:     Discharge Medications      New Medications      Instructions Start Date   isosorbide mononitrate 30 MG 24 hr tablet  Commonly known as:  IMDUR   30 mg, Oral, Every 24 Hours Scheduled   Start Date:  April 13, 2020     metoprolol tartrate 50 MG tablet  Commonly known as:  LOPRESSOR   50 mg, Oral, Every 12 Hours Scheduled         Continue These Medications      Instructions Start Date   aspirin 81 MG tablet   81 mg, Oral, Daily      clopidogrel 75 MG tablet  Commonly known as:  PLAVIX   75 mg, Oral, Daily      furosemide 20 MG tablet  Commonly known as:  LASIX   20 mg, Oral, 2 Times Daily      multivitamin tablet tablet   1 tablet, Oral, Daily Before Lunch      nitroglycerin 0.4 MG SL tablet  Commonly known as:  NITROSTAT   0.4 mg, Sublingual, Every 5 Minutes PRN      OCUVITE ADULT 50+ PO   1 tablet, Oral, Daily Before Lunch      polysacchar iron-FA-B12 150-1-25 MG-MG-MCG capsule capsule  Commonly known as:  FERREX 150 FORTE   1 capsule, Oral, Daily With Breakfast      ranolazine 500 MG 12 hr tablet  Commonly known as:  RANEXA   500 mg, Oral, Daily With Dinner      rosuvastatin 5 MG tablet  Commonly known as:  CRESTOR   5 mg, Oral, Nightly         Stop These Medications    lisinopril 20 MG tablet  Commonly known as:  PRINIVIL,ZESTRIL     metoprolol succinate  MG 24 hr tablet  Commonly known as:  TOPROL-XL     metoprolol succinate XL 50  MG 24 hr tablet  Commonly known as:  TOPROL-XL            Discharge Appointments:  Your Scheduled Appointments    Jun 16, 2020  2:30 PM EDT  LAB with RAY NURSE LAB  [facility] HEMATOLOGY  AND ONCOLOGY ([place]) [address] [address] [place] [address]  [phone]      Jun 16, 2020  3:00 PM EDT  FOLLOW UP with ОЛЬГА Thompson  [facility] HEMATOLOGY  AND ONCOLOGY ([place]) [address] [place] [address]  [phone]      Aug 13, 2020  1:30 PM EDT  Follow Up with ОЛЬГА Ptits  [facility] PULMONOLOGY CRITICAL CARE (--) [address] [facility] [address]  [facility] [address] [phone]   Arrive 15 minutes prior to appointment.            Additional Instructions for the Follow-ups that You Need to Schedule     Discharge Follow-up with PCP   As directed       Currently Documented PCP:    Edgar Urias MD    PCP Phone Number:    [phone]     Follow Up Details:  1 week         Discharge Follow-up with Specified Provider: Cardiology   As directed      To:  Cardiology    Follow Up Details:  1-2 weeks                   Rush Minor MD  Hospitalist Service -- [facility]       04/12/20  14:23    Discharge Time:   Please note that this discharge summary required more than 30 minutes to complete.    Please send a copy of this dictation to the following providers:    Edgar Urias MD

## 2020-04-12 NOTE — PLAN OF CARE
Pt's cognition seems to be improving. Pt has rested well throughout the night. Will continue to monitor.

## 2020-04-12 NOTE — PROGRESS NOTES
Nephrology Progress Note      Subjective     Patient is not fully oriented and alert, according to nurse she was fine until this morning.      Objective       Vital signs :     Temp:  [97.5 °F (36.4 °C)-98.1 °F (36.7 °C)] 97.5 °F (36.4 °C)  Heart Rate:  [60-71] 60  Resp:  [18-20] 18  BP: (141-160)/(60-72) 151/63      Intake/Output Summary (Last 24 hours) at 4/12/2020 1150  Last data filed at 4/12/2020 0500  Gross per 24 hour   Intake 360 ml   Output 200 ml   Net 160 ml       Physical Exam:    General Appearance : not fully oriented  Lungs : clear to auscultation, respirations regular  Heart :  regular rhythm & normal rate, normal S1, S2 and no murmur, no rub  Abdomen : normal bowel sounds, no masses, no hepatomegaly, no splenomegaly, soft non-tender and no guarding  Extremities : moves extremities well, no edema, no cyanosis and no redness  Neurologic :   orientated to person, place, time and situation, Grossly no focal deficits    Laboratory Data :     Albumin No results found for: ALBUMIN   Magnesium No results found for: MG       PTH               No results found for: PTH    CBC and coagulation:  Results from last 7 days   Lab Units 04/12/20  0045 04/11/20  0108 04/10/20  0024  04/06/20  2351   WBC 10*3/mm3 6.27 10.58 6.09   < > 7.65   HEMOGLOBIN g/dL 9.6* 11.4* 10.1*   < > 10.6*   HEMATOCRIT % 31.2* 37.7 32.1*   < > 33.8*   MCV fL 85.2 85.1 82.5   < > 82.6   MCHC g/dL 30.8* 30.2* 31.5   < > 31.4*   PLATELETS 10*3/mm3 265 308 275   < > 292   INR   --   --   --   --  0.98    < > = values in this interval not displayed.     Acid/base balance:      Renal and electrolytes:  Results from last 7 days   Lab Units 04/12/20  0759 04/12/20  0555 04/12/20  0045 04/11/20  2017 04/11/20  1711  04/11/20  0727  04/10/20  0550 04/10/20  0024 04/09/20  1719 04/09/20  1153   SODIUM mmol/L 134* 134* 133* 132* 132*   < > 129*   < > 127* 126* 126* 126*   POTASSIUM mmol/L  --   --  4.8  --   --   --  5.0  --  5.7* 5.5* 5.5* 5.0    CHLORIDE mmol/L  --   --  101  --   --   --   --   --  92* 92* 91* 90*   CO2 mmol/L  --   --  18.3*  --   --   --   --   --  19.8* 20.8* 20.9* 22.8   BUN mg/dL  --   --  33*  --   --   --   --   --  39* 40* 40* 39*   CREATININE mg/dL  --   --  1.23*  --   --   --   --   --  1.29* 1.28* 1.41* 1.36*   EGFR IF NONAFRICN AM mL/min/1.73  --   --  41*  --   --   --   --   --  38* 39* 35* 36*   CALCIUM mg/dL  --   --  8.3  --   --   --   --   --  8.5 8.4 8.5 8.7    < > = values in this interval not displayed.     Estimated Creatinine Clearance: 25.3 mL/min (A) (by C-G formula based on SCr of 1.23 mg/dL (H)).    Liver and pancreatic function:  Results from last 7 days   Lab Units 04/06/20  2351   ALBUMIN g/dL 3.25*   BILIRUBIN mg/dL 0.2   ALK PHOS U/L 82   AST (SGOT) U/L 16   ALT (SGPT) U/L 11         Cardiac:  Results from last 7 days   Lab Units 04/06/20  2351   PROBNP pg/mL 7,619.0*     Liver and pancreatic function:  Results from last 7 days   Lab Units 04/06/20  2351   ALBUMIN g/dL 3.25*   BILIRUBIN mg/dL 0.2   ALK PHOS U/L 82   AST (SGOT) U/L 16   ALT (SGPT) U/L 11       Medications :       aspirin 81 mg Oral Daily   clopidogrel 75 mg Oral Daily   docusate sodium 100 mg Oral Daily   I-shakir 1 tablet Oral Daily   iron polysacch complex-B12-VitC-FA-Succ 1 capsule Oral Daily With Breakfast   isosorbide mononitrate 30 mg Oral Q24H   lactulose 20 g Oral BID   metoprolol tartrate 50 mg Oral Q12H   multivitamin 1 tablet Oral Daily Before Lunch   polyethylene glycol 17 g Oral Daily   ranolazine 500 mg Oral Daily With Dinner   rosuvastatin 5 mg Oral Daily   sodium chloride 10 mL Intravenous Q12H            Assessment/Plan        1. Acue on chronic diastolic heart failure  2. Hyponatremia  3. 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 Essential hypertension  5. AF  6. Acute metabolic encephalopathy  7. Hyperkalemia     Na has improved to 134, repeat Usom 519 with Katharine 27.      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         Rush Santizo MD  04/12/20  11:50

## 2020-04-12 NOTE — NURSING NOTE
Report called to [facility], spoke with Jaleesa who said they would come out to see the patient on Monday 4/13/2020

## 2020-04-13 NOTE — PROGRESS NOTES
Discharge Planning Assessment   [place]     Patient Name: Cici Veliz  MRN: [id number]  Today's Date: 4/13/2020    Admit Date: 4/6/2020        Discharge Plan     Row Name 04/13/20 0917       Plan    Final Discharge Disposition Code  06 - home with home health care    Final Note  [facility]    Row Name 04/13/20 0637       Plan    Final Discharge Disposition Code  06 - home with home health care                MEHNAZ EdwardsW

## 2020-04-14 NOTE — OUTREACH NOTE
CHF Week 1 Survey      Responses   [facility] patient discharged from?  [facility]   Does the patient have one of the following disease processes/diagnoses(primary or secondary)?  CHF   Is there a successful TCM telephone encounter documented?  No   CHF Week 1 attempt successful?  No   Unsuccessful attempts  Attempt 1          Annetta Mejia RN

## 2020-04-15 NOTE — OUTREACH NOTE
CHF Week 1 Survey      Responses   [facility] [place] [facility]   Does the patient have one of the following disease processes/diagnoses(primary or secondary)?  CHF   Is there a successful TCM telephone encounter documented?  No   CHF Week 1 attempt successful?  Yes   Call start time  1129   Call end time  1141   Discharge diagnosis   Chest Pain CHF with acute exacerbation   Is patient permission given to speak with other caregiver?  Yes   List who call center can speak with  Ramona    Person spoke with today (if not patient) and relationship  Daughter   Meds reviewed with patient/caregiver?  Yes   Is the patient having any side effects they believe may be caused by any medication additions or changes?  No   Does the patient have all medications ordered at discharge?  Yes   Is the patient taking all medications as directed (includes completed medication regime)?  Yes   Medication comments  off ASA but on Plavix   Does the patient have a primary care provider?   Yes   Does the patient have an appointment with their PCP within 7 days of discharge?  No   What is preventing the patient from scheduling follow up appointments within 7 days of discharge?  -- [Having telephone conversations with MD's and had COVID test at drive thru  yesterday]   Nursing Interventions  Advised patient to make appointment   Has the patient kept scheduled appointments due by today?  N/A   What is the Home health agency?   [facility]    Has home health visited the patient within 72 hours of discharge?  Yes   Psychosocial issues?  No   Did the patient receive a copy of their discharge instructions?  Yes   Nursing interventions  Reviewed instructions with patient   What is the patient's perception of their health status since discharge?  Improving   Is the patient weighing daily?  No [almost every day]   Does the patient have scales?  Yes   Daily weight interventions  Education provided on importance of daily  weight   Is the patient able to teach back Heart Failure diet management?  Yes   Is the patient able to teach back Heart Failure Zones?  Yes   Is the patient able to teach back signs and symptoms of worsening condition? (i.e. weight gain, shortness of air, etc.)  Yes   If the patient is a current smoker, are they able to teach back resources for cessation?  -- [Non smoker]   Is the patient/caregiver able to teach back the hierarchy of who to call/visit for symptoms/problems? PCP, Specialist, Home health nurse, Urgent Care, ED, 911  Yes   Additional teach back comments  Daughter very knowledgeable and taking good care of her Mother.    CHF Week 1 call completed?  Yes          Gail Ugalde RN

## 2020-04-20 NOTE — OUTREACH NOTE
CHF Week 2 Survey      Responses   [facility] patient discharged from?  [facility]   Does the patient have one of the following disease processes/diagnoses(primary or secondary)?  CHF   Week 2 attempt successful?  Yes   Call start time  1439   Call end time  1441   Discharge diagnosis   Chest Pain CHF with acute exacerbation   Is patient permission given to speak with other caregiver?  Yes   List who call center can speak with  Ramona    Person spoke with today (if not patient) and relationship  Daughter   Meds reviewed with patient/caregiver?  Yes   Is the patient having any side effects they believe may be caused by any medication additions or changes?  No   Does the patient have all medications ordered at discharge?  Yes   Is the patient taking all medications as directed (includes completed medication regime)?  Yes   Does the patient have a primary care provider?   Yes   Does the patient have an appointment with their PCP within 7 days of discharge?  Greater than 7 days   Comments regarding PCP  She was seen by her PCP on Friday.    Has the patient kept scheduled appointments due by today?  Yes   Did the patient receive a copy of their discharge instructions?  Yes   Nursing interventions  Reviewed instructions with patient   What is the patient's perception of their health status since discharge?  Improving   Is the patient weighing daily?  No [Not every day, most days. ]   Does the patient have scales?  Yes   Daily weight interventions  Education provided on importance of daily weight   Is the patient able to teach back Heart Failure diet management?  Yes   Is the patient able to teach back Heart Failure Zones?  Yes   Is the patient able to teach back signs and symptoms of worsening condition? (i.e. weight gain, shortness of air, etc.)  Yes   Is the patient/caregiver able to teach back the hierarchy of who to call/visit for symptoms/problems? PCP, Specialist, Home health nurse, Urgent Care, ED, 911  Yes   CHF  Week 2 call completed?  Yes          Tuyet Fitzgerald RN

## 2020-04-27 NOTE — OUTREACH NOTE
CHF Week 3 Survey      Responses   [facility] patient discharged from?  [facility]   Does the patient have one of the following disease processes/diagnoses(primary or secondary)?  CHF   Week 3 attempt successful?  Yes   Call start time  1244   Rescheduled  Rescheduled-pt requested [HH is currently present. ]   Call end time  1245          Annetta Mejia RN

## 2020-04-28 NOTE — OUTREACH NOTE
CHF Week 3 Survey      Responses   [facility] patient discharged from?  [facility]   Does the patient have one of the following disease processes/diagnoses(primary or secondary)?  CHF   Week 3 attempt successful?  No   Rescheduled  Revoked          Annetta Mejia RN

## 2020-06-01 PROBLEM — R07.9 CHEST PAIN: Status: ACTIVE | Noted: 2020-01-01

## 2020-06-08 PROBLEM — I50.9 ACUTE ON CHRONIC CONGESTIVE HEART FAILURE (HCC): Status: ACTIVE | Noted: 2020-01-01

## 2020-06-08 NOTE — PLAN OF CARE
Pt arrived to floor from ED. Pt confused. Complains of shortness of breath, O2 level WNL. Pt anxious. No other complaints at this time. Will continue to follow plan of care and monitor.

## 2020-06-08 NOTE — PROGRESS NOTES
Discharge Planning Assessment   [facility]     Patient Name: Cici Veliz  MRN: [id number]  Today's Date: 6/8/2020    Admit Date: 6/7/2020      Discharge Plan     Row Name 06/08/20 1446       Plan    Plan  Pt admitted on 6/7/20 from [facility].  Pt has a skilled bed reserved per [facility].  SS will follow.           AD Edwards
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
## 2020-06-08 NOTE — PROGRESS NOTES
[facility]     Patient Identification:  Name:  Cici Veliz  Age:  89 y.o.  Sex:  female  :  1925  MRN:  [id number]  Visit Number:  [id number]  Primary Care Provider:  Edgar Urias MD    Length of stay:  0    Chief complaint: Shortness of breath    Subjective:    Patient currently appears pleasantly confused.  She denies any shortness of breath, cough, fever, chills, sweats or any other symptoms at this time.  ----------------------------------------------------------------------------------------------------------------------  Current Hospital Meds:    aspirin 81 mg Oral Daily   aspirin 325 mg Oral Once   clopidogrel 75 mg Oral Daily   dilTIAZem  mg Oral Daily   iron polysaccharides 150 mg Oral Daily   ranolazine 500 mg Oral Q12H   sodium chloride 10 mL Intravenous Q12H        ----------------------------------------------------------------------------------------------------------------------  Vital Signs:  Temp:  [97.5 °F (36.4 °C)-98.1 °F (36.7 °C)] 97.6 °F (36.4 °C)  Heart Rate:  [62-76] 64  Resp:  [18-22] 18  BP: (128-177)/(61-80) 157/75      20  2110 20  0251   Weight: 56.2 kg (124 lb) 57.2 kg (126 lb 3.2 oz)     Body mass index is 20.37 kg/m².    Intake/Output Summary (Last 24 hours) at 2020 180  Last data filed at 2020 1706  Gross per 24 hour   Intake 700 ml   Output 1675 ml   Net -975 ml     Diet Regular; Cardiac  ----------------------------------------------------------------------------------------------------------------------  Physical exam:  Constitutional: Elderly appearing  female in no apparent distress.  HENT:  Head:  Normocephalic and atraumatic.  Mouth:  Moist mucous membranes.    Eyes:  Conjunctivae and EOM are normal.  Pupils are equal, round, and reactive to light.  No scleral icterus.    Neck:  Neck supple. No thyromegaly.  No JVD present.    Cardiovascular:  Regular rate and rhythm with 3+ systolic  murmur.  No rubs, clicks or gallops appreciated.  Pulmonary/Chest:  Clear to auscultation bilaterally with no crackles, wheezes or rhonchi appreciated.  Abdominal:  Soft. Nontender. Nondistended  Bowel sounds are normal in all four quadrants. No organomegally appreciated.   Musculoskeletal:  5/5 muscle strength bilateral upper and lower extermties with equal muscle tone and bulk. No edema, no tenderness, and no deformity.  No red or swollen joints anywhere.    Neurological:  Cranial nerves II-XII intact with no focal deficits.  No facial droop.  No slurred speech.   Skin:  Warm and dry to palpation with no rashes or lesions appreciated.  Peripheral vascular:  2+ radial and pedal pulses in bilateral upper and lower extremities.  Psychiatric:  Alert but not oriented to person, place or time.  She does not appear to demonstrate appropriate judgment or insight  ----------------------------------------------------------------------------------------------------------------------  Tele:    ----------------------------------------------------------------------------------------------------------------------  Results from last 7 days   Lab Units 06/08/20  0531 06/08/20  0023 06/07/20 2212   TROPONIN T ng/mL 0.029 0.037* 0.031*     Results from last 7 days   Lab Units 06/08/20  0532 06/08/20  0023 06/07/20 2212 06/02/20  0044 06/01/20  1907   LACTATE mmol/L 0.9 2.2*  --   --   --    WBC 10*3/mm3  --   --  7.44 7.93 5.64   HEMOGLOBIN g/dL  --   --  8.9* 9.4* 9.0*   HEMATOCRIT %  --   --  29.7* 31.8* 29.6*   MCV fL  --   --  86.1 85.5 84.1   MCHC g/dL  --   --  30.0* 29.6* 30.4*   PLATELETS 10*3/mm3  --   --  308 317 318   INR   --   --   --   --  1.01     Results from last 7 days   Lab Units 06/07/20  2153   PH, ARTERIAL pH units 7.428   PO2 ART mm Hg 76.2*   PCO2, ARTERIAL mm Hg 31.7*   HCO3 ART mmol/L 20.9     Results from last 7 days   Lab Units 06/08/20  0531 06/07/20  2212 06/02/20  0044 06/01/20  1907   SODIUM mmol/L   --  128* 139 136   POTASSIUM mmol/L  --  5.3* 4.7 5.6*   MAGNESIUM mg/dL 1.9  --  2.0  --    CHLORIDE mmol/L  --  97* 104 102   CO2 mmol/L  --  19.9* 24.1 23.1   BUN mg/dL  --  45* 27* 29*   CREATININE mg/dL  --  1.75* 1.00 1.03*   EGFR IF NONAFRICN AM mL/min/1.73  --  27* 52* 50*   CALCIUM mg/dL  --  8.8 9.2 9.6   GLUCOSE mg/dL  --  153* 107* 103*   ALBUMIN g/dL  --  3.06*  --  3.33*   BILIRUBIN mg/dL  --  0.4  --  0.3   ALK PHOS U/L  --  79  --  82   AST (SGOT) U/L  --  16  --  15   ALT (SGPT) U/L  --  12  --  10   Estimated Creatinine Clearance: 17.8 mL/min (A) (by C-G formula based on SCr of 1.75 mg/dL (H)).    No results found for: AMMONIA      No results found for: BLOODCX  No results found for: URINECX  No results found for: WOUNDCX  No results found for: STOOLCX    I have personally looked at the labs and they are summarized above.  ----------------------------------------------------------------------------------------------------------------------  Imaging Results (Last 24 Hours)     Procedure Component Value Units Date/Time    CT Chest Without Contrast [[id number]] Collected:  06/08/20 0132     Updated:  06/08/20 0134    Narrative:       CT CHEST, NONCONTRAST, 6/8/2020    HISTORY:  89-year-old female with history of coronary artery disease and hypertrophic cardiomyopathy with diastolic heart failure presenting to the ED tonight with worsening shortness of air since a recent hospital discharge. Chest x-ray showing worsening  interstitial edema.    TECHNIQUE:  CT imaging of the chest without IV contrast. Radiation dose reduction techniques included automated exposure control. Radiation audit for CT and nuclear cardiology exams in the last 12 months: 2.    COMPARISON:  *  CTA chest, 3/12/2018.    FINDINGS:  Moderate cardiomegaly with densely calcified mitral valve annulus and papillary muscular calcification. No pericardial effusion. Densely calcified thoracic aorta is normal in caliber. Dual-chamber cardiac  pacemaker.    Mild to moderate diffuse interstitial and alveolar pulmonary edema with small bilateral pleural effusions and dependent posterior lung base atelectasis. This may be present on a background of pre-existing chronic lung disease.    No esophageal dilatation or hiatal hernia. No suspicious mass or adenopathy is visible within the chest. Chronic multinodular thyroid goiter. Limited upper abdominal images are unremarkable.      Impression:       1.  Moderate cardiomegaly. No pericardial effusion.  2.  Mild to moderate diffuse interstitial and alveolar pulmonary edema with small bilateral pleural effusions.    Signer Name: Sonny Vazquez MD   Signed: 6/8/2020 1:32 AM   Workstation Name: [id number]    [facility]    XR Chest 1 View [[id number]] Collected:  06/07/20 2217     Updated:  06/07/20 2219    Narrative:       CR Chest 1 Vw    INDICATION:   Short of air. ER evaluation.     COMPARISON:    Chest x-ray 6/1/2020.    FINDINGS:  Single portable AP view(s) of the chest.  Cardiac silhouette is mildly enlarged and there is a left-sided dual-lead pacemaker.    There is considerable interval worsening in the appearance the chest with increased central vascular and interstitial markings as well as patchy airspace disease bilaterally more prominent centrally. Please correlate for clinical evidence of congestive  heart failure. Please exclude any clinical concern for infection. No pneumothorax. No obvious pleural effusion.      Impression:       Considerable interval worsening in the appearance the chest with development of central vascular congestion and interstitial edema and patchy predominantly central airspace disease. Cardiac silhouette is enlarged with a pacemaker. Findings most likely  due to congestive failure. Please exclude clinical concern for infection. Follow-up to resolution recommended.    Signer Name: Courtney Lazar MD   Signed: 6/7/2020 10:17 PM   Workstation Name:  [email or web address]    [facility]        ----------------------------------------------------------------------------------------------------------------------  Assessment and Plan:    1.  Acute on chronic diastolic CHF -continue to diurese and monitor renal function closely.    2.  Essential hypertension -will restart home medications once available    3.  History of severe aortic stenosis -during patient's last hospitalization, both the patient and her family discussed not wanting to proceed with any further work-up or treatment for severe aortic stenosis.    4.  Hyperkalemia -serum potassium minimally elevated at 5.3, will continue to monitor closely    5.  Hyponatremia    6.  Paroxysmal A. Fib    7.  COPD    8.  History of coronary artery disease      Overall, patient appears to have very poor/minimal reserve and will likely continue to have repeated hospitalizations for the same underlying chronic comorbidities.  Will attempt to have conversation with patient's family in the next 24 hours regarding overall goals of care.  Will consult palliative care as well.      Deniz Perez,   06/08/20  18:02
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
## 2020-06-08 NOTE — ED NOTES
"Went in to give 325 of aspirin and pt's daughter stated that \"since her stroke we have not been giving her aspirin\" provider made aware      Charli Villalobos, RN  06/08/20 0142    "
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
## 2020-06-08 NOTE — ED NOTES
Pt leaving the floor at this time via stretcher with ED tech. Pt respirations even and unlabored, VSS, NADN. Pt IV patent and intact. Pt belongings being transferred with pt.     Charli Villalobos, RN  06/08/20 9323
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
## 2020-06-08 NOTE — PLAN OF CARE
Problem: Dysphagia (Adult)  Goal: Functional/Safe Swallow  Outcome: Outcome(s) achieved  Flowsheets (Taken 6/8/2020 8316)  Functional/Safe Swallow: achieves outcome  Note:   Clinical dysphagia assessment w/ wfl oropharyngeal swallow.

## 2020-06-08 NOTE — ED NOTES
"Pt's family refusing the blood pressure cuff at this time claiming that it \"gets too tight\" provider aware      Charli Villalobos, RN  06/08/20 0233    "

## 2020-06-09 NOTE — PROGRESS NOTES
Patient Identification:  Name:  Cici Veliz  Age:  89 y.o.  Sex:  female  :  1925  MRN:  [id number]  Visit Number:  [id number]    Chief Complaint:   Shortness of breath, mild aortic stenosis, advanced age    Subjective:    Patient feels better today she denies shortness of breath or chest pain or other cardiac symptoms  ----------------------------------------------------------------------------------------------------------------------  Current Hospital Meds:    aspirin 81 mg Oral Daily   clopidogrel 75 mg Oral Daily   dilTIAZem  mg Oral Daily   iron polysaccharides 150 mg Oral Daily   isosorbide mononitrate 30 mg Oral Q24H   sodium chloride 10 mL Intravenous Q12H        ----------------------------------------------------------------------------------------------------------------------  Vital Signs:  Temp:  [97.3 °F (36.3 °C)-98 °F (36.7 °C)] 97.9 °F (36.6 °C)  Heart Rate:  [61-72] 72  Resp:  [18] 18  BP: (118-157)/(50-75) 133/52  Vital Signs (last 72 hrs)        0700  -   0659  07  -   0659  07  -   0659  07  -   1231   Most Recent    Temp (°F)   97.6 -  98.1    97.3 -  98      97.9     97.9 (36.6)    Heart Rate   62 -  76    61 -  70      72     72    Resp     22      18      18     18    BP   148/80 -  177/80    118/55 -  157/75      133/52     133/52    SpO2 (%)   95 -  100    94 -  98      95     95            20  2110 20  0251 20  0508   Weight: 56.2 kg (124 lb) 57.2 kg (126 lb 3.2 oz) 57.1 kg (125 lb 14.4 oz)     Body mass index is 20.32 kg/m².    Intake/Output Summary (Last 24 hours) at 2020 1231  Last data filed at 2020 1111  Gross per 24 hour   Intake 580 ml   Output 1900 ml   Net -1320 ml     Diet Regular; Cardiac  ----------------------------------------------------------------------------------------------------------------------  Physical exam:    HEENT:  Head:  Normocephalic and atraumatic.     Eyes:   Conjunctivae and EOM are normal.  Pupils are equal, round, and reactive to light.  No scleral icterus.    Neck:  Neck supple.  No JVD present.  No bruit.  Cardiovascular: Normal rate, regular rhythm, S1 S2+, NO S3 / S4  Pulmonary/Chest:  Vesicular breath sounds B/L, Clear to auscultation, with no added sounds.  Abdominal:  Soft.  Bowel sounds are normal.  No distension and no tenderness.  No organomegaly.  Neurological:  Alert and oriented to person, place, and time. No focal defecits  Skin:  Skin is warm and dry. No rash noted. No pallor.   Musculoskeletal:  No tenderness, and no deformity.  No red or swollen joints anywhere.   Lower extremities: No edema, Peripheral vascular:  2+ Pulses B/L DP.  ----------------------------------------------------------------------------------------------------------------------    ----------------------------------------------------------------------------------------------------------------------  Results from last 7 days   Lab Units 06/08/20  0531 06/08/20  0023 06/07/20  2212   TROPONIN T ng/mL 0.029 0.037* 0.031*     Results from last 7 days   Lab Units 06/09/20  0406 06/08/20  0532 06/08/20  0023 06/07/20  2212   LACTATE mmol/L  --  0.9 2.2*  --    WBC 10*3/mm3 5.93  --   --  7.44   HEMOGLOBIN g/dL 8.5*  --   --  8.9*   HEMATOCRIT % 27.9*  --   --  29.7*   MCV fL 84.5  --   --  86.1   MCHC g/dL 30.5*  --   --  30.0*   PLATELETS 10*3/mm3 306  --   --  308     Results from last 7 days   Lab Units 06/07/20  2153   PH, ARTERIAL pH units 7.428   PO2 ART mm Hg 76.2*   PCO2, ARTERIAL mm Hg 31.7*   HCO3 ART mmol/L 20.9     Results from last 7 days   Lab Units 06/09/20  0406 06/08/20  0531 06/07/20  2212   SODIUM mmol/L 132*  --  128*   POTASSIUM mmol/L 5.3*  --  5.3*   MAGNESIUM mg/dL  --  1.9  --    CHLORIDE mmol/L 100  --  97*   CO2 mmol/L 21.2*  --  19.9*   BUN mg/dL 34*  --  45*   CREATININE mg/dL 1.39*  --  1.75*   EGFR IF NONAFRICN AM mL/min/1.73 35*  --  27*   CALCIUM mg/dL  8.6  --  8.8   GLUCOSE mg/dL 101*  --  153*   ALBUMIN g/dL  --   --  3.06*   BILIRUBIN mg/dL  --   --  0.4   ALK PHOS U/L  --   --  79   AST (SGOT) U/L  --   --  16   ALT (SGPT) U/L  --   --  12   Estimated Creatinine Clearance: 22.3 mL/min (A) (by C-G formula based on SCr of 1.39 mg/dL (H)).    No results found for: AMMONIA      Blood Culture   Date Value Ref Range Status   06/08/2020 No growth at 24 hours  Preliminary   06/08/2020 No growth at 24 hours  Preliminary     No results found for: URINECX  No results found for: WOUNDCX  No results found for: STOOLCX    I have personally looked at the labs and they are summarized above.  ----------------------------------------------------------------------------------------------------------------------  Imaging Results (Last 24 Hours)     Procedure Component Value Units Date/Time    XR Chest 1 View [[id number]] Collected:  06/09/20 0827     Updated:  06/09/20 0829    Narrative:       EXAMINATION: XR CHEST 1 VW-      CLINICAL INDICATION:     chf; I50.9-Heart failure, unspecified;  N17.9-Acute kidney failure, unspecified; R74.8-Abnormal levels of other  serum enzymes     TECHNIQUE:  XR CHEST 1 VW-      COMPARISON: 06/07/2020      FINDINGS:   Left cardiac pacer device stable.     Interstitial edema stable. Decreased lung volumes. Cardiomegaly stable.  Small effusions stable. No pneumothorax. No acute bony changes  identified.       Impression:       1. Slight decrease in lung volumes.  2. Stable CHF/edema.     This report was finalized on 6/9/2020 8:27 AM by Dr. Williams Lamas MD.           ----------------------------------------------------------------------------------------------------------------------    Assessment:  1.  Acute on chronic diastolic congestive heart failure  2.  Elevated troponin with a flat trend  3.  Coronary artery status post PCI of the right coronary artery in 2017  4.  Mild aortic stenosis  5.  Paroxysmal atrial fibrillation not on anticoagulation  because of history of GI bleed and frequent falls and frailty  6.  Hyperkalemia and elevated creatinine  7.  Dementia    Plan:  1.  Patient has negative fluid bladder balance and despite that her creatinine and her sodium levels have improved objectively she feels better once creatinine level is normalized probably she can be placed on a standing dose of diuretics p.o. instruction in the nursing home to have a low-salt diet  2.  Regarding her mild aortic stenosis this can be followed up as an outpatient  3.  Her troponin has normalized and the patient as well as her daughter have expressed previously no willing for pursuing ischemia work-up  4.  We will follow at a distance please call if assistance is required      Jarod Beth MD   06/09/20 12:31

## 2020-06-09 NOTE — PLAN OF CARE
"At  the beginning of shift patient was anxious/agitated. Spoke with her children who are also her POAs Carson and Ramona they state patient can become anxious at times. PRN medications given as ordered. Patient daughter expressed concern of patient's sodium level requesting \"salt tablets\". Judith Bhatt PA-C notified no new orders but will reassess AM labs. . Patient resting well, no complaints or concerns voiced at this time. Will follow plan of care. No distress noted will continue to monitor.   "

## 2020-06-09 NOTE — PROGRESS NOTES
[facility]     Patient Identification:  Name:  Cici Veliz  Age:  89 y.o.  Sex:  female  :  1925  MRN:  [id number]  Visit Number:  [id number]  Primary Care Provider:  Edgar Urias MD    Length of stay:  0    Chief complaint: Shortness of breath    Subjective:    Patient with no new complaints today.  Patient still appears pleasantly confused.  Per nursing, no new events overnight.  Serum creatinine is improving, will transition to oral diuretics and anticipate discharge back to nursing home once serum creatinine returns to normal.  ----------------------------------------------------------------------------------------------------------------------  Current Hospital Meds:    aspirin 81 mg Oral Daily   clopidogrel 75 mg Oral Daily   dilTIAZem  mg Oral Daily   iron polysaccharides 150 mg Oral Daily   isosorbide mononitrate 30 mg Oral Q24H   sodium chloride 10 mL Intravenous Q12H        ----------------------------------------------------------------------------------------------------------------------  Vital Signs:  Temp:  [97.3 °F (36.3 °C)-98 °F (36.7 °C)] 97.9 °F (36.6 °C)  Heart Rate:  [61-72] 72  Resp:  [18] 18  BP: (118-157)/(50-75) 133/52      20  2110 20  0251 20  0508   Weight: 56.2 kg (124 lb) 57.2 kg (126 lb 3.2 oz) 57.1 kg (125 lb 14.4 oz)     Body mass index is 20.32 kg/m².    Intake/Output Summary (Last 24 hours) at 2020 1422  Last data filed at 2020 1300  Gross per 24 hour   Intake 700 ml   Output 1600 ml   Net -900 ml     Diet Regular; Cardiac  ----------------------------------------------------------------------------------------------------------------------  Physical exam:  Constitutional: Elderly appearing  female in no apparent distress.  HENT:  Head:  Normocephalic and atraumatic.  Mouth:  Moist mucous membranes.    Eyes:  Conjunctivae and EOM are normal.  Pupils are equal, round, and reactive to  light.  No scleral icterus.    Neck:  Neck supple. No thyromegaly.  No JVD present.    Cardiovascular:  Regular rate and rhythm with 3+ systolic murmur.  No rubs, clicks or gallops appreciated.  Pulmonary/Chest:  Clear to auscultation bilaterally with no crackles, wheezes or rhonchi appreciated.  Abdominal:  Soft. Nontender. Nondistended  Bowel sounds are normal in all four quadrants. No organomegally appreciated.   Musculoskeletal:  5/5 muscle strength bilateral upper and lower extermties with equal muscle tone and bulk. No edema, no tenderness, and no deformity.  No red or swollen joints anywhere.    Neurological:  Cranial nerves II-XII intact with no focal deficits.  No facial droop.  No slurred speech.   Skin:  Warm and dry to palpation with no rashes or lesions appreciated.  Peripheral vascular:  2+ radial and pedal pulses in bilateral upper and lower extremities.  Psychiatric:  Alert but not oriented to person, place or time.  She does not appear to demonstrate appropriate judgment or insight  ----------------------------------------------------------------------------------------------------------------------  Tele:    ----------------------------------------------------------------------------------------------------------------------  Results from last 7 days   Lab Units 06/08/20  0531 06/08/20  0023 06/07/20  2212   TROPONIN T ng/mL 0.029 0.037* 0.031*     Results from last 7 days   Lab Units 06/09/20  0406 06/08/20  0532 06/08/20  0023 06/07/20  2212   LACTATE mmol/L  --  0.9 2.2*  --    WBC 10*3/mm3 5.93  --   --  7.44   HEMOGLOBIN g/dL 8.5*  --   --  8.9*   HEMATOCRIT % 27.9*  --   --  29.7*   MCV fL 84.5  --   --  86.1   MCHC g/dL 30.5*  --   --  30.0*   PLATELETS 10*3/mm3 306  --   --  308     Results from last 7 days   Lab Units 06/07/20  2153   PH, ARTERIAL pH units 7.428   PO2 ART mm Hg 76.2*   PCO2, ARTERIAL mm Hg 31.7*   HCO3 ART mmol/L 20.9     Results from last 7 days   Lab Units 06/09/20  0406  06/08/20  0531 06/07/20  2212   SODIUM mmol/L 132*  --  128*   POTASSIUM mmol/L 5.3*  --  5.3*   MAGNESIUM mg/dL  --  1.9  --    CHLORIDE mmol/L 100  --  97*   CO2 mmol/L 21.2*  --  19.9*   BUN mg/dL 34*  --  45*   CREATININE mg/dL 1.39*  --  1.75*   EGFR IF NONAFRICN AM mL/min/1.73 35*  --  27*   CALCIUM mg/dL 8.6  --  8.8   GLUCOSE mg/dL 101*  --  153*   ALBUMIN g/dL  --   --  3.06*   BILIRUBIN mg/dL  --   --  0.4   ALK PHOS U/L  --   --  79   AST (SGOT) U/L  --   --  16   ALT (SGPT) U/L  --   --  12   Estimated Creatinine Clearance: 22.3 mL/min (A) (by C-G formula based on SCr of 1.39 mg/dL (H)).    No results found for: AMMONIA      No results found for: BLOODCX  No results found for: URINECX  No results found for: WOUNDCX  No results found for: STOOLCX    I have personally looked at the labs and they are summarized above.  ----------------------------------------------------------------------------------------------------------------------  Imaging Results (Last 24 Hours)     Procedure Component Value Units Date/Time    XR Chest 1 View [[id number]] Collected:  06/09/20 0827     Updated:  06/09/20 0829    Narrative:       EXAMINATION: XR CHEST 1 VW-      CLINICAL INDICATION:     chf; I50.9-Heart failure, unspecified;  N17.9-Acute kidney failure, unspecified; R74.8-Abnormal levels of other  serum enzymes     TECHNIQUE:  XR CHEST 1 VW-      COMPARISON: 06/07/2020      FINDINGS:   Left cardiac pacer device stable.     Interstitial edema stable. Decreased lung volumes. Cardiomegaly stable.  Small effusions stable. No pneumothorax. No acute bony changes  identified.       Impression:       1. Slight decrease in lung volumes.  2. Stable CHF/edema.     This report was finalized on 6/9/2020 8:27 AM by Dr. Williams Lamas MD.           ----------------------------------------------------------------------------------------------------------------------  Assessment and Plan:    1.  Acute on chronic diastolic CHF -continue  to diurese and monitor renal function closely.  Patient reports no shortness of breath today, symptomatically patient has resolved.    2.  Essential hypertension - well-controlled    3.  History of severe aortic stenosis -after most recent transthoracic echocardiogram, severity of aortic stenosis appears mild in nature.  Appreciate cardiology recommendations.  No further work-up warranted at this time secondary to patient request.    4.  Hyperkalemia -serum potassium minimally elevated at 5.3, will continue to monitor closely    5.  Hyponatremia    6.  Paroxysmal A. Fib    7.  COPD    8.  History of coronary artery disease            Deniz Perez,   06/09/20  14:22

## 2020-06-09 NOTE — DISCHARGE PLACEMENT REQUEST
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 presentation.  We will place her on falls precautions because of the vascular dementia.  We will continue to monitor her on the telemetry as she does have a history of paroxysmal A. fib; currently, the patient is in normal sinus rhythm.  I have obtained a magnesium level and it is low; if her magnesium level drops below 2 then we will start her on the replacement protocol.  I will have speech, physical therapy, and occupational therapy see the patient while she is here due to the complaints of generalized weakness at the nursing home.  Please note that the patient is not on anticoagulation for the A. fib due to a prior lower GI bleed during which she required blood transfusions.  She was on Crestor 5 mg a couple months ago when she was hospitalized here but this medication does not seem to be on her current medicine list; I am not sure if she had any side effects but she does list simvastatin as an allergy.  Thus, we will leave the decision for starting a statin with cardiology.  Please note that I have held her home lisinopril due to the acute kidney injury but I  have continued her home her home Cardizem CD and Xanax; the Bumex that we are giving her currently may also lower her blood pressures.  Since the patient is of advanced age, I will be more lenient at allowing her blood pressure to be higher so that she has less chance of falling.     VTE Prophylaxis:   Mechanical Order History:      Ordered        Signed and Held  Place Sequential Compression Device  Once         Signed and Held  Maintain Sequential Compression Device  Continuous                 Pharmalogical Order History:     None        Ashley Nickerson MD  [facility]  06/08/20  04:50      Electronically signed by Ashley Nickerson MD at 06/08/20 0515       Vital Signs (last day)     Date/Time   Temp   Temp src   Pulse   Resp   BP   Patient Position   SpO2    06/09/20 0944   97.9 (36.6)   Oral   72   18   133/52   Lying   95    06/09/20 0657   98 (36.7)   Axillary   63   18   118/55   Lying   94    06/09/20 0303   97.4 (36.3)   Tympanic   61   18   124/50   Lying   96    06/08/20 2200   --   --   --   --   --   --   98    06/08/20 2030   --   --   70   --   156/65   --   98    06/08/20 1851   97.3 (36.3)   Oral   69   18   125/62   Lying   96    06/08/20 1446   97.6 (36.4)   Oral   64   18   157/75   Lying   97    06/08/20 1003   97.5 (36.4)   Oral   65   18   128/79   Lying   97    06/08/20 0633   98.1 (36.7)   Oral   76   22   148/80   Lying   97    06/08/20 0251   97.6 (36.4)   Oral   76   22   177/80   Lying   97    06/08/20 0230   --   --   --   --   --   --   96    06/08/20 0225   --   --   --   --   --   --   96 06/08/20 0210   --   --   --   --   --   --   98    06/08/20 0150   --   --   63   --   --   --   98    06/08/20 0145   --   --   62   --   --   --   96    06/08/20 0115   --   --   --   --   --   --   96    06/08/20 0100   --   --   --   --   --   --   98    06/08/20 0030   --   --   73   --   --   --   --    06/08/20 0005   --   --   67   --   --   --   95               Lines, Drains & Airways    Active LDAs     Name:   Placement date:   Placement time:   Site:   Days:    Peripheral IV 06/08/20 0227 Right Wrist   06/08/20 0227    Wrist   1                  Current Facility-Administered Medications   Medication Dose Route Frequency Provider Last Rate Last Dose   • acetaminophen (TYLENOL) tablet 325 mg  325 mg Oral Q4H PRN Ashley Nickerson MD       • ALPRAZolam (XANAX) tablet 0.25 mg  0.25 mg Oral Q12H PRN Ashley Nickerson MD   0.25 mg at 06/08/20 2043   • aspirin EC tablet 81 mg  81 mg Oral Daily Ashley Nickerson MD   81 mg at 06/09/20 0837   • bisacodyl (DULCOLAX) suppository 10 mg  10 mg Rectal Daily PRN Ashley Nickerson MD       • clopidogrel (PLAVIX) tablet 75 mg  75 mg Oral Daily Ashley Nickerson MD   75 mg at 06/09/20 0837   • dilTIAZem CD (CARDIZEM CD) 24 hr capsule 120 mg  120 mg Oral Daily Ashley Nickerson MD   120 mg at 06/09/20 1241   • iron polysaccharides (NIFEREX) capsule 150 mg  150 mg Oral Daily Ashley Nickerson MD   150 mg at 06/09/20 0837   • isosorbide mononitrate (IMDUR) 24 hr tablet 30 mg  30 mg Oral Q24H Deniz Perez,    30 mg at 06/09/20 1241   • lactulose (CHRONULAC) 10 GM/15ML solution 20 g  20 g Oral Q12H PRN Ashley Nickerson MD       • nitroglycerin (NITROSTAT) SL tablet 0.4 mg  0.4 mg Sublingual Q5 Min PRN Ashley Nickerson MD       • sodium chloride 0.9 % flush 10 mL  10 mL Intravenous Q12H Ashley Nickerson MD   10 mL at 06/09/20 0837   • sodium chloride 0.9 % flush 10 mL  10 mL Intravenous PRN Ashley Nickerson MD           Lab Results (last 24 hours)     Procedure Component Value Units Date/Time    Basic Metabolic Panel [[id number]]  (Abnormal) Collected:  06/09/20 0406    Specimen:  Blood Updated:  06/09/20 0449     Glucose 101 mg/dL      BUN 34 mg/dL      Creatinine 1.39 mg/dL      Sodium 132 mmol/L      Potassium 5.3 mmol/L      Chloride 100 mmol/L      CO2 21.2 mmol/L      Calcium 8.6 mg/dL      eGFR Non   Amer 35 mL/min/1.73      BUN/Creatinine Ratio 24.5     Anion Gap 10.8 mmol/L     Narrative:       GFR Normal >60  Chronic Kidney Disease <60  Kidney Failure <15      CBC (No Diff) [[id number]]  (Abnormal) Collected:  06/09/20 0406    Specimen:  Blood Updated:  06/09/20 0425     WBC 5.93 10*3/mm3      RBC 3.30 10*6/mm3      Hemoglobin 8.5 g/dL      Hematocrit 27.9 %      MCV 84.5 fL      MCH 25.8 pg      MCHC 30.5 g/dL      RDW 18.9 %      RDW-SD 59.1 fl      MPV 9.7 fL      Platelets 306 10*3/mm3     Blood Culture - Blood, Arm, Right [[id number]] Collected:  06/08/20 0023    Specimen:  Blood from Arm, Right Updated:  06/09/20 0046     Blood Culture No growth at 24 hours    Blood Culture - Blood, Hand, Left [[id number]] Collected:  06/08/20 0030    Specimen:  Blood from Hand, Left Updated:  06/09/20 0046     Blood Culture No growth at 24 hours    POC Glucose Once [[id number]]  (Normal) Collected:  06/08/20 2223    Specimen:  Blood Updated:  06/08/20 2228     Glucose 108 mg/dL         Orders (last 24 hrs)      Start     Ordered    06/09/20 1100  isosorbide mononitrate (IMDUR) 24 hr tablet 30 mg  Every 24 Hours Scheduled      06/09/20 0940    06/09/20 0600  CBC (No Diff)  Morning Draw      06/08/20 1807    06/09/20 0600  Basic Metabolic Panel  Morning Draw      06/08/20 1807 06/09/20 0600  XR Chest 1 View  1 Time Imaging      06/08/20 1807 06/08/20 2229  POC Glucose Once  Once      06/08/20 2223    06/08/20 1807  Inpatient Palliative Care MD Consult  Once     Specialty:  Hospice and Palliative Medicine  Provider:  Snow Cordova MD    06/08/20 1807 06/08/20 1200  Dietary Nutrition Supplements Magic Cup  Daily With Breakfast, Lunch & Dinner      06/08/20 1201    06/08/20 0900  sodium chloride 0.9 % flush 10 mL  Every 12 Hours Scheduled      06/08/20 0251    06/08/20 0900  clopidogrel (PLAVIX) tablet 75 mg  Daily      06/08/20 0508    06/08/20 0900  dilTIAZem CD (CARDIZEM CD) 24 hr capsule 120 mg   Daily      06/08/20 0508    06/08/20 0900  iron polysaccharides (NIFEREX) capsule 150 mg  Daily      06/08/20 0508    06/08/20 0900  ranolazine (RANEXA) 12 hr tablet 500 mg  Every 12 Hours Scheduled,   Status:  Discontinued      06/08/20 0508    06/08/20 0900  aspirin EC tablet 81 mg  Daily      06/08/20 0514    06/08/20 0508  lactulose (CHRONULAC) 10 GM/15ML solution 20 g  Every 12 Hours PRN      06/08/20 0508    06/08/20 0507  acetaminophen (TYLENOL) tablet 325 mg  Every 4 Hours PRN      06/08/20 0508    06/08/20 0507  bisacodyl (DULCOLAX) suppository 10 mg  Daily PRN      06/08/20 0508    06/08/20 0507  ALPRAZolam (XANAX) tablet 0.25 mg  Every 12 Hours PRN      06/08/20 0508    06/08/20 0251  sodium chloride 0.9 % flush 10 mL  As Needed      06/08/20 0251    06/08/20 0251  nitroglycerin (NITROSTAT) SL tablet 0.4 mg  Every 5 Minutes PRN      06/08/20 0251    06/08/20 0138  aspirin tablet 325 mg  Once,   Status:  Discontinued      06/08/20 0136    Unscheduled  Oxygen Therapy- Nasal Cannula; Titrate for SPO2: 90% - 95%  Continuous PRN     Comments:  If Patient Develops Unresponsiveness, Acute Dyspnea, Cyanosis or Suspected Hypoxemia Start Continuous Pulse Ox Monitoring, Apply Oxygen & Notify Provider    06/08/20 0251    Unscheduled  ECG 12 Lead  As Needed     Comments:  Nurse to Release if Patient Expericences Acute Chest Pain or Dysrhythmias    06/08/20 0251    Unscheduled  Potassium  As Needed     Comments:  For Ventricular Arrhythmias      06/08/20 0251    Unscheduled  Magnesium  As Needed     Comments:  For Ventricular Arrhythmias      06/08/20 0251    Unscheduled  Troponin  As Needed     Comments:  For Chest Pain      06/08/20 0251    Unscheduled  Digoxin Level  As Needed     Comments:  For Atrial Arrhythmias      06/08/20 0251    Unscheduled  Blood Gas, Arterial  As Needed     Comments:  Per O2 PolicyNotify Physician      06/08/20 0251    Unscheduled  Bladder Scan  As Needed      06/08/20 0400    --   furosemide (LASIX) 40 MG tablet  Daily      20    --  bisacodyl (DULCOLAX) 10 MG suppository  Every 12 Hours PRN      20    --  lactulose (CHRONULAC) 10 GM/15ML solution  Every 12 Hours PRN      20    --  Sodium Phosphates (FLEET ENEMA) 7-19 GM/118ML enema  Every 12 Hours PRN      20    --  acetaminophen (TYLENOL) 325 MG tablet  Every 4 Hours PRN      20    Pending  Inpatient Case Management  Consult  Once     Provider:  (Not yet assigned)    Pending                Operative/Procedure Notes (last 24 hours) (Notes from 20 1303 through 20 1303)    No notes of this type exist for this encounter.            Physician Progress Notes (last 24 hours) (Notes from 20 1303 through 20 1303)      Jarod Beht MD at 20 1231          Patient Identification:  Name:  Cici Veliz  Age:  89 y.o.  Sex:  female  :  1925  MRN:  [id number]  Visit Number:  [id number]    Chief Complaint:   Shortness of breath, mild aortic stenosis, advanced age    Subjective:    Patient feels better today she denies shortness of breath or chest pain or other cardiac symptoms  ----------------------------------------------------------------------------------------------------------------------  Current Hospital Meds:    aspirin 81 mg Oral Daily   clopidogrel 75 mg Oral Daily   dilTIAZem  mg Oral Daily   iron polysaccharides 150 mg Oral Daily   isosorbide mononitrate 30 mg Oral Q24H   sodium chloride 10 mL Intravenous Q12H        ----------------------------------------------------------------------------------------------------------------------  Vital Signs:  Temp:  [97.3 °F (36.3 °C)-98 °F (36.7 °C)] 97.9 °F (36.6 °C)  Heart Rate:  [61-72] 72  Resp:  [18] 18  BP: (118-157)/(50-75) 133/52  Vital Signs (last 72 hrs)       700  -  0659 700  -  0659 700  -   0659 700  -   1231   Most Recent     Temp (°F)   97.6 -  98.1    97.3 -  98      97.9     97.9 (36.6)    Heart Rate   62 -  76    61 -  70      72     72    Resp     22      18      18     18    BP   148/80 -  177/80    118/55 -  157/75      133/52     133/52    SpO2 (%)   95 -  100    94 -  98      95     95            06/07/20  2110 06/08/20  0251 06/09/20  0508   Weight: 56.2 kg (124 lb) 57.2 kg (126 lb 3.2 oz) 57.1 kg (125 lb 14.4 oz)     Body mass index is 20.32 kg/m².    Intake/Output Summary (Last 24 hours) at 6/9/2020 1231  Last data filed at 6/9/2020 1111  Gross per 24 hour   Intake 580 ml   Output 1900 ml   Net -1320 ml     Diet Regular; Cardiac  ----------------------------------------------------------------------------------------------------------------------  Physical exam:    HEENT:  Head:  Normocephalic and atraumatic.     Eyes:  Conjunctivae and EOM are normal.  Pupils are equal, round, and reactive to light.  No scleral icterus.    Neck:  Neck supple.  No JVD present.  No bruit.  Cardiovascular: Normal rate, regular rhythm, S1 S2+, NO S3 / S4  Pulmonary/Chest:  Vesicular breath sounds B/L, Clear to auscultation, with no added sounds.  Abdominal:  Soft.  Bowel sounds are normal.  No distension and no tenderness.  No organomegaly.  Neurological:  Alert and oriented to person, place, and time. No focal defecits  Skin:  Skin is warm and dry. No rash noted. No pallor.   Musculoskeletal:  No tenderness, and no deformity.  No red or swollen joints anywhere.   Lower extremities: No edema, Peripheral vascular:  2+ Pulses B/L DP.  ----------------------------------------------------------------------------------------------------------------------    ----------------------------------------------------------------------------------------------------------------------  Results from last 7 days   Lab Units 06/08/20  0531 06/08/20  0023 06/07/20  2212   TROPONIN T ng/mL 0.029 0.037* 0.031*     Results from last 7 days   Lab Units 06/09/20  0406  06/08/20  0532 06/08/20  0023 06/07/20  2212   LACTATE mmol/L  --  0.9 2.2*  --    WBC 10*3/mm3 5.93  --   --  7.44   HEMOGLOBIN g/dL 8.5*  --   --  8.9*   HEMATOCRIT % 27.9*  --   --  29.7*   MCV fL 84.5  --   --  86.1   MCHC g/dL 30.5*  --   --  30.0*   PLATELETS 10*3/mm3 306  --   --  308     Results from last 7 days   Lab Units 06/07/20  2153   PH, ARTERIAL pH units 7.428   PO2 ART mm Hg 76.2*   PCO2, ARTERIAL mm Hg 31.7*   HCO3 ART mmol/L 20.9     Results from last 7 days   Lab Units 06/09/20  0406 06/08/20  0531 06/07/20  2212   SODIUM mmol/L 132*  --  128*   POTASSIUM mmol/L 5.3*  --  5.3*   MAGNESIUM mg/dL  --  1.9  --    CHLORIDE mmol/L 100  --  97*   CO2 mmol/L 21.2*  --  19.9*   BUN mg/dL 34*  --  45*   CREATININE mg/dL 1.39*  --  1.75*   EGFR IF NONAFRICN AM mL/min/1.73 35*  --  27*   CALCIUM mg/dL 8.6  --  8.8   GLUCOSE mg/dL 101*  --  153*   ALBUMIN g/dL  --   --  3.06*   BILIRUBIN mg/dL  --   --  0.4   ALK PHOS U/L  --   --  79   AST (SGOT) U/L  --   --  16   ALT (SGPT) U/L  --   --  12   Estimated Creatinine Clearance: 22.3 mL/min (A) (by C-G formula based on SCr of 1.39 mg/dL (H)).    No results found for: AMMONIA      Blood Culture   Date Value Ref Range Status   06/08/2020 No growth at 24 hours  Preliminary   06/08/2020 No growth at 24 hours  Preliminary     No results found for: URINECX  No results found for: WOUNDCX  No results found for: STOOLCX    I have personally looked at the labs and they are summarized above.  ----------------------------------------------------------------------------------------------------------------------  Imaging Results (Last 24 Hours)     Procedure Component Value Units Date/Time    XR Chest 1 View [[id number]] Collected:  06/09/20 0827     Updated:  06/09/20 0829    Narrative:       EXAMINATION: XR CHEST 1 VW-      CLINICAL INDICATION:     chf; I50.9-Heart failure, unspecified;  N17.9-Acute kidney failure, unspecified; R74.8-Abnormal levels of other  serum  enzymes     TECHNIQUE:  XR CHEST 1 VW-      COMPARISON: 2020      FINDINGS:   Left cardiac pacer device stable.     Interstitial edema stable. Decreased lung volumes. Cardiomegaly stable.  Small effusions stable. No pneumothorax. No acute bony changes  identified.       Impression:       1. Slight decrease in lung volumes.  2. Stable CHF/edema.     This report was finalized on 2020 8:27 AM by Dr. Williams Lamas MD.           ----------------------------------------------------------------------------------------------------------------------    Assessment:  1.  Acute on chronic diastolic congestive heart failure  2.  Elevated troponin with a flat trend  3.  Coronary artery status post PCI of the right coronary artery in 2017  4.  Mild aortic stenosis  5.  Paroxysmal atrial fibrillation not on anticoagulation because of history of GI bleed and frequent falls and frailty  6.  Hyperkalemia and elevated creatinine  7.  Dementia    Plan:  1.  Patient has negative fluid bladder balance and despite that her creatinine and her sodium levels have improved objectively she feels better once creatinine level is normalized probably she can be placed on a standing dose of diuretics p.o. instruction in the nursing home to have a low-salt diet  2.  Regarding her mild aortic stenosis this can be followed up as an outpatient  3.  Her troponin has normalized and the patient as well as her daughter have expressed previously no willing for pursuing ischemia work-up  4.  We will follow at a distance please call if assistance is required      Jarod Beth MD   20 12:31           Electronically signed by Jarod Beth MD at 20 [id number]     Deniz Perez DO at 20 8521              [facility]     Patient Identification:  Name:  Cici Veliz  Age:  89 y.o.  Sex:  female  :  1925  MRN:  [id number]  Visit Number:  [id number]  Primary Care Provider:  Adonay  MD Edgar    Length of stay:  0    Chief complaint: Shortness of breath    Subjective:    Patient currently appears pleasantly confused.  She denies any shortness of breath, cough, fever, chills, sweats or any other symptoms at this time.  ----------------------------------------------------------------------------------------------------------------------  Current [facility] Meds:    aspirin 81 mg Oral Daily   aspirin 325 mg Oral Once   clopidogrel 75 mg Oral Daily   dilTIAZem  mg Oral Daily   iron polysaccharides 150 mg Oral Daily   ranolazine 500 mg Oral Q12H   sodium chloride 10 mL Intravenous Q12H        ----------------------------------------------------------------------------------------------------------------------  Vital Signs:  Temp:  [97.5 °F (36.4 °C)-98.1 °F (36.7 °C)] 97.6 °F (36.4 °C)  Heart Rate:  [62-76] 64  Resp:  [18-22] 18  BP: (128-177)/(61-80) 157/75      06/07/20  2110 06/08/20  0251   Weight: 56.2 kg (124 lb) 57.2 kg (126 lb 3.2 oz)     Body mass index is 20.37 kg/m².    Intake/Output Summary (Last 24 hours) at 6/8/2020 1802  Last data filed at 6/8/2020 1706  Gross per 24 hour   Intake 700 ml   Output 1675 ml   Net -975 ml     Diet Regular; Cardiac  ----------------------------------------------------------------------------------------------------------------------  Physical exam:  Constitutional: Elderly appearing  female in no apparent distress.  HENT:  Head:  Normocephalic and atraumatic.  Mouth:  Moist mucous membranes.    Eyes:  Conjunctivae and EOM are normal.  Pupils are equal, round, and reactive to light.  No scleral icterus.    Neck:  Neck supple. No thyromegaly.  No JVD present.    Cardiovascular:  Regular rate and rhythm with 3+ systolic murmur.  No rubs, clicks or gallops appreciated.  Pulmonary/Chest:  Clear to auscultation bilaterally with no crackles, wheezes or rhonchi appreciated.  Abdominal:  Soft. Nontender. Nondistended  Bowel sounds are normal in all  four quadrants. No organomegally appreciated.   Musculoskeletal:  5/5 muscle strength bilateral upper and lower extermties with equal muscle tone and bulk. No edema, no tenderness, and no deformity.  No red or swollen joints anywhere.    Neurological:  Cranial nerves II-XII intact with no focal deficits.  No facial droop.  No slurred speech.   Skin:  Warm and dry to palpation with no rashes or lesions appreciated.  Peripheral vascular:  2+ radial and pedal pulses in bilateral upper and lower extremities.  Psychiatric:  Alert but not oriented to person, place or time.  She does not appear to demonstrate appropriate judgment or insight  ----------------------------------------------------------------------------------------------------------------------  Tele:    ----------------------------------------------------------------------------------------------------------------------  Results from last 7 days   Lab Units 06/08/20  0531 06/08/20  0023 06/07/20 2212   TROPONIN T ng/mL 0.029 0.037* 0.031*     Results from last 7 days   Lab Units 06/08/20  0532 06/08/20  0023 06/07/20 2212 06/02/20  0044 06/01/20  1907   LACTATE mmol/L 0.9 2.2*  --   --   --    WBC 10*3/mm3  --   --  7.44 7.93 5.64   HEMOGLOBIN g/dL  --   --  8.9* 9.4* 9.0*   HEMATOCRIT %  --   --  29.7* 31.8* 29.6*   MCV fL  --   --  86.1 85.5 84.1   MCHC g/dL  --   --  30.0* 29.6* 30.4*   PLATELETS 10*3/mm3  --   --  308 317 318   INR   --   --   --   --  1.01     Results from last 7 days   Lab Units 06/07/20  2153   PH, ARTERIAL pH units 7.428   PO2 ART mm Hg 76.2*   PCO2, ARTERIAL mm Hg 31.7*   HCO3 ART mmol/L 20.9     Results from last 7 days   Lab Units 06/08/20  0531 06/07/20  2212 06/02/20  0044 06/01/20  1907   SODIUM mmol/L  --  128* 139 136   POTASSIUM mmol/L  --  5.3* 4.7 5.6*   MAGNESIUM mg/dL 1.9  --  2.0  --    CHLORIDE mmol/L  --  97* 104 102   CO2 mmol/L  --  19.9* 24.1 23.1   BUN mg/dL  --  45* 27* 29*   CREATININE mg/dL  --  1.75* 1.00  1.03*   EGFR IF NONAFRICN AM mL/min/1.73  --  27* 52* 50*   CALCIUM mg/dL  --  8.8 9.2 9.6   GLUCOSE mg/dL  --  153* 107* 103*   ALBUMIN g/dL  --  3.06*  --  3.33*   BILIRUBIN mg/dL  --  0.4  --  0.3   ALK PHOS U/L  --  79  --  82   AST (SGOT) U/L  --  16  --  15   ALT (SGPT) U/L  --  12  --  10   Estimated Creatinine Clearance: 17.8 mL/min (A) (by C-G formula based on SCr of 1.75 mg/dL (H)).    No results found for: AMMONIA      No results found for: BLOODCX  No results found for: URINECX  No results found for: WOUNDCX  No results found for: STOOLCX    I have personally looked at the labs and they are summarized above.  ----------------------------------------------------------------------------------------------------------------------  Imaging Results (Last 24 Hours)     Procedure Component Value Units Date/Time    CT Chest Without Contrast [[id number]] Collected:  06/08/20 0132     Updated:  06/08/20 0134    Narrative:       CT CHEST, NONCONTRAST, 6/8/2020    HISTORY:  89-year-old female with history of coronary artery disease and hypertrophic cardiomyopathy with diastolic heart failure presenting to the ED tonight with worsening shortness of air since a recent hospital discharge. Chest x-ray showing worsening  interstitial edema.    TECHNIQUE:  CT imaging of the chest without IV contrast. Radiation dose reduction techniques included automated exposure control. Radiation audit for CT and nuclear cardiology exams in the last 12 months: 2.    COMPARISON:  *  CTA chest, 3/12/2018.    FINDINGS:  Moderate cardiomegaly with densely calcified mitral valve annulus and papillary muscular calcification. No pericardial effusion. Densely calcified thoracic aorta is normal in caliber. Dual-chamber cardiac pacemaker.    Mild to moderate diffuse interstitial and alveolar pulmonary edema with small bilateral pleural effusions and dependent posterior lung base atelectasis. This may be present on a background of pre-existing  chronic lung disease.    No esophageal dilatation or hiatal hernia. No suspicious mass or adenopathy is visible within the chest. Chronic multinodular thyroid goiter. Limited upper abdominal images are unremarkable.      Impression:       1.  Moderate cardiomegaly. No pericardial effusion.  2.  Mild to moderate diffuse interstitial and alveolar pulmonary edema with small bilateral pleural effusions.    Signer Name: Sonny Vazquez MD   Signed: 6/8/2020 1:32 AM   Workstation Name[email or web address]    [facility]    XR Chest 1 View [[id number]] Collected:  06/07/20 2217     Updated:  06/07/20 2219    Narrative:       CR Chest 1 Vw    INDICATION:   Short of air. ER evaluation.     COMPARISON:    Chest x-ray 6/1/2020.    FINDINGS:  Single portable AP view(s) of the chest.  Cardiac silhouette is mildly enlarged and there is a left-sided dual-lead pacemaker.    There is considerable interval worsening in the appearance the chest with increased central vascular and interstitial markings as well as patchy airspace disease bilaterally more prominent centrally. Please correlate for clinical evidence of congestive  heart failure. Please exclude any clinical concern for infection. No pneumothorax. No obvious pleural effusion.      Impression:       Considerable interval worsening in the appearance the chest with development of central vascular congestion and interstitial edema and patchy predominantly central airspace disease. Cardiac silhouette is enlarged with a pacemaker. Findings most likely  due to congestive failure. Please exclude clinical concern for infection. Follow-up to resolution recommended.    Signer Name: Courtney Lazar MD   Signed: 6/7/2020 10:17 PM   Workstation Name: [email or web address]    [facility]        ----------------------------------------------------------------------------------------------------------------------  Assessment and Plan:    1.  Acute on chronic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 person  2 - assistive person  2 - assistive person  0 - independent  0 - understands/communicates without difficulty  0 - swallows foods/liquids without difficulty  --  --    06/08/20 1900  denies pain/discomfort  --  --  --  --  --  --  --  --  --    06/08/20 0719  denies pain/discomfort  2 - assistive person  2 - assistive person  2 - assistive person  2 - assistive person  0 - independent  0 - understands/communicates without difficulty  0 - swallows foods/liquids without difficulty  --  --    06/08/20 0307  --  --  --  --  --  --  --  --  no  oxygen    06/08/20 0245  denies pain/discomfort  2 - assistive person  2 - assistive person  2 - assistive person  2 - assistive person  0 - independent  0 - understands/communicates without difficulty  0 - swallows foods/liquids without difficulty  --  --
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
## 2020-06-10 NOTE — PROGRESS NOTES
Discharge Planning Assessment   [place]     Patient Name: Cici Veliz  MRN: [id number]  Today's Date: 6/10/2020    Admit Date: 6/7/2020      Discharge Plan     Row Name 06/10/20 1057       Plan    Plan  [facility] per Gloria stated pt will require a Covid 19 test prior to discharge back to [facility].  SS will follow.         Destination      Service Provider Request Status Selected Services Address Phone Number Fax Number    [facility] Pending - Request Sent N/A [address] [place] [address] [phone]       Priti Fried, MEHNAZW

## 2020-06-10 NOTE — PROGRESS NOTES
Chart reviewed. No new symptoms to address. PCP working on med regimen and O2 weaning to get back to NH in the near future.     See consult note from 6/9 re: GOC and dtr's plan for pt in the future. Awaiting Medicaid waiver before obtaining hospice services based on [facility]  recommendation.     We will continue to follow along. Please do not hesitate to contact us regarding further sx mgmt or GOC needs, including after hours or on weekends via our on call provider at [phone].        Snow Cordova MD    6/10/2020

## 2020-06-10 NOTE — PLAN OF CARE
Problem: Patient Care Overview  Goal: Plan of Care Review  Outcome: Ongoing (interventions implemented as appropriate)  Flowsheets (Taken 6/10/2020 9901)  Progress: improving  Plan of Care Reviewed With: patient; family  Outcome Summary: Pt has been alert, and oriented to self throughout the day with bouts of being oriented to place and time as well. Pt's daughter visited, with concerns that pt is on ASA and not on lasix, notified provider. Pt has had some frequency of urination, urine dark yellow and malodorous

## 2020-06-10 NOTE — PROGRESS NOTES
[facility] HOSPITALIST PROGRESS NOTE     Patient Identification:  Name:  Cici Veliz  Age:  89 y.o.  Sex:  female  :  1925  MRN:  [id number]  Visit Number:  [id number]  Primary Care Provider:  Edgar Urias MD    Length of stay:  0    Chief complaint: Shortness of breath    Subjective:    Patient with no complaints today.  In fact, although patient was admitted with a chief complaint of shortness of breath she has never complained of shortness of breath to me.  Patient does complain of mild back pain but otherwise has no other complaints.  Patient has an oxygen saturation well above 95% on 2 L nasal cannula.  Will decrease supplemental oxygen throughout the day with goal of weaning off by the end of today.  ----------------------------------------------------------------------------------------------------------------------  Current Hospital Meds:    aspirin 81 mg Oral Daily   clopidogrel 75 mg Oral Daily   dilTIAZem  mg Oral Daily   iron polysaccharides 150 mg Oral Daily   isosorbide mononitrate 30 mg Oral Q24H   sodium chloride 10 mL Intravenous Q12H        ----------------------------------------------------------------------------------------------------------------------  Vital Signs:  Temp:  [97.5 °F (36.4 °C)-97.9 °F (36.6 °C)] 97.7 °F (36.5 °C)  Heart Rate:  [64-73] 64  Resp:  [18] 18  BP: (108-141)/(52-89) 141/61      20  0251 20  0508 06/10/20  0458   Weight: 57.2 kg (126 lb 3.2 oz) 57.1 kg (125 lb 14.4 oz) 57 kg (125 lb 9.6 oz)     Body mass index is 20.27 kg/m².    Intake/Output Summary (Last 24 hours) at 6/10/2020 1613  Last data filed at 6/10/2020 1111  Gross per 24 hour   Intake 340 ml   Output 650 ml   Net -310 ml     Diet Regular; Cardiac  ----------------------------------------------------------------------------------------------------------------------  Physical exam:  Constitutional: Elderly appearing  female in no apparent distress.  HENT:   Head:  Normocephalic and atraumatic.  Mouth:  Moist mucous membranes.    Eyes:  Conjunctivae and EOM are normal.  Pupils are equal, round, and reactive to light.  No scleral icterus.    Neck:  Neck supple. No thyromegaly.  No JVD present.    Cardiovascular:  Regular rate and rhythm with 3+ systolic murmur.  No rubs, clicks or gallops appreciated.  Pulmonary/Chest:  Clear to auscultation bilaterally with no crackles, wheezes or rhonchi appreciated.  Abdominal:  Soft. Nontender. Nondistended  Bowel sounds are normal in all four quadrants. No organomegally appreciated.   Musculoskeletal:  5/5 muscle strength bilateral upper and lower extermties with equal muscle tone and bulk. No edema, no tenderness, and no deformity.  No red or swollen joints anywhere.    Neurological:  Cranial nerves II-XII intact with no focal deficits.  No facial droop.  No slurred speech.   Skin:  Warm and dry to palpation with no rashes or lesions appreciated.  Peripheral vascular:  2+ radial and pedal pulses in bilateral upper and lower extremities.  Psychiatric:  Alert but not oriented to person, place or time.  She does not appear to demonstrate appropriate judgment or insight  ----------------------------------------------------------------------------------------------------------------------  Tele:    ----------------------------------------------------------------------------------------------------------------------  Results from last 7 days   Lab Units 06/08/20  0531 06/08/20  0023 06/07/20 2212   TROPONIN T ng/mL 0.029 0.037* 0.031*     Results from last 7 days   Lab Units 06/09/20  0406 06/08/20  0532 06/08/20 0023 06/07/20 2212   LACTATE mmol/L  --  0.9 2.2*  --    WBC 10*3/mm3 5.93  --   --  7.44   HEMOGLOBIN g/dL 8.5*  --   --  8.9*   HEMATOCRIT % 27.9*  --   --  29.7*   MCV fL 84.5  --   --  86.1   MCHC g/dL 30.5*  --   --  30.0*   PLATELETS 10*3/mm3 306  --   --  308     Results from last 7 days   Lab Units 06/07/20  9093    PH, ARTERIAL pH units 7.428   PO2 ART mm Hg 76.2*   PCO2, ARTERIAL mm Hg 31.7*   HCO3 ART mmol/L 20.9     Results from last 7 days   Lab Units 06/10/20  0318 06/09/20  0406 06/08/20  0531 06/07/20  2212   SODIUM mmol/L 129* 132*  --  128*   POTASSIUM mmol/L 5.9* 5.3*  --  5.3*   MAGNESIUM mg/dL  --   --  1.9  --    CHLORIDE mmol/L 99 100  --  97*   CO2 mmol/L 22.0 21.2*  --  19.9*   BUN mg/dL 32* 34*  --  45*   CREATININE mg/dL 1.40* 1.39*  --  1.75*   EGFR IF NONAFRICN AM mL/min/1.73 35* 35*  --  27*   CALCIUM mg/dL 8.3 8.6  --  8.8   GLUCOSE mg/dL 116* 101*  --  153*   ALBUMIN g/dL  --   --   --  3.06*   BILIRUBIN mg/dL  --   --   --  0.4   ALK PHOS U/L  --   --   --  79   AST (SGOT) U/L  --   --   --  16   ALT (SGPT) U/L  --   --   --  12   Estimated Creatinine Clearance: 22.1 mL/min (A) (by C-G formula based on SCr of 1.4 mg/dL (H)).    No results found for: AMMONIA      No results found for: BLOODCX  No results found for: URINECX  No results found for: WOUNDCX  No results found for: STOOLCX    I have personally looked at the labs and they are summarized above.  ----------------------------------------------------------------------------------------------------------------------  Imaging Results (Last 24 Hours)     ** No results found for the last 24 hours. **        ----------------------------------------------------------------------------------------------------------------------  Assessment and Plan:    1.  Acute on chronic diastolic CHF -patient with no shortness of breath today, patient likely no longer hypoxic and will attempt to discontinue supplemental oxygen today.  Will repeat chest x-ray tomorrow morning.  Will start Lasix 20 mg p.o. daily today.    2.  Essential hypertension - well-controlled    3.  History of severe aortic stenosis -after most recent transthoracic echocardiogram, severity of aortic stenosis appears mild in nature.  Appreciate cardiology recommendations.  No further work-up  warranted at this time secondary to patient request.    4.  Hyperkalemia -we will give Kayexalate today.    5.  Hyponatremia -mild, continue to monitor    6.  Paroxysmal A. Fib    7.  COPD    8.  History of coronary artery disease            Deniz Perez,   06/10/20  16:13

## 2020-06-11 NOTE — PROGRESS NOTES
Discharge Planning Assessment   [place]     Patient Name: Cici Veliz  MRN: [id number]  Today's Date: 6/11/2020    Admit Date: 6/7/2020      Discharge Plan     Row Name 06/11/20 0936       Plan    Plan  Pt can be accepted back to [facility] on this date per Gloria.  Physician aware and agreeable.  SS will follow.         Destination      Service Provider Request Status Selected Services Address Phone Number Fax Number    [facility] Pending - Request Sent N/A [address] [place] [address] [phone]     Priti Fried, MEHNAZW

## 2020-06-11 NOTE — DISCHARGE SUMMARY
[facility] HOSPITALIST MEDICINE DISCHARGE SUMMARY    Patient Identification:  Name:  Cici Veliz  Age:  89 y.o.  Sex:  female  :  1925  MRN:  [id number]  Visit Number:  [id number]    Date of Admission: 2020  Date of Discharge:  2020     PCP: Edgar Urias MD    DISCHARGE DIAGNOSIS   1.  Acute on chronic diastolic CHF  2.  Essential hypertension  3.  Mild aortic stenosis  4.  Hyperkalemia  5.  Hyponatremia  6.  Paroxysmal A. Fib  7.  COPD  8.  History of CAD      CONSULTS  1. Dr. Cordova, Palliative Care  2. Dr. Beth, Cardiology      PROCEDURES PERFORMED   None      HOSPITAL COURSE  Ms. Veliz is a 89 y.o. female who presented to [facility] ED on 2020 with a chief complaint of shortness of breath.  Patient has a past medical history remarkable for chronic diastolic CHF, essential hypertension, CKD stage III, paroxysmal A. fib, vascular dementia and coronary artery disease.  It should be noted patient is quite advanced with her dementia and is unable to give any pertinent history of present illness.  It should be noted patient was recently hospitalized in our facility from 2020 through 2020 for generalized weakness, elevated troponins and mild elevation in creatinine.  Patient was discharged from our facility to a nursing home and was not discharged with Lasix secondary to decreasing but still somewhat elevated serum creatinine on discharge.  It is not quite clear to me why patient was brought to the emergency department but there are reports of shortness of breath.  During my interviews with the patient she reports that she sometimes has shortness of breath but sometimes she does not.  Initial evaluation in the emergency department did consist of basic laboratory work as well as physical exam and vital signs.  Initial lab work did include a CBC and CMP.  Initial CMP found patient's serum creatinine elevated at 1.7.  Serum troponins were also mildly elevated.   CT of chest was obtained that demonstrated bilateral pleural effusions with mild to moderate diffuse interstitial and alveolar pulmonary edema.  For this reason, patient was admitted for further treatment and evaluation.    Cardiology services were consulted who did thoroughly evaluate the patient.  After thorough evaluation, recommendation was made to gently diurese patient in the setting of elevated serum creatinine and complaint of shortness of breath.  Within 24 hours patient's troponin was back to normal and no further ischemic work-up was warranted as patient's family has requested no further work-up.  Patient's serum creatinine did improve throughout her hospital stay.  Patient did have improvement of serum creatinine down to 1.1 on date of discharge.  Patient has been restarted on Lasix 20 mg p.o. daily.  On date of discharge, patient states that she feels well and would like to be discharged from the hospital.  However, patient still demonstrates baseline confusion from her dementia.    It should be noted patient's family did have several questions regarding patient's current medication regimen.  There is concern regarding a history of GI bleed in the past and while patient was receiving aspirin during her hospital stay.  This medication appeared to be started by admitting physician and it is interpreted by myself as a probable home medication.  However, secondary to family concerns, the patient's aspirin will be discontinued.  The patient's family also had concerns after speaking with a reported close friend of theirs who is a doctor regarding why the patient was on lisinopril and Ranexa.  Request was made by family to discontinue patient's lisinopril and to start patient on hydralazine.  Request was made to stop patient's Ranexa and start patient on Imdur.  As patient did have mild acute kidney injury on admission, these requests were not felt unreasonable and these requests were honored.  With this in  mind, it is felt patient has reached maximum medical benefit of current hospitalization and she will will be discharged back to her nursing home in stable condition today.  However, it should be noted patient has advanced age with multiple comorbidities and is at high risk for readmission within the next 90 days.    VITAL SIGNS:      06/09/20  0508 06/10/20  0458 06/11/20  0506   Weight: 57.1 kg (125 lb 14.4 oz) 57 kg (125 lb 9.6 oz) 57.2 kg (126 lb 1.6 oz)     Body mass index is 20.35 kg/m².    PHYSICAL EXAM:  Constitutional: Elderly appearing  female in no apparent distress.  HENT:  Head:  Normocephalic and atraumatic.  Mouth:  Moist mucous membranes.    Eyes:  Conjunctivae and EOM are normal.  Pupils are equal, round, and reactive to light.  No scleral icterus.    Neck:  Neck supple. No thyromegaly.  No JVD present.    Cardiovascular:  Regular rate and rhythm with 3+ systolic murmur.  No rubs, clicks or gallops appreciated.  Pulmonary/Chest:  Clear to auscultation bilaterally with no crackles, wheezes or rhonchi appreciated.  Abdominal:  Soft. Nontender. Nondistended  Bowel sounds are normal in all four quadrants. No organomegally appreciated.   Musculoskeletal:  5/5 muscle strength bilateral upper and lower extermties with equal muscle tone and bulk. No edema, no tenderness, and no deformity.  No red or swollen joints anywhere.    Neurological:  Cranial nerves II-XII intact with no focal deficits.  No facial droop.  No slurred speech.   Skin:  Warm and dry to palpation with no rashes or lesions appreciated.  Peripheral vascular:  2+ radial and pedal pulses in bilateral upper and lower extremities.  Psychiatric:  Alert but not oriented to person, place or time.  She does not appear to demonstrate appropriate judgment or insight    DISCHARGE DISPOSITION   Stable    DISCHARGE MEDICATIONS:     Discharge Medications      New Medications      Instructions Start Date   hydrALAZINE 10 MG tablet  Commonly known  as:  APRESOLINE   10 mg, Oral, Every 6 Hours Scheduled      isosorbide mononitrate 30 MG 24 hr tablet  Commonly known as:  IMDUR   30 mg, Oral, Every 24 Hours Scheduled   Start Date:  June 12, 2020        Changes to Medications      Instructions Start Date   furosemide 20 MG tablet  Commonly known as:  LASIX  What changed:    · medication strength  · how much to take   20 mg, Oral, Daily   Start Date:  June 12, 2020        Continue These Medications      Instructions Start Date   acetaminophen 325 MG tablet  Commonly known as:  TYLENOL   325 mg, Oral, Every 4 Hours PRN      ALPRAZolam 0.25 MG tablet  Commonly known as:  XANAX   0.25 mg, Oral, Every 12 Hours PRN      bisacodyl 10 MG suppository  Commonly known as:  DULCOLAX   10 mg, Rectal, Every 12 Hours PRN      Cardizem  MG 24 hr capsule  Generic drug:  dilTIAZem CD   120 mg, Oral, Daily      clopidogrel 75 MG tablet  Commonly known as:  PLAVIX   75 mg, Oral, Daily      fleet enema 7-19 GM/118ML enema   1 enema, Rectal, Every 12 Hours PRN      iron polysaccharides 150 MG capsule  Commonly known as:  NIFEREX   150 mg, Oral, Daily      lactulose 10 GM/15ML solution  Commonly known as:  CHRONULAC   20 g, Oral, Every 12 Hours PRN      nitroglycerin 0.4 MG SL tablet  Commonly known as:  NITROSTAT   0.4 mg, Sublingual, Every 5 Minutes PRN         Stop These Medications    lisinopril 5 MG tablet  Commonly known as:  PRINIVIL,ZESTRIL     ranolazine 500 MG 12 hr tablet  Commonly known as:  RANEXA              Your Scheduled Appointments    Jun 16, 2020  2:30 PM EDT  LAB with [place] [facility]  [facility] HEMATOLOGY  AND ONCOLOGY ([place]) [address] [place] [address]  [place] [address]  [phone]      Jun 16, 2020  3:00 PM EDT  FOLLOW UP with ОЛЬГА Thompson  [facility] HEMATOLOGY  AND ONCOLOGY ([place]) [address] [place] [address]  [place] [address]  [phone]      Aug 13, 2020  1:30 PM EDT  Follow Up  with ОЛЬГА Pitts  [facility] PULMONOLOGY CRITICAL CARE (--) [address] [facility] [address]  [facility] [address] [phone]   Arrive 15 minutes prior to appointment.            Additional Instructions for the Follow-ups that You Need to Schedule     Discharge Follow-up with PCP   As directed       Currently Documented PCP:    Edgar Urias MD    PCP Phone Number:    [phone]     Follow Up Details:  please follow up with pcp in 1 week           Follow-up Information     Edgar Urias MD .    Specialty:  Family Medicine  Why:  please follow up with pcp in 1 week  Contact information:  [address]   [address]  [place] [address] [phone]                   TEST  RESULTS PENDING AT DISCHARGE   Order Current Status    Blood Culture - Blood, Arm, Right Preliminary result    Blood Culture - Blood, Hand, Left Preliminary result           Deniz Perez,   06/11/20  13:41    Please note that this discharge summary required more than 30 minutes to complete.    Please send a copy of this dictation to the following providers:  Edgar Urias MD

## 2020-06-11 NOTE — PROGRESS NOTES
Discharge Planning Assessment   [place]     Patient Name: Cici Veliz  MRN: [id number]  Today's Date: 6/11/2020    Admit Date: 6/7/2020        Discharge Plan     Row Name 06/11/20 1503       Plan    Final Discharge Disposition Code  03 - skilled nursing facility (SNF)    Final Note  Pt to be discharged back to [facility] on this date.  Facility aware and agreeable per Gloria.  Pt daughter aware and agreeable.  Pt to be transported via EMS.        Destination      Service Provider Request Status Selected Services Address Phone Number Fax Number    [facility] Pending - Request Sent N/A [address], [place] [address] [phone]              Priti Fried, MEHNAZW

## 2020-06-11 NOTE — NURSING NOTE
Pt Alert and oriented to self and place, but does not know what time of day it is, what day of the week it is, or what her current situation is. Pt called to nurses station stating she needed help. When arrived in the room pt was lying on her right side with several blankets on top of her. Pt appeared anxious and stated that the blankets were too heavy on her body and she needed us to remove some of the weight. Pt began complaining of chest pain in the L chest wall stating her heart was hurting. Pt's heart rate at the time was 104 per telemetry monitor, sinus tachycardia with PACs.      Pt's vital signs   /61  HR 95  RR 26    STAT EKG was ordered per protocol, and as the EKG tech arrived pt stated her chest pain had resolved, the blankets were just heavy. Notified Dr. Perez immediately of findings, as well as EKG results. Pt to be discharged this afternoon.

## 2020-06-11 NOTE — DISCHARGE PLACEMENT REQUEST
"Cici Veliz (89 y.o. Female)     Date of Birth Social Security Number Address Home Phone MRN    09/07/1925  [address] [id number] [phone] [id number]    Yazidism Marital Status          Protestant        Admission Date Admission Type Admitting Provider Attending Provider Department, Room/Bed    6/7/20 Emergency Ashley Nickerson MD Mullins, Thomas Anthony, DO [address], 3322/1P    Discharge Date Discharge Disposition Discharge Destination         Home or Self Care              Attending Provider:  Deniz Perez DO    Allergies:  Bee Venom, Erythromycin, Levaquin [Levofloxacin], Lipitor [Atorvastatin], Albuterol, Amoxil [Amoxicillin], Codeine, Demeclocycline, Lopressor [Metoprolol Tartrate], Penicillins, Tetracyclines & Related, Zetia [Ezetimibe], Zocor [Simvastatin]    Isolation:  None   Infection:  COVID Screen (preop/placement) (06/04/20)   Code Status:  No CPR    Ht:  167.6 cm (66\")   Wt:  57.2 kg (126 lb 1.6 oz)    Admission Cmt:  None   Principal Problem:  None                Active Insurance as of 6/7/2020     Primary Coverage     Payor Plan Insurance Group Employer/Plan Group    MEDICARE MEDICARE A & B      Payor Plan Address Payor Plan Phone Number Payor Plan Fax Number Effective Dates    [address] [phone]  9/1/1990 - None Entered    [facility] [id number]       Subscriber Name Subscriber Birth Date Member ID       CICI VELIZ 9/7/1925 [id number]           Secondary Coverage     Payor Plan Insurance Group Employer/Plan Group    [id number]     Payor Plan Address Payor Plan Phone Number Payor Plan Fax Number Effective Dates    [address]   8/18/1997 - None Entered    [facility] [id number]       Subscriber Name Subscriber Birth Date Member ID       CICI VELIZ 9/7/1925 [id number]                 Emergency Contacts      (Rel.) Home Phone Work Phone Mobile Phone    Ramona Hamilton (Power of ) -- " -- [phone]    Carson Veliz (Power of ) -- -- [phone]              Discharge Summary    No notes of this type exist for this encounter.         Discharge Order (From admission, onward)     Start     Ordered    06/11/20 1339  Discharge patient  Once     Expected Discharge Date:  06/11/20    Expected Discharge Time:  Afternoon    Discharge Disposition:  Home or Self Care    Physician of Record for Attribution - Please select from Treatment Team:  MITRA DORMAN [[id number]]    Review needed by CMO to determine Physician of Record:  No       Question Answer Comment   Physician of Record for Attribution - Please select from Treatment Team MITRA DORMAN    Review needed by CMO to determine Physician of Record No        06/11/20 1340           Cici Veliz  MRN: [id number]      6/8/2020 - 6/11/2020     [facility] 3 SOUTH    Your Next Steps     Do     ·    these medications from [facility] - [place], [place] - [address] [place] [address] [phone] [facility] [phone]   ? furosemide   ? hydrALAZINE   ? isosorbide mononitrate   Read     ·   Read these attachments   ? Heart Failure  Diagnosis  Easy-to-Read (English)   Go     Jun 16 LAB 2:30 PM   [facility] HEMATOLOGY  AND ONCOLOGY   [address] [place] [address] [phone]    You have more appointments on this date. Please review your full appointment list.   After Visit Summary   Instructions     ·   Your medications have changed     START taking:   ? hydrALAZINE (APRESOLINE)   ? isosorbide mononitrate (IMDUR)   Start taking on: June 12, 2020   CHANGE how you take:   ? furosemide (LASIX) -- medication strength, how much to take   STOP taking:   ? lisinopril 5 MG tablet (PRINIVIL,ZESTRIL)   ? ranolazine 500 MG 12 hr tablet (RANEXA)   Review your updated medication list below.   Your Next Steps   Do   Read   Go       If you have any questions about your recovery, please call the  [facility] Nurse Call Center at [phone]. A registered nurse is available 24 hours a day 7 days a week to assist you.   If you have any COVID-19 related questions, please call [phone].  ·   Other instructions     Discharge Follow-up with PCP   Currently Documented PCP:   Edgar Urias MD   PCP Phone Number:   [phone]   What's Next     What's Next          Follow up with Edgar Urias MD   please follow up with pcp in 1 week  [address]   [address]   [facility] [address]   [phone]    Jun 16 LAB   Tuesday Jun 16, 2020 2:30 PM  [facility] HEMATOLOGY  AND ONCOLOGY   [address] [facility] [address]   [phone]          FOLLOW UP with ОЛЬГА Cano   Tuesday Jun 16, 2020 3:00 PM  [facility] HEMATOLOGY  AND ONCOLOGY   [address] [facility] [address]   [phone]    Aug 13 Follow Up with ОЛЬГА Pitts   Thursday Aug 13, 2020 1:30 PM   Arrive 15 minutes prior to appointment.  [facility] PULMONOLOGY CRITICAL CARE   [address] [facility] [address]  [facility] [address] [phone]    Your Allergies   Date Reviewed: 6/8/2020   Your Allergies   Allergen Reactions   Bee Venom Anaphylaxis       Erythromycin Diarrhea   Cramping       Levaquin (Levofloxacin) Unknown - Low Severity       Lipitor (Atorvastatin) Diarrhea  Itching       Albuterol Unknown - Low Severity       Amoxil (Amoxicillin) Rash       Codeine Delirium  Confusion       Demeclocycline Other (See Comments)   Unknown        Lopressor (Metoprolol Tartrate) Confusion   Confusion and nightmares       Penicillins Nausea And Vomiting  Palpitations       Tetracyclines & Related Palpitations  Other (See Comments)   Labored breathing and head feels heavy        Zetia (Ezetimibe) Itching       Zocor (Simvastatin) Itching   Patient Belongings Returned     Document Return of Belongings Flowsheet     Were the patient bedside belongings sent home? --    Medications Retrieved from Pharmacy & Sent Home --   Belongings Sent to Safe --   Belongings sent with: --   Belongings Retrieved from Security & Sent Home --       [employer] Signup     Our records indicate that your [facility] account has been deactivated. If you would like to reactivate your account, please email [email or web address] or call [phone] to talk to our [employer] staff.  Medication List   Medication List     Morning Afternoon Evening Bedtime As Needed    acetaminophen 325 MG tablet   Commonly known as: TYLENOL   Take 325 mg by mouth Every 4 (Four) Hours As Needed for Mild Pain or Fever.   Last time this was given: 325 mg on Silvia 10, 2020  1:09 PM          ALPRAZolam 0.25 MG tablet   Commonly known as: XANAX   Take 0.25 mg by mouth Every 12 (Twelve) Hours As Needed for Anxiety or Sleep.   Last time this was given: 0.25 mg on Silvia 10, 2020 11:17 PM          bisacodyl 10 MG suppository   Commonly known as: DULCOLAX   Insert 10 mg into the rectum Every 12 (Twelve) Hours As Needed for Constipation.          Cardizem  MG 24 hr capsule   Take 120 mg by mouth Daily.   Generic drug: dilTIAZem CD   Last time this was given: 120 mg on June 11, 2020  8:01 AM          clopidogrel 75 MG tablet   Commonly known as: PLAVIX   Take 1 tablet by mouth Daily.   Last time this was given: 75 mg on June 11, 2020  8:01 AM          fleet enema 7-19 GM/118ML enema   Insert 1 enema into the rectum Every 12 (Twelve) Hours As Needed (Constipation).          furosemide 20 MG tablet   Commonly known as: LASIX   Start taking on: June 12, 2020   Take 1 tablet by mouth Daily.   What changed:   · medication strength   · how much to take   Last time this was given: 20 mg on June 11, 2020  8:01 AM          hydrALAZINE 10 MG tablet   Commonly known as: APRESOLINE   Take 1 tablet by mouth Every 6 (Six) Hours.   Last time this was given: 10 mg on June 11, 2020 12:28 PM          iron polysaccharides 150 MG capsule    Commonly known as: NIFEREX   Take 150 mg by mouth Daily.   Last time this was given: 150 mg on June 11, 2020  8:00 AM          isosorbide mononitrate 30 MG 24 hr tablet   Commonly known as: IMDUR   Start taking on: June 12, 2020   Take 1 tablet by mouth Daily.   Last time this was given: 30 mg on June 11, 2020  8:01 AM          lactulose 10 GM/15ML solution   Commonly known as: CHRONULAC   Take 20 g by mouth Every 12 (Twelve) Hours As Needed (Constipation).   Last time this was given: 60 mL on Silvia 10, 2020  5:23 PM          nitroglycerin 0.4 MG SL tablet   Commonly known as: NITROSTAT   Place 1 tablet under the tongue Every 5 (Five) Minutes As Needed for Chest Pain.         Where to  your medications     ·    these medications at [facility] - [place], [place] - [address] [facility] - [phone]  - [phone]     ? furosemide • hydrALAZINE • isosorbide mononitrate   Address: [address] [place] [address]   Phone: [phone]   Always carry an updated list of your medications with you. If there is an emergency, a responder can quickly see what medications you are taking. Take this paperwork with you the next time you see your health care provider.      Attached Information   Heart Failure  Diagnosis  Easy-to-Read (English)   Heart Failure, Diagnosis       Heart failure means that your heart is not able to pump blood in the right way. This makes it hard for your body to work well. Heart failure is usually a long-term (chronic) condition. You must take good care of yourself and follow your treatment plan from your doctor.  What are the causes?  This condition may be caused by:  · High blood pressure.  · Build up of cholesterol and fat in the arteries.  · Heart attack. This injures the heart muscle.  · Heart valves that do not open and close properly.  · Damage of the heart muscle. This is also called cardiomyopathy.  · Lung disease.  · Abnormal heart rhythms.  What increases the risk?  The risk  of heart failure goes up as a person ages. This condition is also more likely to develop in people who:  · Are overweight.  · Are male.  · Smoke or chew tobacco.  · Abuse alcohol or illegal drugs.  · Have taken medicines that can damage the heart.  · Have diabetes.  · Have abnormal heart rhythms.  · Have thyroid problems.  · Have low blood counts (anemia).  What are the signs or symptoms?  Symptoms of this condition include:  · Shortness of breath.  · Coughing.  · Swelling of the feet, ankles, legs, or belly.  · Losing weight for no reason.  · Trouble breathing.  · Waking from sleep because of the need to sit up and get more air.  · Rapid heartbeat.  · Being very tired.  · Feeling dizzy, or feeling like you may pass out (faint).  · Having no desire to eat.  · Feeling like you may vomit (nauseous).  · Peeing (urinating) more at night.  · Feeling confused.  How is this treated?               This condition may be treated with:  · Medicines. These can be given to treat blood pressure and to make the heart muscles stronger.  · Changes in your daily life. These may include eating a healthy diet, staying at a healthy body weight, quitting tobacco and illegal drug use, or doing exercises.  · Surgery. Surgery can be done to open blocked valves, or to put devices in the heart, such as pacemakers.  · A donor heart (heart transplant). You will receive a healthy heart from a donor.  Follow these instructions at home:  · Treat other conditions as told by your doctor. These may include high blood pressure, diabetes, thyroid disease, or abnormal heart rhythms.  · Learn as much as you can about heart failure.  · Get support as you need it.  · Keep all follow-up visits as told by your doctor. This is important.  Summary  · Heart failure means that your heart is not able to pump blood in the right way.  · This condition is caused by high blood pressure, heart attack, or damage of the heart muscle.  · Symptoms of this condition  include shortness of breath and swelling of the feet, ankles, legs, or belly. You may also feel very tired or feel like you may vomit.  · You may be treated with medicines, surgery, or changes in your daily life.  · Treat other health conditions as told by your doctor.  This information is not intended to replace advice given to you by your health care provider. Make sure you discuss any questions you have with your health care provider.  Document Released: 09/26/2009 Document Revised: 03/06/2020 Document Reviewed: 03/06/2020  Elsevier Patient Education © 2020 [employer]     Pneumonia Vaccination     Please follow up with your primary care provider or retail pharmacy to see if you are eligible for a pneumonia vaccination.      Opioid Resource     If you or someone you know needs information on substance abuse, please visit   [email or web address].findhelpnAzev[facility] Pharmaceuticalsky.org/ for listings of facilities and resources across [place].  Stroke Symptoms     · Call 911 or have someone take you to the Emergency Department if you have any of the following:  · Sudden numbness or weakness of your face, arm or leg especially on one side of the body  · Sudden confusion, difficulty speaking or trouble understanding   · Changes in your vision or loss of sight in one eye  · Sudden severe headache with no known cause  · Sudden dizziness, trouble walking, loss of balance or coordination     It is important to seek emergency care right away if you have further stroke symptoms. If you get emergency help quickly, the powerful clot-dissolving medicines can reduce the disabilities caused by a stroke.      For more information:  American Stroke Association  2-661-8-STROKE  www.strokeassociation.org  IF YOU SMOKE OR USE TOBACCO PLEASE READ THE FOLLOWING:  Why is smoking bad for me?  Smoking increases the risk of heart disease, lung disease, vascular disease, stroke, and cancer. If you smoke, STOP!     For more information:  Quit Now  [place]  1-800-QUIT-NOW  https://[place].quitlogix.org/en-[place]/     Suicidal Feelings     If you feel like life is too tough and are thinking of suicide or injuring yourself, get help right away!  · Call 911  · Call a suicide hotline to speak to a counselor. 0-627-593-TALK or 2-053-DRHBMIU   Patient Experience     Thank you for choosing [facility]. You may receive a survey following your visit. Please take a moment to share what went well, where we need improvement, and which staff members deserve recognition. We value your input.         YOU ARE THE MOST IMPORTANT FACTOR IN YOUR RECOVERY.      Follow all instructions carefully.      I have reviewed my discharge instructions with my nurse, including the following information, if applicable:                 Information about my illness and diagnosis              Follow up appointments (including lab draws)              Wound Care              Equipment Needs              Medications (new and continuing) along with side effects              Preventative information such as vaccines and smoking cessations              Diet              Pain              I know when to contact my Doctor's office or seek emergency care        I want my nurse to describe the side effects of my medications: YES NO   If the answer is no, I understand the side effects of my medications: YES NO   My nurse described the side effects of my medications in a way that I could understand: YES NO   I have taken my personal belongings and my own medications with me at discharge: YES NO       I have received this information and my questions have been answered. I have discussed any concerns I see with this plan with the nurse or physician. I understand these instructions.     Signature of Patient or Responsible Person: _____________________________________     Date: _________________  Time: __________________     Signature of Healthcare Provider: _______________________________________  Date:  _________________  Time: __________________

## 2020-06-12 NOTE — NURSING NOTE
"Called [facility] to check status of transport back to nursing home, dispatch said \"it has already been toned out, they were running behind due to an emergency and she would try again.\"   "

## 2020-06-12 NOTE — OUTREACH NOTE
Prep Survey      Responses   [facility] facility patient discharged from?  [place]   Is LACE score < 7 ?  No   Eligibility  Not Eligible   What are the reasons patient is not eligible?  [facility]   COVID-19 Test Status  Negative   Does the patient have one of the following disease processes/diagnoses(primary or secondary)?  CHF   Prep survey completed?  Yes          Iwona Farias RN

## 2020-07-12 PROBLEM — I63.512 ACUTE ISCHEMIC LEFT MCA STROKE (HCC): Status: ACTIVE | Noted: 2020-01-01

## 2020-07-12 PROBLEM — I63.9 STROKE (HCC): Status: ACTIVE | Noted: 2020-01-01
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
## 2020-07-12 NOTE — ED NOTES
Called  [facility] Spoke with Ly requesting Stroke Coordinator. Dr. Navarro speaking with Stroke Navigator at this time.     Julia Lacey  07/12/20 0637
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
## 2020-07-12 NOTE — ED NOTES
Received call from Zahra at [facility] requesting report from Nurse and Bed Assignment.     Julia Lacey  07/12/20 4034

## 2020-07-12 NOTE — ED NOTES
Called [facility] requesting transport to [facility] at this time. [facility] Declined due to anther transport Pending.     Julia Lacey  07/12/20 0600

## 2020-07-12 NOTE — PLAN OF CARE
Problem: Patient Care Overview  Goal: Plan of Care Review  Outcome: Ongoing (interventions implemented as appropriate)  Flowsheets (Taken 7/12/2020 3988)  Plan of Care Reviewed With: patient; daughter  Note:   SLP evaluation completed. Continue NPO. SLP to follow up tomorrow for clinical bedside re-assessment for ? Proceed with MBS. Will also follow for aphasia. Please see note for further details and recommendations.
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
## 2020-07-12 NOTE — ED NOTES
EMTALA consent signed at this time by patients POA; Daughter Marcela TeagueJerica, RN  07/12/20 0669

## 2020-07-12 NOTE — NURSING NOTE
"  ACC REVIEW REPORT: [facility]        PATIENT NAME: Cici Veliz    PATIENT ID: [id number]      COVID-19 ACC SCREENING       DOES THE PATIENT HAVE A FEVER GREATER THAN OR EQUAL .4: NO    IS THE PATIENT EXPERIENCING SHORTNESS OF BREATH: NO    DOES THE PATIENT HAVE A COUGH: NO    DOES THE PATIENT HAVE ANY OF THE FOLLOWING RISK FACTORS:    EXPOSURE TO SUSPECTED OR KNOWN COVID-19: NO    RECENT TRAVEL HISTORY TO ENDEMIC AREA (DOMESTIC/LOCAL): NO    IS THE PATIENT A HEALTHCARE WORKER: NO    HAS THE PATIENT BEEN TESTED FOR COVID-19: YES    DATE TESTED: 7/12/2020    LAB TESTING SENT TO: IN HOUSE AT [place]      BED: S501    BED TYPE: TELEMETRY    BED GIVEN TO: YOANDY PAYTON RN    TIME BED GIVEN: 0616    YOB: 1925    AGE: 89    GENDER: FEMALE    PREVIOUS ADMIT TO [facility]: NO    PREVIOUS ADMISSION DATE: N/A    PATIENT CLASS: INPATIENT    TODAY'S DATE: 7/12/2020    TRANSFER DATE: 7/12/2020    ETA: 0700    TRANSFERRING FACILITY: [facility]     TRANSFERRING FACILITY PHONE # : [phone]    TRANSFERRING MD: MICHELLE    ACCEPTING PROVIDER: NAVIGATOR    NEUROLOGY PHYSICIAN: BREN    DATE/TIME REQUEST RECEIVED: 7/12/2020 AT 0549    [facility] RN: WYATT COOK RN    REPORT FROM: YOANDY PAYTON RN    TIME REPORT TAKEN: 0610    DIAGNOSIS: CVA    REASON FOR TRANSFER TO [facility]: HIGHER LEVEL OF CARE    TRANSPORTATION: HELICOPTOR    CLINICAL REASON FOR TRANSFER TO [facility]: Patients daughter states, \"My brother was with her when she started to have the right sided weakness and the facial drooping. It was about 3:30 when the symptoms started. She has 5 stents, she has a-fib, and she is on Plavix. She normally talks with no problems, and walks around at home with a walker and she couldn't talk to us anymore. She lost all of her strength, so I know she has had a stroke.\" RR even and unlabored.      CLINICAL INFORMATION    HEIGHT:67 INCHES    WEIGHT: 59 KG    ALLERGIES: SEE EPIC    MILLER: NO    INFECTIOUS " DISEASE: NO    ISOLATION: NO      LAST VITAL SIGNS:  TIME: 0617  TEMP: 98.1  PULSE: 138  B/P: 125/93  RESP: 18    LAB INFORMATION:     CULTURE INFORMATION:  Component  Ref Range & Units 05:26   WBC  3.40 - 10.80 10*3/mm3 6.08    RBC  3.77 - 5.28 10*6/mm3 3.40Low     Hemoglobin  12.0 - 15.9 g/dL 8.8Low     Hematocrit  34.0 - 46.6 % 29.4Low     MCV  79.0 - 97.0 fL 86.5    MCH  26.6 - 33.0 pg 25.9Low     MCHC  31.5 - 35.7 g/dL 29.9Low     RDW  12.3 - 15.4 % 18.4High     RDW-SD  37.0 - 54.0 fl 58.4High     MPV  6.0 - 12.0 fL 10.1    Platelets  140 - 450 10*3/mm3 296    Neutrophil %  42.7 - 76.0 % 57.8    Lymphocyte %  19.6 - 45.3 % 25.8    Monocyte %  5.0 - 12.0 % 13.2High     Eosinophil %  0.3 - 6.2 % 2.6    Basophil %  0.0 - 1.5 % 0.3    Immature Grans %  0.0 - 0.5 % 0.3    Neutrophils, Absolute  1.70 - 7.00 10*3/mm3 3.51    Lymphocytes, Absolute  0.70 - 3.10 10*3/mm3 1.57    Monocytes, Absolute  0.10 - 0.90 10*3/mm3 0.80    Eosinophils, Absolute  0.00 - 0.40 10*3/mm3 0.16    Basophils, Absolute  0.00 - 0.20 10*3/mm3 0.02    Immature Grans, Absolute  0.00 - 0.05 10*3/mm3 0.02    nRBC  0.0 - 0.2 /100 WBC 0.0      Component  Ref Range & Units 05:26 5d ago   Glucose  65 - 99 mg/dL 116High   100High     BUN  8 - 23 mg/dL 28High   30High     Creatinine  0.57 - 1.00 mg/dL 1.34High   1.66High     Sodium  136 - 145 mmol/L 136  141    Potassium  3.5 - 5.2 mmol/L 5.4High   5.9High     Comment: Specimen hemolyzed.  Results may be affected. 1+ Hemolysis    Chloride  98 - 107 mmol/L 103  107    CO2  22.0 - 29.0 mmol/L 19.5Low   24.0    Calcium  8.2 - 9.6 mg/dL 8.5  8.8    Total Protein  6.0 - 8.5 g/dL 6.6  6.3    Albumin  3.50 - 5.20 g/dL 2.93Low   3.26Low     ALT (SGPT)  1 - 33 U/L 9  10    Comment: Specimen hemolyzed.  Results may be affected.   AST (SGOT)  1 - 32 U/L 20  13    Alkaline Phosphatase  39 - 117 U/L 64  66    Total Bilirubin  0.0 - 1.2 mg/dL 0.2  0.2    eGFR Non African Amer  >60 mL/min/1.73 37Low   29Low        Globulin  gm/dL 3.7  3.0    A/G Ratio  g/dL 0.8  1.1    BUN/Creatinine Ratio  7.0 - 25.0 20.9  18.1    Anion Gap  5.0 - 15.0 mmol/L 13.5  10.0     T4, Free [[id number]] (Order [id number])   Order   Date: 7/12/2020 Department: [facility] Released By/Authorizing: Keith Navarro MD (auto-released)   Reprint Order Requisition     T4, Free (Order #[id number]) on 7/12/20       Contains abnormal data T4, Free   Order: [id number]   Status:  Final result   Visible to patient:  No (Not Released) Next appt:  08/13/2020 at 01:30 PM in Pulmonology (Claudia Weber, APRIRINEO)   Specimen Information: Blood        Component  Ref Range & Units 05:26 3mo ago   Free T4  0.93 - 1.70 ng/dL 0.87Low   0.98    Resulting Agency  [facility]      Narrative   Performed by:  [facility]   Results may be falsely increased if patient taking Biotin.      Specimen Collected: 07/12/20 05:26 Last Resulted: 07/12/20 05:59 Lab Flowsheet Order Details View Encounter Lab and Collection Details Routing Result History         Related Result Highlights         Troponin  Final result 7/12/2020             TSH  Final result 7/12/2020             BNP  Final result 7/12/2020             Comprehensive Metabolic Panel  Final result 7/12/2020             CK  Final result 7/12/2020             Phosphorus  Final result 7/12/2020             Magnesium  Final result 7/12/2020             C-reactive Protein  Final result 7/12/2020                  Other Results from 7/12/2020     Urinalysis With Culture If Indicated - Urine, Catheter   Order: [id number]     Status:  Final result   Visible to patient:  No (Not Released) Next appt:  08/13/2020 at 01:30 PM in Pulmonology (Claudia Weber, APRIRINEO)   Specimen Information: Urine, Catheter        Component  Ref Range & Units 05:31 1mo ago   Color, UA  Yellow, Straw Yellow  Yellow    Appearance, UA  Clear Clear  Clear    pH, UA  5.0 - 8.0 6.0  7.0    Specific Gravity,  UA  1.005 - 1.030 1.007  1.008    Glucose, UA  Negative Negative  Negative    Ketones, UA  Negative Negative  Negative    Bilirubin, UA  Negative Negative  Negative    Blood, UA  Negative Negative  Negative    Protein, UA  Negative Negative  Negative    Leuk Esterase, UA  Negative Negative  Negative    Nitrite, UA  Negative Negative  Negative    Urobilinogen, UA  0.2 - 1.0 E.U./dL 0.2 E.U./dL  0.2 E.U./dL    Resulting Agency  [facility]      Narrative   Performed by: [facility]   Urine microscopic not indicated.      Specimen Collected: 07/12/20 05:31 Last Resulted: 07/12/20 05:37 Lab Flowsheet Order Details View Encounter Lab and Collection Details Routing Result History               Urine Drug Screen - Urine, Clean Catch   Order: [id number]     Status:  Final result   Visible to patient:  No (Not Released) Next appt:  08/13/2020 at 01:30 PM in Pulmonology (Claudia Weber, ОЛЬГА)   Specimen Information: Urine, Clean Catch        Component  Ref Range & Units 05:31  (7/12/20) 3yr ago  (1/22/17) 3yr ago  (1/22/17)   Amphetamine Screen, Urine  Negative Negative  Negative     Barbiturates Screen, Urine  Negative Negative  Negative     Benzodiazepine Screen, Urine  Negative Negative  Negative     Cocaine Screen, Urine  Negative Negative  Negative     Methadone Screen, Urine  Negative Negative  Negative     Opiate Screen  Negative Negative  Negative     Phencyclidine (PCP), Urine  Negative Negative  Negative     THC, Screen, Urine  Negative Negative  Negative     6-ACETYL MORPHINE  Negative Negative      Buprenorphine, Screen, Urine  Negative Negative      Oxycodone Screen, Urine  Negative Negative   Negative    Resulting Agency  [facility]  [facility]      Narrative   Performed by: [facility]   Negative Thresholds For Drugs Screened:                  Amphetamines              1000 ng/ml               Barbiturates               200 ng/ml               Benzodiazepines            200 ng/ml               Cocaine                    300 ng/ml              Methadone                  300 ng/ml              Opiates                    300 ng/ml               Phencyclidine               25 ng/ml               THC                         50 ng/ml              6-Acetyl Morphine           10 ng/ml              Buprenorphine                5 ng/ml              Oxycodone                  300 ng/ml    The reference range for all drugs tested is negative. This report includes final unconfirmed qualitative results to be used for medical treatment purposes only. Unconfirmed results must not be used for non-medical purposes such as employment or legal testing. Clinical consideration should be applied to any drug of abuse test, especially when unconfirmed quantitative results are used.        Specimen Collected: 07/12/20 05:31 Last Resulted: 07/12/20 05:51 Lab Flowsheet Order Details View Encounter Lab and Collection Details Routing Result History               Contains abnormal data Comprehensive Metabolic Panel   Order: [id number]     Status:  Final result   Visible to patient:  No (Not Released) Next appt:  08/13/2020 at 01:30 PM in Pulmonology (Claudia Weber, APRN)   Specimen Information: Blood        Component  Ref Range & Units 05:26 5d ago   Glucose  65 - 99 mg/dL 116High   100High     BUN  8 - 23 mg/dL 28High   30High     Creatinine  0.57 - 1.00 mg/dL 1.34High   1.66High     Sodium  136 - 145 mmol/L 136  141    Potassium  3.5 - 5.2 mmol/L 5.4High   5.9High     Comment: Specimen hemolyzed.  Results may be affected. 1+ Hemolysis    Chloride  98 - 107 mmol/L 103  107    CO2  22.0 - 29.0 mmol/L 19.5Low   24.0    Calcium  8.2 - 9.6 mg/dL 8.5  8.8    Total Protein  6.0 - 8.5 g/dL 6.6  6.3    Albumin  3.50 - 5.20 g/dL 2.93Low   3.26Low     ALT (SGPT)  1 - 33 U/L 9  10    Comment: Specimen hemolyzed.  Results may be affected.   AST (SGOT)  1 - 32 U/L 20  13    Alkaline Phosphatase  39 - 117 U/L 64  66    Total  Bilirubin  0.0 - 1.2 mg/dL 0.2  0.2    eGFR Non African Amer  >60 mL/min/1.73 37Low   29Low     Globulin  gm/dL 3.7  3.0    A/G Ratio  g/dL 0.8  1.1    BUN/Creatinine Ratio  7.0 - 25.0 20.9  18.1    Anion Gap  5.0 - 15.0 mmol/L 13.5  10.0    Resulting Agency  [facility]      Narrative   Performed by:  [facility]   GFR Normal >60  Chronic Kidney Disease <60  Kidney Failure <15      Specimen Collected: 07/12/20 05:26 Last Resulted: 07/12/20 05:57 Lab Flowsheet Order Details View Encounter Lab and Collection Details Routing Result History         Related Result Highlights         Troponin  Final result 7/12/2020             T4, Free  Final result 7/12/2020             TSH  Final result 7/12/2020             BNP  Final result 7/12/2020             CK  Final result 7/12/2020             Phosphorus  Final result 7/12/2020             Magnesium  Final result 7/12/2020             C-reactive Protein  Final result 7/12/2020                     Protime-INR   Order: [id number]     Status:  Final result   Visible to patient:  No (Not Released) Next appt:  08/13/2020 at 01:30 PM in Pulmonology (Claudia Weber, ОЛЬГА)   Specimen Information: Blood        Component  Ref Range & Units 05:26 1mo ago   Protime  11.9 - 14.1 Seconds 13.1  13.1 CM   Comment: Note new Reference Range   INR  0.90 - 1.10 1.01  1.01    Resulting Agency  [facility]      Narrative   Performed by:  [facility]   Suggested INR therapeutic range for stable oral anticoagulant therapy:    Low Intensity therapy:   1.5-2.0  Moderate Intensity therapy:   2.0-3.0  High Intensity therapy:   2.5-4.0      Specimen Collected: 07/12/20 05:26 Last Resulted: 07/12/20 05:38 Lab Flowsheet Order Details View Encounter Lab and Collection Details Routing Result History      CM=Additional comments              TSH   Order: [id number]     Status:  Final result   Visible to patient:  No (Not Released) Next appt:  08/13/2020 at 01:30 PM in Pulmonology (Claudia  Yuridia Weber, ОЛЬГА)   Specimen Information: Blood        Component  Ref Range & Units 05:26 3mo ago   TSH  0.270 - 4.200 uIU/mL 1.650  1.640    Resulting Agency [facility]  [facility]         Specimen Collected: 07/12/20 05:26 Last Resulted: 07/12/20 05:59 Lab Flowsheet Order Details View Encounter Lab and Collection Details Routing Result History         Related Result Highlights         Troponin  Final result 7/12/2020             T4, Free  Final result 7/12/2020             BNP  Final result 7/12/2020             Comprehensive Metabolic Panel  Final result 7/12/2020             CK  Final result 7/12/2020             Phosphorus  Final result 7/12/2020             Magnesium  Final result 7/12/2020             C-reactive Protein  Final result 7/12/2020                     Phosphorus   Order: [id number]     Status:  Final result   Visible to patient:  No (Not Released) Next appt:  08/13/2020 at 01:30 PM in Pulmonology (Claudia Weber, APRN)   Specimen Information: Blood        Component  Ref Range & Units 05:26   Phosphorus  2.5 - 4.5 mg/dL 4.3    Resulting Agency  [facility]         Specimen Collected: 07/12/20 05:26 Last Resulted: 07/12/20 05:55 Lab Flowsheet Order Details View Encounter Lab and Collection Details Routing Result History         Related Result Highlights         Troponin  Final result 7/12/2020             T4, Free  Final result 7/12/2020             TSH  Final result 7/12/2020             BNP  Final result 7/12/2020             Comprehensive Metabolic Panel  Final result 7/12/2020             CK  Final result 7/12/2020             Magnesium  Final result 7/12/2020             C-reactive Protein  Final result 7/12/2020                     Magnesium   Order: [id number]     Status:  Final result   Visible to patient:  No (Not Released) Next appt:  08/13/2020 at 01:30 PM in Pulmonology (Claudia Weber, APRIRINEO)   Specimen Information: Blood        Component  Ref Range & Units 05:26 1mo ago    Magnesium  1.7 - 2.3 mg/dL 2.0  1.9    Resulting Agency  [facility]         Specimen Collected: 07/12/20 05:26 Last Resulted: 07/12/20 05:55 Lab Flowsheet Order Details View Encounter Lab and Collection Details Routing Result History         Related Result Highlights         Troponin  Final result 7/12/2020             T4, Free  Final result 7/12/2020             TSH  Final result 7/12/2020             BNP  Final result 7/12/2020             Comprehensive Metabolic Panel  Final result 7/12/2020             CK  Final result 7/12/2020             Phosphorus  Final result 7/12/2020             C-reactive Protein  Final result 7/12/2020                     CK   Order: [id number]     Status:  Final result   Visible to patient:  No (Not Released) Next appt:  08/13/2020 at 01:30 PM in Pulmonology (Claudia Weber, APRN)   Specimen Information: Blood        Component  Ref Range & Units 05:26 2yr ago   Creatine Kinase  20 - 180 U/L 43  107 R   Comment: Specimen hemolyzed.  Results may be affected.   Resulting Agency  [facility]         Specimen Collected: 07/12/20 05:26 Last Resulted: 07/12/20 05:57 Lab Flowsheet Order Details View Encounter Lab and Collection Details Routing Result History      R=Reference range differs from displayed range        Related Result Highlights         Troponin  Final result 7/12/2020             T4, Free  Final result 7/12/2020             TSH  Final result 7/12/2020             BNP  Final result 7/12/2020             Comprehensive Metabolic Panel  Final result 7/12/2020             Phosphorus  Final result 7/12/2020             Magnesium  Final result 7/12/2020             C-reactive Protein  Final result 7/12/2020                     Contains abnormal data C-reactive Protein   Order: [id number]     Status:  Final result   Visible to patient:  No (Not Released) Next appt:  08/13/2020 at 01:30 PM in Pulmonology (Claudia Weber, APRN)   Specimen Information:  Blood        Component  Ref Range & Units 05:26 4mo ago   C-Reactive Protein  0.00 - 0.50 mg/dL 6.48High   7.73High     Resulting Agency  [facility]         Specimen Collected: 07/12/20 05:26 Last Resulted: 07/12/20 05:55 Lab Flowsheet Order Details View Encounter Lab and Collection Details Routing Result History         Related Result Highlights         Troponin  Final result 7/12/2020             T4, Free  Final result 7/12/2020             TSH  Final result 7/12/2020             BNP  Final result 7/12/2020             Comprehensive Metabolic Panel  Final result 7/12/2020             CK  Final result 7/12/2020             Phosphorus  Final result 7/12/2020             Magnesium  Final result 7/12/2020                     Contains abnormal data Sedimentation Rate   Order: [id number]     Status:  Final result   Visible to patient:  No (Not Released) Next appt:  08/13/2020 at 01:30 PM in Pulmonology (Claudia Weber, APRN)   Specimen Information: Blood        Component  Ref Range & Units 05:26 1mo ago   Sed Rate  0 - 30 mm/hr 86High   99High     Resulting Agency [facility]  [facility]         Specimen Collected: 07/12/20 05:26 Last Resulted: 07/12/20 05:44 Lab Flowsheet Order Details View Encounter Lab and Collection Details Routing Result History         Related Result Highlights         CBC Auto Differential  Final result 7/12/2020                     Contains critical data Troponin   Order: [id number]     Status:  Final result   Visible to patient:  No (Not Released) Next appt:  08/13/2020 at 01:30 PM in Pulmonology (Claudia Weber, APRN)   Specimen Information: Blood        Component  Ref Range & Units 05:26 1mo ago   Troponin T  0.000 - 0.030 ng/mL 0.065High Critical   0.029    Comment: Specimen hemolyzed.  Results may be affected.   Resulting Agency  [facility]      Narrative   Performed by:  [facility]   Troponin T Reference Range:  <= 0.03 ng/mL-   Negative for AMI  >0.03 ng/mL-      Abnormal for myocardial necrosis.  Clinicians would have to utilize clinical acumen, EKG, Troponin and serial changes to determine if it is an Acute Myocardial Infarction or myocardial injury due to an underlying chronic condition.       Results may be falsely decreased if patient taking Biotin.      Specimen Collected: 07/12/20 05:26 Last Resulted: 07/12/20 06:23 Lab Flowsheet Order Details View Encounter Lab and Collection Details Routing Result History         Related Result Highlights         T4, Free  Final result 7/12/2020             TSH  Final result 7/12/2020             BNP  Final result 7/12/2020             Comprehensive Metabolic Panel  Final result 7/12/2020             CK  Final result 7/12/2020             Phosphorus  Final result 7/12/2020             Magnesium  Final result 7/12/2020             C-reactive Protein  Final result 7/12/2020                     Contains abnormal data BNP   Order: [id number]     Status:  Final result   Visible to patient:  No (Not Released) Next appt:  08/13/2020 at 01:30 PM in Pulmonology (ОЛЬГА Pitts)   Specimen Information: Blood        Component  Ref Range & Units 05:26 5d ago   proBNP  0.0-1,800.0 pg/mL 8,676.0High   6,031.0High     Resulting Agency  [facility]      Narrative   Performed by:  [facility]   Among patients with dyspnea, NT-proBNP is highly sensitive for the detection of acute congestive heart failure. In addition NT-proBNP of <300 pg/ml effectively rules out acute congestive heart failure with 99% negative predictive value.    Results may be falsely decreased if patient taking Biotin.      Specimen Collected: 07/12/20 05:26 Last Resulted: 07/12/20 05:59 Lab Flowsheet Order Details View Encounter Lab and Collection Details Routing Result History         Related Result Highlights         Troponin  Final result 7/12/2020             T4, Free  Final result 7/12/2020             TSH  Final result 7/12/2020              Comprehensive Metabolic Panel  Final result 7/12/2020             CK  Final result 7/12/2020             Phosphorus  Final result 7/12/2020             Magnesium  Final result 7/12/2020             C-reactive Protein  Final result 7/12/2020                     Contains abnormal data CBC Auto Differential   Order: [id number] - Part of Panel Order [id number]     Status:  Final result   Visible to patient:  No (Not Released) Next appt:  08/13/2020 at 01:30 PM in Pulmonology (Claudia Weber, APRN)   Specimen Information: Blood        Component  Ref Range & Units 05:26 10d ago   WBC  3.40 - 10.80 10*3/mm3 6.08  6.90    RBC  3.77 - 5.28 10*6/mm3 3.40Low   3.16Low     Hemoglobin  12.0 - 15.9 g/dL 8.8Low   8.2Low     Hematocrit  34.0 - 46.6 % 29.4Low   27.0Low     MCV  79.0 - 97.0 fL 86.5  85.4    MCH  26.6 - 33.0 pg 25.9Low   25.9Low     MCHC  31.5 - 35.7 g/dL 29.9Low   30.4Low     RDW  12.3 - 15.4 % 18.4High   18.6High     RDW-SD  37.0 - 54.0 fl 58.4High   58.4High     MPV  6.0 - 12.0 fL 10.1  10.4    Platelets  140 - 450 10*3/mm3 296  296    Neutrophil %  42.7 - 76.0 % 57.8  63.6    Lymphocyte %  19.6 - 45.3 % 25.8  21.2    Monocyte %  5.0 - 12.0 % 13.2High   13.2High     Eosinophil %  0.3 - 6.2 % 2.6  1.4    Basophil %  0.0 - 1.5 % 0.3  0.3    Immature Grans %  0.0 - 0.5 % 0.3  0.3    Neutrophils, Absolute  1.70 - 7.00 10*3/mm3 3.51  4.39    Lymphocytes, Absolute  0.70 - 3.10 10*3/mm3 1.57  1.46    Monocytes, Absolute  0.10 - 0.90 10*3/mm3 0.80  0.91High     Eosinophils, Absolute  0.00 - 0.40 10*3/mm3 0.16  0.10    Basophils, Absolute  0.00 - 0.20 10*3/mm3 0.02  0.02    Immature Grans, Absolute  0.00 - 0.05 10*3/mm3 0.02  0.02    nRBC  0.0 - 0.2 /100 WBC 0.0  0.0    Resulting Agency [facility]         Specimen Collected: 07/12/20 05:26 Last Resulted: 07/12/20 05:30 Lab Flowsheet Order Details View Encounter Lab and Collection Details Routing Result History            Result Read / Acknowledged       Acknowledge result   No acknowledgement history exists for this order.   Related Result Highlights         Troponin  Final result 7/12/2020             TSH  Final result 7/12/2020             BNP  Final result 7/12/2020             Comprehensive Metabolic Panel  Final result 7/12/2020             CK  Final result 7/12/2020             Phosphorus  Final result 7/12/2020             Magnesium  Final result 7/12/2020             C-reactive Protein  Final result 7/12/2020            Other Results from 7/12/2020      COVID-19,CEPHEID,COR/ZABRINA/PAD IN-HOUSE(OR EMERGENT/ADD-ON),NP SWAB IN TRANSPORT MEDIA 3-4 HR TAT - Swab, Nasopharynx  In process 7/12/2020    Urinalysis With Culture If Indicated - Urine, Catheter  Final result 7/12/2020    Urine Drug Screen - Urine, Clean Catch  Final result 7/12/2020    Protime-INR  Final result 7/12/2020    Sedimentation Rate  Final result 7/12/2020    CBC Auto Differential  Final result 7/12/2020   (important suggestion)  Warning: Additional results from 7/12/2020 are available but are not displayed in this report.        C-Reactive Protein  0.00 - 0.50 mg/dL 6.48High   7.73High      Sed Rate  0 - 30 mm/hr 86High   99High       proBNP  0.0-1,800.0 pg/mL 8,676.0High   6,031.0High           MEDS/IV FLUIDS: #16 LAC #20 RAC NORMAL SALINE 500 ML BOLUS    CARDIAC SYSTEM:    CHEST PAIN: NO  RATE: 0    SCALE: 1/10    RHYTHM: AFIB    Is patient taking or has patient been given any drugs that could increase bleeding? YES  (Plavix, Brilinta, Effient, Eliquis, Xarelto, Warfarin, Integrilin, Angiomax)    DRUG: PLAVIX     DOSE/FREQUENCY: 75 DAILY.      RESPIRATORY SYSTEM:    LUNG SOUNDS:    CLEAR: Y  CRACKLES: N  WHEEZES: N  RHONCHI: N  DIMINISHED: N      OXYGEN: YES    O2 SAT: 97    ADMINISTRATION ROUTE: 3L PER NC    IMAGING FINDINGS: IMPRESSION:  Stable cardiac enlargement with improved pulmonary edema relative to the prior study. There is mild infiltrate or atelectasis at the left lung base  today.    CNS/MUSCULOSKELETAL      NELY COMA SCALE:    E: 2  M: 4  V: 1    LAST KNOWN WELL: 330 BUT WAS A WAKE UP AND HAD GONE TO BED RIGHT BEFORE MIDNIGHT        NIHSS    Survey Item  0: Means Alert  1: Drowsy or Answer Correctly  2: Incorrect, Forced, Can't Resist Gravity  3: Complete or No Effort  4: No Movement  NT: Not Testable Acceptable As Noted Above      1A: Level of Consciousness: 0    1B: LOC Questions (month, age) : 2    1C: LOC Commands (open/close eyes, make a fist & let go): 2    2:  Best Gaze (eyes open-pt follows examiner's fingers or face): 0    3:  Visual (introduce visual stimulus/threat to pt's visual field quad. Cover 1 eye and hold up fingers in all 4 quadrants) : 0    4.  Facial Palsy (show teeth, raise eyebrows and squeeze eyes tightly shut): 2    5A: Motor Arm-Left (elevate extremity to 90 degrees and score drift/movement.  Count to 10 aloud and use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

## 2020-07-12 NOTE — ED NOTES
Called [facility] Requesting transport to [facility] Spoke with Terry. Accepted transport at this time ETA 20 min. Nurse and Provider aware.     Julia Lacey  07/12/20 0655

## 2020-07-12 NOTE — ED NOTES
"Patients daughter states, \"My brother was with her when she started to have the right sided weakness and the facial drooping. It was about 3:30 when the symptoms started. She has 5 stents, she has a-fib, and she is on Plavix. She normally talks with no problems, and walks around at home with a walker and she couldn't talk to us anymore. She lost all of her strength, so I know she has had a stroke.\" RR even and unlabored. Family at bedside. VSS. Call light within reach. NADN. Jerica Myers, RN  07/12/20 6926    "

## 2020-07-13 NOTE — PLAN OF CARE
Pt seen today by palliative care for goals of care conversation with family.  Barium swallow not done as patient is not appropriate yet, plan to follow-up tomorrow and consider feeding tube vs waiting to see if she improves. Difficulty controlling pain, palliative aware.  Pt's daughter at bedside and staying the night.

## 2020-07-13 NOTE — NURSING NOTE
Spoke with Love RN regarding low Xavier of 11. Per Love, pt has no skin breakdown at this time, but is immobile, with poor nutrition, high skin risk. Off loading heel boots in place. Skin orders placed. Dolphin w/o topper ordered from [facility]. WOC nurse will f/u. Please contact WOC nurse as needed for concerns.

## 2020-07-13 NOTE — PLAN OF CARE
Problem: Patient Care Overview  Goal: Interprofessional Rounds/Family Conf  Outcome: Ongoing (interventions implemented as appropriate)  Flowsheets (Taken 7/13/2020 1505)  Summary: 1300 Palliative Team Conference: SHALA Fernandes RN, CHPN; ОЛЬГА Jones; RN; GRACIE Gonzales, RN, CHPN; MONROE Bills RN, CHPN; MATY Newman, ; ANGELA Murray, RN, CHPN  Note:   Palliative consult for GOC/ACP. Pt aphasic, shook head in response to inquiry about pain, nodded in response to feeling anxious; noted documentation of indicators of possible hip pain and diagnostics ordered to assess for possible cause. Palliative to contact pt's family for GOC discussion; monitor for symptoms, manage as needed.     Problem: Palliative Care (Adult)  Goal: Identify Related Risk Factors and Signs and Symptoms  Outcome: Outcome(s) achieved  Flowsheets (Taken 7/13/2020 1505)  Palliative Care: Related Risk Factors: advanced age; injury (severe and disabling); worsening symptoms; uncontrolled pain  Palliative Care: Signs and Symptoms: anxiety; confusion; fatigue; loss of appetite; pain  Goal: Maximized Comfort  Outcome: Ongoing (interventions implemented as appropriate)  Flowsheets (Taken 7/13/2020 1505)  Maximized Comfort: making progress toward outcome  Goal: Enhanced Quality of Life  Outcome: Ongoing (interventions implemented as appropriate)  Flowsheets (Taken 7/13/2020 1505)  Enhanced Quality of Life: making progress toward outcome  Intervention: Minimize Discomfort  Flowsheets (Taken 7/13/2020 1505)  Pain Management Interventions: around-the-clock dosing utilized; other (see comments) (diagnostics ordered to assess pain source)  Intervention: Promote Informed Decision Making and Goal Setting  Flowsheets (Taken 7/13/2020 1505)  Life Transition/Adjustment: palliative care initiated  Intervention: Support/Optimize Psychosocial Response  Flowsheets (Taken 7/13/2020 1505)  Spiritual Activities Assistance: other (see comments) (Spiritual Care consult)  Family/Support  System Care: caregiver stress acknowledged; support provided

## 2020-07-13 NOTE — PLAN OF CARE
Problem: Patient Care Overview  Goal: Plan of Care Review  Outcome: Ongoing (interventions implemented as appropriate)  Flowsheets (Taken 7/13/2020 1127)  Outcome Summary: Patient presents with generalized weakness (R>L), decreased activity tolerance, and cognitive deficits affecting functional mobility. Pt nodding head yes/no to questions. She completed R rolling with Mod A, L rolling with Min A, and supine<>sit Dep x2. She completed 2 reps STS with Mod A x2 and BUE support. Skilled IP PT warranted at this time. Recommend SNF at discharge.
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
## 2020-07-13 NOTE — PLAN OF CARE
Problem: Patient Care Overview  Goal: Plan of Care Review  Outcome: Ongoing (interventions implemented as appropriate)  Flowsheets (Taken 7/13/2020 9758)  Progress: declining  Plan of Care Reviewed With: patient  Note:   SLP re-evaluation completed. Will address suspected oropharyngeal dysphagia w/ f/u for GOC decisions. Will continue to address aphasia during treatment as appropriate. Please see note for further details and recommendations.
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
## 2020-07-13 NOTE — PLAN OF CARE
Problem: Patient Care Overview  Goal: Plan of Care Review  Outcome: Ongoing (interventions implemented as appropriate)  Flowsheets (Taken 7/13/2020 1126)  Outcome Summary: OT eval complete. Pt limited by confusion and flat affect. Minimally verbal. Pt with appropriate head nods to yes/no questions. Pt completes bed mobility with modA to roll to R side and madxA to roll to L side. Pt dependent for meredith care and dependent to advance to EOB. Pt unable to verbalize sensation or visual deficits. Recommend d/c to SNF.
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
## 2020-07-13 NOTE — PROGRESS NOTES
[facility] Medicine Services  PROGRESS NOTE    Patient Name: Cici Veliz  : 1925  MRN: [id number]    Date of Admission: 2020  Primary Care Physician: Edgar Urias MD    Subjective   Subjective     CC:  F/U CVA    HPI:  Pt seen and examined. Nursing notes reviewed. Pt with elevated HR overnight requiring Lopressor. Pt moaning and pulling at my hand towards her left leg. Pt appears to be in pain. She is aphasic.     Review of Systems  Unable to obtain    Objective   Objective     Vital Signs:   Temp:  [97.4 °F (36.3 °C)-98.7 °F (37.1 °C)] 98.5 °F (36.9 °C)  Heart Rate:  [100-136] 116  Resp:  [16-20] 16  BP: (101-172)/() 107/75  Total (NIH Stroke Scale): 21     Physical Exam:  Constitutional: chronically ill appearing female, awake, alert, appears to be in moderate discomfort   HENT: NCAT, mucous membranes moist  Respiratory: Clear to auscultation bilaterally, respiratory effort normal   Cardiovascular: IRR, no murmurs, rubs, or gallops, palpable pedal pulses bilaterally  Gastrointestinal: Positive bowel sounds, soft, nontender, nondistended  Musculoskeletal: No bilateral ankle edema  Psychiatric: Flat affect,  Neurologic: Aphasic, not cooperative with neuro exam  Skin: No rashes    Results Reviewed:  Results from last 7 days   Lab Units 20  0534 20  0526   WBC 10*3/mm3 7.52 6.08   HEMOGLOBIN g/dL 9.3* 8.8*   HEMATOCRIT % 29.5* 29.4*   PLATELETS 10*3/mm3 295 296   INR   --  1.01     Results from last 7 days   Lab Units 20  0534 20  1434 20  0526 20  0922   SODIUM mmol/L 139  --  136 141   POTASSIUM mmol/L 4.8  --  5.4* 5.9*   CHLORIDE mmol/L 103  --  103 107   CO2 mmol/L 22.0  --  19.5* 24.0   BUN mg/dL 29*  --  28* 30*   CREATININE mg/dL 1.34*  --  1.34* 1.66*   GLUCOSE mg/dL 113*  --  116* 100*   CALCIUM mg/dL 8.7  --  8.5 8.8   ALT (SGPT) U/L  --   --  9 10   AST (SGOT) U/L  --   --  20 13   TROPONIN T ng/mL  --  0.051* 0.065*  --     PROBNP pg/mL  --   --  8,676.0* 6,031.0*     Estimated Creatinine Clearance: 23.9 mL/min (A) (by C-G formula based on SCr of 1.34 mg/dL (H)).    Microbiology Results Abnormal     None          Imaging Results (Last 24 Hours)     Procedure Component Value Units Date/Time    CT Head Without Contrast [[id number]] Collected:  07/12/20 1836     Updated:  07/13/20 0851    Narrative:       EXAMINATION: CT HEAD WO CONTRAST - 07/12/2020     INDICATION: Evaluate for stroke.     TECHNIQUE: CT head without intravenous contrast     The radiation dose reduction device was turned on for each scan per the  ALARA (As Low as Reasonably Achievable) protocol.     COMPARISON: CT head dated 07/12/2020 earlier same day.     FINDINGS: Persistent area of low attenuation left frontotemporal region  of evolving left MCA infarction without evidence for significant  progression of effacement, midline shift or hemorrhagic conversion.  Background chronic small vessel ischemic disease findings. Globes and  orbits unremarkable. Visualized paranasal sinuses and mastoid cells are  grossly clear and well-pneumatized. Calvarium intact.       Impression:       Evolving left MCA infarction without hemorrhagic conversion.  No interval development of progressive effacement or midline shift.     DICTATED:   07/12/2020  EDITED/ls :   07/12/2020               This report was finalized on 7/13/2020 8:47 AM by Dr. Damion Chua.       CT Angiogram Head [[id number]] Collected:  07/12/20 0840     Updated:  07/12/20 1900    Narrative:       EXAMINATION: CT ANGIOGRAM HEAD, CT ANGIOGRAM NECK - 07/12/2020      INDICATION: Evaluate for stroke.     TECHNIQUE: CT angiogram head and neck with and without intravenous  contrast. 2D and 3D reconstructions performed.     The radiation dose reduction device was turned on for each scan per the  ALARA (As Low as Reasonably Achievable) protocol.     COMPARISON: Concurrent CT cerebral perfusion     FINDINGS:      CTA NECK: Patent  great vessel origins with normal three-vessel arch  demonstrating at least chronic calcific disease without significant  luminal impact. Proximal subclavian arteries are patent despite  atherosclerotic involvement including calcific disease burden without  significant luminal stenosis. Vertebral arteries demonstrate a  right-sided dominance of caliber with at least mild atherosclerotic  involvement of the proximal right V1 segment and left V1/V2 segments  however no hemodynamically significant stenosis, aneurysm or occlusion  identified through the vertebral segments. Carotids demonstrate a near  midline retropharyngeal course of the distal common and proximal  internal carotid arteries with normal bifurcation appearance  demonstrating atherosclerotic involvement including calcific disease  burden producing 65% right and 80% left luminal narrowing as measured by  NASCET criteria with patency of the distal internal carotid arteries  through the intracranial segments which are discussed further below in  the dedicated CTA head portion. Cervical soft tissues unremarkable for  bulky adenopathy or acute soft tissue findings of the cervical region  however heterogeneous appearance of a goitrous thyroid with left  inferior thyroid lobe substernal extension. Lung apices demonstrate  pulmonary edema pattern with scattered reticular opacifications of  intralobular septal thickening and intervening opacifications as well as  bilateral pleural effusions which are partially visualized.     CTA HEAD: Distal internal carotid arteries are patent with mild  atherosclerotic involvement and calcific disease burden however no  hemodynamically significant stenosis, aneurysm or occlusion. Anterior  cerebral arteries are patent without hemodynamically significant  stenosis, aneurysm or occlusion. Middle cerebral arteries appear grossly  patent however there is within the left MCA territory apparent temporal  lobe branch of the inferior  division a 3 mm focus of increased  attenuation concerning for calcific disease versus calcific plaque  certain is a potential thrombus series 5 image 354 and series 904 images  18-22. Patency observed throughout the remaining MCA territories on the  left is questionably mildly decreased compared to the right.  Vertebrobasilar system and posterior cerebral arteries are patent  without hemodynamically significant stenosis, aneurysm or occlusion.  Venous structures including superior sagittal sinus widely patent.  Noncontrast intracranial portions without midline shift, hydrocephalus  or intra-axial hemorrhage.       Impression:       1. CTA neck demonstrates a near midline retropharyngeal course of the  distal common and proximal internal carotid arteries with normal  bifurcation appearance demonstrating atherosclerotic involvement  including calcific disease burden producing 65% right and 80% left  luminal narrowing as measured by NASCET criteria   2. CTA head demonstrates within the left MCA territory apparent temporal  lobe branch of the inferior division a 3 mm focus of increased  attenuation concerning for calcific disease versus calcific plaque  certain is a potential thrombus series 5 image 354 and series 904 images  18-22.   3. Visualized soft tissue components of the upper lungs demonstrate a  pulmonary edema pattern with intralobular septal thickening intervening  opacifications as well as partially visualized bilateral pleural  effusions.     DICTATED:   07/12/2020  EDITED/ls :   07/12/2020         This report was finalized on 7/12/2020 6:57 PM by Dr. Damion Chua.       CT Cerebral Perfusion With & Without Contrast [[id number]] Collected:  07/12/20 0839     Updated:  07/12/20 1900    Narrative:       EXAMINATION: CT CEREBRAL PERFUSION WWO CONTRAST- - 07/12/2020     INDICATION: Evaluate for stroke.     TECHNIQUE: CT cerebral perfusion with and without intravenous contrast.  Multiple parametric maps  including mean transit time, time to drain,  cerebral blood flow and cerebral blood volume performed.     The radiation dose reduction device was turned on for each scan per the  ALARA (As Low as Reasonably Achievable) protocol.     COMPARISON: NONE     FINDINGS: Parametric maps demonstrate asymmetry of prolongation mean  transit time and time to drain within the left frontotemporal region of  the left MCA territory with decreased cerebral blood flow however  preservation of cerebral blood volume consistent with reversible  ischemia.       Impression:       Reversible ischemia within these left MCA territory. No  evidence for core infarct component.     DICTATED:   07/12/2020  EDITED/ls :   07/12/2020      This report was finalized on 7/12/2020 6:57 PM by Dr. Damion Chua.       CT Angiogram Neck [[id number]] Collected:  07/12/20 0840     Updated:  07/12/20 1900    Narrative:       EXAMINATION: CT ANGIOGRAM HEAD, CT ANGIOGRAM NECK - 07/12/2020      INDICATION: Evaluate for stroke.     TECHNIQUE: CT angiogram head and neck with and without intravenous  contrast. 2D and 3D reconstructions performed.     The radiation dose reduction device was turned on for each scan per the  ALARA (As Low as Reasonably Achievable) protocol.     COMPARISON: Concurrent CT cerebral perfusion     FINDINGS:      CTA NECK: Patent great vessel origins with normal three-vessel arch  demonstrating at least chronic calcific disease without significant  luminal impact. Proximal subclavian arteries are patent despite  atherosclerotic involvement including calcific disease burden without  significant luminal stenosis. Vertebral arteries demonstrate a  right-sided dominance of caliber with at least mild atherosclerotic  involvement of the proximal right V1 segment and left V1/V2 segments  however no hemodynamically significant stenosis, aneurysm or occlusion  identified through the vertebral segments. Carotids demonstrate a near  midline  retropharyngeal course of the distal common and proximal  internal carotid arteries with normal bifurcation appearance  demonstrating atherosclerotic involvement including calcific disease  burden producing 65% right and 80% left luminal narrowing as measured by  NASCET criteria with patency of the distal internal carotid arteries  through the intracranial segments which are discussed further below in  the dedicated CTA head portion. Cervical soft tissues unremarkable for  bulky adenopathy or acute soft tissue findings of the cervical region  however heterogeneous appearance of a goitrous thyroid with left  inferior thyroid lobe substernal extension. Lung apices demonstrate  pulmonary edema pattern with scattered reticular opacifications of  intralobular septal thickening and intervening opacifications as well as  bilateral pleural effusions which are partially visualized.     CTA HEAD: Distal internal carotid arteries are patent with mild  atherosclerotic involvement and calcific disease burden however no  hemodynamically significant stenosis, aneurysm or occlusion. Anterior  cerebral arteries are patent without hemodynamically significant  stenosis, aneurysm or occlusion. Middle cerebral arteries appear grossly  patent however there is within the left MCA territory apparent temporal  lobe branch of the inferior division a 3 mm focus of increased  attenuation concerning for calcific disease versus calcific plaque  certain is a potential thrombus series 5 image 354 and series 904 images  18-22. Patency observed throughout the remaining MCA territories on the  left is questionably mildly decreased compared to the right.  Vertebrobasilar system and posterior cerebral arteries are patent  without hemodynamically significant stenosis, aneurysm or occlusion.  Venous structures including superior sagittal sinus widely patent.  Noncontrast intracranial portions without midline shift, hydrocephalus  or intra-axial  hemorrhage.       Impression:       1. CTA neck demonstrates a near midline retropharyngeal course of the  distal common and proximal internal carotid arteries with normal  bifurcation appearance demonstrating atherosclerotic involvement  including calcific disease burden producing 65% right and 80% left  luminal narrowing as measured by NASCET criteria   2. CTA head demonstrates within the left MCA territory apparent temporal  lobe branch of the inferior division a 3 mm focus of increased  attenuation concerning for calcific disease versus calcific plaque  certain is a potential thrombus series 5 image 354 and series 904 images  18-22.   3. Visualized soft tissue components of the upper lungs demonstrate a  pulmonary edema pattern with intralobular septal thickening intervening  opacifications as well as partially visualized bilateral pleural  effusions.     DICTATED:   07/12/2020  EDITED/ls :   07/12/2020         This report was finalized on 7/12/2020 6:57 PM by Dr. Damino Chua.             Results for orders placed during the hospital encounter of 07/12/20   Adult Transthoracic Echo Complete W/ Cont if Necessary Per Protocol (With Agitated Saline)    Narrative · Left ventricular wall thickness is consistent with moderate concentric   hypertrophy.  · Moderate mitral valve regurgitation is present.  · Mild tricuspid valve regurgitation is present.  · The following left ventricular wall segments are hypokinetic: basal   anterolateral, mid anterolateral, apical lateral, basal inferolateral, mid   inferolateral, apical inferior, mid inferior, apical septal, basal   inferoseptal, mid inferoseptal, mid anteroseptal, basal anterior, basal   inferior and basal inferoseptal. The following left ventricular wall   segments are akinetic: mid anterior, apical anterior and apex.  · Estimated EF = 41%.  · Left ventricular systolic function is moderately decreased.  · Left atrial cavity size is mild-to-moderately dilated.  ·  Normal right ventricular cavity size, wall thickness, systolic function   and septal motion noted.  · Saline test results are negative.  · The aortic valve exhibits moderate sclerosis.  · Severe MAC is present.  · There is no evidence of pericardial effusion.  · No evidence of pulmonary hypertension is present.          I have reviewed the medications:  Scheduled Meds:  aspirin 81 mg Oral Daily   Or      aspirin 300 mg Rectal Daily   diclofenac 2 g Topical 4x Daily   ipratropium 0.5 mg Nebulization 4x Daily - RT   lidocaine 1 patch Transdermal Q24H   sodium chloride 10 mL Intravenous Q12H   sodium chloride 10 mL Intravenous Q12H     Continuous Infusions:   PRN Meds:.LORazepam  •  metoprolol tartrate  •  Morphine  •  ondansetron **OR** ondansetron  •  sodium chloride  •  sodium chloride    Assessment/Plan   Assessment & Plan     Active Hospital Problems    Diagnosis  POA   • **Acute ischemic left MCA stroke (CMS/[facility]) [I63.512]  Yes   • Stroke (CMS/[facility]) [I63.9]  Yes   • Acute on chronic congestive heart failure (CMS/[facility]) [I50.9]  Yes   • NSTEMI (non-ST elevated myocardial infarction) (CMS/[facility]) [I21.4]  Yes   • Shortness of breath [R06.02]  Yes   • PAF (paroxysmal atrial fibrillation) (CMS/[facility]) [I48.0]  Yes   • Chronic back pain [M54.9, G89.29]  Yes   • CKD (chronic kidney disease) stage 3, GFR 30-59 ml/min (CMS/[facility]) [N18.3]  Yes   • Diastolic heart failure secondary to hypertrophic cardiomyopathy (CMS/[facility]) [I50.30, I42.2]  Yes   • Spinal stenosis, degenerative disc disease, back pain* [M48.00]  Yes   • COPD (chronic obstructive pulmonary disease) (CMS/[facility]) [J44.9]  Yes   • Essential hypertension [I10]  Yes   • CAD, status post RCA and circumflex stents, in-stent restenosis of RCA requiring stenting 2017 Dr. Cash  [I25.10]  Yes   • Pacemaker* [Z95.0]  Yes      Resolved Hospital Problems   No resolved problems to display.        Brief Hospital Course to date:  Cici Veliz is a 89 y.o. female with numerous  comorbidities (CKD3, CAD, recent stroke, COPD, D-CHF, dementia, etc) who presents in transfer from OSH for acute stroke like symptoms       Acute LT MCA distribution stroke  Severe cerebral vascular disease  Recent stroke ~1 month PTA  -Neurology following, continue to follow stroke order set  -Echo: moderate concentric hypertrophy, moderate MVR, mild TVR, multiple hypokinetic segments, EF 41%, saline tests negative, severe MAC  -Slowly begin normalizing BP  -Unable to obtain MRI due to pacemaker   -, A1c 5.1  -Pt currently receiving rectal ASA  -Plan for ASA/Plavix for 90 days (currently patient not safe for swallowing)  -Statin  -PT/OT following, recommends SNF  -SLP following, patient not safe for PO diet. ?MBS. Per family at bedside, she wouldn't want a feeding tube  -Palliative care consulted for GOC  -While family deciding on GOC, will have Cardiology evaluate     Acute metabolic encephalopathy  Underlying dementia  - related to stroke, dementia, acute illness     Acute hypoxic respiratory insufficiency  Acute on chronic diastolic CHF  Underlying COPD with wheezes  - recently DC'ed from OSH to rehab and her lasix fell off the MAR; has been uptitrating her lasix outpatient (taking 30 and 40 mg alternating days)  - CTA for stroke showing edema pattern with effusions, BNP elevated on admission  - O2 support as needed  - reported intolerance of albuterol, ipratropium ordered  - Will continue IV Lasix as patient unable to take PO     NSTEMI, suspect type 2  Underlying CAD  - in the setting of AEDCHF, trend troponins, cannot fully AC or add statin/etc due to acute stroke  - EKG personally reviewed, there are some T wave abnormalities, afib, no ST segment elevations  - rectal ASA as noted  -Troponin trended down     Atrial fibrillation with fast response  --120 while I was present in room  -continue PRN IV Lopressor (pt NPO)  - no AC for stroke  -Will ask Cardiology to evaluate      CKD stage 3  -  baseline Cr anywhere from 1.1-1.7; egfr 40-50's  - monitor with diuresis  -Stable today at 1.3     Mild hyperkalemia  - RESOLVED     History of hyperparathyroidism s/p parathyroidectomy  - per DTR she gets her calcium from her diet    L hip pain  -Will order XR of hip/pelvis and femur as patient moaning on exam  -Lidoderm patch added  -PRN Morphine    Chronic Benzodiazepine use  -PRN Ativan      DVT prophylaxis:  mechanical       Daily Care Communication  Due to current limited visitation policies, an attempt will be made daily to update patient's identified best point-of-contact(s)   Contact: Son present at bedside   Relation:    Type of communication (phone or televideo):    Time of communication:    Notes (if applicable):     Disposition: I expect the patient to be discharged pending Neuro recs, GOC discussion    CODE STATUS:   Code Status and Medical Interventions:   Ordered at: 07/12/20 0920     Level Of Support Discussed With:    Next of Kin (If No Surrogate)     Code Status:    No CPR     Medical Interventions (Level of Support Prior to Arrest):    Full         Electronically signed by Scarlett Vieyra DO, 07/13/20, 14:23.

## 2020-07-13 NOTE — CONSULTS
Chart review for diabetes educator consult.    At the time of this review patient A1c is 5.1 , they have no noted history of diabetes and no home medications noted for treatment of diabetes. At this time we do not feel the patient would benefit from diabetes education. Thank you for this consult, should patient needs change please re consult us.

## 2020-07-13 NOTE — PROGRESS NOTES
"                  Clinical Nutrition     Reason for Visit:   Identified at risk by screening criteria, Difficulty chewing/swallowing, MST score 2+    Patient Name: Cici Veliz  YOB: 1925  MRN: [id number]  Date of Encounter: 07/13/20 13:36  Admission date: 7/12/2020    Nutrition Assessment   Assessment     Admission diagnosis  R-sided weakness  ? L MCA CVA    Additional diagnosis/conditions/procedures this admission  Dysphagia  Aphasia    Additional PMH/procedures:  CHF  Hypertrophic cardiomyopathy  CAD  MI  PAF  H. Pylori  PUD  Dementia  CVA (4/2020)  COPD  Spinal stenosis    PPM  L foot I&D  Parathyroidectomy  Hemorrhoid surgery  CABG    Reported/Observed/Food/Nutrition Related History:      Patient continues NPO per SLP eval. Family awaiting hospice/palliative consult for GOC decisions. Per neurology note \"Patient's family is going to consider hospice as an option.  They do not feel that she would want a feeding tube if she is unable to pass her swallow.  They are considering allowing her pleasure eating if she is unable to swallow eval.\" MST of 2 for \"unsure\" weight loss, patients weights appear stable over the past 3 months per EMR.      Anthropometrics     Height: 170.2 cm (67.01\")  Last filed wt: Weight: 59 kg (130 lb) (07/12/20 1704)  Weight Method: Stated    BMI: BMI (Calculated): 20.4  Normal: 18.5-24.9kg/m2    Ideal Body Weight (IBW) (kg): 61.88  Admission wt: 124 lb  Method obtained: stated weight per charting 7/12    Weight Weight (kg) Weight (lbs) Weight Method   4/6/2020 58.968 kg 130 lb Stated   4/7/2020 58.117 kg 128 lb 2 oz Standing scale   4/8/2020 58.06 kg 128 lb Standing scale   4/9/2020 56.756 kg 125 lb 2 oz Standing scale   4/10/2020 58.695 kg 129 lb 6.4 oz Standing scale   4/11/2020 58.605 kg 129 lb 3.2 oz Standing scale   4/12/2020 57.335 kg 126 lb 6.4 oz Standing scale   6/1/2020 63.504 kg 140 lb    6/1/2020 55.963 kg 123 lb 6 oz Bed scale   6/2/2020 55.906 kg 123 lb 4 oz  "   6/3/2020 (No Data) (No Data)    6/4/2020 (No Data) (No Data) Standing scale   6/7/2020 56.246 kg 124 lb Stated   6/8/2020 57.244 kg 126 lb 3.2 oz Bed scale   6/9/2020 57.108 kg 125 lb 14.4 oz Bed scale   6/10/2020 56.972 kg 125 lb 9.6 oz Bed scale   6/11/2020 57.199 kg 126 lb 1.6 oz Bed scale   7/12/2020 58.968 kg 130 lb Estimated   7/12/2020 56.246 kg 124 lb Stated   7/12/2020 58.968 kg 130 lb        Labs reviewed     Results from last 7 days   Lab Units 07/13/20  0534 07/12/20  0526 07/07/20  0922   GLUCOSE mg/dL 113* 116* 100*   BUN mg/dL 29* 28* 30*   CREATININE mg/dL 1.34* 1.34* 1.66*   SODIUM mmol/L 139 136 141   CHLORIDE mmol/L 103 103 107   POTASSIUM mmol/L 4.8 5.4* 5.9*   PHOSPHORUS mg/dL  --  4.3  --    MAGNESIUM mg/dL  --  2.0  --    ALT (SGPT) U/L  --  9 10     Results from last 7 days   Lab Units 07/13/20  0534 07/12/20  0526 07/07/20  0922   ALBUMIN g/dL  --  2.93* 3.26*   CRP mg/dL  --  6.48*  --    CHOLESTEROL mg/dL 173  --   --    TRIGLYCERIDES mg/dL 70  --   --        Results from last 7 days   Lab Units 07/13/20  0051 07/12/20  1737   GLUCOSE mg/dL 118 109     Lab Results   Lab Value Date/Time    HGBA1C 5.10 07/13/2020 0535    HGBA1C 5.50 05/19/2017 0258       Medications reviewed   Pertinent: Yes       Intake/Output 24 hrs (7:00AM - 6:59 AM)     Intake & Output (last day)       07/12 0701 - 07/13 0700 07/13 0701 - 07/14 0700    Urine (mL/kg/hr) 950     Stool 0     Total Output 950     Net -950           Urine Unmeasured Occurrence 3 x 1 x    Stool Unmeasured Occurrence 1 x         Current Nutrition Prescription     PO: NPO Diet      Nutrition Diagnosis     7/13  Problem Swallowing difficulty   Etiology Dysphagia   Signs/Symptoms SLP eval - rec safest NPO     7/13  Problem Inadequate oral intake   Etiology Dysphagia   Signs/Symptoms NPO status        Nutrition Intervention     1.  Follow treatment progress, Care plan reviewed  2.  Await begin PO. SLP rec safest NPO. Family consulting  hospice/considering comfort diet.   3. If comfort diet desired,  staff to honor preferences  4. RD available if changes in GOC desire aggressive nutrition intervention    Goal:   General: Nutrition support treatment / Palliative consulted  PO: Establish PO      Monitoring/Evaluation:   Per protocol, Symptoms, POC/GOC, Swallow function    Will Continue to follow per protocol    Love Pearce RDN, LD  Time Spent: 25 minutes
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
## 2020-07-13 NOTE — PROGRESS NOTES
Stroke Progress Note       Chief Complaint:  Aphasia and right side weakness     Subjective     Subjective:  Cici Veliz is a 89 y/o woman with known diagnoses of prior stroke in April 2020, dementia, CKD, congestive heart failure due to hypertrophic cardiomyopathy, coronary artery disease, paroxysmal afib (not on anticoagulation per dgtr), who had new onset right sided weakness and aphasia.    Patient still lives in her own home and her children take turns taking care of her and staying with her overnight. However, she just returned home from a rehab facility following admission from June 7-12 at [facility] for heart failure exacerbation.  She was last seen normal sometime before bed on Saturday night and then awoke at 330 Sunday morning.  Her son heard her making some sort of coughing noise and when he found her she was weak on the right side.  He called his sister and they tried to move her but her right leg kept going out. Patient's initial stroke scale was 17.  She is not a candidate for TPA due to time of onset.  CT head did show left frontal stroke, not previously seen on her last CT scan in June.  CTA head and neck showed severe carotid disease with stenosis per radiology 65% on the right and 80% on the left.  There was no abrupt cut off, however she did have a distal plaque/calcification in the left MCA.  CTP did show a perfusion deficits left MCA inferior division, however core was too large and she was not a candidate for thrombectomy. I personally discussed all diagnostic/imaging with daughter during decision making process. Talked at length about patients funcitonal status prior to new infarct and now what her current neurologic status is. She had no further questions at that time.     Patient was started on scheduled Xanax at home 0.25 twice daily.  Daughter states she is been taking this twice daily for over 6 weeks for anxiety. An IV alternative has been offered to keep the patient calm and  comfortable. This does at times affect ability to complete NIHSS exam. Benefit outweighs risk to prevent any withdrawal at this time.     Interval history:   No new events overnight. She did have a worsening of NIHSS from 19 to 21. I attribute this to drowsiness after receiving IV ativan. However, a repeat CT w/o contrast was done that revealed evolving left MCA infarction without hemorrhagic conversion. Palliative care has been consulted and conversation on goals of care are taking place today.     Review of Systems   Patient is aphasic and unable to provide review of systems  Objective      Temp:  [96.4 °F (35.8 °C)-98.7 °F (37.1 °C)] 98.7 °F (37.1 °C)  Heart Rate:  [100-136] 114  Resp:  [16-20] 20  BP: (101-172)/() 124/83    Physical Exam:  General- elderly woman, calmer this morning   Head/eyes-atraumatic, pupils equal and reactive  Neck-supple, trachea midline  ENT-moist pink membranes  Respiratory- normal effort  Cardiac- regular rate and rhythm, no pitting edema  Abdomen- soft nontender  Speech- she is not able to produce speech except to moan, she does follow simple commands  Cranial nerves- pupils equal and reactive, she does track me around the room, visual fields appear in tact to threat, she does have a right facial droop, difficult to  sensation as she is aphasic and cannot respond, hearing intact, he is able to turn her head without difficulty  Mental status- she is confused and unable to answer orientation questions  Motor-normal bulk, tone,  3/5 in the right upper and lower extremity versus full strength on the left  Sensation- acknowledges touch in all extremities  Cerebellar- she has difficulty following more complex commands such as finger-nose-finger or heel-knee-shin but does not appear to have any gross ataxia/dysmetria on exam  Reflexes-downgoing toes  Gait-deferred as she is confused and weak on the right side    Interval: handoff  1a. Level of Consciousness: 0-->Alert, keenly  responsive  1b. LOC Questions: 2-->Answers neither question correctly  1c. LOC Commands: 2-->Performs neither task correctly  2. Best Gaze: 0-->Normal  3. Visual: 1-->Partial hemianopia  4. Facial Palsy: 2-->Partial paralysis (total or near-total paralysis of lower face)  5a. Motor Arm, Left: 1-->Drift, limb holds 90 (or 45) degrees, but drifts down before full 10 seconds, does not hit bed or other support  5b. Motor Arm, Right: 3-->No effort against gravity, limb falls  6a. Motor Leg, Left: 2-->Some effort against gravity, leg falls to bed by 5 secs, but has some effort against gravity  6b. Motor Leg, Right: 3-->No effort against gravity, leg falls to bed immediately  7. Limb Ataxia: 0-->Absent  8. Sensory: 0-->Normal, no sensory loss  9. Best Language: 3-->Mute, global aphasia, no usable speech or auditory comprehension  10. Dysarthria: 2-->Severe dysarthria, patients speech is so slurred as to be unintelligible in the absence of or out of proportion to any dysphasia, or is mute/anarthric  11. Extinction and Inattention (formerly Neglect): 0-->No abnormality    Total (NIH Stroke Scale): 21         Results Review:    I reviewed the patient's new clinical results.    Lab Results (last 24 hours)     Procedure Component Value Units Date/Time    Basic Metabolic Panel [[id number]]  (Abnormal) Collected:  07/13/20 0534    Specimen:  Blood Updated:  07/13/20 0651     Glucose 113 mg/dL      BUN 29 mg/dL      Creatinine 1.34 mg/dL      Sodium 139 mmol/L      Potassium 4.8 mmol/L      Chloride 103 mmol/L      CO2 22.0 mmol/L      Calcium 8.7 mg/dL      eGFR Non African Amer 37 mL/min/1.73      BUN/Creatinine Ratio 21.6     Anion Gap 14.0 mmol/L     Narrative:       GFR Normal >60  Chronic Kidney Disease <60  Kidney Failure <15      Lipid Panel [[id number]]  (Abnormal) Collected:  07/13/20 0534    Specimen:  Blood Updated:  07/13/20 0651     Total Cholesterol 173 mg/dL      Triglycerides 70 mg/dL      HDL Cholesterol 45 mg/dL        LDL Cholesterol  114 mg/dL      VLDL Cholesterol 14 mg/dL      LDL/HDL Ratio 2.53    Narrative:       Cholesterol Reference Ranges  (U.S. Department of Health and Human Services ATP III Classifications)    Desirable          <200 mg/dL  Borderline High    200-239 mg/dL  High Risk          >240 mg/dL      Triglyceride Reference Ranges  (U.S. Department of Health and Human Services ATP III Classifications)    Normal           <150 mg/dL  Borderline High  150-199 mg/dL  High             200-499 mg/dL  Very High        >500 mg/dL    HDL Reference Ranges  (U.S. Department of Health and Human Services ATP III Classifcations)    Low     <40 mg/dl (major risk factor for CHD)  High    >60 mg/dl ('negative' risk factor for CHD)        LDL Reference Ranges  (U.S. Department of Health and Human Services ATP III Classifcations)    Optimal          <100 mg/dL  Near Optimal     100-129 mg/dL  Borderline High  130-159 mg/dL  High             160-189 mg/dL  Very High        >189 mg/dL    Hemoglobin A1c [[id number]]  (Normal) Collected:  07/13/20 0535    Specimen:  Blood Updated:  07/13/20 0632     Hemoglobin A1C 5.10 %     Narrative:       Hemoglobin A1C Ranges:    Increased Risk for Diabetes  5.7% to 6.4%  Diabetes                     >= 6.5%  Diabetic Goal                < 7.0%    CBC (No Diff) [[id number]]  (Abnormal) Collected:  07/13/20 0534    Specimen:  Blood Updated:  07/13/20 0611     WBC 7.52 10*3/mm3      RBC 3.45 10*6/mm3      Hemoglobin 9.3 g/dL      Hematocrit 29.5 %      MCV 85.5 fL      MCH 27.0 pg      MCHC 31.5 g/dL      RDW 18.5 %      RDW-SD 57.6 fl      MPV 10.3 fL      Platelets 295 10*3/mm3     POC Glucose Once [[id number]]  (Normal) Collected:  07/13/20 0051    Specimen:  Blood Updated:  07/13/20 0052     Glucose 118 mg/dL     POC Glucose Once [[id number]]  (Normal) Collected:  07/12/20 1737    Specimen:  Blood Updated:  07/12/20 1739     Glucose 109 mg/dL     Troponin [[id number]]  (Abnormal) Collected:   07/12/20 1434    Specimen:  Blood Updated:  07/12/20 1502     Troponin T 0.051 ng/mL     Narrative:       Troponin T Reference Range:  <= 0.03 ng/mL-   Negative for AMI  >0.03 ng/mL-     Abnormal for myocardial necrosis.  Clinicians would have to utilize clinical acumen, EKG, Troponin and serial changes to determine if it is an Acute Myocardial Infarction or myocardial injury due to an underlying chronic condition.       Results may be falsely decreased if patient taking Biotin.          Ct Angiogram Head    Result Date: 7/12/2020  1. CTA neck demonstrates a near midline retropharyngeal course of the distal common and proximal internal carotid arteries with normal bifurcation appearance demonstrating atherosclerotic involvement including calcific disease burden producing 65% right and 80% left luminal narrowing as measured by NASCET criteria 2. CTA head demonstrates within the left MCA territory apparent temporal lobe branch of the inferior division a 3 mm focus of increased attenuation concerning for calcific disease versus calcific plaque certain is a potential thrombus series 5 image 354 and series 904 images 18-22. 3. Visualized soft tissue components of the upper lungs demonstrate a pulmonary edema pattern with intralobular septal thickening intervening opacifications as well as partially visualized bilateral pleural effusions.  DICTATED:   07/12/2020 EDITED/ls :   07/12/2020   This report was finalized on 7/12/2020 6:57 PM by Dr. Damion Chua.      Ct Head Without Contrast    Result Date: 7/12/2020  Evolving left MCA infarction without hemorrhagic conversion. No interval development of progressive effacement or midline shift.  DICTATED:   07/12/2020 EDITED/ls :   07/12/2020         Ct Angiogram Neck    Result Date: 7/12/2020  1. CTA neck demonstrates a near midline retropharyngeal course of the distal common and proximal internal carotid arteries with normal bifurcation appearance demonstrating atherosclerotic  involvement including calcific disease burden producing 65% right and 80% left luminal narrowing as measured by NASCET criteria 2. CTA head demonstrates within the left MCA territory apparent temporal lobe branch of the inferior division a 3 mm focus of increased attenuation concerning for calcific disease versus calcific plaque certain is a potential thrombus series 5 image 354 and series 904 images 18-22. 3. Visualized soft tissue components of the upper lungs demonstrate a pulmonary edema pattern with intralobular septal thickening intervening opacifications as well as partially visualized bilateral pleural effusions.  DICTATED:   07/12/2020 EDITED/ls :   07/12/2020   This report was finalized on 7/12/2020 6:57 PM by Dr. Damion Chua.      Xr Chest 1 View    Result Date: 7/12/2020  Stable cardiac enlargement with improved pulmonary edema relative to the prior study. There is mild infiltrate or atelectasis at the left lung base today. Signer Name: Leroy Arzola MD  Signed: 7/12/2020 5:15 AM  Workstation Name: [facility]    Ct Head Without Contrast Stroke Protocol    Result Date: 7/12/2020  Chronic changes in the brain as above. No definite acute findings. Signer Name: Leroy Arzola MD  Signed: 7/12/2020 5:23 AM  Workstation Name: [facility]    Ct Cerebral Perfusion With & Without Contrast    Result Date: 7/12/2020  Reversible ischemia within these left MCA territory. No evidence for core infarct component.  DICTATED:   07/12/2020 EDITED/ls :   07/12/2020  This report was finalized on 7/12/2020 6:57 PM by Dr. Damion Chua.      Results for orders placed during the hospital encounter of 06/01/20   Adult Transthoracic Echo Limited W/ Cont if Necessary Per Protocol    Narrative · Left ventricular systolic function is normal.  · There is moderate calcification of the aortic valve mainly affecting the   non, left and right coronary cusp(s).  ·  Mild aortic valve stenosis is present with aortic valve area by   planimetry about 1.7 cm², with a mean gradient of 10 mmHg peak gradient of   16 mmHg.            Assessment/Plan     Assessment/Plan:      89 y/o woman with history of prior stroke, dementia, CKD, congestive heart failure due to hypertrophic cardiomyopathy, coronary artery disease, paroxysmal afib (not on anticoagulation per dgtr), has a pacemaker, who had new onset right sided weakness and aphasia.  Patient has had likely an artery to artery versus cardioembolic stroke to the left inferior MCA.  She has a history of atrial fibrillation and was in A. fib yesterday but is not on anticoagulation.  She also has significant atherosclerosis in both carotids.     Family had indicated the patient takes Xanax twice a day, so in an effort to avoid withdrawal we did restart low-dose Ativan.  Unfortunately this is made her more sleepy and it may be difficult to  her stroke scale. A repeat CT was done and is stable.      Patient's family is going to consider hospice as an option.  They do not feel that she would want a feeding tube if she is unable to pass her swallow.  They are considering allowing her pleasure eating if she is unable to swallow eval.    Plan:   - Continue to follow stroke order set for now  - Slowly begin normalizing blood pressure   - Unable to get MRI due to pacemaker, had initially planned for repeat head CT ordered for today but may hold on this based on family goals for care.   - A1C 5.1  -  - Aspirin and Plavix for 90 days, statin for secondary stroke prevention   - History of known A. fib, 2D echo--Estimated EF = 41%. Negative saline test   - Lowest possible dose of Ativan once daily to prevent withdrawal from home benzodiazepine  - PT/OT/ST consulted   - Suggest palliative care consult to help continue goals of care discussion and consider hospice given her complicated history      Discussed with bedside nurse and primary  team. Stroke/neurology team would be happy to participate in goals of care conversation along with palliative if needed.            Kasey Reyes, ОЛЬГА  07/13/20  08:19

## 2020-07-13 NOTE — PROGRESS NOTES
Patient has insurance and denies concerns regarding coverage or disruption in coverage issues. Patient has drug coverage and denies issues obtaining or affording current medications. Discharge Planning Assessment   [place]     Patient Name: Cici Veliz  MRN: [id number]  Today's Date: 7/13/2020    Admit Date: 7/12/2020    Discharge Needs Assessment     Row Name 07/13/20 1404       Living Environment    Lives With  alone    Primary Care Provided by  self    Provides Primary Care For  no one    Family Caregiver if Needed  child(vanesa), adult    Quality of Family Relationships  supportive       Resource/Environmental Concerns    Resource/Environmental Concerns  none    Transportation Concerns  car, none       Transition Planning    Patient/Family Anticipates Transition to  long term care facility;other (see comments) possible hospice,     Patient/Family Anticipated Services at Transition  hospice care;rehabilitation services;skilled nursing    Transportation Anticipated  family or friend will provide       Discharge Needs Assessment    Readmission Within the Last 30 Days  no previous admission in last 30 days    Concerns to be Addressed  basic needs;decision making;grief and loss;adjustment to diagnosis/illness    Equipment Currently Used at Home  walker, rolling;oxygen    Anticipated Changes Related to Illness  inability to care for self    Equipment Needed After Discharge  none    Current Discharge Risk  dependent with mobility/activities of daily living;chronically ill        Discharge Plan     Row Name 07/13/20 2868       Plan    Plan  To be determined    Plan Comments  I spoke with patient's son, Carson this am to discuss discharge planning. He tells me his mother had most recently had a stay at [facility]  in [place] and was brought home, living alone just prior to our admission. Palliative is working with patient/her family regarding GOC.     I will assistive as needed with discharge planning.      Final Discharge Disposition Code  30 - still a patient        Destination      Coordination has not been started for this encounter.      Durable Medical Equipment      Coordination has not been started for this encounter.      Dialysis/Infusion      Coordination has not been started for this encounter.      Home Medical Care      Coordination has not been started for this encounter.      Therapy      Coordination has not been started for this encounter.      Community Resources      Coordination has not been started for this encounter.        Expected Discharge Date and Time     Expected Discharge Date Expected Discharge Time    Jul 15, 2020         Demographic Summary     Row Name 07/13/20 1406       General Information    Admission Type  inpatient    Referral Source  admission list    Preferred Language  English    General Information Comments  PCP is Edgar Urias       Contact Information    Contact Information Comments  I spoke with her son Carson, at patient's bedside        Functional Status     Row Name 07/13/20 1405       Functional Status, IADL    IADL Comments  unable to assess       Employment/    Employment/ Comments  .Patient has Medicare and [place] insurance and denies concerns regarding coverage or disruption in coverage issues. Patient has drug coverage and denies issues obtaining or affording current medications.         Psychosocial    No documentation.       Abuse/Neglect    No documentation.       Legal    No documentation.       Substance Abuse    No documentation.       Patient Forms    No documentation.           Erika Vidales RN

## 2020-07-13 NOTE — PLAN OF CARE
Problem: Patient Care Overview  Goal: Plan of Care Review  Outcome: Ongoing (interventions implemented as appropriate)  Flowsheets  Taken 7/13/2020 0639  Progress: improving  Outcome Summary: VSS; afib per monitor. Called for metoprolol twice during shift to keep rate below 120s. 3 L NC. Patient indicated pain in her left hip and thigh area at one point, obtained order for topical relief (see MAR). Difficult to assess orientation due to patient's inability to speak. NIH 21. Daughter at bedside to provide support. Patient slept periodically during the night. Will continue to monitor.  Taken 7/13/2020 0605  Plan of Care Reviewed With: patient

## 2020-07-14 NOTE — PLAN OF CARE
Problem: Patient Care Overview  Goal: Plan of Care Review  Outcome: Ongoing (interventions implemented as appropriate)  Flowsheets (Taken 7/14/2020 1045)  Plan of Care Reviewed With: patient; grandchild(vanesa)  Note:   SLP re-evaluation and treatment completed. Will continue to address swallowing, aphasia, and apraxia of speech in dx tx. Please see note for further details and recommendations.

## 2020-07-14 NOTE — PROGRESS NOTES
Stroke Progress Note       Chief Complaint:  aphasia    Subjective    Subjective     Subjective:  Lying in bed, continues to be basically mute. Granddaughter at bedside who verbalized she has not tried to communicate verbally with her.  She was just evaluated by speech who noted significant coughing with pudding attempt, probable aspiration.     Review of Systems   Unable to perform ROS: Patient nonverbal            Objective    Objective      Temp:  [97.3 °F (36.3 °C)-98.7 °F (37.1 °C)] 97.6 °F (36.4 °C)  Heart Rate:  [100-133] 130  Resp:  [16-18] 16  BP: (124-158)/() 141/91        Neurological Exam  Mental Status  Awake and alert. Language: Does not attempt to speak, follows some commands.    Cranial Nerves  CN III, IV, VI: Extraocular movements intact bilaterally. Extraocular movements intact bilaterally.    Motor    Does not participate in motor exam but does seem to move all extremities, less on right.    Reflexes                                           Right                      Left  Plantar                           Downgoing                Downgoing    Coordination  Patient unable to follow commands to test coordination  .    Gait  Did not assess .      Physical Exam   Constitutional: She appears well-developed.   Eyes: EOM are normal.   Neck: Normal range of motion.   Pulmonary/Chest: Effort normal.   Neurological: She is alert.   Skin: Skin is warm and dry.   Psychiatric: She has a normal mood and affect.       Results Review:    I reviewed the patient's new clinical results.    Results for orders placed during the hospital encounter of 07/12/20   Adult Transthoracic Echo Complete W/ Cont if Necessary Per Protocol (With Agitated Saline)    Narrative · Left ventricular wall thickness is consistent with moderate concentric   hypertrophy.  · Moderate mitral valve regurgitation is present.  · Mild tricuspid valve regurgitation is present.  · The following left ventricular wall segments are  hypokinetic: basal   anterolateral, mid anterolateral, apical lateral, basal inferolateral, mid   inferolateral, apical inferior, mid inferior, apical septal, basal   inferoseptal, mid inferoseptal, mid anteroseptal, basal anterior, basal   inferior and basal inferoseptal. The following left ventricular wall   segments are akinetic: mid anterior, apical anterior and apex.  · Estimated EF = 41%.  · Left ventricular systolic function is moderately decreased.  · Left atrial cavity size is mild-to-moderately dilated.  · Normal right ventricular cavity size, wall thickness, systolic function   and septal motion noted.  · Saline test results are negative.  · The aortic valve exhibits moderate sclerosis.  · Severe MAC is present.  · There is no evidence of pericardial effusion.  · No evidence of pulmonary hypertension is present.                Assessment/Plan     Assessment/Plan:          89 y/o woman with history of prior stroke, dementia, CKD, congestive heart failure due to hypertrophic cardiomyopathy, coronary artery disease, paroxysmal afib (not on anticoagulation per dgtr), has a pacemaker, who had new onset right sided weakness and aphasia.  Patient has had likely an artery to artery versus cardioembolic stroke to the left inferior MCA.  She has a history of atrial fibrillation and was in A. fib yesterday but is not on anticoagulation.  She also has significant atherosclerosis in both carotids.     Originally, patient's family was going to consider hospice as an option and do not feel that she would want a feeding tube if she is unable to pass her swallow.  This am the grand daughter tells me that she has spoken to her father (patient's son) who does want to do a feeding tube for now.      Plan:   - Continue to follow stroke order set for now  - can continue to normalize blood pressure   - Unable to get MRI due to pacemaker, will consider repeating head CT to help in the discussion with family, will hold for today     - A1C 5.1  --will need statin if patient gets feeding tube and goals of care are leaning toward aggressive care.   - Aspirin and Plavix for 90 days, needs to at least have ASA suppository for now until feeding tube is placed.    - History of known A. fib, 2D echo--Estimated EF = 41%. Negative saline test   - PT/OT/ST to continue plan of treatment  - Will continue to follow along with palliative care consult to help continue goals of care discussion.      Discussed patient with granddaughter. Will continue to follow along.          Anisha Bills, APRN  07/14/20  11:24

## 2020-07-14 NOTE — NURSING NOTE
WOC nurse for Xavier report 15 or less    Spoke with RN Alina - family meeting with hospice, on dolphin, no heel boots but has heels lifted on pillows    No pressure injury skin issues noted, all skin interventions in place, including heel boots, repositioning and speciality bed.    Please consult WOC nurse if needed.

## 2020-07-14 NOTE — PLAN OF CARE
"Pt unable to speak and inappropriately answers with nods. A-fib on monitor. Cardizem gtt  titrated throughout the shift to maintain SBP and HR within parameters. 2L NC with no signs of resp distress. Ativan given once before keofeed placement and morphine, pain L leg, given twice. Family met with hospice today but plan is to continue with tube feeding for \"10 days\". Hospice states they will continue to follow. [facility] interrogated pacemaker, papers in chart. Keofeed placement verified with KUB and dietician called for orders. Orders placed and dietary to bring Isosource 1.5 to floor. Turned Q2-3 as tolerated by pt. Education provided throughout the day to pt and family about pt care and plan. All questions answered and support provided.   "

## 2020-07-14 NOTE — PLAN OF CARE
Problem: Patient Care Overview  Goal: Interprofessional Rounds/Family Conf  Outcome: Ongoing (interventions implemented as appropriate)  Flowsheets (Taken 7/14/2020 7511)  Summary: 1300 Palliative Team Conference: SHALA Fernandes RN, CHPN; ОЛЬГА Jones; RN; GRACIE Gonzales, RN, CHPN; MONROE Bills RN, CHPN; MATY Newman, DO; CHE Guaman, RN  Note:   Pt able to nod/shake head appropriately to yes/no questions; smiles with interaction, engaged in surroundings. Hip and knee pain preceded hospital admission per granddaughter report; per Xray rpt, no acute fracture, but there is severe degenerative disease. Planning for keofeed, may be able to trial scheduled acetaminophen for MSK pain; pt also stated that sometimes heat application helps to relieve her leg and hip pain. With opioid therapy on board, need to monitor bowel function, may need prn bowel regimen. Palliative following for continuing symptom management and ongoing GOC discussion, support to pt and family.     Problem: Palliative Care (Adult)  Goal: Maximized Comfort  Outcome: Ongoing (interventions implemented as appropriate)  Flowsheets (Taken 7/14/2020 8968)  Maximized Comfort: making progress toward outcome

## 2020-07-14 NOTE — PROGRESS NOTES
[facility] Medicine Services  PROGRESS NOTE    Patient Name: Cici Veliz  : 1925  MRN: [id number]    Date of Admission: 2020  Primary Care Physician: Edgar Urias MD    Subjective   Subjective     CC:  F/U CVA    HPI:  Pt seen and examined. Nursing notes reviewed. Grand-daughter at bedside, states she slept some during the night. Not complaining of pain this morning. Pt remains aphasic.     Review of Systems  Unable to obtain    Objective   Objective     Vital Signs:   Temp:  [97.3 °F (36.3 °C)-98.7 °F (37.1 °C)] 97.7 °F (36.5 °C)  Heart Rate:  [] 106  Resp:  [16-18] 18  BP: (115-158)/() 115/95  Total (NIH Stroke Scale): 19     Physical Exam:  Constitutional: chronically ill appearing female, awake, alert, appears comfortable  HENT: NCAT, mucous membranes moist  Respiratory: Clear to auscultation bilaterally, respiratory effort normal   Cardiovascular: IRR, no murmurs, rubs, or gallops, palpable pedal pulses bilaterally  Gastrointestinal: Positive bowel sounds, soft, nontender, nondistended  Musculoskeletal: No bilateral ankle edema  Psychiatric: Flat affect  Neurologic: Aphasic, unable to cooperate with neuro exam  Skin: No rashes    Results Reviewed:  Results from last 7 days   Lab Units 20  0732 20  0534 20  0526   WBC 10*3/mm3 7.90 7.52 6.08   HEMOGLOBIN g/dL 9.5* 9.3* 8.8*   HEMATOCRIT % 31.8* 29.5* 29.4*   PLATELETS 10*3/mm3 300 295 296   INR   --   --  1.01     Results from last 7 days   Lab Units 20  0732 20  0534 20  1434 20  0526   SODIUM mmol/L 137 139  --  136   POTASSIUM mmol/L 4.4 4.8  --  5.4*   CHLORIDE mmol/L 101 103  --  103   CO2 mmol/L 20.0* 22.0  --  19.5*   BUN mg/dL 41* 29*  --  28*   CREATININE mg/dL 1.45* 1.34*  --  1.34*   GLUCOSE mg/dL 114* 113*  --  116*   CALCIUM mg/dL 8.9 8.7  --  8.5   ALT (SGPT) U/L  --   --   --  9   AST (SGOT) U/L  --   --   --  20   TROPONIN T ng/mL  --   --  0.051*  0.065*   PROBNP pg/mL 21,133.0*  --   --  8,676.0*     Estimated Creatinine Clearance: 22.1 mL/min (A) (by C-G formula based on SCr of 1.45 mg/dL (H)).    Microbiology Results Abnormal     None          Imaging Results (Last 24 Hours)     Procedure Component Value Units Date/Time    XR Pelvis 1 or 2 View [[id number]] Collected:  07/14/20 0859     Updated:  07/14/20 0929    Narrative:       EXAMINATION: XR PELVIS 1 OR 2 VW-      INDICATION: Pain; R13.10-Dysphagia, unspecified; R47.01-Aphasia;  Z74.09-Other reduced mobility; Z78.9-Other specified health status.      COMPARISON: None.     FINDINGS: Imaging of the pelvis reveals degenerative changes seen within  the sacroiliac joints bilaterally with severe degenerative changes seen  in both hips. There is spurring of the femoral heads. Spurring of the  acetabulum. Vascular calcification is seen within the thoracic aorta.             Impression:       Extensive degenerative changes seen within the sacroiliac  joints and hips with no evidence of fracture or dislocation.     D:  07/14/2020  E:  07/14/2020       XR Femur 1 View Left [[id number]] Collected:  07/14/20 0811     Updated:  07/14/20 0909    Narrative:          EXAMINATION: XR FEMUR 1 VW, LEFT-07/13/2020:      INDICATION: Pain; R13.10-Dysphagia, unspecified; R47.01-Aphasia;  Z74.09-Other reduced mobility; Z78.9-Other specified health status.      COMPARISON: NONE.     FINDINGS: Two views of the left femur reveal extensive degenerative  changes seen both within the hip and knee. Osteophyte formation seen of  the acetabulum. Vascular calcification seen within the vessels. Severe  degenerative changes seen within the left knee. No fracture or  dislocation. There is spurring identified of the patella. No acute bony  abnormality.           Impression:       There are severe degenerative changes seen within the hip  and knee with no evidence of acute fracture or dislocation.     D:  07/14/2020  E:  07/14/2020                    Results for orders placed during the hospital encounter of 07/12/20   Adult Transthoracic Echo Complete W/ Cont if Necessary Per Protocol (With Agitated Saline)    Narrative · Left ventricular wall thickness is consistent with moderate concentric   hypertrophy.  · Moderate mitral valve regurgitation is present.  · Mild tricuspid valve regurgitation is present.  · The following left ventricular wall segments are hypokinetic: basal   anterolateral, mid anterolateral, apical lateral, basal inferolateral, mid   inferolateral, apical inferior, mid inferior, apical septal, basal   inferoseptal, mid inferoseptal, mid anteroseptal, basal anterior, basal   inferior and basal inferoseptal. The following left ventricular wall   segments are akinetic: mid anterior, apical anterior and apex.  · Estimated EF = 41%.  · Left ventricular systolic function is moderately decreased.  · Left atrial cavity size is mild-to-moderately dilated.  · Normal right ventricular cavity size, wall thickness, systolic function   and septal motion noted.  · Saline test results are negative.  · The aortic valve exhibits moderate sclerosis.  · Severe MAC is present.  · There is no evidence of pericardial effusion.  · No evidence of pulmonary hypertension is present.          I have reviewed the medications:  Scheduled Meds:    aspirin 81 mg Oral Daily   Or      aspirin 300 mg Rectal Daily   diclofenac 2 g Topical TID   furosemide 40 mg Intravenous Daily   ipratropium 0.5 mg Nebulization 4x Daily - RT   lidocaine 3 patch Transdermal Q24H   sodium chloride 10 mL Intravenous Q12H   sodium chloride 10 mL Intravenous Q12H     Continuous Infusions:    dilTIAZem 5-15 mg/hr Last Rate: 5 mg/hr (07/14/20 0844)     PRN Meds:.LORazepam  •  magnesium sulfate **OR** magnesium sulfate **OR** magnesium sulfate  •  metoprolol tartrate  •  Morphine  •  ondansetron **OR** ondansetron  •  potassium chloride  •  sodium chloride  •  sodium chloride    Assessment/Plan     Assessment & Plan     Active Hospital Problems    Diagnosis  POA   • **Acute ischemic left MCA stroke (CMS/[facility]) [I63.512]  Yes   • Stroke (CMS/[facility]) [I63.9]  Yes   • Acute on chronic congestive heart failure (CMS/[facility]) [I50.9]  Yes   • NSTEMI (non-ST elevated myocardial infarction) (CMS/[facility]) [I21.4]  Yes   • Shortness of breath [R06.02]  Yes   • PAF (paroxysmal atrial fibrillation) (CMS/[facility]) [I48.0]  Yes   • Chronic back pain [M54.9, G89.29]  Yes   • CKD (chronic kidney disease) stage 3, GFR 30-59 ml/min (CMS/[facility]) [N18.3]  Yes   • Diastolic heart failure secondary to hypertrophic cardiomyopathy (CMS/[facility]) [I50.30, I42.2]  Yes   • Spinal stenosis, degenerative disc disease, back pain* [M48.00]  Yes   • COPD (chronic obstructive pulmonary disease) (CMS/[facility]) [J44.9]  Yes   • Essential hypertension [I10]  Yes   • CAD, status post RCA and circumflex stents, in-stent restenosis of RCA requiring stenting 2017 Dr. Cash  [I25.10]  Yes   • Pacemaker* [Z95.0]  Yes      Resolved Hospital Problems   No resolved problems to display.        Brief Hospital Course to date:  Cici Veliz is a 89 y.o. female with numerous comorbidities (CKD3, CAD, recent stroke, COPD, D-CHF, dementia, etc) who presents in transfer from OSH for acute stroke like symptoms     Acute LT MCA distribution stroke  Severe cerebral vascular disease  Recent stroke ~1 month PTA  -Neurology following, continue to follow stroke order set  -Echo: moderate concentric hypertrophy, moderate MVR, mild TVR, multiple hypokinetic segments, EF 41%, saline tests negative, severe MAC  -Slowly begin normalizing BP  -Unable to obtain MRI due to pacemaker   -, A1c 5.1  -Plan for ASA/Plavix for 90 days   -Statin   -PT/OT following, recommends SNF  -SLP following, patient not safe for PO diet. Not able to perform MBS at this time because patient not able to cooperate with exam. Per family at bedside, she wouldn't want a feeding tube  -Palliative care following, Hospice  also consulted     Acute metabolic encephalopathy  Underlying dementia  - related to stroke, dementia, acute illness     Acute hypoxic respiratory insufficiency  Acute on chronic diastolic CHF  Underlying COPD with wheezes  - recently DC'ed from OSH to rehab and her lasix fell off the MAR; has been uptitrating her lasix outpatient (taking 30 and 40 mg alternating days)  - CTA for stroke showing edema pattern with effusions, BNP elevated on admission  - O2 support as needed  - reported intolerance of albuterol, ipratropium ordered  - Continue Lasix      NSTEMI, suspect type 2  Underlying CAD  - EKG personally reviewed, there are some T wave abnormalities, afib, no ST segment elevations  - ASA  -Troponin trended down     Atrial fibrillation with RVR  [employer] dual-chamber pacemaker  -CHADsVasc 8  -Rate control with IV Diltiazem  -Cardiology following, defer ECV, MPS, LHC  -May start Amiodarone per Cardiology  -She needs full AC if Neurology feels this is an option  -EKG in AM  -Device interrogation to assess A fib burden      CKD stage 3  -baseline Cr anywhere from 1.1-1.7; egfr 40-50's  -monitor with diuresis  -Stable      Mild hyperkalemia  - RESOLVED     History of hyperparathyroidism s/p parathyroidectomy  - per DTR she gets her calcium from her diet    L hip pain  -XR of hip/pelvis and femur shows severe degenerative change but no fracture   -Lidoderm patch   -PRN Morphine    Chronic Benzodiazepine use  -PRN Ativan      DVT prophylaxis:  mechanical       Daily Care Communication  Due to current limited visitation policies, an attempt will be made daily to update patient's identified best point-of-contact(s)   Contact: Grand-daughter present at bedside   Relation:    Type of communication (phone or televideo):    Time of communication:    Notes (if applicable):     Disposition: I expect the patient to be discharged pending Neuro recs, GOC discussion    CODE STATUS:   Code Status and Medical Interventions:      Ordered at: 07/12/20 0920     Level Of Support Discussed With:    Next of Kin (If No Surrogate)     Code Status:    No CPR     Medical Interventions (Level of Support Prior to Arrest):    Full         Electronically signed by Scarlett Vieyra DO, 07/14/20, 13:15.

## 2020-07-14 NOTE — CONSULTS
Cici BALJEET Veliz  [id number]  9/7/1925   LOS: 2 days   Patient Care Team:  PHYSICIAN: Edgar Urias MD   CARDIOLOGIST: Tejinder Cash MD  PULMONOLOGIST: Jonathan Palumbo MD  INTERVENTIONAL CARDIOLOGISTS: Brennon Wisdom MD (), Tova Ayon MD, Carson Waggoner MD, Tejinder Cash MD    Ms. Veliz is a 89-year-old  white female from [place], [place], retired dental assistant.    Chief Complaint:  CVA / ATRIAL FIBRILLATION    Problem List:  1. Paroxysmal atrial fibrillation  a. CHADsVasc 8, remotely anticoagulated with Eliquis but had side effects of unknown etiology, now anticoagulated with ASA and Plavix  b. Saint Arnaldo dual chamber pacemaker implantation 3/16/2010-data deficit  c. Echocardiogram 1/14/2017: LV wall thickness consistent with mild concentric hypertrophy, LV wall segments contract abnormally.  EF 46-50%, calcification of the left and right coronary cusps.  Mild aortic stenosis, mitral annular calcification is present with mitral valve morphology score 8, mild TR, RVSP 45-55 mmHg, no pericardial effusion  d. Echocardiogram 3/6/2017: LVEF 65%, LV wall thickness consistent with mild concentric hypertrophy, mild noncoronary cusp left coronary cusp, right coronary cusp calcification present within the aortic valve, LA moderate to severely dilated, no pericardial effusion  e. Saint Arnaldo device interrogation January 2018: 5.6 years battery longevity on interrogation January 2018, atrial pacing 74%, less than 1% ventricular pacing, less than 1% mode switching, no changes  f. Echocardiogram 4/7/2020: LV wall thickness consistent with concentric hypertrophy, LVEF 61-65%, LV diastolic dysfunction grade 3 consistent with reversible restrictive pattern, LA severely dilated, mild calcification of the aortic valve, moderate to severe aortic stenosis, severe MR, mild to moderate mitral stenosis, mild TR  g. Echocardiogram 6/3/2020: LV function normal, moderate calcification of the aortic valve mainly affecting the  non-, left and right coronary cusps, mild aortic stenosis with aortic valve area 1.7 cm² with mean gradient 10 mmHg, peak gradient 16 mmHg  2. CVA  a. CVA versus TIA April 2020 with residual speech disturbances but no weakness  b. CTA head/neck 7/12/2020: Near midline retropharyngeal course of the distal common and proximal internal carotid arteries with normal bifurcation appearance demonstrating atherosclerotic involvement including calcific disease burden producing 65% right and 80% left luminal narrowing, left MCA territory apparent temporal lobe branch of the inferior division a 3 mm focus of increased attenuation concerning for calcific disease versus calcific plaque certain as a potential thrombus series 5 image 354 and series 904 images 18-22.  Soft tissue components of the upper lungs demonstrate a pulmonary edema pattern with interlobular septal thickening intervening opacifications as well as partially visualized bilateral pleural effusions  c. CT cerebral perfusion 7/12/2020: Reversible ischemia within left MCA territory, no evidence for cord infarct component  d. Echocardiogram 7/12/2020: LV wall thickness consistent with moderate concentric hypertrophy, moderate MR, mild TR, the following LV wall segments are hypokinetic: Basal anterolateral, mid anterolateral, apical lateral, basal inferolateral, mid inferolateral, apical inferior, mid inferior, apical septal, basal inferoseptal, mid inferoseptal, mid anteroseptal, basal anterior, basal inferior, and basal inferoseptal.  The following LV wall segments are akinetic: Mid anterior, apical anterior and apex.  LVEF 41%, LA mild to moderately dilated, normal RV cavity size, wall thickness, systolic function and septal motion noted.  Saline test results negative, aortic valve exhibits moderate sclerosis, severe MAC present, no pericardial effusion, no pulmonary hypertension  3. Ischemic heart disease   a. Left heart catheterization 4/20/2015: Patent stents  to circumflex and RCA, nonobstructive disease   b. Regadenoson stress test 1/14/2017: Myocardial perfusion imaging indicates a small sized infarct in the lateral wall with no significant ischemia noted, LVEF 56% with mild anterior wall dyskinesis, intermediate risk study  c. Left heart catheterization 3/10/2017: Right dominant coronary artery system with single-vessel disease involving the proximal RCA with successful angioplasty and stenting of the RCA with a 3 x 18 mm Alpine drug-eluting stent  d. Left heart catheterization 5/18/2017: Right dominant coronary artery system with native single-vessel disease involving the proximal RCA, severe in-stent restenosis of the proximal RCA, successful angioplasty and stenting of the proximal RCA with a 3.5 x 28 mm Alpine drug-eluting stent  e. Left heart catheterization 8/2/2017: No stents placed, EF 60%, recommendations for current medical therapy  f. NSTEMI with heart catheterization and stent placement for 99% restenosis of the proximal RCA at [facility] January 2018  4. Chronic diastolic heart failure secondary to hypertrophic cardiomyopathy, 5-day [facility] hospitalization June 2020 for diastolic heart failure exacerbation  5. Hypertension, [facility] for hypertension August 2019  6. Hyperlipidemia  7. Carotid artery disease with apparent high grade stenosis LICA on CTA neck July 2020  8. COPD with abnormal chest x ray July 2020  9. Chronic kidney disease stage III  10. Dementia, nonverbal  11. Degenerative disc disease/spinal stenosis  12. Hyperparathyroidism  13. Macular degeneration  14. Thyroid nodule  15. Anemia  16. Asthma   17. Family history of early CAD with her father having an MI at 45  18. [facility] 3-day hospitalization 6/1/2020-6/4/2020 for weakness and decline  19. Surgical history:  a. Left breast biopsy  b. Multiple left heart catheterizations x8 with 3 PCI total  c. Pacemaker implant  d. Bilateral cataracts  e. I&D of  foot secondary to cellulitis  f. Hemorrhoidectomy  g. Hysterectomy  h. Parathyroidectomy  i. Tonsillectomy      Allergies   Allergen Reactions   • Bee Venom Anaphylaxis   • Erythromycin Diarrhea     Cramping     • Levaquin [Levofloxacin] Unknown - Low Severity   • Lipitor [Atorvastatin] Diarrhea and Itching   • Albuterol Unknown - Low Severity   • Amoxil [Amoxicillin] Rash   • Codeine Delirium and Confusion   • Demeclocycline Other (See Comments)     Unknown    • Lopressor [Metoprolol Tartrate] Confusion     Confusion and nightmares   • Penicillins Nausea And Vomiting and Palpitations   • Tetracyclines & Related Palpitations and Other (See Comments)     Labored breathing and head feels heavy    • Zetia [Ezetimibe] Itching   • Zocor [Simvastatin] Itching     Medications Prior to Admission   Medication Sig Dispense Refill Last Dose   • acetaminophen (TYLENOL) 325 MG tablet Take 325 mg by mouth Every 4 (Four) Hours As Needed for Mild Pain  or Fever.   7/11/2020 at Unknown time   • bisacodyl (DULCOLAX) 10 MG suppository Insert 10 mg into the rectum Every 12 (Twelve) Hours As Needed for Constipation.   7/11/2020 at Unknown time   • clopidogrel (PLAVIX) 75 MG tablet Take 1 tablet by mouth Daily. 90 tablet 0 7/11/2020 at Unknown time   • dilTIAZem CD (Cardizem CD) 120 MG 24 hr capsule Take 120 mg by mouth Daily.   7/11/2020 at Unknown time   • furosemide (LASIX) 20 MG tablet Take 1 tablet by mouth Daily. (Patient taking differently: Take 40 mg by mouth Daily. 40mg then 30mg alternating) 30 tablet 1 7/11/2020 at Unknown time   • hydrALAZINE (APRESOLINE) 10 MG tablet Take 1 tablet by mouth Every 6 (Six) Hours. 120 tablet 1 7/11/2020 at 2100   • iron polysaccharides (NIFEREX) 150 MG capsule Take 150 mg by mouth Daily.   7/11/2020 at Unknown time   • isosorbide mononitrate (IMDUR) 30 MG 24 hr tablet Take 1 tablet by mouth Daily. 30 tablet 1 7/11/2020 at Unknown time   • lisinopril (PRINIVIL,ZESTRIL) 5 MG tablet Take 5 mg by  mouth Daily.   7/11/2020 at Unknown time   • ALPRAZolam (XANAX) 0.25 MG tablet Take 0.25 mg by mouth Every 12 (Twelve) Hours As Needed for Anxiety or Sleep.   Unknown at Unknown time   • lactulose (CHRONULAC) 10 GM/15ML solution Take 20 g by mouth Every 12 (Twelve) Hours As Needed (Constipation).   Unknown at Unknown time   • nitroglycerin (NITROSTAT) 0.4 MG SL tablet Place 1 tablet under the tongue Every 5 (Five) Minutes As Needed for Chest Pain. 30 tablet 0 Unknown at Unknown time   • Sodium Phosphates (FLEET ENEMA) 7-19 GM/118ML enema Insert 1 enema into the rectum Every 12 (Twelve) Hours As Needed (Constipation).   Unknown at Unknown time     Scheduled Meds:  aspirin 81 mg Oral Daily   Or      aspirin 300 mg Rectal Daily   diclofenac 2 g Topical TID   furosemide 40 mg Intravenous Daily   ipratropium 0.5 mg Nebulization 4x Daily - RT   lidocaine 3 patch Transdermal Q24H   sodium chloride 10 mL Intravenous Q12H   sodium chloride 10 mL Intravenous Q12H     Continuous Infusions:  dilTIAZem 5-15 mg/hr Last Rate: 5 mg/hr (07/14/20 0844)          History of Present Illness:   This is a 89-year-old white female who presented to [facility] 7/12/2020 as a transfer from [facility] for stroke.  The patient was recently treated at [facility] for stroke about 1 month ago but had been overall declining.  Apparently on a prior H&P the patient was demented to the point of not speaking.  The patient had been at rehab and returned to home to live with her children about 3 days prior to presented to [facility].  The night before admission the patient was in bed and around 2-3 in the morning her son heard noise and found her trying to signal for help but she was unable to speak, follow commands, or bear weight on her right lower extremity.  On admission the patient was nonverbal and not following commands.  The patient was in atrial fibrillation but not on anticoagulation.  Patient is now a DNR and hospice was consulted.  She is not able to  obtain an MRI because she has a pacemaker.  The patient did not receive TPA because she was not a candidate due to time of onset.  CT of the head showed a left frontal stroke which was not shown on her last CT scan in June 2020.  She has severe carotid disease on CTA of the head/neck and distal plaque/calcification in the left MCA.  Cerebral perfusion showed deficits in the left MCA inferior division and the patient was not a candidate for thrombectomy.  Patient's echo was negative for PFO but did have moderate MR, EF 41%, hypokinetic LV wall segments, and severe MAC.  Patient currently is on a Cardizem gtt. Her troponins were initially slightly elevated, likely in view of the patient's stroke and possible type II NSTEMI due to stress and demand ischemia.  The patient apparently was switched to aspirin and Plavix around a year ago.  She had had side effects of Eliquis but the patient's family member was unsure why she was taken off of this.  She has not had any GI bleeding in the past.  Patient has had a marked decline since April 2020 when she had her initial stroke.  Her first stroke affected her speech but she did not have any residual weakness otherwise.  Since that time, she has gradually declined.  She is currently aphasic but her granddaughter is in the room with her currently.  The patient appears comfortable.  I spoke with the patient's son who provided most of the history.  He also stated that the patient has been intolerant to metoprolol in the past and was having hallucinations, confusion, and increased anxiety.  Since she has been switched to diltiazem, she has had a little anxiety but no delusions or hallucinations.  The patient has been seen by multiple cardiologists in the past.  Patient's son felt that every time they establish care with a cardiologist  something would happen and they would have to switch to a new one.  She was remotely seen between [facility] and [facility] over  "the past few years.  She is now going to be followed at [facility].  Apparently over the course of the night, the patient received a couple doses of IV metoprolol.  The patient's son denies her having multiple falls at home. She has been seen at [facility], but not [facility] in the past; first admission to [facility].    Cardiac risk factors: advanced age (older than 55 for men, 65 for women), dyslipidemia, hypertension and sedentary lifestyle.    Social History     Socioeconomic History   • Marital status:      Spouse name: Not on file   • Number of children: Not on file   • Years of education: Not on file   • Highest education level: Not on file   Occupational History   • Occupation: Retired     Comment: Dental assistant   Tobacco Use   • Smoking status: Never Smoker   • Smokeless tobacco: Never Used   Substance and Sexual Activity   • Alcohol use: No   • Drug use: No   • Sexual activity: Defer     Family History   Problem Relation Age of Onset   • Heart failure Mother    • Diabetes Mother    • Heart attack Mother    • Heart attack Father 45   • Heart failure Father    • Heart disease Father    • Diabetes Father    • Heart disease Brother    • Heart failure Brother    • Coronary artery disease Brother    • Heart failure Brother    • Heart disease Brother    • Cancer Brother    • Breast cancer Neg Hx        Review of Systems  10 point review of systems was completed, positives outlined in the HPI, and otherwise all other systems are negative.      Objective:       Physical Exam  /91 (BP Location: Right arm, Patient Position: Lying)   Pulse (!) 130   Temp 97.6 °F (36.4 °C) (Axillary)   Resp 16   Ht 170.2 cm (67.01\")   Wt 59 kg (130 lb)   LMP  (LMP Unknown)   SpO2 99%   BMI 20.36 kg/m²       07/12/20  0800 07/12/20  1704   Weight: 56.2 kg (124 lb) 59 kg (130 lb)     Body mass index is 20.36 kg/m².    Intake/Output Summary (Last 24 hours) at 7/14/2020 0920  Last data filed at 7/14/2020 0338  Gross per 24 " hour   Intake --   Output 850 ml   Net -850 ml       General Appearance:  Aphasic, no distress, appears stated age   Throat: Lips, mucosa, and tongue normal; teeth and gums normal   Neck: Supple, symmetrical, trachea midline, no adenopathy, thyroid: not enlarged, symmetric, no tenderness/mass/nodules, no carotid bruit or JVD   Lungs:   Clear to auscultation bilaterally, respirations unlabored   Heart:   Atrial fib with RVR, S1, S2 normal, grade 2/6 murmur, rub or gallop   Abdomen:   Soft, non-tender, no masses, no organomegaly, bowel sounds audible x4   Extremities: No edema, normal range of motion   Pulses: 2+ and symmetric   Skin: Skin color, texture, turgor normal, no rashes or lesions   Neurologic:  Aphasic       · Cardiographics:    · EKG 7/12/2020:    Atrial fibrillation with rapid ventricular response  Moderate voltage criteria for LVH, may be normal variant  Marked ST abnormality, possible lateral subendocardial injury  Abnormal ECG.    · Echocardiogram 6/1/2020:    · Left ventricular systolic function is normal.  · There is moderate calcification of the aortic valve mainly affecting the non, left and right coronary cusp(s).  · Mild aortic valve stenosis is present with aortic valve area by planimetry about 1.7 cm², with a mean gradient of 10 mmHg peak gradient of 16 mmHg    · Echocardiogram 7/12/2020:    · Left ventricular wall thickness is consistent with moderate concentric hypertrophy.  · Moderate mitral valve regurgitation is present.  · Mild tricuspid valve regurgitation is present.  · The following left ventricular wall segments are hypokinetic: basal anterolateral, mid anterolateral, apical lateral, basal inferolateral, mid inferolateral, apical inferior, mid inferior, apical septal, basal inferoseptal, mid inferoseptal, mid anteroseptal, basal anterior, basal inferior and basal inferoseptal. The following left ventricular wall segments are akinetic: mid anterior, apical anterior and  apex.  · Estimated EF = 41%.  · Left ventricular systolic function is moderately decreased.  · Left atrial cavity size is mild-to-moderately dilated.  · Normal right ventricular cavity size, wall thickness, systolic function and septal motion noted.  · Saline test results are negative.  · The aortic valve exhibits moderate sclerosis.  · Severe MAC is present.  · There is no evidence of pericardial effusion.  · No evidence of pulmonary hypertension is present.       · Imaging:    · Chest x-ray 7/12/2020:    Stable cardiac enlargement with improved pulmonary edema relative to the prior study. There is mild infiltrate or atelectasis at the left lung base today.     · CTA head/neck 7/12/2020:    1. CTA neck demonstrates near midline retropharyngeal course of the  distal common and proximal internal carotid arteries with normal  bifurcation appearance demonstrating atherosclerotic involvement  including calcific disease burden producing 65% right and 80% left  luminal narrowing as measured by NASCET criteria.  2. CTA head demonstrates within the left MCA territory apparent temporal  lobe branch of the inferior division a 3 mm focus of increased  attenuation concerning for calcific disease versus calcific plaque  certain is a potential thrombus series 5 image 354 and series 904 image  18 through 22.  3. Visualized soft tissue components of the upper lungs demonstrate a  pulmonary edema pattern with intralobular septal thickening intervening  opacifications as well as partially visualized bilateral pleural  Effusions    · CT cerebral perfusion 7/12/2020:    Reversible ischemia within these left MCA territory. No  evidence for core infarct component    · Left femur x-ray 7/13/2020:    There are severe degenerative changes seen within the hip  and knee with no evidence of acute fracture or dislocation.    Lab Review:     Results from last 7 days   Lab Units 07/14/20  0732 07/13/20  0534 07/12/20  0526   SODIUM mmol/L 137 139  136   POTASSIUM mmol/L 4.4 4.8 5.4*   CHLORIDE mmol/L 101 103 103   CO2 mmol/L 20.0* 22.0 19.5*   BUN mg/dL 41* 29* 28*   CREATININE mg/dL 1.45* 1.34* 1.34*   GLUCOSE mg/dL 114* 113* 116*   CALCIUM mg/dL 8.9 8.7 8.5     Results from last 7 days   Lab Units 07/14/20  0732 07/13/20  0534 07/12/20  0526   WBC 10*3/mm3 7.90 7.52 6.08   HEMOGLOBIN g/dL 9.5* 9.3* 8.8*   HEMATOCRIT % 31.8* 29.5* 29.4*   PLATELETS 10*3/mm3 300 295 296     Results from last 7 days   Lab Units 07/13/20  0534   CHOLESTEROL mg/dL 173   TRIGLYCERIDES mg/dL 70   HDL CHOL mg/dL 45   LDL CHOL mg/dL 114*     Results from last 7 days   Lab Units 07/13/20  0535   HEMOGLOBIN A1C % 5.10     Results from last 7 days   Lab Units 07/12/20  1434 07/12/20  0526   CK TOTAL U/L  --  43   TROPONIN T ng/mL 0.051* 0.065*     · DRIP = Diltiazem gtt @ 5mg/hr     Assessment:      Patient with asymptomatic paroxysmal atrial fib with RVR in the setting of left MCA CVA.  She has a history of atrial fibrillation and has a Saint Arnaldo dual-chamber pacemaker.  Patient has had a decline over the last month and hospice has been consulted.  Patient is aphasic.  Would rate control patient's atrial fibrillation. She was only on plavix prior to admission for anticoagulation despite CHADsVasc 8. We will assess atrial fib burden with device interrogation.  Prognosis guarded in view of multiple severe medical problems as she approaches her 89th birthday.      Plan:   1. Rate control with diltiazem  2. Defer ECV/MPS/LHC  3. Hospice care consulted  4. If she can eventually take po medications, we may start amiodarone  5. She needs full anticoagulation if neurology feels that this is an option  6. She should be an a statin in view of previous stents but she has had some intolerances  7. Concur with DNR  8. ECG in morning  9. Device interrogation    Scribed for Williams Alvarez MD by Shanta Garcia, ОЛЬГА. 7/14/2020  11:20     Discussed with patient and granddaughter in room; patient's  son updated earlier via telephone.    I have seen and examined the patient, case was discussed with the physician extender, reviewed the above note, necessary changes were made and I agree with the final note.     Williams Alvarez MD [facility]

## 2020-07-14 NOTE — THERAPY RE-EVALUATION
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 on chronic congestive heart failure (CMS/HCC)   • Acute ischemic left MCA stroke (CMS/[facility])   • Stroke (CMS/[facility])       Therapy Treatment  Rehabilitation Treatment Summary     Row Name 07/14/20 1010             Treatment Time/Intention    Discipline  speech language pathologist  -AC      Document Type  therapy note (daily note)  -AC      Therapy Frequency (SLP SLC)  5 days per week  -AC      Patient Effort  good  -AC      Recorded by [AC] Arlin Garrett MS CCC-SLP 07/14/20 1036      Row Name 07/14/20 1010             Pain Scale: FACES Pre/Post-Treatment    Pain: FACES Scale, Pretreatment  0-->no hurt  -AC      Pain: FACES Scale, Post-Treatment  0-->no hurt  -AC      Recorded by [AC] Arlin Garrett, MS CCC-SLP 07/14/20 1036      Row Name 07/14/20 1010             Outcome Summary/Treatment Plan (SLP)    Daily Summary of Progress (SLP)  progress toward functional goals as expected  -AC      Barriers to Overall Progress (SLP)  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  -AC      Plan for Continued Treatment (SLP)  Pt cont to exhibit severe aphasia affecting verbal expression and auditory comprehension. 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 U/a to assess reading comprehension/written expression this date. Provided edu to pt's granddaughter re: speech/language  defitics, tx, and ways to optimize communication. Pt would benefit from further dx tx.  -AC2      Anticipated Dischage Disposition (SLP)  anticipate therapy at next level of care;inpatient rehabilitation facility  -AC      Recorded by [AC] Arlin Garrett, MS CCC-SLP 07/14/20 1036  [AC2] Arlin Garrett MS CCC-SLP 07/14/20 1044        User Key  (r) = Recorded By, (t) = Taken By, (c) = Cosigned By    Initials Name Effective Dates Discipline    AC Arlin Garrett MS CCC-SLP 07/27/17 -  SLP          Outcome Summary  Outcome Summary/Treatment Plan (SLP)  Daily Summary of Progress (SLP): progress toward functional goals as expected (07/14/20 1010 : Arlin Garrett, MS CCC-SLP)  Barriers to Overall Progress (SLP): 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 (07/14/20 1010 : Arlin Garrett, MS CCC-SLP)  Plan for Continued Treatment (SLP): Pt cont to exhibit severe aphasia affecting verbal expression and auditory comprehension. 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 U/a to assess reading comprehension/written expression this date. Provided edu to pt's granddaughter re: speech/language defitics, tx, and ways to optimize communication. Pt would benefit from further dx tx. (07/14/20 1010 : Arlin Garrett, MS CCC-SLP)  Anticipated Dischage Disposition (SLP): anticipate therapy at next level of care, inpatient rehabilitation facility (07/14/20 1010 : Arlin Garrett, MS CCC-SLP)      SLP GOALS     Row Name 07/14/20 1010 07/13/20 0940 07/12/20 1100       Oral Nutrition/Hydration Goal 1 (SLP)    Oral Nutrition/Hydration Goal 1, SLP  LTG: Pt will improve swallowing per clinical assessment, warranting instrumental swallow study.  -AC  --  --    Time Frame (Oral Nutrition/Hydration Goal 1, SLP)  by discharge  -AC  --  --       Oral Nutrition/Hydration Goal 2 (SLP)    Oral Nutrition/Hydration Goal 2, SLP  Pt will tolerate therapeutic trials of H2O/puree w/o s/sxs aspiration w/ 60% acc w/o cues.  -AC  --  --    Time Frame (Oral  Nutrition/Hydration Goal 2, SLP)  short term goal (STG)  -AC  --  --       Labial Strengthening Goal 1 (SLP)    Activity (Labial Strengthening Goal 1, SLP)  increase labial tone  -AC  --  --    Increase Labial Tone  labial resistance exercises  -AC  --  --    [place]/Accuracy (Labial Strengthening Goal 1, SLP)  with maximum cues (25-49% accuracy)  -AC  --  --    Time Frame (Labial Strengthening Goal 1, SLP)  short term goal (STG)  -AC  --  --       Lingual Strengthening Goal 1 (SLP)    Activity (Lingual Strengthening Goal 1, SLP)  increase lingual tone/sensation/control/coordination/movement  -AC  --  --    Increase Lingual Tone/Sensation/Control/Coordination/Movement  lingual movement exercises;lingual resistance exercises  -AC  --  --    [place]/Accuracy (Lingual Strengthening Goal 1, SLP)  with maximum cues (25-49% accuracy)  -AC  --  --    Time Frame (Lingual Strengthening Goal 1, SLP)  short term goal (STG)  -AC  --  --       Pharyngeal Strengthening Exercise Goal 1 (SLP)    Activity (Pharyngeal Strengthening Goal 1, SLP)  increase timing  -AC  --  --    Increase Timing  prepping - 3 second prep or suck swallow or 3-step swallow  -AC  --  --    [place]/Accuracy (Pharyngeal Strengthening Goal 1, SLP)  with maximum cues (25-49% accuracy)  -AC  --  --    Time Frame (Pharyngeal Strengthening Goal 1, SLP)  short term goal (STG)  -AC  --  --       Comprehend Questions Goal 1 (SLP)    Improve Ability to Comprehend Questions Goal 1 (SLP)  simple yes/no questions;60%;with moderate cues (50-74%)  -AC  simple yes/no questions;60%;with moderate cues (50-74%)  -RD  simple yes/no questions;60%;with moderate cues (50-74%)  -SM    Time Frame (Comprehend Questions Goal 1, SLP)  short term goal (STG)  -AC  short term goal (STG)  -RD  short term goal (STG)  -SM    Progress (Ability to Comprehend Questions Goal 1, SLP)  20%;with moderate cues (50-74%)  -AC  30%;with moderate cues (50-74%)  -RD  --     Progress/Outcomes (Comprehend Questions Goal 1, SLP)  progress slower than expected  -AC  continuing progress toward goal  -RD  --    Comment (Comprehend Questions Goal 1, SLP)  Head nod only - unreliable responses.  -AC  via head nod only; no verbalizations  -RD  --       Follow Directions Goal 2 (SLP)    Improve Ability to Follow Directions Goal 1 (SLP)  1 step direction with objects;1 step direction without objects;60%;with moderate cues (50-74%)  -AC  1 step direction with objects;1 step direction without objects;60%;with moderate cues (50-74%)  -RD  1 step direction with objects;1 step direction without objects;60%;with moderate cues (50-74%)  -SM    Time Frame (Follow Directions Goal 1, SLP)  short term goal (STG)  -AC  short term goal (STG)  -RD  short term goal (STG)  -SM    Progress (Ability to Follow Directions Goal 1, SLP)  0%;with moderate cues (50-74%)  -AC  20%;with maximum cues (25-49%)  -RD  --    Progress/Outcomes (Follow Directions Goal 1, SLP)  progress slower than expected  -AC  continuing progress toward goal  -RD  --    Comment (Follow Directions Goal 1, SLP)  --  attempting commands  -RD  --       Word Retrieval Skills Goal 1 (SLP)    Improve Word Retrieval Skills By Goal 1 (SLP)  completing automatic speech task, counting;completing open ended structured sentence;answer WH question with one word;50%;with maximum cues (25-49%)  -AC  completing automatic speech task, counting;completing open ended structured sentence;answer WH question with one word;50%;with maximum cues (25-49%)  -RD  completing automatic speech task, counting;completing open ended structured sentence;answer WH question with one word;50%;with maximum cues (25-49%)  -SM    Time Frame (Word Retrieval Goal 1, SLP)  short term goal (STG)  -AC  short term goal (STG)  -RD  short term goal (STG)  -SM    Progress (Word Retrieval Skills Goal 1, SLP)  0%;with maximum cues (25-49%)  -AC  0%;with maximum cues (25-49%)  -RD  --     "Progress/Outcomes (Word Retrieval Goal 1, SLP)  progress slower than expected  -AC  continuing progress toward goal  -RD  --    Comment (Word Retrieval Goal 1, SLP)  Counting, months of yr, singing \"[place].\" No verbalizations during tx. Appeared to exhibit groping x2 during tx.  -AC  --  --       Motor Planning Goal 1 (SLP)    Improve Motor Planning to Reduce Apraxia by Goal 1 (SLP)  imitating mouth postures;imitating vowels;following isolated oral commands;60%;with moderate cues (50-74%)  -AC  imitating mouth postures;imitating vowels;following isolated oral commands;60%;with moderate cues (50-74%)  -RD  imitating mouth postures;imitating vowels;following isolated oral commands;60%;with moderate cues (50-74%)  -SM    Time Frame (Motor Planning Goal 1, SLP)  short term goal (STG)  -AC  short term goal (STG)  -RD  short term goal (STG)  -SM    Progress (Motor Planning Goal 1, SLP)  0%;with moderate cues (50-74%)  -AC  20%;with moderate cues (50-74%)  -RD  --    Progress/Outcomes (Motor Planning Goal 1, SLP)  progress slower than expected  -AC  continuing progress toward goal  -RD  --    Comment (Motor Planning Goal 1, SLP)  Vowels.  -AC  imitiated \"ahh\" x1 and attempted smile on command, suspect apraxia contributing to oral  -RD  --       Additional Goal 1 (SLP)    Additional Goal 1, SLP  LTG: Improve communication in order to participate in care while in hospital setting with 60% accuracy and cues  -AC  LTG: Improve communication in order to participate in care while in hospital setting with 60% accuracy and cues  -RD  LTG: Improve communication in order to participate in care while in hospital setting with 60% accuracy and cues  -SM    Time Frame (Additional Goal 1, SLP)  by discharge  -AC  by discharge  -RD  by discharge  -SM    Progress/Outcomes (Additional Goal 1, SLP)  progress slower than expected  -AC  continuing progress toward goal  -RD  --      User Key  (r) = Recorded By, (t) = Taken By, (c) = " Cosigned By    Initials Name Provider Type    Soumya Chavarria, MS CCC-SLP Speech and Language Pathologist    Arlin Pfeiffer MS CCC-SLP Speech and Language Pathologist    Armida Toney, MS CCC-SLP Speech and Language Pathologist          EDUCATION  The patient has been educated in the following areas:   Communication Impairment.    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  Plan for Continued Treatment (SLP): Pt cont to exhibit severe aphasia affecting verbal expression and auditory comprehension. 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 U/a to assess reading comprehension/written expression this date. Provided edu to pt's granddaughter re: speech/language defitics, tx, and ways to optimize communication. Pt would benefit from further dx tx.  Anticipated Dischage Disposition (SLP): anticipate therapy at next level of care, inpatient rehabilitation facility            Time Calculation:   Time Calculation- SLP     Row Name 07/14/20 1045             Time Calculation- SLP    SLP Start Time  0945  -      SLP Received On  07/14/20  -        User Key  (r) = Recorded By, (t) = Taken By, (c) = Cosigned By    Initials Name Provider Type    Arlin Pfeiffer MS CCC-SLP Speech and Language Pathologist          Therapy Charges for Today     Code Description Service Date Service Provider Modifiers Qty    99836581578 HC ST EVAL ORAL PHARYNG SWALLOW 2 7/14/2020 Arlin Garrett MS CCC-SLP GN 1    [id number] HC ST TREATMENT SPEECH 3 7/14/2020 Arlin Garrett MS CCC-SLP GN 1                 Arlin Garrett MS CCC-SLP  7/14/2020

## 2020-07-14 NOTE — CONSULTS
"                  Clinical Nutrition     Nutrition Support Assessment  Reason for Visit:   EN assessment    Patient Name: Cici Veliz  YOB: 1925  MRN: [id number]  Date of Encounter: 07/14/20 16:09  Admission date: 7/12/2020    Comments: EN consult received. Reviewed weight/intake history with patients daughter/POA and answered family questions. Discussed nutrition support regimen with RN.    RD recommends standard formula, Isosource 1.5 to begin at 15 mL and advance as tolerated by 15 mL Q6Hrs to goal rate of 45 mL/hr, TGV = 990 mL. Free water @ 30 mL.     At Target Goal Volume     % Est needs   Volume  990 ml     Energy/kcals 1485 kcals 106%   Protein 67 g pro 103%   Fiber 15 g         Fluid via  mL    Total Fluid  1430 mL    Meets 100% RDI Yes        Will continue to monitor GOC discussions, labs, and EN tolerance, adjusting nutrition support regimen as medically appropriate     Nutrition Assessment   Assessment        Admission diagnosis  R-sided weakness  ? L MCA CVA     Additional diagnosis/conditions/procedures this admission  Dysphagia  Aphasia    Applicable PMH/PSxH:   CHF  Hypertrophic cardiomyopathy  CAD  PAF  PUD  H. Pylori  Spinal stenosis  Dementia  COPD  CKD  CVA (4/2020)    PPM  L foot I&D  Parathyroidectomy  Hemorrhoid surgery  CABG    Reported/Observed/Food/Nutrition Related History:      Consult received for EN assessment. Patient resting in room. Still with aphasia. No family present. Called and spoke with daughter over the phone. Reports patient weight was steady around 124 lbs at NH facility. Report no significant weight changes or decreased in oral intake, however, patients appetite has been slowly declining over the past couple years, she \"eats like a toddler\" very small amounts, snack portions at best. Daughter reports monitoring patients iron and calcium levels due to history of parathyroidectomy. Denies any known food allergies. Reviewed process of EN initiation and " "discussed standard formula to meet estimated kcal/protein needs. Answered daughters questions. Spoke with patients RN about EN initiation and order.      Anthropometrics     Height: 170.2 cm (67.01\")  Last filed wt: Weight: 59 kg (130 lb) (07/12/20 1704)  Weight Method: Stated    BMI: BMI (Calculated): 20.4  Normal: 18.5-24.9kg/m2    Ideal Body Weight (IBW) (kg): 61.88  Admission wt: 124 lbs  Method obtained: stated weight per charting 7/12    Weight Change   UBW: 124 lb per daughter, pt \"holding steady\" at NH  Weight change:   % wt change:   Time frame of weight loss:     Weight Weight (kg) Weight (lbs) Weight Method   4/6/2020 58.968 kg 130 lb Stated   4/7/2020 58.117 kg 128 lb 2 oz Standing scale   4/8/2020 58.06 kg 128 lb Standing scale   4/9/2020 56.756 kg 125 lb 2 oz Standing scale   4/10/2020 58.695 kg 129 lb 6.4 oz Standing scale   4/11/2020 58.605 kg 129 lb 3.2 oz Standing scale   4/12/2020 57.335 kg 126 lb 6.4 oz Standing scale   6/1/2020 63.504 kg 140 lb     6/1/2020 55.963 kg 123 lb 6 oz Bed scale   6/2/2020 55.906 kg 123 lb 4 oz     6/3/2020 (No Data) (No Data)     6/4/2020 (No Data) (No Data) Standing scale   6/7/2020 56.246 kg 124 lb Stated   6/8/2020 57.244 kg 126 lb 3.2 oz Bed scale   6/9/2020 57.108 kg 125 lb 14.4 oz Bed scale   6/10/2020 56.972 kg 125 lb 9.6 oz Bed scale   6/11/2020 57.199 kg 126 lb 1.6 oz Bed scale   7/12/2020 58.968 kg 130 lb Estimated   7/12/2020 56.246 kg 124 lb Stated   7/12/2020 58.968 kg 130 lb          Labs reviewed     Yes - reviewed    Results from last 7 days   Lab Units 07/14/20  0732 07/13/20  0534 07/12/20  0526   GLUCOSE mg/dL 114* 113* 116*   BUN mg/dL 41* 29* 28*   CREATININE mg/dL 1.45* 1.34* 1.34*   SODIUM mmol/L 137 139 136   CHLORIDE mmol/L 101 103 103   POTASSIUM mmol/L 4.4 4.8 5.4*   PHOSPHORUS mg/dL  --   --  4.3   MAGNESIUM mg/dL  --   --  2.0   ALT (SGPT) U/L  --   --  9     Results from last 7 days   Lab Units 07/13/20  0534 07/12/20  0526   ALBUMIN g/dL "  --  2.93*   CRP mg/dL  --  6.48*   CHOLESTEROL mg/dL 173  --    TRIGLYCERIDES mg/dL 70  --        Results from last 7 days   Lab Units 07/13/20  0051 07/12/20  1737   GLUCOSE mg/dL 118 109     Lab Results   Lab Value Date/Time    HGBA1C 5.10 07/13/2020 0535    HGBA1C 5.50 05/19/2017 0258         Medications reviewed   Pertinent: lasix, plavix  GTT: diltiazem IV    Intake/Ouptut 24 hrs (7:00AM - 6:59 AM)     Intake & Output (last day)       07/13 0701 - 07/14 0700 07/14 0701 - 07/15 0700    Urine (mL/kg/hr) 850 (0.6)     Stool      Total Output 850     Net -850           Urine Unmeasured Occurrence 1 x           Needs Assessment (7/14)     Height used  170.2 cm (67 in)   Weight used  59 kgs (130 lbs)         Estimated need Method/Equation used Result    Energy/Calorie need   ~1400 kcals/d  22 - 25 kcal/kg actBW   MSJ: 1022 x 1.3  1298 - 1475 kcal   1329 kcal             Protein   ~65 g/day  1.0 - 1.2 g/kg actBW  59 - 71 g        Fiber  14 g / 1000 kcal  ~19.6 g/day   Fluid  ~1500 mL/d  25 - 30 mL/kg   1 mL/kcal  1298 - 1475 mL   1400 mL            Current Nutrition Prescription     PO: NPO Diet  EN: Diet, Tube Feeding Tube Feeding Formula: Per RD    EN to provide at goal volume  Intake/Evaluation of Received Nutrient/Fluid Intake last day 2174-4827:     At Target Goal Volume    % Est needs Received per I/O's    % Est needs   Volume  990 ml        Modulars         Energy/kcals 1485 kcals 106% kcals %   Protein  67 g pro 103% g pro %                 Fiber 15 g      Fluid   W/Free water 770 ml  1430 ml                    Nutrition Diagnosis     7/13 updated 7/14  Problem Swallowing difficulty   Etiology Dysphagia   Signs/Symptoms SLP eval - rec safest NPO/alternate means of nutrition   Status: EN to be started 7/14 7/13 updated 7/14  Problem Inadequate oral intake   Etiology Dysphagia   Signs/Symptoms NPO status   Status: EN to be started 7/14    Nutrition Intervention   1.  Follow treatment progress, Care plan  reviewed  2. Consult received for EN assessment. RD recommends standard formula, Isosource 1.5 to begin at 15 mL and advance as tolerated by 15 mL Q6Hrs to goal rate of 45 mL/hr, TGV = 990 mL. Free water @ 30 mL.    Route: NGT     At Target Goal Volume     % Est needs   Volume  990 ml     Energy/kcals 1485 kcals 106%   Protein 67 g pro 103%   Fiber 15 g         Fluid via  mL    Total Fluid  1430 mL    Meets 100% RDI Yes          3. Weekly Nutrition panel, CRP and Prealbumin ordered to start Monday 7/20. Nutrition panel, CRP and Prealbumin ordered to be completed 7/15.   4. RD to continue to monitor GOC discussions, labs, and EN tolerance, adjusting nutrition support regimen as medically appropriate       Goal:   General: Nutrition support treatment  EN/PN: Initiate EN, Deliver estimated needs      Monitoring/Evaluation:   Per protocol, I&O, Pertinent labs, EN delivery/tolerance, Weight, Symptoms, POC/GOC, Swallow function    Will Continue to follow per protocol    Love Pearce RDN, LD  Time Spent: 60 minutes

## 2020-07-15 NOTE — PLAN OF CARE
Problem: Patient Care Overview  Goal: Interprofessional Rounds/Family Conf  Outcome: Ongoing (interventions implemented as appropriate)  Flowsheets (Taken 7/15/2020 1300)  Participants: ;advanced practice nurse;nursing;physician  Note:   Palliative Team Meeting Attendance 1300:  Dr. Ioana Luz, Alma Paz, APRN, Alem Gonzales,RN, CHPN, Yvonne Murray, CALLI,, [facility] Negar Bills RN, CHPN. Patient was awake, would answer some questions. Daughter Sera at bedside and she is concern that patient is too sedated. She is ok with scheduling some low dose Tylenol for pain, she requested to decrease amount of Ativan. Patient is tolerating tube feeding but daughter wants Alma to come down and talk to patient, her and her brother Carson about Peg placed or go comfort care. States her mother has been ready to die for some time. Alma was informed at team and will adjust Ativan and add low dose Tylenol. She will go down after team to meet with patient and her children. Palliative will continue to follow for support, goals, symptom management and discharge needs.

## 2020-07-15 NOTE — PROGRESS NOTES
"    [facility] Medicine Services  PROGRESS NOTE    Patient Name: Cici Veliz  : 1925  MRN: [id number]    Date of Admission: 2020  Primary Care Physician: Edgar Urias MD    Subjective   Subjective     CC:  F/U CVA    HPI:  Pt seen and examined. Nursing notes reviewed. No acute events overnight. Daughter at bedside this AM. States she slept intermittently throughout the night. States she gets agitated when she urinates because she thinks she is peeing on herself, doesn't understand the [employer] is in place. Pt grimacing on exam this AM as if she is in pain, patient has severe arthritis. Per daughter, she shakes her head appropriately to \"yes\" and \"no\" questions.     Review of Systems  Unable to obtain    Objective   Objective     Vital Signs:   Temp:  [97 °F (36.1 °C)-97.9 °F (36.6 °C)] 97.5 °F (36.4 °C)  Heart Rate:  [] 115  Resp:  [18-20] 18  BP: ()/(48-95) 127/71  Total (NIH Stroke Scale): 20     Physical Exam:  Constitutional: chronically ill appearing female, awake, grimacing  HENT: NCAT, mucous membranes moist, +Keofeed in place  Respiratory: Clear to auscultation bilaterally, respiratory effort normal   Cardiovascular: IRR, no murmurs, rubs, or gallops, palpable pedal pulses bilaterally  Gastrointestinal: Positive bowel sounds, soft, nontender, nondistended  Musculoskeletal: No bilateral ankle edema  Psychiatric: Flat affect  Neurologic: Aphasic, unable to cooperate with neuro exam  Skin: No rashes    Results Reviewed:  Results from last 7 days   Lab Units 07/15/20  0559 20  0732 20  0534 20  0526   WBC 10*3/mm3 8.85 7.90 7.52 6.08   HEMOGLOBIN g/dL 8.9* 9.5* 9.3* 8.8*   HEMATOCRIT % 30.0* 31.8* 29.5* 29.4*   PLATELETS 10*3/mm3 285 300 295 296   INR   --   --   --  1.01     Results from last 7 days   Lab Units 07/15/20  0559 20  0732 20  0534 20  1434 20  0526   SODIUM mmol/L 139 137 139  --  136   POTASSIUM mmol/L 4.1 " 4.4 4.8  --  5.4*   CHLORIDE mmol/L 104 101 103  --  103   CO2 mmol/L 21.0* 20.0* 22.0  --  19.5*   BUN mg/dL 52* 41* 29*  --  28*   CREATININE mg/dL 1.57* 1.45* 1.34*  --  1.34*   GLUCOSE mg/dL 112* 114* 113*  --  116*   CALCIUM mg/dL 8.3 8.9 8.7  --  8.5   ALT (SGPT) U/L 9  --   --   --  9   AST (SGOT) U/L 14  --   --   --  20   TROPONIN T ng/mL  --   --   --  0.051* 0.065*   PROBNP pg/mL  --  21,133.0*  --   --  8,676.0*     Estimated Creatinine Clearance: 20.8 mL/min (A) (by C-G formula based on SCr of 1.57 mg/dL (H)).    Microbiology Results Abnormal     None          Imaging Results (Last 24 Hours)     Procedure Component Value Units Date/Time    XR Femur 1 View Left [[id number]] Collected:  07/14/20 0811     Updated:  07/15/20 0853    Narrative:          EXAMINATION: XR FEMUR 1 VW, LEFT-07/13/2020:      INDICATION: Pain; R13.10-Dysphagia, unspecified; R47.01-Aphasia;  Z74.09-Other reduced mobility; Z78.9-Other specified health status.      COMPARISON: NONE.     FINDINGS: Two views of the left femur reveal extensive degenerative  changes seen both within the hip and knee. Osteophyte formation seen of  the acetabulum. Vascular calcification seen within the vessels. Severe  degenerative changes seen within the left knee. No fracture or  dislocation. There is spurring identified of the patella. No acute bony  abnormality.           Impression:       There are severe degenerative changes seen within the hip  and knee with no evidence of acute fracture or dislocation.     D:  07/14/2020  E:  07/14/2020     This report was finalized on 7/15/2020 8:50 AM by Dr. Saskia Munoz MD.       XR Pelvis 1 or 2 View [[id number]] Collected:  07/14/20 0859     Updated:  07/15/20 0853    Narrative:       EXAMINATION: XR PELVIS 1 OR 2 VW-      INDICATION: Pain; R13.10-Dysphagia, unspecified; R47.01-Aphasia;  Z74.09-Other reduced mobility; Z78.9-Other specified health status.      COMPARISON: None.     FINDINGS: Imaging of the  pelvis reveals degenerative changes seen within  the sacroiliac joints bilaterally with severe degenerative changes seen  in both hips. There is spurring of the femoral heads. Spurring of the  acetabulum. Vascular calcification is seen within the thoracic aorta.             Impression:       Extensive degenerative changes seen within the sacroiliac  joints and hips with no evidence of fracture or dislocation.     D:  07/14/2020  E:  07/14/2020     This report was finalized on 7/15/2020 8:50 AM by Dr. Saskia Munoz MD.       XR Abdomen KUB [[id number]] Collected:  07/14/20 2325     Updated:  07/14/20 2327    Narrative:       CR Abdomen 1 Vw    INDICATION:   Feeding tube placement.    COMPARISON:   Earlier same day    FINDINGS:  AP view of the abdomen. Tip of the feeding tube has been advanced into the distal second portion of the duodenum. Bowel gas pattern is within normal limits.      Impression:       Feeding tube tip in the distal second portion of the duodenum.    Signer Name: Leroy Arzola MD   Signed: 7/14/2020 11:25 PM   Workstation Name: [facility]    [facility]    XR Abdomen KUB [[id number]] Collected:  07/14/20 1527     Updated:  07/14/20 2140    Narrative:       EXAMINATION: XR ABDOMEN/KUB-07/14/2020:     INDICATION: Tube feed placement; I63.512-Cerebral infarction due to  unspecified occlusion or stenosis of left middle cerebral artery;  R13.10-Dysphagia, unspecified; R47.01-Aphasia; Z74.09-Other reduced  mobility; Z78.9-Other specified health status.     COMPARISON: NONE.     FINDINGS: Feeding tube is seen in the mid stomach. Bowel gas pattern is  nonobstructive. Incidental note is made of extensive and diffuse  aortoiliac vascular calcification.       Impression:       Feeding tube tip in the mid stomach.     D:  07/14/2020  E:  07/14/2020            This report was finalized on 7/14/2020 9:37 PM by Dr. Kirby Izquierdo MD.             Results for orders placed during the  hospital encounter of 07/12/20   Adult Transthoracic Echo Complete W/ Cont if Necessary Per Protocol (With Agitated Saline)    Narrative · Left ventricular wall thickness is consistent with moderate concentric   hypertrophy.  · Moderate mitral valve regurgitation is present.  · Mild tricuspid valve regurgitation is present.  · The following left ventricular wall segments are hypokinetic: basal   anterolateral, mid anterolateral, apical lateral, basal inferolateral, mid   inferolateral, apical inferior, mid inferior, apical septal, basal   inferoseptal, mid inferoseptal, mid anteroseptal, basal anterior, basal   inferior and basal inferoseptal. The following left ventricular wall   segments are akinetic: mid anterior, apical anterior and apex.  · Estimated EF = 41%.  · Left ventricular systolic function is moderately decreased.  · Left atrial cavity size is mild-to-moderately dilated.  · Normal right ventricular cavity size, wall thickness, systolic function   and septal motion noted.  · Saline test results are negative.  · The aortic valve exhibits moderate sclerosis.  · Severe MAC is present.  · There is no evidence of pericardial effusion.  · No evidence of pulmonary hypertension is present.          I have reviewed the medications:  Scheduled Meds:    aspirin 81 mg Nasogastric Daily   Or      aspirin 300 mg Rectal Daily   clopidogrel 75 mg Nasogastric Daily   ipratropium 0.5 mg Nebulization 4x Daily - RT   lidocaine 3 patch Transdermal Q24H   nebivolol 2.5 mg Oral Q24H   rosuvastatin 10 mg Nasogastric Nightly   sodium chloride 10 mL Intravenous Q12H   sodium chloride 10 mL Intravenous Q12H   torsemide 10 mg Oral Daily     Continuous Infusions:    dilTIAZem 5-15 mg/hr Last Rate: Stopped (07/15/20 0348)     PRN Meds:.•  acetaminophen  •  acetaminophen  •  bisacodyl  •  LORazepam  •  magnesium sulfate **OR** magnesium sulfate **OR** magnesium sulfate  •  metoprolol tartrate  •  Morphine  •  ondansetron **OR**  ondansetron  •  potassium chloride  •  sodium chloride  •  sodium chloride    Assessment/Plan   Assessment & Plan     Active Hospital Problems    Diagnosis  POA   • **Acute ischemic left MCA stroke (CMS/[facility]) [I63.512]  Yes   • Stroke (CMS/[facility]) [I63.9]  Yes   • Acute on chronic congestive heart failure (CMS/[facility]) [I50.9]  Yes   • NSTEMI (non-ST elevated myocardial infarction) (CMS/[facility]) [I21.4]  Yes   • Shortness of breath [R06.02]  Yes   • PAF (paroxysmal atrial fibrillation) (CMS/[facility]) [I48.0]  Yes   • Chronic back pain [M54.9, G89.29]  Yes   • CKD (chronic kidney disease) stage 3, GFR 30-59 ml/min (CMS/HCC) [N18.3]  Yes   • Diastolic heart failure secondary to hypertrophic cardiomyopathy (CMS/[facility]) [I50.30, I42.2]  Yes   • Spinal stenosis, degenerative disc disease, back pain* [M48.00]  Yes   • COPD (chronic obstructive pulmonary disease) (CMS/[facility]) [J44.9]  Yes   • Essential hypertension [I10]  Yes   • CAD, status post RCA and circumflex stents, in-stent restenosis of RCA requiring stenting 2017 Dr. Cash  [I25.10]  Yes   • Pacemaker* [Z95.0]  Yes      Resolved Hospital Problems   No resolved problems to display.        Brief Hospital Course to date:  Cici Veliz is a 89 y.o. female with numerous comorbidities (CKD3, CAD, recent stroke, COPD, D-CHF, dementia, etc) who presents in transfer from OSH for acute stroke like symptoms     Acute LT MCA distribution stroke  Severe cerebral vascular disease  Recent stroke ~1 month PTA  -Neurology following, per nursing staff, had a very honest conversation with patient's family today about prognosis.   -Echo: moderate concentric hypertrophy, moderate MVR, mild TVR, multiple hypokinetic segments, EF 41%, saline tests negative, severe MAC  -Unable to obtain MRI due to pacemaker   -, A1c 5.1  -Plan for ASA/Plavix for 90 days   -Statin   -PT/OT following, recommends SNF. CM following  -SLP following, patient not safe for PO diet. Not able to perform MBS at this time  because patient not able to cooperate with exam. Per family at bedside, she wouldn't want a feeding tube. Keofeed currently in place.  -Palliative care following, Hospice also following     Acute metabolic encephalopathy  Underlying dementia  - related to stroke, dementia, acute illness     Acute hypoxic respiratory insufficiency  Acute on chronic diastolic CHF  Underlying COPD with wheezes  - recently DC'ed from OSH to rehab and her lasix fell off the MAR; has been uptitrating her lasix outpatient (taking 30 and 40 mg alternating days)  - CTA for stroke showing edema pattern with effusions, BNP elevated on admission  - O2 support as needed  - reported intolerance of albuterol, ipratropium ordered  - Lasix changed to Torsemide per Cards     NSTEMI, suspect type 2  Underlying CAD  - ASA  -Troponin trended down     Atrial fibrillation with RVR  [place] dual-chamber pacemaker  -CHADsVasc 8  -IV Diltiazem off  -Pt started on Bystolic 2.5mg daily   -Cardiology following, defer ECV, MPS, LHC  -She needs full AC if Neurology feels this is an option  -Device interrogation to assess A fib burden      CKD stage 3  -Pt with worsening BUN/CR  -baseline Cr anywhere from 1.1-1.7; egfr 40-50's. Today, BUN 52, Cr 1.57  -monitor with diuresis     Mild hyperkalemia  - RESOLVED     History of hyperparathyroidism s/p parathyroidectomy  - per DTR she gets her calcium from her diet    L hip pain  -XR of hip/pelvis and femur shows severe degenerative change but no fracture   -Lidoderm patch   -PRN Morphine    Chronic Benzodiazepine use  -PRN Ativan      DVT prophylaxis:  mechanical       Daily Care Communication  Due to current limited visitation policies, an attempt will be made daily to update patient's identified best point-of-contact(s)   Contact: Daughter present at bedside   Relation:    Type of communication (phone or televideo):    Time of communication:    Notes (if applicable):     Disposition: I expect the patient to be  discharged pending C discussion today with Neurology/Palliative and Hospice.     CODE STATUS:   Code Status and Medical Interventions:   Ordered at: 07/12/20 0920     Level Of Support Discussed With:    Next of Kin (If No Surrogate)     Code Status:    No CPR     Medical Interventions (Level of Support Prior to Arrest):    Full         Electronically signed by Scarlett Vieyra DO, 07/15/20, 11:37.

## 2020-07-15 NOTE — PLAN OF CARE
Problem: Patient Care Overview  Goal: Plan of Care Review  Outcome: Ongoing (interventions implemented as appropriate)  Flowsheets (Taken 7/15/2020 3615)  Outcome Summary: Pt very lethargic, declining grooming. Pt lift to chair and not safe to attempt txfr. Continue POC but consider decreasing frequency if pt has difficulty participating.

## 2020-07-15 NOTE — PLAN OF CARE
Problem: Patient Care Overview  Goal: Plan of Care Review  Outcome: Ongoing (interventions implemented as appropriate)  Flowsheets (Taken 7/15/2020 1006)  Plan of Care Reviewed With: patient; daughter  Note:   SLP treatment completed. Will continue to address dysphagia, aphasia, and apraxia of speech in dx tx. Please see note for further details and recommendations.

## 2020-07-15 NOTE — THERAPY TREATMENT NOTE
Acute Care - Speech Language Pathology Treatment Note  [facility]     Patient Name: Cici Veliz  : 1925  MRN: [id number]  Today's Date: 7/15/2020         Admit Date: 2020    Visit Dx:      ICD-10-CM ICD-9-CM   1. Acute ischemic left MCA stroke (CMS/HCC) I63.512 434.91   2. Dysphagia, unspecified type R13.10 787.20   3. Aphasia R47.01 784.3   4. Impaired mobility and ADLs Z74.09 V49.89    Z78.9      Patient Active Problem List   Diagnosis   • Spinal stenosis, degenerative disc disease, back pain*   • Deformity of the right Sternoclavicular joint*   • COPD (chronic obstructive pulmonary disease) (CMS/HCC)   • Essential hypertension   • Mixed hyperlipidemia   • CAD, status post RCA and circumflex stents, in-stent restenosis of RCA requiring stenting  Dr. Cash    • Pacemaker*   • hx of Myocardial infarction*   • Valvular heart disease mild mitral regurgitation not significant*   • Atopic rhinitis   • Hyperparathyroidism status post parathyroidectomy   • Chronic back pain   • CKD (chronic kidney disease) stage 3, GFR 30-59 ml/min (CMS/HCC)   • Diastolic heart failure secondary to hypertrophic cardiomyopathy (CMS/HCC)   • Hyperglycemia   • PAF (paroxysmal atrial fibrillation) (CMS/HCC)   • Bradycardia   • Neuropathy   • Cerebrovascular disease   • Mild obesity   • Hypotension due to drugs   • Shortness of breath   • Chronic fatigue   • Subclavian arterial stenosis (CMS/HCC)   • Sore throat   • Perirectal abscess   • Heart failure with preserved left ventricular function (HFpEF) (CMS/HCC)   • S/P angioplasty with stent   • NSTEMI (non-ST elevated myocardial infarction) (CMS/HCC)   • Seasonal allergies   • Chest pain   • Acute on chronic congestive heart failure (CMS/HCC)   • Acute ischemic left MCA stroke (CMS/HCC)   • Stroke (CMS/HCC)        Therapy Treatment  Rehabilitation Treatment Summary     Row Name 07/15/20 0910             Treatment Time/Intention    Discipline  speech language pathologist   -AC      Document Type  therapy note (daily note)  -      Subjective Information  -- difficult expressing complaints  -AC      Patient/Family Observations  Pt drowsy today -- difficulty holding eyes open w/o constant stimuli. Dtr @ bedside.  -AC      Care Plan Review  evaluation/treatment results reviewed;care plan/treatment goals reviewed;risks/benefits reviewed;current/potential barriers reviewed;patient/other agree to care plan  -AC      Care Plan Review, Other Participant(s)  daughter  -AC      Therapy Frequency (Swallow)  5 days per week  -AC      Therapy Frequency (SLP SLC)  5 days per week  -AC      Patient Effort  good  -AC      Recorded by [AC] Arlin Garrett, MS CCC-SLP 07/15/20 0959      Row Name 07/15/20 0910             Pain Scale: FACES Pre/Post-Treatment    Pain: FACES Scale, Pretreatment  4-->hurts little more  -AC      Pain: FACES Scale, Post-Treatment  0-->no hurt  -AC      Pre/Post Treatment Pain Comment  Pt grimaced/moaned w/ repositioning, particularly when moving legs.   -AC      Recorded by [AC] Arlin Garrett, MS CCC-SLP 07/15/20 0959      Row Name 07/15/20 0910             Outcome Summary/Treatment Plan (SLP)    Daily Summary of Progress (SLP)  progress toward functional goals is gradual  -AC      Barriers to Overall Progress (SLP)  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 Minimal oral manipulation w/ PO trials this date and no swallow initiation. Safest rec cont to be NPO w/ alternate means of nutrition. Pt not appropriate for MBS @ this point 2' severity of deficits/difficulty initiating swallow. Provided edu to pt's dtr re: aphasia, apraxia of speech, dysphagia, oral care. Pt would benefit from further dysphagia and speech/language dx tx.  -AC      Anticipated Dischage Disposition (SLP)  anticipate  therapy at next level of care  -AC      Recorded by [AC] Arlin Garrett, MS CCC-SLP 07/15/20 0959        User Key  (r) = Recorded By, (t) = Taken By, (c) = Cosigned By    Initials Name Effective Dates Discipline    AC Arlin Garrett, MS CCC-SLP 07/27/17 -  SLP          EDUCATION  The patient has been educated in the following areas:   Communication Impairment Dysphagia (Swallowing Impairment) Oral Care/Hydration NPO rationale.    SLP Recommendation and Plan  Daily Summary of Progress (SLP): progress toward functional goals is gradual  Barriers to Overall Progress (SLP): 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 Minimal oral manipulation w/ PO trials this date and no swallow initiation. Safest rec cont to be NPO w/ alternate means of nutrition. Pt not appropriate for MBS @ this point 2' severity of deficits/difficulty initiating swallow. Provided edu to pt's dtr re: aphasia, apraxia of speech, dysphagia, oral care. Pt would benefit from further dysphagia and speech/language dx tx.  Anticipated Dischage Disposition (SLP): anticipate therapy at next level of care             SLP GOALS     Row Name 07/15/20 0910 07/14/20 1010 07/13/20 0940       Oral Nutrition/Hydration Goal 1 (SLP)    Oral Nutrition/Hydration Goal 1, SLP  LTG: Pt will improve swallowing per clinical assessment, warranting instrumental swallow study.  -AC  LTG: Pt will improve swallowing per clinical assessment, warranting instrumental swallow study.  -AC  --    Time Frame (Oral Nutrition/Hydration Goal 1, SLP)  by discharge  -AC  by discharge  -AC  --    Progress/Outcomes (Oral Nutrition/Hydration Goal 1, SLP)  progress slower than expected  -AC  --  --       Oral Nutrition/Hydration Goal 2 (SLP)    Oral Nutrition/Hydration Goal 2, SLP  Pt will tolerate therapeutic trials of  H2O/puree w/o s/sxs aspiration w/ 60% acc w/o cues.  -AC  Pt will tolerate therapeutic trials of H2O/puree w/o s/sxs aspiration w/ 60% acc w/o cues.  -AC  --    Time Frame (Oral Nutrition/Hydration Goal 2, SLP)  short term goal (STG)  -AC  short term goal (STG)  -AC  --    Barriers (Oral Nutrition/Hydration Goal 2, SLP)  Performed oral care and trialed 1/2 tsp orange sherbet. Minimal oral manipulation, anterior loss noted. No swallow initiation detected and majority of bolus removed from oral cavity w/ swab. DNT further PO 2' high risk for aspiration.  -AC  --  --    Progress/Outcomes (Oral Nutrition/Hydration Goal 2, SLP)  progress slower than expected  -AC  --  --       Labial Strengthening Goal 1 (SLP)    Activity (Labial Strengthening Goal 1, SLP)  --  increase labial tone  -AC  --    Increase Labial Tone  labial resistance exercises  -AC  labial resistance exercises  -AC  --    Walker/Accuracy (Labial Strengthening Goal 1, SLP)  with maximum cues (25-49% accuracy)  -AC  with maximum cues (25-49% accuracy)  -AC  --    Time Frame (Labial Strengthening Goal 1, SLP)  short term goal (STG)  -AC  short term goal (STG)  -AC  --    Barriers (Labial Strengthening Goal 1, SLP)  0% w/ max cues  -AC  --  --    Progress/Outcomes (Labial Strengthening Goal 1, SLP)  progress slower than expected  -AC  --  --       Lingual Strengthening Goal 1 (SLP)    Activity (Lingual Strengthening Goal 1, SLP)  --  increase lingual tone/sensation/control/coordination/movement  -AC  --    Increase Lingual Tone/Sensation/Control/Coordination/Movement  lingual movement exercises;lingual resistance exercises  -AC  lingual movement exercises;lingual resistance exercises  -AC  --    Walker/Accuracy (Lingual Strengthening Goal 1, SLP)  with maximum cues (25-49% accuracy)  -AC  with maximum cues (25-49% accuracy)  -AC  --    Time Frame (Lingual Strengthening Goal 1, SLP)  short term goal (STG)  -AC  short term goal (STG)  -AC  --     Barriers (Lingual Strengthening Goal 1, SLP)  0% w/ max cues  -AC  --  --    Progress/Outcomes (Lingual Strengthening Goal 1, SLP)  progress slower than expected  -AC  --  --       Pharyngeal Strengthening Exercise Goal 1 (SLP)    Activity (Pharyngeal Strengthening Goal 1, SLP)  --  increase timing  -AC  --    Increase Timing  prepping - 3 second prep or suck swallow or 3-step swallow  -AC  prepping - 3 second prep or suck swallow or 3-step swallow  -AC  --    [place]/Accuracy (Pharyngeal Strengthening Goal 1, SLP)  with maximum cues (25-49% accuracy)  -AC  with maximum cues (25-49% accuracy)  -AC  --    Time Frame (Pharyngeal Strengthening Goal 1, SLP)  short term goal (STG)  -AC  short term goal (STG)  -AC  --    Barriers (Pharyngeal Strengthening Goal 1, SLP)  0% acc w/ max cues. Pt u/a to complete suck swallow w/ cold stimuli or swallow on command @ all.  -AC  --  --    Progress/Outcomes (Pharyngeal Strengthening Goal 1, SLP)  progress slower than expected  -AC  --  --       Comprehend Questions Goal 1 (SLP)    Improve Ability to Comprehend Questions Goal 1 (SLP)  simple yes/no questions;60%;with moderate cues (50-74%)  -AC  simple yes/no questions;60%;with moderate cues (50-74%)  -AC  simple yes/no questions;60%;with moderate cues (50-74%)  -RD    Time Frame (Comprehend Questions Goal 1, SLP)  short term goal (STG)  -AC  short term goal (STG)  -AC  short term goal (STG)  -RD    Progress (Ability to Comprehend Questions Goal 1, SLP)  20%;with moderate cues (50-74%)  -AC  20%;with moderate cues (50-74%)  -AC  30%;with moderate cues (50-74%)  -RD    Progress/Outcomes (Comprehend Questions Goal 1, SLP)  progress slower than expected  -AC  progress slower than expected  -AC  continuing progress toward goal  -RD    Comment (Comprehend Questions Goal 1, SLP)  Head nod only - unreliable responses.  -AC  Head nod only - unreliable responses.  -AC  via head nod only; no verbalizations  -RD       Follow Directions  Goal 2 (SLP)    Improve Ability to Follow Directions Goal 1 (SLP)  1 step direction with objects;1 step direction without objects;60%;with moderate cues (50-74%)  -AC  1 step direction with objects;1 step direction without objects;60%;with moderate cues (50-74%)  -AC  1 step direction with objects;1 step direction without objects;60%;with moderate cues (50-74%)  -RD    Time Frame (Follow Directions Goal 1, SLP)  short term goal (STG)  -AC  short term goal (STG)  -AC  short term goal (STG)  -RD    Progress (Ability to Follow Directions Goal 1, SLP)  10%;with moderate cues (50-74%)  -AC  0%;with moderate cues (50-74%)  -AC  20%;with maximum cues (25-49%)  -RD    Progress/Outcomes (Follow Directions Goal 1, SLP)  continuing progress toward goal  -AC  progress slower than expected  -AC  continuing progress toward goal  -RD    Comment (Follow Directions Goal 1, SLP)  Followed command to raise hand only w/ visual cue.  -AC  --  attempting commands  -RD       Word Retrieval Skills Goal 1 (SLP)    Improve Word Retrieval Skills By Goal 1 (SLP)  completing automatic speech task, counting;completing open ended structured sentence;answer WH question with one word;50%;with maximum cues (25-49%)  -AC  completing automatic speech task, counting;completing open ended structured sentence;answer WH question with one word;50%;with maximum cues (25-49%)  -AC  completing automatic speech task, counting;completing open ended structured sentence;answer WH question with one word;50%;with maximum cues (25-49%)  -RD    Time Frame (Word Retrieval Goal 1, SLP)  short term goal (STG)  -AC  short term goal (STG)  -AC  short term goal (STG)  -RD    Progress (Word Retrieval Skills Goal 1, SLP)  0%;with maximum cues (25-49%)  -AC  0%;with maximum cues (25-49%)  -AC  0%;with maximum cues (25-49%)  -RD    Progress/Outcomes (Word Retrieval Goal 1, SLP)  progress slower than expected  -AC  progress slower than expected  -AC  continuing progress toward  "goal  -RD    Comment (Word Retrieval Goal 1, SLP)  Counting, \"Colleen Had a Little Kee,\" open-ended structured sentences.  -AC  Counting, months of yr, singing \"[place]zing [place].\" No verbalizations during tx. Appeared to exhibit groping x2 during tx.  -AC  --       Motor Planning Goal 1 (SLP)    Improve Motor Planning to Reduce Apraxia by Goal 1 (SLP)  imitating mouth postures;imitating vowels;following isolated oral commands;60%;with moderate cues (50-74%)  -AC  imitating mouth postures;imitating vowels;following isolated oral commands;60%;with moderate cues (50-74%)  -AC  imitating mouth postures;imitating vowels;following isolated oral commands;60%;with moderate cues (50-74%)  -RD    Time Frame (Motor Planning Goal 1, SLP)  short term goal (STG)  -AC  short term goal (STG)  -AC  short term goal (STG)  -RD    Progress (Motor Planning Goal 1, SLP)  0%;with moderate cues (50-74%)  -AC  0%;with moderate cues (50-74%)  -AC  20%;with moderate cues (50-74%)  -RD    Progress/Outcomes (Motor Planning Goal 1, SLP)  progress slower than expected  -AC  progress slower than expected  -AC  continuing progress toward goal  -RD    Comment (Motor Planning Goal 1, SLP)  Vowels. Dtr reported pt appeared to approximate \"I\" last night.  -AC  Vowels.  -AC  imitiated \"ahh\" x1 and attempted smile on command, suspect apraxia contributing to oral  -RD       Additional Goal 1 (SLP)    Additional Goal 1, SLP  LTG: Improve communication in order to participate in care while in hospital setting with 60% accuracy and cues  -AC  LTG: Improve communication in order to participate in care while in hospital setting with 60% accuracy and cues  -AC  LTG: Improve communication in order to participate in care while in hospital setting with 60% accuracy and cues  -RD    Time Frame (Additional Goal 1, SLP)  by discharge  -AC  by discharge  -AC  by discharge  -RD    Progress/Outcomes (Additional Goal 1, SLP)  progress slower than expected  -AC  progress " slower than expected  -  continuing progress toward goal  -RD    Row Name 07/12/20 1100             Comprehend Questions Goal 1 (SLP)    Improve Ability to Comprehend Questions Goal 1 (SLP)  simple yes/no questions;60%;with moderate cues (50-74%)  -      Time Frame (Comprehend Questions Goal 1, SLP)  short term goal (STG)  -SM         Follow Directions Goal 2 (SLP)    Improve Ability to Follow Directions Goal 1 (SLP)  1 step direction with objects;1 step direction without objects;60%;with moderate cues (50-74%)  -SM      Time Frame (Follow Directions Goal 1, SLP)  short term goal (STG)  -SM         Word Retrieval Skills Goal 1 (SLP)    Improve Word Retrieval Skills By Goal 1 (SLP)  completing automatic speech task, counting;completing open ended structured sentence;answer WH question with one word;50%;with maximum cues (25-49%)  -SM      Time Frame (Word Retrieval Goal 1, SLP)  short term goal (STG)  -SM         Motor Planning Goal 1 (SLP)    Improve Motor Planning to Reduce Apraxia by Goal 1 (SLP)  imitating mouth postures;imitating vowels;following isolated oral commands;60%;with moderate cues (50-74%)  -SM      Time Frame (Motor Planning Goal 1, SLP)  short term goal (STG)  -SM         Additional Goal 1 (SLP)    Additional Goal 1, SLP  LTG: Improve communication in order to participate in care while in hospital setting with 60% accuracy and cues  -      Time Frame (Additional Goal 1, SLP)  by discharge  -        User Key  (r) = Recorded By, (t) = Taken By, (c) = Cosigned By    Initials Name Provider Type     Soumya Hurley, MS CCC-SLP Speech and Language Pathologist    AC Arlin Garrett, MS CCC-SLP Speech and Language Pathologist    Armida Toney, MS CCC-SLP Speech and Language Pathologist              Time Calculation:     Time Calculation- SLP     Row Name 07/15/20 1007             Time Calculation- SLP    SLP Start Time  0910  -      SLP Received On  07/15/20  -        User Key  (r)  = Recorded By, (t) = Taken By, (c) = Cosigned By    Initials Name Provider Type     Arlin Garrett MS CCC-SLP Speech and Language Pathologist          Therapy Charges for Today     Code Description Service Date Service Provider Modifiers Qty    96839589042 HC ST EVAL ORAL PHARYNG SWALLOW 2 7/14/2020 Arlin Garrett, MS CCC-SLP GN 1    98081808716 HC ST TREATMENT SPEECH 3 7/14/2020 Arlin Garrett, MS CCC-SLP GN 1    29259035303 HC ST TREATMENT SWALLOW 2 7/15/2020 Arlin Garrett, MS CCC-SLP GN 1    06795174609 HC ST TREATMENT SPEECH 2 7/15/2020 Arlin Garrett, MS CCC-SLP GN 1                     Arlin Garrett MS CCC-SLP  7/15/2020

## 2020-07-15 NOTE — DISCHARGE PLACEMENT REQUEST
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 allowing for permissive HTN will hold off on diltiazem  - reported intolerance to lopressor (hallucinations and nightmares per dtr)  - will consider IV lopressor if HR continues to elevate  - no AC for stroke    CKD stage 3  - baseline Cr anywhere from 1.1-1.7; egfr 40-50's  - monitor with diuresis    Mild hyperkalemia  - 5.4, getting IV lasix - no peaked Twaves on EKG    History of hyperparathyroidism s/p parathyroidectomy  - per DTR she gets her calcium from her diet    DVT prophylaxis:  mechanical    CODE STATUS:  Patient is DNR/DNI, would do BIPAP if safe to do, daughter did briefly mention prior talks with hospice and if the patient declines in status they may opt for a more palliative course  Code Status and Medical Interventions:   Ordered at: 07/12/20 0920     Level Of Support Discussed With:    Next of Kin (If No Surrogate)     Code Status:    No CPR     Medical Interventions (Level of Support Prior to Arrest):    Full     Admission Status:  I believe this patient meets INPATIENT status due to acute stroke, AECHF, hypoxia, etc.  I feel patient’s risk for adverse outcomes and need for care warrant INPATIENT evaluation and I predict the  patient’s care encounter to likely last beyond 2 midnights.      Electronically signed by Toni Delaney DO, 07/12/20, 11:21 AM.      Electronically signed by Toni Delaney DO at 07/12/20 1546          Physician Progress Notes (most recent note)      Williams Alvarez MD at 07/15/20 0745          Cici Veliz  [id number]  9/7/1925   LOS: 3 days   Patient Care Team:  Edgar Urias MD as PCP - General (Family Medicine)  Santhosh Duron MD as PCP - Claims Attributed    Chief Complaint:  IHD / HFrEF / SSS / AFIB / CVA / DEBILITY / CHRONIC PAIN / HTN / DYSLIPIDEMIA / CKD / ANEMIA    Subjective     Sedated and comfortable this morning.  Receiving nutritional support via NG tube with Isosource 1.5 at 15 cc/hour.  Patient's daughter states she was lucid last night and appeared to appropriately respond with nods to questions 80% of the time.  She was uncomfortable and required IV Ativan through the night.  No apparent posturing or seizure activity.  Review of systems otherwise cannot be obtained.    Objective     Vital Sign Min/Max for last 24 hours  Temp  Min: 97 °F (36.1 °C)  Max: 97.9 °F (36.6 °C)   BP  Min: 78/59  Max: 159/67   Pulse  Min: 73  Max: 130   Resp  Min: 16  Max: 20   SpO2  Min: 94 %  Max: 100 %      Weight  Min: 60.1 kg (132 lb 9.6 oz)  Max: 60.1 kg (132 lb 9.6 oz)         07/12/20  1704 07/15/20  0530   Weight: 59 kg (130 lb) 60.1 kg (132 lb 9.6 oz)         Intake/Output Summary (Last 24 hours) at 7/15/2020 0745  Last data filed at 7/15/2020 0350  Gross per 24 hour   Intake 60 ml   Output 300 ml   Net -240 ml       Physical Exam:     General Appearance:   Quiet, comfortable, sedated, in no acute distress   Lungs:    Scattered crackles,respirations regular, even and                   unlabored    Heart:    Irregular and normal rate, normal S1 and S2, grade 2/6            murmur, no gallop, no rub, no click   Abdomen:  Extremities:   Soft, non-tender, bowel sounds audible x4    No edema,  normal range of motion   Pulses:   Pulses palpable and equal bilaterally      Results Review:   Results from last 7 days   Lab Units 07/15/20  0559 07/14/20  0732 07/13/20  0534   SODIUM mmol/L 139 137 139   POTASSIUM mmol/L 4.1 4.4 4.8   CHLORIDE mmol/L 104 101 103   CO2 mmol/L 21.0* 20.0* 22.0   BUN mg/dL 52* 41* 29*   CREATININE mg/dL 1.57* 1.45* 1.34*   GLUCOSE mg/dL 112* 114* 113*   CALCIUM mg/dL 8.3 8.9 8.7     Results from last 7 days   Lab Units 07/15/20  0559 07/14/20  0732 07/13/20  0534   WBC 10*3/mm3 8.85 7.90 7.52   HEMOGLOBIN g/dL 8.9* 9.5* 9.3*   HEMATOCRIT % 30.0* 31.8* 29.5*   PLATELETS 10*3/mm3 285 300 295     Results from last 7 days   Lab Units 07/15/20  0559 07/13/20  0534   CHOLESTEROL mg/dL 178 173   TRIGLYCERIDES mg/dL 92 70   HDL CHOL mg/dL  --  45   LDL CHOL mg/dL  --  114*     Results from last 7 days   Lab Units 07/13/20  0535   HEMOGLOBIN A1C % 5.10     Results from last 7 days   Lab Units 07/12/20  1434 07/12/20  0526   CK TOTAL U/L  --  43   TROPONIN T ng/mL 0.051* 0.065*     · NO EKG / CXR.    · PACEMAKER CHECK: [employer] Jude Accent DR RF [id number] pacemaker implanted 16 March 2010.  RANDOLPH 5.1-8.7 months.  2.6% mode switch since 28 January 2018 with AT/AF burden less than 1%.  Persistent atrial fibrillation since 9 July 2020.  Mode DDDR at 60-90 bpm.  PMT noted    · KUB:    FINDINGS:  AP view of the abdomen. Tip of the feeding tube has been advanced into the distal second portion of the duodenum. Bowel gas pattern is within normal limits.     IMPRESSION: Feeding tube tip in the distal second portion of the duodenum.     Medication Review: REVIEWED; NO DRIPS.    Assessment/Plan     Marginal hemodynamics and afebrile with progressive azotemia and anemia.  Markedly elevated BNP in part related to ischemic LV dysfunction, valvular heart disease, chronic kidney disease, as well as advancing age and gender.  Will add nebivolol 2.5 mg daily for atrial fibrillation and alter furosemide to torsemide 10  mg daily.  Would recommend apixaban 2.5 mg BID when neurology allows.  Would defer MPS/LHC/ECV at this time.  Prognosis remains very guarded.    Discussed with patient's daughter in room at length.      Acute ischemic left MCA stroke (CMS/HCC)    Spinal stenosis, degenerative disc disease, back pain*    COPD (chronic obstructive pulmonary disease) (CMS/[facility])    Essential hypertension    CAD, status post RCA and circumflex stents, in-stent restenosis of RCA requiring stenting 2017 Dr. Cash     Pacemaker*    Chronic back pain    CKD (chronic kidney disease) stage 3, GFR 30-59 ml/min (CMS/[facility])    Diastolic heart failure secondary to hypertrophic cardiomyopathy (CMS/[facility])    PAF (paroxysmal atrial fibrillation) (CMS/[facility])    Shortness of breath    NSTEMI (non-ST elevated myocardial infarction) (CMS/[facility])    Acute on chronic congestive heart failure (CMS/[facility])    Stroke (CMS/[facility])    07/15/20  07:45    Electronically signed by Williams Alvarez MD at 07/15/20 0812          Consult Notes (most recent note)      Williams Alvarez MD at 07/14/20 0920      Consult Orders    1. Inpatient Cardiology Consult [[id number]] ordered by Scarlett Vieyra DO at 07/13/20 1442                  Cici Veliz  [id number]  9/7/1925   LOS: 2 days   Patient Care Team:  PHYSICIAN: Edgar Urias MD   CARDIOLOGIST: Tejinder Cash MD  PULMONOLOGIST: Jonathan Palumbo MD  INTERVENTIONAL CARDIOLOGISTS: Brennon Wisdom MD (), Tova Ayon MD, Carson Waggoner MD, Tejinder Cash MD    Ms. Veliz is a 89-year-old  white female from [place], [place], retired dental assistant.    Chief Complaint:  CVA / ATRIAL FIBRILLATION    Problem List:  1. Paroxysmal atrial fibrillation  a. CHADsVasc 8, remotely anticoagulated with Eliquis but had side effects of unknown etiology, now anticoagulated with ASA and Plavix  b. Saint [employer]do dual chamber pacemaker implantation 3/16/2010-data deficit  c. Echocardiogram 1/14/2017: LV wall thickness consistent with mild  concentric hypertrophy, LV wall segments contract abnormally.  EF 46-50%, calcification of the left and right coronary cusps.  Mild aortic stenosis, mitral annular calcification is present with mitral valve morphology score 8, mild TR, RVSP 45-55 mmHg, no pericardial effusion  d. Echocardiogram 3/6/2017: LVEF 65%, LV wall thickness consistent with mild concentric hypertrophy, mild noncoronary cusp left coronary cusp, right coronary cusp calcification present within the aortic valve, LA moderate to severely dilated, no pericardial effusion  e. Saint Arnaldo device interrogation January 2018: 5.6 years battery longevity on interrogation January 2018, atrial pacing 74%, less than 1% ventricular pacing, less than 1% mode switching, no changes  f. Echocardiogram 4/7/2020: LV wall thickness consistent with concentric hypertrophy, LVEF 61-65%, LV diastolic dysfunction grade 3 consistent with reversible restrictive pattern, LA severely dilated, mild calcification of the aortic valve, moderate to severe aortic stenosis, severe MR, mild to moderate mitral stenosis, mild TR  g. Echocardiogram 6/3/2020: LV function normal, moderate calcification of the aortic valve mainly affecting the non-, left and right coronary cusps, mild aortic stenosis with aortic valve area 1.7 cm² with mean gradient 10 mmHg, peak gradient 16 mmHg  2. CVA  a. CVA versus TIA April 2020 with residual speech disturbances but no weakness  b. CTA head/neck 7/12/2020: Near midline retropharyngeal course of the distal common and proximal internal carotid arteries with normal bifurcation appearance demonstrating atherosclerotic involvement including calcific disease burden producing 65% right and 80% left luminal narrowing, left MCA territory apparent temporal lobe branch of the inferior division a 3 mm focus of increased attenuation concerning for calcific disease versus calcific plaque certain as a potential thrombus series 5 image 354 and series 904 images  18-22.  Soft tissue components of the upper lungs demonstrate a pulmonary edema pattern with interlobular septal thickening intervening opacifications as well as partially visualized bilateral pleural effusions  c. CT cerebral perfusion 7/12/2020: Reversible ischemia within left MCA territory, no evidence for cord infarct component  d. Echocardiogram 7/12/2020: LV wall thickness consistent with moderate concentric hypertrophy, moderate MR, mild TR, the following LV wall segments are hypokinetic: Basal anterolateral, mid anterolateral, apical lateral, basal inferolateral, mid inferolateral, apical inferior, mid inferior, apical septal, basal inferoseptal, mid inferoseptal, mid anteroseptal, basal anterior, basal inferior, and basal inferoseptal.  The following LV wall segments are akinetic: Mid anterior, apical anterior and apex.  LVEF 41%, LA mild to moderately dilated, normal RV cavity size, wall thickness, systolic function and septal motion noted.  Saline test results negative, aortic valve exhibits moderate sclerosis, severe MAC present, no pericardial effusion, no pulmonary hypertension  3. Ischemic heart disease   a. Left heart catheterization 4/20/2015: Patent stents to circumflex and RCA, nonobstructive disease   b. Regadenoson stress test 1/14/2017: Myocardial perfusion imaging indicates a small sized infarct in the lateral wall with no significant ischemia noted, LVEF 56% with mild anterior wall dyskinesis, intermediate risk study  c. Left heart catheterization 3/10/2017: Right dominant coronary artery system with single-vessel disease involving the proximal RCA with successful angioplasty and stenting of the RCA with a 3 x 18 mm Alpine drug-eluting stent  d. Left heart catheterization 5/18/2017: Right dominant coronary artery system with native single-vessel disease involving the proximal RCA, severe in-stent restenosis of the proximal RCA, successful angioplasty and stenting of the proximal RCA with a  3.5 x 28 mm Alpine drug-eluting stent  e. Left heart catheterization 8/2/2017: No stents placed, EF 60%, recommendations for current medical therapy  f. NSTEMI with heart catheterization and stent placement for 99% restenosis of the proximal RCA at [facility] January 2018  4. Chronic diastolic heart failure secondary to hypertrophic cardiomyopathy, 5-day [facility] hospitalization June 2020 for diastolic heart failure exacerbation  5. Hypertension, 4-day [facility] hospitalization for hypertension August 2019  6. Hyperlipidemia  7. Carotid artery disease with apparent high grade stenosis LICA on CTA neck July 2020  8. COPD with abnormal chest x ray July 2020  9. Chronic kidney disease stage III  10. Dementia, nonverbal  11. Degenerative disc disease/spinal stenosis  12. Hyperparathyroidism  13. Macular degeneration  14. Thyroid nodule  15. Anemia  16. Asthma   17. Family history of early CAD with her father having an MI at 45  18. [facility] 3-day hospitalization 6/1/2020-6/4/2020 for weakness and decline  19. Surgical history:  a. Left breast biopsy  b. Multiple left heart catheterizations x8 with 3 PCI total  c. Pacemaker implant  d. Bilateral cataracts  e. I&D of foot secondary to cellulitis  f. Hemorrhoidectomy  g. Hysterectomy  h. Parathyroidectomy  i. Tonsillectomy      Allergies   Allergen Reactions   • Bee Venom Anaphylaxis   • Erythromycin Diarrhea     Cramping     • Levaquin [Levofloxacin] Unknown - Low Severity   • Lipitor [Atorvastatin] Diarrhea and Itching   • Albuterol Unknown - Low Severity   • Amoxil [Amoxicillin] Rash   • Codeine Delirium and Confusion   • Demeclocycline Other (See Comments)     Unknown    • Lopressor [Metoprolol Tartrate] Confusion     Confusion and nightmares   • Penicillins Nausea And Vomiting and Palpitations   • Tetracyclines & Related Palpitations and Other (See Comments)     Labored breathing and head feels heavy    • Zetia [Ezetimibe] Itching   • Zocor [Simvastatin]  Itching     Medications Prior to Admission   Medication Sig Dispense Refill Last Dose   • acetaminophen (TYLENOL) 325 MG tablet Take 325 mg by mouth Every 4 (Four) Hours As Needed for Mild Pain  or Fever.   7/11/2020 at Unknown time   • bisacodyl (DULCOLAX) 10 MG suppository Insert 10 mg into the rectum Every 12 (Twelve) Hours As Needed for Constipation.   7/11/2020 at Unknown time   • clopidogrel (PLAVIX) 75 MG tablet Take 1 tablet by mouth Daily. 90 tablet 0 7/11/2020 at Unknown time   • dilTIAZem CD (Cardizem CD) 120 MG 24 hr capsule Take 120 mg by mouth Daily.   7/11/2020 at Unknown time   • furosemide (LASIX) 20 MG tablet Take 1 tablet by mouth Daily. (Patient taking differently: Take 40 mg by mouth Daily. 40mg then 30mg alternating) 30 tablet 1 7/11/2020 at Unknown time   • hydrALAZINE (APRESOLINE) 10 MG tablet Take 1 tablet by mouth Every 6 (Six) Hours. 120 tablet 1 7/11/2020 at 2100   • iron polysaccharides (NIFEREX) 150 MG capsule Take 150 mg by mouth Daily.   7/11/2020 at Unknown time   • isosorbide mononitrate (IMDUR) 30 MG 24 hr tablet Take 1 tablet by mouth Daily. 30 tablet 1 7/11/2020 at Unknown time   • lisinopril (PRINIVIL,ZESTRIL) 5 MG tablet Take 5 mg by mouth Daily.   7/11/2020 at Unknown time   • ALPRAZolam (XANAX) 0.25 MG tablet Take 0.25 mg by mouth Every 12 (Twelve) Hours As Needed for Anxiety or Sleep.   Unknown at Unknown time   • lactulose (CHRONULAC) 10 GM/15ML solution Take 20 g by mouth Every 12 (Twelve) Hours As Needed (Constipation).   Unknown at Unknown time   • nitroglycerin (NITROSTAT) 0.4 MG SL tablet Place 1 tablet under the tongue Every 5 (Five) Minutes As Needed for Chest Pain. 30 tablet 0 Unknown at Unknown time   • Sodium Phosphates (FLEET ENEMA) 7-19 GM/118ML enema Insert 1 enema into the rectum Every 12 (Twelve) Hours As Needed (Constipation).   Unknown at Unknown time     Scheduled Meds:  aspirin 81 mg Oral Daily   Or      aspirin 300 mg Rectal Daily   diclofenac 2 g  Topical TID   furosemide 40 mg Intravenous Daily   ipratropium 0.5 mg Nebulization 4x Daily - RT   lidocaine 3 patch Transdermal Q24H   sodium chloride 10 mL Intravenous Q12H   sodium chloride 10 mL Intravenous Q12H     Continuous Infusions:  dilTIAZem 5-15 mg/hr Last Rate: 5 mg/hr (07/14/20 0844)          History of Present Illness:   This is a 89-year-old white female who presented to [facility] 7/12/2020 as a transfer from [facility] for stroke.  The patient was recently treated at [facility] for stroke about 1 month ago but had been overall declining.  Apparently on a prior H&P the patient was demented to the point of not speaking.  The patient had been at rehab and returned to home to live with her children about 3 days prior to presented to [facility].  The night before admission the patient was in bed and around 2-3 in the morning her son heard noise and found her trying to signal for help but she was unable to speak, follow commands, or bear weight on her right lower extremity.  On admission the patient was nonverbal and not following commands.  The patient was in atrial fibrillation but not on anticoagulation.  Patient is now a DNR and hospice was consulted.  She is not able to obtain an MRI because she has a pacemaker.  The patient did not receive TPA because she was not a candidate due to time of onset.  CT of the head showed a left frontal stroke which was not shown on her last CT scan in June 2020.  She has severe carotid disease on CTA of the head/neck and distal plaque/calcification in the left MCA.  Cerebral perfusion showed deficits in the left MCA inferior division and the patient was not a candidate for thrombectomy.  Patient's echo was negative for PFO but did have moderate MR, EF 41%, hypokinetic LV wall segments, and severe MAC.  Patient currently is on a Cardizem gtt. Her troponins were initially slightly elevated, likely in view of the patient's stroke and possible type II NSTEMI due to stress and  demand ischemia.  The patient apparently was switched to aspirin and Plavix around a year ago.  She had had side effects of Eliquis but the patient's family member was unsure why she was taken off of this.  She has not had any GI bleeding in the past.  Patient has had a marked decline since April 2020 when she had her initial stroke.  Her first stroke affected her speech but she did not have any residual weakness otherwise.  Since that time, she has gradually declined.  She is currently aphasic but her granddaughter is in the room with her currently.  The patient appears comfortable.  I spoke with the patient's son who provided most of the history.  He also stated that the patient has been intolerant to metoprolol in the past and was having hallucinations, confusion, and increased anxiety.  Since she has been switched to diltiazem, she has had a little anxiety but no delusions or hallucinations.  The patient has been seen by multiple cardiologists in the past.  Patient's son felt that every time they establish care with a cardiologist  something would happen and they would have to switch to a new one.  She was remotely seen between [facility] and [facility] over the past few years.  She is now going to be followed at [facility].  Apparently over the course of the night, the patient received a couple doses of IV metoprolol.  The patient's son denies her having multiple falls at home. She has been seen at [facility], but not [facility] in the past; first admission to [facility].    Cardiac risk factors: advanced age (older than 55 for men, 65 for women), dyslipidemia, hypertension and sedentary lifestyle.    Social History     Socioeconomic History   • Marital status:      Spouse name: Not on file   • Number of children: Not on file   • Years of education: Not on file   • Highest education level: Not on file   Occupational History   • Occupation: Retired     Comment: Dental assistant   Tobacco Use   •  "Smoking status: Never Smoker   • Smokeless tobacco: Never Used   Substance and Sexual Activity   • Alcohol use: No   • Drug use: No   • Sexual activity: Defer     Family History   Problem Relation Age of Onset   • Heart failure Mother    • Diabetes Mother    • Heart attack Mother    • Heart attack Father 45   • Heart failure Father    • Heart disease Father    • Diabetes Father    • Heart disease Brother    • Heart failure Brother    • Coronary artery disease Brother    • Heart failure Brother    • Heart disease Brother    • Cancer Brother    • Breast cancer Neg Hx        Review of Systems  10 point review of systems was completed, positives outlined in the HPI, and otherwise all other systems are negative.      Objective:      Objective Physical Exam  /91 (BP Location: Right arm, Patient Position: Lying)   Pulse (!) 130   Temp 97.6 °F (36.4 °C) (Axillary)   Resp 16   Ht 170.2 cm (67.01\")   Wt 59 kg (130 lb)   LMP  (LMP Unknown)   SpO2 99%   BMI 20.36 kg/m²        07/12/20  0800 07/12/20  1704   Weight: 56.2 kg (124 lb) 59 kg (130 lb)     Body mass index is 20.36 kg/m².    Intake/Output Summary (Last 24 hours) at 7/14/2020 0920  Last data filed at 7/14/2020 0338  Gross per 24 hour   Intake --   Output 850 ml   Net -850 ml       General Appearance:  Aphasic, no distress, appears stated age   Throat: Lips, mucosa, and tongue normal; teeth and gums normal   Neck: Supple, symmetrical, trachea midline, no adenopathy, thyroid: not enlarged, symmetric, no tenderness/mass/nodules, no carotid bruit or JVD   Lungs:   Clear to auscultation bilaterally, respirations unlabored   Heart:   Atrial fib with RVR, S1, S2 normal, grade 2/6 murmur, rub or gallop   Abdomen:   Soft, non-tender, no masses, no organomegaly, bowel sounds audible x4   Extremities: No edema, normal range of motion   Pulses: 2+ and symmetric   Skin: Skin color, texture, turgor normal, no rashes or lesions   Neurologic:  Aphasic "       · Cardiographics:    · EKG 7/12/2020:    Atrial fibrillation with rapid ventricular response  Moderate voltage criteria for LVH, may be normal variant  Marked ST abnormality, possible lateral subendocardial injury  Abnormal ECG.    · Echocardiogram 6/1/2020:    · Left ventricular systolic function is normal.  · There is moderate calcification of the aortic valve mainly affecting the non, left and right coronary cusp(s).  · Mild aortic valve stenosis is present with aortic valve area by planimetry about 1.7 cm², with a mean gradient of 10 mmHg peak gradient of 16 mmHg    · Echocardiogram 7/12/2020:    · Left ventricular wall thickness is consistent with moderate concentric hypertrophy.  · Moderate mitral valve regurgitation is present.  · Mild tricuspid valve regurgitation is present.  · The following left ventricular wall segments are hypokinetic: basal anterolateral, mid anterolateral, apical lateral, basal inferolateral, mid inferolateral, apical inferior, mid inferior, apical septal, basal inferoseptal, mid inferoseptal, mid anteroseptal, basal anterior, basal inferior and basal inferoseptal. The following left ventricular wall segments are akinetic: mid anterior, apical anterior and apex.  · Estimated EF = 41%.  · Left ventricular systolic function is moderately decreased.  · Left atrial cavity size is mild-to-moderately dilated.  · Normal right ventricular cavity size, wall thickness, systolic function and septal motion noted.  · Saline test results are negative.  · The aortic valve exhibits moderate sclerosis.  · Severe MAC is present.  · There is no evidence of pericardial effusion.  · No evidence of pulmonary hypertension is present.       · Imaging:    · Chest x-ray 7/12/2020:    Stable cardiac enlargement with improved pulmonary edema relative to the prior study. There is mild infiltrate or atelectasis at the left lung base today.     · CTA head/neck 7/12/2020:    1. CTA neck demonstrates near  midline retropharyngeal course of the  distal common and proximal internal carotid arteries with normal  bifurcation appearance demonstrating atherosclerotic involvement  including calcific disease burden producing 65% right and 80% left  luminal narrowing as measured by NASCET criteria.  2. CTA head demonstrates within the left MCA territory apparent temporal  lobe branch of the inferior division a 3 mm focus of increased  attenuation concerning for calcific disease versus calcific plaque  certain is a potential thrombus series 5 image 354 and series 904 image  18 through 22.  3. Visualized soft tissue components of the upper lungs demonstrate a  pulmonary edema pattern with intralobular septal thickening intervening  opacifications as well as partially visualized bilateral pleural  Effusions    · CT cerebral perfusion 7/12/2020:    Reversible ischemia within these left MCA territory. No  evidence for core infarct component    · Left femur x-ray 7/13/2020:    There are severe degenerative changes seen within the hip  and knee with no evidence of acute fracture or dislocation.    Lab Review:     Results from last 7 days   Lab Units 07/14/20  0732 07/13/20  0534 07/12/20  0526   SODIUM mmol/L 137 139 136   POTASSIUM mmol/L 4.4 4.8 5.4*   CHLORIDE mmol/L 101 103 103   CO2 mmol/L 20.0* 22.0 19.5*   BUN mg/dL 41* 29* 28*   CREATININE mg/dL 1.45* 1.34* 1.34*   GLUCOSE mg/dL 114* 113* 116*   CALCIUM mg/dL 8.9 8.7 8.5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 had prev L CVA ~6-8 wks ago w/ initial aphasia that eventually improved to just mild difficulty w/ time. Denied hx of known dysphagia.  -AC  L MCA CVA. NIH increased today. Stroke protocol.   -RD  L MCA CVA  -SM      Current Method of Nutrition  NPO  -AC  NPO  -RD  NPO  -SM      Prior Level of Function-Communication  --  cognitive-linguistic impairment;other (see comments) mild word retrieval & intermittent confusion   -RD  cognitive-linguistic impairment mild word retrieval difficulties and intermittent confusion  -SM      Prior Level of Function-Swallowing  --  other (see comments) occasional coughing w/ milk, dtr started thickening  -RD  other (see comments) occassional coughing with milk, dtr starting thickening  -SM      Plans/Goals Discussed with  patient and family;agreed upon  -AC  patient  -RD  patient and family;agreed upon  -SM      Barriers to Rehab  cognitive status;previous functional deficit  -AC  cognitive status;previous functional deficit  -RD  none identified  -      Patient's Goals for Discharge  patient could not state  -AC  patient could not state  -RD  patient could not state  -SM      Family Goals for Discharge  patient able to return to PO diet  -AC  --  patient able to eat/drink without coughing/choking  -SM         Pain Assessment    Additional Documentation  --  Pain Scale: FACES Pre/Post-Treatment (Group)  -RD  Pain Scale: FACES Pre/Post-Treatment (Group)  -SM         Pain Scale: FACES Pre/Post-Treatment    Pain: FACES Scale, Pretreatment  0-->no hurt  -AC  4-->hurts little more  -RD  2-->hurts little bit  -SM      Pain: FACES Scale, Post-Treatment  0-->no hurt  -AC  4-->hurts little more  -RD  2-->hurts little bit  -SM      Pre/Post Treatment  Pain Comment  --  pt crying/moaning- pointing to L leg. RN notified of pt pain  -RD  --         Oral Motor and Function    Dentition Assessment  natural, present and adequate  -AC  --  --      Secretion Management  --  problems swallowing secretions  -RD  --      Mucosal Quality  dry;sticky  -AC  dry  -RD  --      Volitional Swallow  --  unable to elicit  -RD  --      Volitional Cough  --  unable to elicit  -RD  --         Oral Musculature and Cranial Nerve Assessment    Oral Motor General Assessment  unable to assess;other (see comments) u/a to fully assess 2' comprehension deficits  -AC  oral labial or buccal impairment;unable to assess;other (see comments) suspect comprehension & ? apraxia impacting  -RD  unable to assess;other (see comments) 2' decreased comprehension  -SM      Oral Labial or Buccal Impairment, Detail, Cranial Nerve VII (Facial):  right labial droop;other (see comments) noted @ rest  -AC  right labial droop  -RD  right labial droop  -SM         General Eating/Swallowing Observations    Respiratory Support Currently in Use  --  room air  -RD  --      Eating/Swallowing Skills  fed by SLP  -AC  fed by SLP  -RD  --      Positioning During Eating  upright 90 degree;upright in bed  -AC  upright 90 degree;upright in bed  -RD  --      Utensils Used  spoon  -AC  spoon;cup  -RD  --      Consistencies Trialed  pudding thick  -AC  nectar/syrup-thick liquids;pureed  -RD  --         Clinical Swallow Eval    Oral Prep Phase  impaired  -AC  impaired  -RD  impaired  -SM      Oral Transit  impaired  -AC  impaired  -RD  impaired  -SM      Oral Residue  impaired  -AC  impaired  -RD  impaired  -SM      Pharyngeal Phase  suspected pharyngeal impairment  -AC  suspected pharyngeal impairment  -RD  suspected pharyngeal impairment  -SM         Oral Prep Concerns    Oral Prep Concerns  incomplete or weak lip closure around spoon;anterior loss;oral holding  -AC  oral holding;incomplete or weak lip closure around  spoon;anterior loss;increased prep time;bolus removed from mouth manually;reduced lip opening  -RD  oral holding;reduced lip opening;incomplete or weak lip closure around spoon;anterior loss  -SM      Oral Holding  pudding  -AC  pudding  -RD  pudding  -SM      Reduced Lip Opening  pudding  -AC  regular consistencies  -RD  all consistencies  -SM      Incomplete or Weak Lip Closure Around Spoon  pudding  -AC  all consistencies  -RD  all consistencies  -SM      Anterior Loss  pudding;other (see comments) mild amount on R side  -AC  nectar  -RD  thin  -SM      Increased Prep Time  --  all consistencies  -RD  --      Bolus Removed from Mouth Manually  --  pudding  -RD  --         Oral Transit Concerns    Oral Transit Concerns  increased oral transit time;delayed initiation of bolus transit  -AC  increased oral transit time  -RD  increased oral transit time  -SM      Delayed Intiation of Bolus Transit  pudding  -AC  --  --      Increased Oral Transit Time  pudding  -AC  nectar  -RD  --      Oral Transit Concerns, Comment  --  Did not initiate transit for pureed  -RD  --         Oral Residue Concerns    Oral Residue Concerns  diffuse residue throughout oral cavity  -AC  lateral sulcus residue, right;diffuse residue throughout oral cavity  -RD  lateral sulcus residue, right  -SM      Lateral Sulcus Residue, Right  --  all consistencies  -RD  pudding  -SM      Diffuse Residue Throughout Oral Cavity  pudding  -AC  pudding  -RD  --      Oral Residue Concerns, Comment  --  Removed pudding from mouth manually  -RD  --         Pharyngeal Phase Concerns    Pharyngeal Phase Concerns  wet vocal quality;cough  -AC  throat clear;multiple swallows;wet vocal quality;other (see comments) no swallow initiation pureed  -RD  cough;multiple swallows  -SM      Wet Vocal Quality  pudding  -AC  nectar  -RD  --      Multiple Swallows  --  nectar  -RD  pudding  -SM      Cough  pudding  -AC  --  all consistencies  -SM      Throat Clear  --  nectar   -RD  --      Pharyngeal Phase Concerns, Comment  Pt exhibited difficulty initiating oral transit/pharyngeal swallow. Eventually swallowed 1/2 tsp pudding and immediately exhibited excessive coughing and wet-sounding exhalations. DNT further PO 2' severity of overt s/sxs aspiration. Not yet safe/appropriate for PO diet or instrumental swallow study.  -AC  Repeat clinical dysphagia evaluation. No family present. Pt alert and cooperative. Moaning in pain at times (RN notified). Trials of nectar & pureed given (DNT thins/solid 2' severity). Poor labial seal and labial opening. Anterior loss of thins & nectar-thick. Multiple delayed swallows, weak throat clearing, and wet vocal quality w/ thins (noted when pt moaning). Pt unable to initiate swallow w/ pureed and bolus removed manually from oral cavity. High risk of aspiration w/ all consistencies. Not really appropriate for instrumental evaluation 2' severity of s/s of aspiration. Suspect aspiration w/ all consistencies. Palliative consulted. No family present for GOC discussion. Safest NPO at this time. If comfort consider nectar-thick & pureed via tsp. SLP will f/u for GOC decisions.   -RD  Showing s/s aspiration with all trials (tested only thin and puree. DNT nectar thick liqs 2' increasing pt discomfort with trials). Lengthy eduation provided to pt's dtr to include, NPO, feeding tube options, comfort diet, instrumental assessment of swallowing. Dtr wishes to continue NPO for now, SLP to re-assess at the bedside tomorrow, and discuss ? MBS at that time.   -SM         Clinical Impression    SLP Swallowing Diagnosis  mod-severe;oral dysfunction;suspected pharyngeal dysfunction  -AC  mod-severe;oral dysfunction;suspected pharyngeal dysfunction  -RD  moderate;oral dysfunction;suspected pharyngeal dysfunction  -SM      Functional Impact  risk of aspiration/pneumonia;risk of malnutrition;risk of dehydration  -AC  risk of aspiration/pneumonia;risk of malnutrition;risk of  dehydration  -RD  risk of aspiration/pneumonia  -SM      Rehab Potential/Prognosis, Swallowing  re-evaluate goals as necessary  -AC  re-evaluate goals as necessary  -RD  --      Swallow Criteria for Skilled Therapeutic Interventions Met  demonstrates skilled criteria  -AC  demonstrates skilled criteria  -RD  demonstrates skilled criteria  -         Recommendations    Therapy Frequency (Swallow)  5 days per week  -AC  PRN  -RD  --      Predicted Duration Therapy Intervention (Days)  until discharge  -AC  until discharge  -RD  --      SLP Diet Recommendation  NPO;other (see comments) consider alt means nutrition vs comfort diet, pending POC  -AC  NPO;other (see comments) vs. comfort diet pending GOC  -RD  NPO  -      Recommended Diagnostics  --  reassess via clinical swallow evaluation  -RD  reassess via clinical swallow evaluation  -      Recommended Precautions and Strategies  --  other (see comments) Oral care BID/PRN  -RD  --      SLP Rec. for Method of Medication Administration  meds via alternate route  -AC  meds via alternate route  -RD  meds via alternate route  -      Monitor for Signs of Aspiration  --  notify SLP if any concerns  -RD  --      Anticipated Dischage Disposition (SLP)  anticipate therapy at next level of care;inpatient rehabilitation facility  -AC  unknown;anticipate therapy at next level of care  -RD  --        User Key  (r) = Recorded By, (t) = Taken By, (c) = Cosigned By    Initials Name Effective Dates     Soumya Hurley, MS CCC-SLP 06/22/15 -      Arlin Garrett, MS CCC-SLP 07/27/17 -     RD Armida Haas, MS CCC-SLP 04/03/18 -           EDUCATION  The patient has been educated in the following areas:   Dysphagia (Swallowing Impairment) Oral Care/Hydration NPO rationale.    SLP Recommendation and Plan  SLP Swallowing Diagnosis: mod-severe, oral dysfunction, suspected pharyngeal dysfunction  SLP Diet Recommendation: NPO, other (see comments)(consider alt means  nutrition vs comfort diet, pending POC)     SLP Rec. for Method of Medication Administration: meds via alternate route           Swallow Criteria for Skilled Therapeutic Interventions Met: demonstrates skilled criteria  Anticipated Dischage Disposition (SLP): anticipate therapy at next level of care, inpatient rehabilitation facility  Rehab Potential/Prognosis, Swallowing: re-evaluate goals as necessary  Therapy Frequency (Swallow): 5 days per week  Predicted Duration Therapy Intervention (Days): until discharge       Plan of Care Reviewed With: patient, grandchild(vanesa)    SLP GOALS     Row Name 07/14/20 1010 07/13/20 0940 07/12/20 1100       Oral Nutrition/Hydration Goal 1 (SLP)    Oral Nutrition/Hydration Goal 1, SLP  LTG: Pt will improve swallowing per clinical assessment, warranting instrumental swallow study.  -AC  --  --    Time Frame (Oral Nutrition/Hydration Goal 1, SLP)  by discharge  -AC  --  --       Oral Nutrition/Hydration Goal 2 (SLP)    Oral Nutrition/Hydration Goal 2, SLP  Pt will tolerate therapeutic trials of H2O/puree w/o s/sxs aspiration w/ 60% acc w/o cues.  -AC  --  --    Time Frame (Oral Nutrition/Hydration Goal 2, SLP)  short term goal (STG)  -AC  --  --       Labial Strengthening Goal 1 (SLP)    Activity (Labial Strengthening Goal 1, SLP)  increase labial tone  -AC  --  --    Increase Labial Tone  labial resistance exercises  -AC  --  --    [place]/Accuracy (Labial Strengthening Goal 1, SLP)  with maximum cues (25-49% accuracy)  -AC  --  --    Time Frame (Labial Strengthening Goal 1, SLP)  short term goal (STG)  -AC  --  --       Lingual Strengthening Goal 1 (SLP)    Activity (Lingual Strengthening Goal 1, SLP)  increase lingual tone/sensation/control/coordination/movement  -AC  --  --    Increase Lingual Tone/Sensation/Control/Coordination/Movement  lingual movement exercises;lingual resistance exercises  -AC  --  --    [place]/Accuracy (Lingual Strengthening Goal 1, SLP)  with  maximum cues (25-49% accuracy)  -AC  --  --    Time Frame (Lingual Strengthening Goal 1, SLP)  short term goal (STG)  -AC  --  --       Pharyngeal Strengthening Exercise Goal 1 (SLP)    Activity (Pharyngeal Strengthening Goal 1, SLP)  increase timing  -AC  --  --    Increase Timing  prepping - 3 second prep or suck swallow or 3-step swallow  -AC  --  --    [place]/Accuracy (Pharyngeal Strengthening Goal 1, SLP)  with maximum cues (25-49% accuracy)  -AC  --  --    Time Frame (Pharyngeal Strengthening Goal 1, SLP)  short term goal (STG)  -AC  --  --       Comprehend Questions Goal 1 (SLP)    Improve Ability to Comprehend Questions Goal 1 (SLP)  simple yes/no questions;60%;with moderate cues (50-74%)  -AC  simple yes/no questions;60%;with moderate cues (50-74%)  -RD  simple yes/no questions;60%;with moderate cues (50-74%)  -SM    Time Frame (Comprehend Questions Goal 1, SLP)  short term goal (STG)  -AC  short term goal (STG)  -RD  short term goal (STG)  -SM    Progress (Ability to Comprehend Questions Goal 1, SLP)  20%;with moderate cues (50-74%)  -AC  30%;with moderate cues (50-74%)  -RD  --    Progress/Outcomes (Comprehend Questions Goal 1, SLP)  progress slower than expected  -AC  continuing progress toward goal  -RD  --    Comment (Comprehend Questions Goal 1, SLP)  Head nod only - unreliable responses.  -AC  via head nod only; no verbalizations  -RD  --       Follow Directions Goal 2 (SLP)    Improve Ability to Follow Directions Goal 1 (SLP)  1 step direction with objects;1 step direction without objects;60%;with moderate cues (50-74%)  -AC  1 step direction with objects;1 step direction without objects;60%;with moderate cues (50-74%)  -RD  1 step direction with objects;1 step direction without objects;60%;with moderate cues (50-74%)  -SM    Time Frame (Follow Directions Goal 1, SLP)  short term goal (STG)  -AC  short term goal (STG)  -RD  short term goal (STG)  -SM    Progress (Ability to Follow Directions  "Goal 1, SLP)  0%;with moderate cues (50-74%)  -AC  20%;with maximum cues (25-49%)  -RD  --    Progress/Outcomes (Follow Directions Goal 1, SLP)  progress slower than expected  -AC  continuing progress toward goal  -RD  --    Comment (Follow Directions Goal 1, SLP)  --  attempting commands  -RD  --       Word Retrieval Skills Goal 1 (SLP)    Improve Word Retrieval Skills By Goal 1 (SLP)  completing automatic speech task, counting;completing open ended structured sentence;answer WH question with one word;50%;with maximum cues (25-49%)  -AC  completing automatic speech task, counting;completing open ended structured sentence;answer WH question with one word;50%;with maximum cues (25-49%)  -RD  completing automatic speech task, counting;completing open ended structured sentence;answer WH question with one word;50%;with maximum cues (25-49%)  -SM    Time Frame (Word Retrieval Goal 1, SLP)  short term goal (STG)  -AC  short term goal (STG)  -RD  short term goal (STG)  -SM    Progress (Word Retrieval Skills Goal 1, SLP)  0%;with maximum cues (25-49%)  -AC  0%;with maximum cues (25-49%)  -RD  --    Progress/Outcomes (Word Retrieval Goal 1, SLP)  progress slower than expected  -AC  continuing progress toward goal  -RD  --    Comment (Word Retrieval Goal 1, SLP)  Counting, months of yr, singing \"[place].\" No verbalizations during tx. Appeared to exhibit groping x2 during tx.  -AC  --  --       Motor Planning Goal 1 (SLP)    Improve Motor Planning to Reduce Apraxia by Goal 1 (SLP)  imitating mouth postures;imitating vowels;following isolated oral commands;60%;with moderate cues (50-74%)  -AC  imitating mouth postures;imitating vowels;following isolated oral commands;60%;with moderate cues (50-74%)  -RD  imitating mouth postures;imitating vowels;following isolated oral commands;60%;with moderate cues (50-74%)  -SM    Time Frame (Motor Planning Goal 1, SLP)  short term goal (STG)  -AC  short term goal (STG)  -RD  short " "term goal (STG)  -SM    Progress (Motor Planning Goal 1, SLP)  0%;with moderate cues (50-74%)  -AC  20%;with moderate cues (50-74%)  -RD  --    Progress/Outcomes (Motor Planning Goal 1, SLP)  progress slower than expected  -AC  continuing progress toward goal  -RD  --    Comment (Motor Planning Goal 1, SLP)  Vowels.  -AC  imitiated \"ahh\" x1 and attempted smile on command, suspect apraxia contributing to oral  -RD  --       Additional Goal 1 (SLP)    Additional Goal 1, SLP  LTG: Improve communication in order to participate in care while in hospital setting with 60% accuracy and cues  -AC  LTG: Improve communication in order to participate in care while in hospital setting with 60% accuracy and cues  -RD  LTG: Improve communication in order to participate in care while in hospital setting with 60% accuracy and cues  -SM    Time Frame (Additional Goal 1, SLP)  by discharge  -AC  by discharge  -RD  by discharge  -SM    Progress/Outcomes (Additional Goal 1, SLP)  progress slower than expected  -AC  continuing progress toward goal  -RD  --      User Key  (r) = Recorded By, (t) = Taken By, (c) = Cosigned By    Initials Name Provider Type     Soumya Hurley, MS CCC-SLP Speech and Language Pathologist    Arlin Pfeiffer, MS CCC-SLP Speech and Language Pathologist    Armida Toney, MS CCC-SLP Speech and Language Pathologist             Time Calculation:   Time Calculation- SLP     Row Name 07/14/20 1045             Time Calculation- SLP    SLP Start Time  0945  -      SLP Received On  07/14/20  -AC        User Key  (r) = Recorded By, (t) = Taken By, (c) = Cosigned By    Initials Name Provider Type    Arlin Pfeiffer, MS CCC-SLP Speech and Language Pathologist          Therapy Charges for Today     Code Description Service Date Service Provider Modifiers Qty    [id number]  ST EVAL ORAL PHARYNG SWALLOW 2 7/14/2020 Arlin Garrett, MS CCC-SLP GN 1    [id number]  ST TREATMENT SPEECH 3 7/14/2020 Gerry, " Arlin MELENDEZ, MS CCC-SLP GN 1               Arlin S Gerry, MS CCC-SLP  2020 and Acute Care - Speech Language Pathology   Speech Treatment Note  Mickie     Patient Name: Cici Veliz  : 1925  MRN: [id number]  Today's Date: 2020  Onset of Illness/Injury or Date of Surgery: 20     Referring Physician: MD Tee       Admit Date: 2020    Visit Dx:      ICD-10-CM ICD-9-CM   1. Acute ischemic left MCA stroke (CMS/HCC) I63.512 434.91   2. Dysphagia, unspecified type R13.10 787.20   3. Aphasia R47.01 784.3   4. Impaired mobility and ADLs Z74.09 V49.89    Z78.9      Patient Active Problem List   Diagnosis   • Spinal stenosis, degenerative disc disease, back pain*   • Deformity of the right Sternoclavicular joint*   • COPD (chronic obstructive pulmonary disease) (CMS/[facility])   • Essential hypertension   • Mixed hyperlipidemia   • CAD, status post RCA and circumflex stents, in-stent restenosis of RCA requiring stenting  Dr. Cash    • Pacemaker*   • hx of Myocardial infarction*   • Valvular heart disease mild mitral regurgitation not significant*   • Atopic rhinitis   • Hyperparathyroidism status post parathyroidectomy   • Chronic back pain   • CKD (chronic kidney disease) stage 3, GFR 30-59 ml/min (CMS/[facility])   • Diastolic heart failure secondary to hypertrophic cardiomyopathy (CMS/[facility])   • Hyperglycemia   • PAF (paroxysmal atrial fibrillation) (CMS/[facility])   • Bradycardia   • Neuropathy   • Cerebrovascular disease   • Mild obesity   • Hypotension due to drugs   • Shortness of breath   • Chronic fatigue   • Subclavian arterial stenosis (CMS/[facility])   • Sore throat   • Perirectal abscess   • Heart failure with preserved left ventricular function (HFpEF) (CMS/[facility])   • S/P angioplasty with stent   • NSTEMI (non-ST elevated myocardial infarction) (CMS/[facility])   • Seasonal allergies   • Chest pain   • Acute on chronic congestive heart failure (CMS/HCC)   • Acute ischemic left MCA stroke (CMS/[facility])   • Stroke  (CMS/[facility])       Therapy Treatment  Rehabilitation Treatment Summary     Row Name 07/14/20 1010             Treatment Time/Intention    Discipline  speech language pathologist  -AC      Document Type  therapy note (daily note)  -AC      Therapy Frequency (SLP SLC)  5 days per week  -AC      Patient Effort  good  -AC      Recorded by [AC] Arlin Garrett MS CCC-SLP 07/14/20 1036      Row Name 07/14/20 1010             Pain Scale: FACES Pre/Post-Treatment    Pain: FACES Scale, Pretreatment  0-->no hurt  -AC      Pain: FACES Scale, Post-Treatment  0-->no hurt  -AC      Recorded by [AC] Alrin Garrett MS CCC-SLP 07/14/20 1036      Row Name 07/14/20 1010             Outcome Summary/Treatment Plan (SLP)    Daily Summary of Progress (SLP)  progress toward functional goals as expected  -AC      Barriers to Overall Progress (SLP)  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  -AC      Plan for Continued Treatment (SLP)  Pt cont to exhibit severe aphasia affecting verbal expression and auditory comprehension. 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 U/a to assess reading comprehension/written expression this date. Provided edu to pt's granddaughter re: speech/language defitics, tx, and ways to optimize communication. Pt would benefit from further dx tx.  -AC2      Anticipated Dischage Disposition (SLP)  anticipate therapy at next level of care;inpatient rehabilitation facility  -AC      Recorded by [AC] Arlin Garrett MS CCC-SLP 07/14/20 1036  [AC2] Arlin Garrett MS CCC-SLP 07/14/20 1044        User Key  (r) = Recorded By, (t) = Taken By, (c) = Cosigned By    Initials Name Effective Dates Discipline    AC Arlin Garrett MS CCC-SLP 07/27/17 -  SLP          Outcome Summary  Outcome Summary/Treatment Plan (SLP)  Daily Summary of Progress (SLP): progress toward functional goals as expected (07/14/20 1010 : Arlin Garrett, MS CCC-SLP)  Barriers to Overall Progress (SLP): Previous aphasia after CVA 6-8 wks ago, medically  complex. (07/14/20 1010 : Arlin Garrett, MS CCC-SLP)  Plan for Continued Treatment (SLP): Pt cont to exhibit severe aphasia affecting verbal expression and auditory comprehension. 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 U/a to assess reading comprehension/written expression this date. Provided edu to pt's granddaughter re: speech/language defitics, tx, and ways to optimize communication. Pt would benefit from further dx tx. (07/14/20 1010 : Arlin Garrett, MS CCC-SLP)  Anticipated Dischage Disposition (SLP): anticipate therapy at next level of care, inpatient rehabilitation facility (07/14/20 1010 : Arlin Garrett, MS CCC-SLP)      SLP GOALS     Row Name 07/14/20 1010 07/13/20 0940 07/12/20 1100       Oral Nutrition/Hydration Goal 1 (SLP)    Oral Nutrition/Hydration Goal 1, SLP  LTG: Pt will improve swallowing per clinical assessment, warranting instrumental swallow study.  -AC  --  --    Time Frame (Oral Nutrition/Hydration Goal 1, SLP)  by discharge  -AC  --  --       Oral Nutrition/Hydration Goal 2 (SLP)    Oral Nutrition/Hydration Goal 2, SLP  Pt will tolerate therapeutic trials of H2O/puree w/o s/sxs aspiration w/ 60% acc w/o cues.  -AC  --  --    Time Frame (Oral Nutrition/Hydration Goal 2, SLP)  short term goal (STG)  -AC  --  --       Labial Strengthening Goal 1 (SLP)    Activity (Labial Strengthening Goal 1, SLP)  increase labial tone  -AC  --  --    Increase Labial Tone  labial resistance exercises  -AC  --  --    [place]/Accuracy (Labial Strengthening Goal 1, SLP)  with maximum cues (25-49% accuracy)  -AC  --  --    Time Frame (Labial Strengthening Goal 1, SLP)  short term goal (STG)  -AC  --  --       Lingual Strengthening Goal 1 (SLP)    Activity (Lingual Strengthening Goal 1, SLP)  increase lingual tone/sensation/control/coordination/movement  -AC  --  --    Increase Lingual Tone/Sensation/Control/Coordination/Movement  lingual movement exercises;lingual resistance exercises  -AC  --  --     [place]/Accuracy (Lingual Strengthening Goal 1, SLP)  with maximum cues (25-49% accuracy)  -AC  --  --    Time Frame (Lingual Strengthening Goal 1, SLP)  short term goal (STG)  -AC  --  --       Pharyngeal Strengthening Exercise Goal 1 (SLP)    Activity (Pharyngeal Strengthening Goal 1, SLP)  increase timing  -AC  --  --    Increase Timing  prepping - 3 second prep or suck swallow or 3-step swallow  -AC  --  --    [place]/Accuracy (Pharyngeal Strengthening Goal 1, SLP)  with maximum cues (25-49% accuracy)  -AC  --  --    Time Frame (Pharyngeal Strengthening Goal 1, SLP)  short term goal (STG)  -AC  --  --       Comprehend Questions Goal 1 (SLP)    Improve Ability to Comprehend Questions Goal 1 (SLP)  simple yes/no questions;60%;with moderate cues (50-74%)  -AC  simple yes/no questions;60%;with moderate cues (50-74%)  -RD  simple yes/no questions;60%;with moderate cues (50-74%)  -SM    Time Frame (Comprehend Questions Goal 1, SLP)  short term goal (STG)  -AC  short term goal (STG)  -RD  short term goal (STG)  -SM    Progress (Ability to Comprehend Questions Goal 1, SLP)  20%;with moderate cues (50-74%)  -AC  30%;with moderate cues (50-74%)  -RD  --    Progress/Outcomes (Comprehend Questions Goal 1, SLP)  progress slower than expected  -AC  continuing progress toward goal  -RD  --    Comment (Comprehend Questions Goal 1, SLP)  Head nod only - unreliable responses.  -AC  via head nod only; no verbalizations  -RD  --       Follow Directions Goal 2 (SLP)    Improve Ability to Follow Directions Goal 1 (SLP)  1 step direction with objects;1 step direction without objects;60%;with moderate cues (50-74%)  -AC  1 step direction with objects;1 step direction without objects;60%;with moderate cues (50-74%)  -RD  1 step direction with objects;1 step direction without objects;60%;with moderate cues (50-74%)  -SM    Time Frame (Follow Directions Goal 1, SLP)  short term goal (STG)  -AC  short term goal (STG)  -RD  short  "term goal (STG)  -SM    Progress (Ability to Follow Directions Goal 1, SLP)  0%;with moderate cues (50-74%)  -AC  20%;with maximum cues (25-49%)  -RD  --    Progress/Outcomes (Follow Directions Goal 1, SLP)  progress slower than expected  -AC  continuing progress toward goal  -RD  --    Comment (Follow Directions Goal 1, SLP)  --  attempting commands  -RD  --       Word Retrieval Skills Goal 1 (SLP)    Improve Word Retrieval Skills By Goal 1 (SLP)  completing automatic speech task, counting;completing open ended structured sentence;answer WH question with one word;50%;with maximum cues (25-49%)  -AC  completing automatic speech task, counting;completing open ended structured sentence;answer WH question with one word;50%;with maximum cues (25-49%)  -RD  completing automatic speech task, counting;completing open ended structured sentence;answer WH question with one word;50%;with maximum cues (25-49%)  -SM    Time Frame (Word Retrieval Goal 1, SLP)  short term goal (STG)  -AC  short term goal (STG)  -RD  short term goal (STG)  -SM    Progress (Word Retrieval Skills Goal 1, SLP)  0%;with maximum cues (25-49%)  -AC  0%;with maximum cues (25-49%)  -RD  --    Progress/Outcomes (Word Retrieval Goal 1, SLP)  progress slower than expected  -AC  continuing progress toward goal  -RD  --    Comment (Word Retrieval Goal 1, SLP)  Counting, months of yr, singing \"Amazing [place].\" No verbalizations during tx. Appeared to exhibit groping x2 during tx.  -AC  --  --       Motor Planning Goal 1 (SLP)    Improve Motor Planning to Reduce Apraxia by Goal 1 (SLP)  imitating mouth postures;imitating vowels;following isolated oral commands;60%;with moderate cues (50-74%)  -AC  imitating mouth postures;imitating vowels;following isolated oral commands;60%;with moderate cues (50-74%)  -RD  imitating mouth postures;imitating vowels;following isolated oral commands;60%;with moderate cues (50-74%)  -SM    Time Frame (Motor Planning Goal 1, SLP)  " "short term goal (STG)  -AC  short term goal (STG)  -RD  short term goal (STG)  -SM    Progress (Motor Planning Goal 1, SLP)  0%;with moderate cues (50-74%)  -AC  20%;with moderate cues (50-74%)  -RD  --    Progress/Outcomes (Motor Planning Goal 1, SLP)  progress slower than expected  -AC  continuing progress toward goal  -RD  --    Comment (Motor Planning Goal 1, SLP)  Vowels.  -AC  imitiated \"ahh\" x1 and attempted smile on command, suspect apraxia contributing to oral  -RD  --       Additional Goal 1 (SLP)    Additional Goal 1, SLP  LTG: Improve communication in order to participate in care while in hospital setting with 60% accuracy and cues  -AC  LTG: Improve communication in order to participate in care while in hospital setting with 60% accuracy and cues  -RD  LTG: Improve communication in order to participate in care while in hospital setting with 60% accuracy and cues  -SM    Time Frame (Additional Goal 1, SLP)  by discharge  -AC  by discharge  -RD  by discharge  -SM    Progress/Outcomes (Additional Goal 1, SLP)  progress slower than expected  -AC  continuing progress toward goal  -RD  --      User Key  (r) = Recorded By, (t) = Taken By, (c) = Cosigned By    Initials Name Provider Type    Soumya Chavarria, MS CCC-SLP Speech and Language Pathologist    AC Arlin Garrett, MS CCC-SLP Speech and Language Pathologist    RD Armida Haas, MS CCC-SLP Speech and Language Pathologist          EDUCATION  The patient has been educated in the following areas:   Communication Impairment.    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  Plan for Continued Treatment (SLP): Pt cont to exhibit severe aphasia affecting verbal expression and auditory comprehension. 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 U/a to assess reading comprehension/written expression this date. " Provided edu to pt's granddaughter re: speech/language defitics, tx, and ways to optimize communication. Pt would benefit from further dx tx.  Anticipated Dischage Disposition (SLP): anticipate therapy at next level of care, inpatient rehabilitation facility            Time Calculation:   Time Calculation- SLP     Row Name 07/14/20 1045             Time Calculation- SLP    SLP Start Time  0945  -      SLP Received On  07/14/20  -        User Key  (r) = Recorded By, (t) = Taken By, (c) = Cosigned By    Initials Name Provider Type     Arlin Garrett MS CCC-SLP Speech and Language Pathologist          Therapy Charges for Today     Code Description Service Date Service Provider Modifiers Qty    87119532181 HC ST EVAL ORAL PHARYNG SWALLOW 2 7/14/2020 Arlin Garrett MS CCC-SLP GN 1    68148798433 HC ST TREATMENT SPEECH 3 7/14/2020 Arlin Garrett MS CCC-SLP GN 1                 Arlin Garrett MS CCC-SLP  7/14/2020    Electronically signed by Arlin Garrett MS CCC-SLP at 07/14/20 1047

## 2020-07-15 NOTE — PLAN OF CARE
Problem: Patient Care Overview  Goal: Plan of Care Review  Outcome: Ongoing (interventions implemented as appropriate)  Flowsheets (Taken 7/15/2020 150)  Progress: declining  Plan of Care Reviewed With: patient;daughter  Outcome Summary: PT LETHARGIC AND DEPENDENT WITH ALL MOBILITY. NO ACTIVE MOVEMENT EXCEPT TO LIFT L ARM AND WIGGLE L TOES. WILL LIKELY DECREASE FREQUENCY TO 3X WEEK SOON IF LEVEL OF ACTIVE PARTICIPATION DOES NOT IMPROVE. RECOMMEND SNF/ECF AT D/C.

## 2020-07-15 NOTE — PROGRESS NOTES
Cici DELONG [facility]  [id number]  9/7/1925   LOS: 3 days   Patient Care Team:  Edgar Urias MD as PCP - General (Family Medicine)  Santhosh Duron MD as PCP - Claims Attributed    Chief Complaint:  IHD / HFrEF / SSS / AFIB / CVA / DEBILITY / CHRONIC PAIN / HTN / DYSLIPIDEMIA / CKD / ANEMIA    Subjective     Sedated and comfortable this morning.  Receiving nutritional support via NG tube with Isosource 1.5 at 15 cc/hour.  Patient's daughter states she was lucid last night and appeared to appropriately respond with nods to questions 80% of the time.  She was uncomfortable and required IV Ativan through the night.  No apparent posturing or seizure activity.  Review of systems otherwise cannot be obtained.    Objective     Vital Sign Min/Max for last 24 hours  Temp  Min: 97 °F (36.1 °C)  Max: 97.9 °F (36.6 °C)   BP  Min: 78/59  Max: 159/67   Pulse  Min: 73  Max: 130   Resp  Min: 16  Max: 20   SpO2  Min: 94 %  Max: 100 %      Weight  Min: 60.1 kg (132 lb 9.6 oz)  Max: 60.1 kg (132 lb 9.6 oz)         07/12/20  1704 07/15/20  0530   Weight: 59 kg (130 lb) 60.1 kg (132 lb 9.6 oz)         Intake/Output Summary (Last 24 hours) at 7/15/2020 0745  Last data filed at 7/15/2020 0350  Gross per 24 hour   Intake 60 ml   Output 300 ml   Net -240 ml       Physical Exam:     General Appearance:   Quiet, comfortable, sedated, in no acute distress   Lungs:    Scattered crackles,respirations regular, even and                   unlabored    Heart:    Irregular and normal rate, normal S1 and S2, grade 2/6            murmur, no gallop, no rub, no click   Abdomen:  Extremities:   Soft, non-tender, bowel sounds audible x4    No edema, normal range of motion   Pulses:   Pulses palpable and equal bilaterally      Results Review:   Results from last 7 days   Lab Units 07/15/20  0559 07/14/20  0732 07/13/20  0534   SODIUM mmol/L 139 137 139   POTASSIUM mmol/L 4.1 4.4 4.8   CHLORIDE mmol/L 104 101 103   CO2 mmol/L 21.0* 20.0* 22.0   BUN mg/dL 52*  41* 29*   CREATININE mg/dL 1.57* 1.45* 1.34*   GLUCOSE mg/dL 112* 114* 113*   CALCIUM mg/dL 8.3 8.9 8.7     Results from last 7 days   Lab Units 07/15/20  0559 07/14/20  0732 07/13/20  0534   WBC 10*3/mm3 8.85 7.90 7.52   HEMOGLOBIN g/dL 8.9* 9.5* 9.3*   HEMATOCRIT % 30.0* 31.8* 29.5*   PLATELETS 10*3/mm3 285 300 295     Results from last 7 days   Lab Units 07/15/20  0559 07/13/20  0534   CHOLESTEROL mg/dL 178 173   TRIGLYCERIDES mg/dL 92 70   HDL CHOL mg/dL  --  45   LDL CHOL mg/dL  --  114*     Results from last 7 days   Lab Units 07/13/20  0535   HEMOGLOBIN A1C % 5.10     Results from last 7 days   Lab Units 07/12/20  1434 07/12/20  0526   CK TOTAL U/L  --  43   TROPONIN T ng/mL 0.051* 0.065*     · NO EKG / CXR.    · PACEMAKER CHECK: [employer]  RF 2210 pacemaker implanted 16 March 2010.  RANDOLPH 5.1-8.7 months.  2.6% mode switch since 28 January 2018 with AT/AF burden less than 1%.  Persistent atrial fibrillation since 9 July 2020.  Mode DDDR at 60-90 bpm.  PMT noted    · KUB:    FINDINGS:  AP view of the abdomen. Tip of the feeding tube has been advanced into the distal second portion of the duodenum. Bowel gas pattern is within normal limits.     IMPRESSION: Feeding tube tip in the distal second portion of the duodenum.     Medication Review: REVIEWED; NO DRIPS.    Assessment/Plan     Marginal hemodynamics and afebrile with progressive azotemia and anemia.  Markedly elevated BNP in part related to ischemic LV dysfunction, valvular heart disease, chronic kidney disease, as well as advancing age and gender.  Will add nebivolol 2.5 mg daily for atrial fibrillation and alter furosemide to torsemide 10 mg daily.  Would recommend apixaban 2.5 mg BID when neurology allows.  Would defer MPS/LHC/ECV at this time.  Prognosis remains very guarded.    Discussed with patient's daughter in room at length.      Acute ischemic left MCA stroke (CMS/HCC)    Spinal stenosis, degenerative disc disease, back pain*    COPD  (chronic obstructive pulmonary disease) (CMS/HCC)    Essential hypertension    CAD, status post RCA and circumflex stents, in-stent restenosis of RCA requiring stenting 2017 Dr. Cash     Pacemaker*    Chronic back pain    CKD (chronic kidney disease) stage 3, GFR 30-59 ml/min (CMS/[facility])    Diastolic heart failure secondary to hypertrophic cardiomyopathy (CMS/HCC)    PAF (paroxysmal atrial fibrillation) (CMS/[facility])    Shortness of breath    NSTEMI (non-ST elevated myocardial infarction) (CMS/[facility])    Acute on chronic congestive heart failure (CMS/[facility])    Stroke (CMS/[facility])    07/15/20  07:45

## 2020-07-15 NOTE — NURSING NOTE
Pt has attempted to pull keofeed out multiple times. Family refuses to allow medications or other interventions to be done to prevent pulling. Pt in pain. Tylenol given

## 2020-07-15 NOTE — PROGRESS NOTES
Stroke Progress Note       Chief Complaint: Aphasia    Subjective    Subjective     Subjective:  Patient continues to be drowsy, aphasic not answering any questions.  Daughter at the bedside, who says patient followed few simple commands by nodding last night.    Review of Systems   Unable to perform ROS: Mental status change            Objective    Objective      Temp:  [97 °F (36.1 °C)-97.9 °F (36.6 °C)] 97.9 °F (36.6 °C)  Heart Rate:  [] 128  Resp:  [18-20] 18  BP: ()/(48-76) 114/68        Neurological Exam  Mental Status  Drowsy. Memory: Unable to assess. Patient is nonverbal. Expressive aphasia and receptive aphasia present. Follows one-step commands. Attention and concentration: Unable to assess. Knowledge: Unable to assess.    Cranial Nerves  CN II: Right homonymous hemianopsia. Left normal visual field.  CN III, IV, VI: Extraocular movements intact bilaterally. Pupils equal round and reactive to light bilaterally.  CN V:  Right: Normal corneal reflex.  Left: Normal corneal reflex.  CN VII:  Right: There is central facial weakness.  Left: There is no facial weakness.  CN VIII: No obvious hearing loss, limited assessment.  CN IX, X: Not assessed.  CN XI: Unable to assess.  CN XII: Unable to assess.    Motor  Decreased muscle bulk throughout. No fasciculations present. Decreased muscle tone.  Right upper/right lower extremity is 0/5.  There was some 2 movements on the right side.  Left upper/left lower extremity there is some spontaneous movement..    Sensory  Sensation: Decreased withdrawal to painful stimuli on the right upper and lower extremity compared to the left..     Reflexes  Deep tendon reflexes: Diminished reflexes throughout.    Coordination  No obvious dysmetria, but limited exam.    Gait  Not assessed.      Physical Exam   Constitutional:   No apparent respiratory distress, although patient is on nasal cannula.  Has a Keofeed tube in place.   HENT:   Head: Normocephalic and atraumatic.    Eyes: Pupils are equal, round, and reactive to light. Conjunctivae are normal.   Neck: Normal range of motion.   Cardiovascular:   In persistent A. fib, with tachycardia   Pulmonary/Chest: Effort normal. No respiratory distress.   Nursing note and vitals reviewed.      Results Review:    I personally reviewed the patient's clinical results.  Her BUN/creatinine is 52/1.5.  Her 2D echo shows EF of 41% with systolic dysfunction, and enlarged left atrial size.  I reviewed the full report of her echocardiogram.  Her femur x-ray was negative for any fracture or dislocation, but does show severe degenerative changes.  Her H&H is 8.9/30.  Her proBNP is 21,000.  Her CT scan had showed left MCA stroke, with no hemorrhage, moderate size.          Assessment/Plan     Assessment/Plan:  Thin 89-year-old right-handed female with known history of atrial fibrillation, GI bleeding, stroke, dementia, congestive heart failure, who has been admitted with left MCA stroke.  Clinically patient has not improved significantly.      1. Left middle cerebral artery stroke.  Likely cardioembolic in etiology.  Patient is on aspirin 81 mg, Plavix 75 mg, Crestor 10 mg for secondary stroke prevention.  She did not pass swallow evaluation, and has Keofeed tube through the nose since yesterday.  Patient has shown no significant signs of improvement except she nods appropriately to some simple commands.  Palliative team has been involved, and if medical care is to be continued, will need to get her started on Eliquis 2.5 mg twice a day, along with aspirin 81 mg.  At that time Plavix needs to be discontinued.  I had prolonged discussion with the daughter at the bedside, and patient has been hospitalized few times in the last few months with different medical problems.  Patient has not shown any significant signs of improvement, and I told her daughter that given the patient age and her ongoing multiple comorbidities, patient should be made comfort  measures only rather than aggressive medical management at this time.  Daughter will talk to her brother, and they will make the decision about the same.  2. Paroxysmal atrial fibrillation.  Actually patient is in persistent atrial fibrillation, and is at high risk for having another vascular event if not anticoagulated.  If medical care is to be continued, will recommend to start anticoagulation, and work aggressively for rate and rhythm control.  3. Acute on chronic kidney disease.  Her BUN/creatinine is slightly elevated, recommend some IV fluid support.  Continue to monitor.  4. Congestive heart failure, systolic.  EF of 41%.  This also increases her risk for for having cardiovascular event.  Continue aspirin and Plavix along with statins for now.  5. Mixed hyperlipidemia.  Her LDL is 114.  Continue Crestor 10 mg for now.  6. Patient blood pressures have been running in normal range.  Continue monitoring.  7. Patient will likely need a PEG tube, if medical care is to be continued.  May not be in patient's best interest.    Patient is at risk for continued medical problems, and permanent brain damage.  I discussed this in detail with the patient's daughter at the bedside.  Follow-up with palliative team for continued goals of care discussion.  I discussed this case with the hospitalist, and we will let the hospitalist manage the patient from here on.  We will sign off for now, but are always available if any question arises.  Thank you for the consult.          Justin George MD  07/15/20  12:22
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
## 2020-07-15 NOTE — PROGRESS NOTES
Continued Stay Note  [facility]     Patient Name: Cici Veliz  MRN: [id number]  Today's Date: 7/15/2020    Admit Date: 7/12/2020    Discharge Plan     Row Name 07/15/20 0913       Plan    Plan  Undetermined     Plan Comments      I spoke with patient's son, Carson over the phone this am regarding dispositon planning. He tells me, he and his sister Xenia Mason would like his mother to return home to her house if they can get arrangements made with caregivers. He tells me he can help some and so can his sister, but would need to get caregivers lined up.     Carson tells me his sister is in the process of obtaining medicaid waiver. He tells me even if this in place, they would still need to obtain care givers.    I asked if I can go ahead and make a referral to [facility], where she had most recently been and he gave [facility] as a second choice    Plan: Per patient's son, family would like to have patient return to her home, if possible but caregivers will need to be worked out. Referrals to nursing homes are being made, if needed.  I have placed a call to patient's sister and left voice mail to return my call.         Final Discharge Disposition Code  30 - still a patient        Discharge Codes    No documentation.       Expected Discharge Date and Time     Expected Discharge Date Expected Discharge Time    Jul 17, 2020             Erika Vidales RN

## 2020-07-16 NOTE — PROGRESS NOTES
"Palliative Care Progress Note    Date of Admission: 7/12/2020    Code Status:   Current Code Status     Date Active Code Status Order ID Comments User Context       7/12/2020 0920 No CPR [id number]  Kasey Reyes APRN Inpatient       Questions for Current Code Status     Question Answer Comment    Code Status No CPR     Medical Interventions (Level of Support Prior to Arrest) Full     Level Of Support Discussed With Next of Kin (If No Surrogate)         Subjective: Patient more lethargic today with prn meds per daughter and son report.  Daughter, Fatuma, spent the night and reports patient became more restless in early am, prn ativan was administered.  Report that patient is sensitive and smaller doses of medications are better tolerated and usually effective.  Prns: lorazepam 0.25mg at 0240,  Morphine 1mg at 0100 and 1345.  Patient has chronic lower back pain and diffuse joint pain history, both agree with being more sedentary is probably having some increase pain.    Reviewed current scheduled and prn medications for route, type, dose, and frequency.  Reviewed medical record.    dilTIAZem 5-15 mg/hr Last Rate: Stopped (07/15/20 0348)     acetaminophen  •  acetaminophen  •  bisacodyl  •  HYDROcodone-acetaminophen  •  LORazepam  •  magnesium sulfate **OR** magnesium sulfate **OR** magnesium sulfate  •  metoprolol tartrate  •  ondansetron **OR** ondansetron  •  potassium chloride  •  sodium chloride  •  sodium chloride    Objective:  PPS 20%  BP 97/60 (BP Location: Right arm, Patient Position: Lying)   Pulse 111   Temp 96.8 °F (36 °C) (Axillary)   Resp 16   Ht 170.2 cm (67.01\")   Wt 60.1 kg (132 lb 9.6 oz)   LMP  (LMP Unknown)   SpO2 99%   BMI 20.76 kg/m²    Intake & Output (last day)       07/15 0701 - 07/16 0700    Other 301    NG/    Total Intake(mL/kg) 483 (8)    Urine (mL/kg/hr) 200 (0.2)    Total Output 200    Net +283             Lab Results (last 24 hours)     Procedure Component Value " Units Date/Time    POC Glucose Once [[id number]]  (Abnormal) Collected:  07/15/20 1831    Specimen:  Blood Updated:  07/15/20 1832     Glucose 148 mg/dL     Prealbumin [[id number]]  (Abnormal) Collected:  07/15/20 0559    Specimen:  Blood Updated:  07/15/20 1413     Prealbumin 17.0 mg/dL     POC Glucose Once [[id number]]  (Normal) Collected:  07/15/20 1149    Specimen:  Blood Updated:  07/15/20 1200     Glucose 127 mg/dL     Nutrition Panel [[id number]]  (Abnormal) Collected:  07/15/20 0559    Specimen:  Blood Updated:  07/15/20 0745     Glucose 112 mg/dL      BUN 52 mg/dL      Creatinine 1.57 mg/dL      Sodium 139 mmol/L      Potassium 4.1 mmol/L      Chloride 104 mmol/L      CO2 21.0 mmol/L      Calcium 8.3 mg/dL      Alkaline Phosphatase 70 U/L      ALT (SGPT) 9 U/L      AST (SGOT) 14 U/L      Total Cholesterol 178 mg/dL      Triglycerides 92 mg/dL      Total Protein 6.5 g/dL      Albumin 3.30 g/dL      Phosphorus 5.3 mg/dL      Total Bilirubin 0.3 mg/dL      Magnesium 2.4 mg/dL      Anion Gap 14.0 mmol/L     Narrative:       Cholesterol Reference Ranges:   Desirable       < 200 mg/dL   Borderline    200-239 mg/dL   High Risk       > 239 mg/dL    Triglyceride Reference Ranges:   Normal          < 150 mg/dL   Borderline    150-199 mg/dL   High          200-499 mg/dL   Very High       > 499 mg/dL    C-reactive Protein [[id number]]  (Abnormal) Collected:  07/15/20 0559    Specimen:  Blood Updated:  07/15/20 0734     C-Reactive Protein 5.12 mg/dL     CBC (No Diff) [[id number]]  (Abnormal) Collected:  07/15/20 0559    Specimen:  Blood Updated:  07/15/20 0701     WBC 8.85 10*3/mm3      RBC 3.43 10*6/mm3      Hemoglobin 8.9 g/dL      Hematocrit 30.0 %      MCV 87.5 fL      MCH 25.9 pg      MCHC 29.7 g/dL      RDW 18.3 %      RDW-SD 58.8 fl      MPV 10.4 fL      Platelets 285 10*3/mm3     POC Glucose Once [[id number]]  (Normal) Collected:  07/15/20 0551    Specimen:  Blood Updated:  07/15/20 0552     Glucose 124 mg/dL              Imaging Results (Last 24 Hours)     Procedure Component Value Units Date/Time    XR Femur 1 View Left [[id number]] Collected:  07/14/20 0811     Updated:  07/15/20 0853    Narrative:          EXAMINATION: XR FEMUR 1 VW, LEFT-07/13/2020:      INDICATION: Pain; R13.10-Dysphagia, unspecified; R47.01-Aphasia;  Z74.09-Other reduced mobility; Z78.9-Other specified health status.      COMPARISON: NONE.     FINDINGS: Two views of the left femur reveal extensive degenerative  changes seen both within the hip and knee. Osteophyte formation seen of  the acetabulum. Vascular calcification seen within the vessels. Severe  degenerative changes seen within the left knee. No fracture or  dislocation. There is spurring identified of the patella. No acute bony  abnormality.           Impression:       There are severe degenerative changes seen within the hip  and knee with no evidence of acute fracture or dislocation.     D:  07/14/2020  E:  07/14/2020     This report was finalized on 7/15/2020 8:50 AM by Dr. Saskia Munoz MD.       XR Pelvis 1 or 2 View [[id number]] Collected:  07/14/20 0859     Updated:  07/15/20 0853    Narrative:       EXAMINATION: XR PELVIS 1 OR 2 VW-      INDICATION: Pain; R13.10-Dysphagia, unspecified; R47.01-Aphasia;  Z74.09-Other reduced mobility; Z78.9-Other specified health status.      COMPARISON: None.     FINDINGS: Imaging of the pelvis reveals degenerative changes seen within  the sacroiliac joints bilaterally with severe degenerative changes seen  in both hips. There is spurring of the femoral heads. Spurring of the  acetabulum. Vascular calcification is seen within the thoracic aorta.             Impression:       Extensive degenerative changes seen within the sacroiliac  joints and hips with no evidence of fracture or dislocation.     D:  07/14/2020  E:  07/14/2020     This report was finalized on 7/15/2020 8:50 AM by Dr. Saskia Munoz MD.       XR Abdomen KUB [[id number]] Collected:   07/14/20 2325     Updated:  07/14/20 2327    Narrative:       CR Abdomen 1 Vw    INDICATION:   Feeding tube placement.    COMPARISON:   Earlier same day    FINDINGS:  AP view of the abdomen. Tip of the feeding tube has been advanced into the distal second portion of the duodenum. Bowel gas pattern is within normal limits.      Impression:       Feeding tube tip in the distal second portion of the duodenum.    Signer Name: Leroy Arzola MD   Signed: 7/14/2020 11:25 PM   Workstation Name: [id number]    [facility]    XR Abdomen KUB [[id number]] Collected:  07/14/20 1527     Updated:  07/14/20 2140    Narrative:       EXAMINATION: XR ABDOMEN/KUB-07/14/2020:     INDICATION: Tube feed placement; I63.512-Cerebral infarction due to  unspecified occlusion or stenosis of left middle cerebral artery;  R13.10-Dysphagia, unspecified; R47.01-Aphasia; Z74.09-Other reduced  mobility; Z78.9-Other specified health status.     COMPARISON: NONE.     FINDINGS: Feeding tube is seen in the mid stomach. Bowel gas pattern is  nonobstructive. Incidental note is made of extensive and diffuse  aortoiliac vascular calcification.       Impression:       Feeding tube tip in the mid stomach.     D:  07/14/2020  E:  07/14/2020            This report was finalized on 7/14/2020 9:37 PM by Dr. Kirby Izquierdo MD.           Physical Exam:  Gen: NAD, up in chair  Skin: warm, dry   Eyes: DOMO, conjunctiva clear and moist, glassy eyed after morphine  HEENT: orophaynx clear, moist, keofeed in place  Resp/thorax: even effort, non labored, CTA   CV: RRR   ABD: soft, bowel sounds +, non distended  Ext: no edema, no redness   Neuro: awake, minimally responsive, no myoclonus     Reviewed labs and diagnostic results.   Lab Results   Component Value Date    HGBA1C 5.10 07/13/2020     Results from last 7 days   Lab Units 07/15/20  0559   WBC 10*3/mm3 8.85   HEMOGLOBIN g/dL 8.9*   HEMATOCRIT % 30.0*   PLATELETS 10*3/mm3 285     Results from  last 7 days   Lab Units 07/15/20  0559   SODIUM mmol/L 139   POTASSIUM mmol/L 4.1   CHLORIDE mmol/L 104   CO2 mmol/L 21.0*   BUN mg/dL 52*   CREATININE mg/dL 1.57*   CALCIUM mg/dL 8.3   BILIRUBIN mg/dL 0.3   ALK PHOS U/L 70   ALT (SGPT) U/L 9   AST (SGOT) U/L 14   GLUCOSE mg/dL 112*       Impression: 89 y.o. female with acute Left MCA stroke, acute on chronic diastolic heart failure, CKD stage 3, recent GIB, Afib    Plan:   Diffuse joint pain and chronic lower back pain - DDD, OA  Scheduled acetaminophen at lower dose with hydrocodone/APAP prn  Continue lidoderm. Stop morphine.    Anxiety - reduce dose to 0.125mg iv    Dysphagia - continue keofeed, continue to monitor and work with patient.   Children are hoping patient will be more awake tomorrow with less prn med effect and better assess.  Both children want to know that they have given her all opportunity.       Goals of care discussion - met with son, Carson, and daughter, Fatuma, in conference room.  Reviewed current clinical status and anticoagulation  medical management issues, consideration for more recurrent events.  Discussed current dysphagia.  Children have not made a decision about PEG placement.   Discussed that recovery from this stroke event would not be as good as she was prior to the stroke.  Both agreed her health condition was poor and difficult to manage at home before this stroke.   Both children do not want to prolong any condition if she is not going to recover but they want to know that she was given an opportunity.   Both children know that the patient has told them prior to this stroke that she is ready to die and has been letting God know she is ready.     Palliative team continue to provide support to patient/family.    Time: 60 minutes   > 50% time spent in counseling and education concerning current orders for symptom management with son and daughter    Alma Paz, APRN  [phone]  07/15/20  20:33

## 2020-07-16 NOTE — PROGRESS NOTES
Continued Stay Note  [facility]     Patient Name: Cici Veliz  MRN: [id number]  Today's Date: 7/16/2020    Admit Date: 7/12/2020    Discharge Plan     Row Name 07/16/20 1207       Plan    Plan  Undetermined    Plan Comments  Visit made to pt room.  Pt seems sleepy today.  Daughter at bedside.  Daughter states family is still making decision r/t removing keofeed.  Daughter requested speech therapy make a visit to assess pt swallowing.  Emotional support provided.  RN will call speech therapy.  Hospice will continue to follow.  Please, call [phone] if I can be of further assistance.             Talia Guaman RN

## 2020-07-16 NOTE — NURSING NOTE
WOC nurse for Xavier report 15 or less    Spoke with RN [place] - per RN Family refusing heel boots, using pillows to float heels.    No pressure injury skin issues noted, all skin interventions in place, including heel boots, repositioning and speciality bed.    Please consult WOC nurse if needed.

## 2020-07-16 NOTE — PROGRESS NOTES
"                  Clinical Nutrition     Nutrition Support Assessment  Reason for Visit:   Follow-up protocol, EN    Patient Name: Cici Veliz  YOB: 1925  MRN: [id number]  Date of Encounter: 07/16/20 09:02  Admission date: 7/12/2020    Comments:   Will continue to monitor GOC discussions, labs, and EN tolerance, adjusting nutrition support regimen as medically appropriate     Nutrition Assessment   Assessment        Admission diagnosis  R-sided weakness  L MCA CVA     Additional diagnosis/conditions/procedures this admission  Dysphagia  Aphasia    (7/15) SLP tx - Safest rec cont to be NPO w/ alternate means of nutrition. Pt not appropriate for MBS @ this point 2' severity of deficits/difficulty initiating swallow    Applicable PMH/PSxH:   CHF  Hypertrophic cardiomyopathy  CAD  PAF  PUD  H. Pylori  Spinal stenosis  Dementia  COPD  CKD  CVA (4/2020)    PPM  L foot I&D  Parathyroidectomy  Hemorrhoid surgery  CABG    Reported/Observed/Food/Nutrition Related History:      Noted per nursing, patient attempting to pull keofeed. Family in discission with palliative team about GOC. Agitated requiring sedation overnight. Patient at goal rate with EN. Family planning to have conversation with patient about GOC, possibly moving towards comfort measures.      Anthropometrics     Height: 170.2 cm (67.01\")  Last filed wt: Weight: 60.1 kg (132 lb 9.6 oz) (07/15/20 0530)  Weight Method: Bed scale    BMI: BMI (Calculated): 20.8  Normal: 18.5-24.9kg/m2    Ideal Body Weight (IBW) (kg): 61.88  Admission wt: 124 lbs  Method obtained: stated weight per charting 7/12    Weight Change   UBW: 124 lb per daughter, pt \"holding steady\" at NH  Weight change:   % wt change:   Time frame of weight loss:     Weight Weight (kg) Weight (lbs) Weight Method   4/6/2020 58.968 kg 130 lb Stated   4/7/2020 58.117 kg 128 lb 2 oz Standing scale   4/8/2020 58.06 kg 128 lb Standing scale   4/9/2020 56.756 kg 125 lb 2 oz Standing scale "   4/10/2020 58.695 kg 129 lb 6.4 oz Standing scale   4/11/2020 58.605 kg 129 lb 3.2 oz Standing scale   4/12/2020 57.335 kg 126 lb 6.4 oz Standing scale   6/1/2020 63.504 kg 140 lb     6/1/2020 55.963 kg 123 lb 6 oz Bed scale   6/2/2020 55.906 kg 123 lb 4 oz     6/3/2020 (No Data) (No Data)     6/4/2020 (No Data) (No Data) Standing scale   6/7/2020 56.246 kg 124 lb Stated   6/8/2020 57.244 kg 126 lb 3.2 oz Bed scale   6/9/2020 57.108 kg 125 lb 14.4 oz Bed scale   6/10/2020 56.972 kg 125 lb 9.6 oz Bed scale   6/11/2020 57.199 kg 126 lb 1.6 oz Bed scale   7/12/2020 58.968 kg 130 lb Estimated   7/12/2020 56.246 kg 124 lb Stated   7/12/2020 58.968 kg 130 lb          Labs reviewed     Yes - reviewed, hypophosphatemia    Results from last 7 days   Lab Units 07/16/20  0637 07/15/20  0559 07/14/20  0732  07/12/20  0526   GLUCOSE mg/dL 119* 112* 114*   < > 116*   BUN mg/dL 58* 52* 41*   < > 28*   CREATININE mg/dL 1.45* 1.57* 1.45*   < > 1.34*   SODIUM mmol/L 142 139 137   < > 136   CHLORIDE mmol/L 108* 104 101   < > 103   POTASSIUM mmol/L 4.3 4.1 4.4   < > 5.4*   PHOSPHORUS mg/dL 4.6* 5.3*  --   --  4.3   MAGNESIUM mg/dL  --  2.4*  --   --  2.0   ALT (SGPT) U/L  --  9  --   --  9    < > = values in this interval not displayed.     Results from last 7 days   Lab Units 07/16/20  0637 07/15/20  0559 07/13/20  0534 07/12/20  0526   ALBUMIN g/dL 3.30* 3.30*  --  2.93*   PREALBUMIN mg/dL  --  17.0*  --   --    CRP mg/dL  --  5.12*  --  6.48*   CHOLESTEROL mg/dL  --  178 173  --    TRIGLYCERIDES mg/dL  --  92 70  --        Results from last 7 days   Lab Units 07/16/20  0448 07/15/20  1831 07/15/20  1149 07/15/20  0551 07/13/20  0051 07/12/20  1737   GLUCOSE mg/dL 129 148* 127 124 118 109     Lab Results   Lab Value Date/Time    HGBA1C 5.10 07/13/2020 0535    HGBA1C 5.50 05/19/2017 0258         Medications reviewed   Pertinent: demadex, plavix  GTT: NaCl @ 50 mL    Intake/Ouptut 24 hrs (7:00AM - 6:59 AM)     Intake & Output (last  day)       07/15 0701 - 07/16 0700 07/16 0701 - 07/17 0700    Other 301     NG/     IV Piggyback 250     Total Intake(mL/kg) 733 (12.2)     Urine (mL/kg/hr) 925 (0.6)     Total Output 925     Net -192                 Needs Assessment (7/14)     Height used  170.2 cm (67 in)   Weight used  59 kgs (130 lbs)         Estimated need Method/Equation used Result    Energy/Calorie need   ~1400 kcals/d  22 - 25 kcal/kg actBW   MSJ: 1022 x 1.3  1298 - 1475 kcal   1329 kcal             Protein   ~65 g/day  1.0 - 1.2 g/kg actBW  59 - 71 g        Fiber  14 g / 1000 kcal  ~19.6 g/day   Fluid  ~1500 mL/d  25 - 30 mL/kg   1 mL/kcal  1298 - 1475 mL   1400 mL            Current Nutrition Prescription     PO: NPO Diet  EN: Diet, Tube Feeding Tube Feeding Formula: Isosource 1.5 (Calorically Dense)    EN to provide at goal volume  Intake/Evaluation of Received Nutrient/Fluid Intake last day 5733-2107:     At Target Goal Volume    % Est needs Received per I/O's    % Est needs   Volume  990 ml  182 mL      Modulars         Energy/kcals 1485 kcals 106% 273 kcals 18%   Protein  67 g pro 103% 12 g pro 18%                 Fiber 15 g      Fluid   W/Free water 770 ml  1430 ml  142 mL                  Nutrition Diagnosis     7/13 updated 7/14, 7/16  Problem Swallowing difficulty   Etiology Dysphagia   Signs/Symptoms SLP eval - rec safest NPO/alternate means of nutrition   Status: EN to be started 7/14 7/13 updated 7/14, 7/16  Problem Inadequate oral intake   Etiology Dysphagia   Signs/Symptoms NPO status   Status: EN to be started 7/14    Nutrition Intervention   1.  Follow treatment progress, Care plan reviewed  2. Consult received for EN assessment. RD recommends standard formula, Isosource 1.5 to begin at 15 mL and advance as tolerated by 15 mL Q6Hrs to goal rate of 45 mL/hr, TGV = 990 mL. Free water @ 30 mL.    Route: NGT     At Target Goal Volume     % Est needs   Volume  990 ml     Energy/kcals 1485 kcals 106%   Protein 67 g pro  103%   Fiber 15 g         Fluid via  mL    Total Fluid  1430 mL    Meets 100% RDI Yes          3. Weekly Nutrition panel, CRP and Prealbumin ordered to start Monday 7/20. Nutrition panel, CRP and Prealbumin ordered to be completed 7/15.   4. RD to continue to monitor GOC discussions, labs, and EN tolerance, adjusting nutrition support regimen as medically appropriate       Goal:   General: Nutrition support treatment  EN/PN: Initiate EN, Deliver estimated needs      Monitoring/Evaluation:   Per protocol, I&O, Pertinent labs, EN delivery/tolerance, Weight, Symptoms, POC/GOC, Swallow function    Will Continue to follow per protocol    Love Pearce RDN, LD  Time Spent: 30 minutes

## 2020-07-16 NOTE — PROGRESS NOTES
"Palliative Care Progress Note    Date of Admission: 7/12/2020    Code Status:   Current Code Status     Date Active Code Status Order ID Comments User Context       7/16/2020 1537 No CPR [id number]  Alma Paz, ОЛЬГА Inpatient       Questions for Current Code Status     Question Answer Comment    Code Status No CPR     Medical Interventions (Level of Support Prior to Arrest) Comfort Measures     Comments Both children: Carson and Ramona agree to change to full comfort     Level Of Support Discussed With Next of Kin (If No Surrogate)         Advance Directive: reviewed on medical record  Surrogate decision maker: Children: Carson Gooded and/or Ramona Hamilton    Subjective: Patient is awake with eyes open, but is unable to respond to yes/no questions.  Minimally responsive.  prns - x1 lorazepam 0.125mg dose early am  Patient has been less responsive today than yesterday.   Overnight noted urinary retention with in/out catheter.   Reviewed current scheduled and prn medications for route, type, dose, and frequency.  Reviewed medical record.     •  acetaminophen  •  bisacodyl  •  ipratropium  •  LORazepam  •  metoprolol tartrate  •  Morphine  •  ondansetron **OR** ondansetron  •  sodium chloride  •  sodium chloride    Objective:  PPS 10%  /83 (BP Location: Right arm, Patient Position: Lying)   Pulse 105   Temp 97.8 °F (36.6 °C) (Axillary)   Resp 14   Ht 170.2 cm (67.01\")   Wt 60.1 kg (132 lb 9.6 oz)   LMP  (LMP Unknown)   SpO2 97%   BMI 20.76 kg/m²    Intake & Output (last day)       07/15 0701 - 07/16 0700 07/16 0701 - 07/17 0700    Other 301     NG/     IV Piggyback 250     Total Intake(mL/kg) 733 (12.2)     Urine (mL/kg/hr) 925 (0.6) 200 (0.3)    Stool  0    Total Output 925 200    Net -192 -200          Stool Unmeasured Occurrence  2 x        Lab Results (last 24 hours)     Procedure Component Value Units Date/Time    POC Glucose Once [[id number]]  (Abnormal) Collected:  07/16/20 1209    Specimen: "  Blood Updated:  07/16/20 1216     Glucose 147 mg/dL     Renal Function Panel [[id number]]  (Abnormal) Collected:  07/16/20 0637    Specimen:  Blood Updated:  07/16/20 0758     Glucose 119 mg/dL      BUN 58 mg/dL      Creatinine 1.45 mg/dL      Sodium 142 mmol/L      Potassium 4.3 mmol/L      Chloride 108 mmol/L      CO2 22.0 mmol/L      Calcium 8.0 mg/dL      Albumin 3.30 g/dL      Phosphorus 4.6 mg/dL      Anion Gap 12.0 mmol/L      BUN/Creatinine Ratio 40.0     eGFR Non African Amer 34 mL/min/1.73     Narrative:       GFR Normal >60  Chronic Kidney Disease <60  Kidney Failure <15      CBC (No Diff) [[id number]]  (Abnormal) Collected:  07/16/20 0637    Specimen:  Blood Updated:  07/16/20 0727     WBC 7.64 10*3/mm3      RBC 3.48 10*6/mm3      Hemoglobin 9.0 g/dL      Hematocrit 30.9 %      MCV 88.8 fL      MCH 25.9 pg      MCHC 29.1 g/dL      RDW 18.6 %      RDW-SD 60.4 fl      MPV 10.5 fL      Platelets 259 10*3/mm3     POC Glucose Once [[id number]]  (Normal) Collected:  07/16/20 0448    Specimen:  Blood Updated:  07/16/20 0451     Glucose 129 mg/dL     POC Glucose Once [[id number]]  (Abnormal) Collected:  07/15/20 1831    Specimen:  Blood Updated:  07/15/20 1832     Glucose 148 mg/dL         Imaging Results (Last 24 Hours)     ** No results found for the last 24 hours. **        Physical Exam:  Gen: NAD, resting in bed, eyes open, frail/weak appearance  Skin: warm, dry   Eyes: DOMO, conjunctiva clear and moist   HEENT: oropharynx clear, dry  Resp/thorax: even shallow effort, intermittent increase rate, CTA with diminished air exchange.   CV: A fib  ABD: soft, bowel sounds   Ext: no edema, no redness   Neuro: awake, makes eye contact but unable to nod yes/no, no myoclonus     Reviewed labs and diagnostic results.   Lab Results   Component Value Date    HGBA1C 5.10 07/13/2020     Results from last 7 days   Lab Units 07/16/20 0637   WBC 10*3/mm3 7.64   HEMOGLOBIN g/dL 9.0*   HEMATOCRIT % 30.9*   PLATELETS 10*3/mm3 259      Results from last 7 days   Lab Units 07/16/20  0637 07/15/20  0559   SODIUM mmol/L 142 139   POTASSIUM mmol/L 4.3 4.1   CHLORIDE mmol/L 108* 104   CO2 mmol/L 22.0 21.0*   BUN mg/dL 58* 52*   CREATININE mg/dL 1.45* 1.57*   CALCIUM mg/dL 8.0* 8.3   BILIRUBIN mg/dL  --  0.3   ALK PHOS U/L  --  70   ALT (SGPT) U/L  --  9   AST (SGOT) U/L  --  14   GLUCOSE mg/dL 119* 112*       Impression: 89 y.o. female with acute Left MCA stroke, acute on chronic diastolic heart failure, CKD stage 3, recent GIB, Afib    Plan: Comfort with end of life care    Diffuse joint pain, chronic lower back pain - DDD, OA  Will rotate to morphine iv prns with scheduled dose at bedtime. Continue lidoderm/diclofenac ointment.     Restlessness, anxiety - prn lorazepam will provide     Dysphagia - patient's children have decided to stop artificial nutrition and remove nasogastric feeding tube.    Maintain mouth care for comfort.     Goals of care discussion - met with daughterRamona, at bedside and put son, Carson, on speaker phone.  Both have seen the patient appear to be in a more declined state than yesterday and have noted that patient has not received any opioid or sedating medication.  Patient appears very still.  Reviewed transition to comfort focused plan of care, removing nasogastric feeding tube, change in medication administration and allowing patient to be in a more natural, respectful state. Allow a more dignified presence in hospital room as possible.   Son, Carson, plans to return to hospital by tomorrow morning.  Agreed to have inpatient hospice meet with them in the morning.   Reviewed and updated nursing and Dr Vieyra.    Time: 60 minutes   > 50% time spent in counseling and education concerning current orders for symptom management with Ramona davis    Alma Paz, APRN  [phone]  07/16/20  17:34

## 2020-07-16 NOTE — PROGRESS NOTES
"    [facility] Medicine Services  PROGRESS NOTE    Patient Name: Cici Veliz  : 1925  MRN: [id number]    Date of Admission: 2020  Primary Care Physician: Edgar Urias MD    Subjective   Subjective     CC:  F/U CVA    HPI:  Pt seen and examined. Nursing notes reviewed. Pt required I/O cath overnight with 700 cc out. Pt more comfortable after bladder emptied. Son and Daughter at bedside do not want patient to have anything for pain other than Tylenol. Also request no further Ativan. They want her \"clear headed\" to try and have conversation about goals of care going forward.     Review of Systems  Unable to obtain    Objective   Objective     Vital Signs:   Temp:  [96.8 °F (36 °C)-97.6 °F (36.4 °C)] 97.5 °F (36.4 °C)  Heart Rate:  [] 92  Resp:  [16-18] 18  BP: ()/(52-88) 113/88  Total (NIH Stroke Scale): 23     Physical Exam:  Constitutional: chronically ill appearing female, awake, lying in bed appears comfortable, nods head yes/no to questions occasionally   HENT: NCAT, mucous membranes moist, +Keofeed in place  Respiratory: Clear to auscultation bilaterally, respiratory effort normal   Cardiovascular: IRR, no murmurs, rubs, or gallops, palpable pedal pulses bilaterally  Gastrointestinal: Positive bowel sounds, soft, nontender, nondistended  Musculoskeletal: No bilateral ankle edema  Psychiatric: Flat affect  Neurologic: Aphasic, unable to cooperate with neuro exam  Skin: No rashes    Results Reviewed:  Results from last 7 days   Lab Units 07/16/20  0637 07/15/20  0559 20  0732  20  0526   WBC 10*3/mm3 7.64 8.85 7.90   < > 6.08   HEMOGLOBIN g/dL 9.0* 8.9* 9.5*   < > 8.8*   HEMATOCRIT % 30.9* 30.0* 31.8*   < > 29.4*   PLATELETS 10*3/mm3 259 285 300   < > 296   INR   --   --   --   --  1.01    < > = values in this interval not displayed.     Results from last 7 days   Lab Units 2037 07/15/20  0559 20  0732  20  1434 20  0526   "   SODIUM mmol/L 142 139 137   < >  --  136   POTASSIUM mmol/L 4.3 4.1 4.4   < >  --  5.4*   CHLORIDE mmol/L 108* 104 101   < >  --  103   CO2 mmol/L 22.0 21.0* 20.0*   < >  --  19.5*   BUN mg/dL 58* 52* 41*   < >  --  28*   CREATININE mg/dL 1.45* 1.57* 1.45*   < >  --  1.34*   GLUCOSE mg/dL 119* 112* 114*   < >  --  116*   CALCIUM mg/dL 8.0* 8.3 8.9   < >  --  8.5   ALT (SGPT) U/L  --  9  --   --   --  9   AST (SGOT) U/L  --  14  --   --   --  20   TROPONIN T ng/mL  --   --   --   --  0.051* 0.065*   PROBNP pg/mL  --   --  21,133.0*  --   --  8,676.0*    < > = values in this interval not displayed.     Estimated Creatinine Clearance: 22.5 mL/min (A) (by C-G formula based on SCr of 1.45 mg/dL (H)).    Microbiology Results Abnormal     None          Imaging Results (Last 24 Hours)     ** No results found for the last 24 hours. **          Results for orders placed during the hospital encounter of 07/12/20   Adult Transthoracic Echo Complete W/ Cont if Necessary Per Protocol (With Agitated Saline)    Narrative · Left ventricular wall thickness is consistent with moderate concentric   hypertrophy.  · Moderate mitral valve regurgitation is present.  · Mild tricuspid valve regurgitation is present.  · The following left ventricular wall segments are hypokinetic: basal   anterolateral, mid anterolateral, apical lateral, basal inferolateral, mid   inferolateral, apical inferior, mid inferior, apical septal, basal   inferoseptal, mid inferoseptal, mid anteroseptal, basal anterior, basal   inferior and basal inferoseptal. The following left ventricular wall   segments are akinetic: mid anterior, apical anterior and apex.  · Estimated EF = 41%.  · Left ventricular systolic function is moderately decreased.  · Left atrial cavity size is mild-to-moderately dilated.  · Normal right ventricular cavity size, wall thickness, systolic function   and septal motion noted.  · Saline test results are negative.  · The aortic valve  exhibits moderate sclerosis.  · Severe MAC is present.  · There is no evidence of pericardial effusion.  · No evidence of pulmonary hypertension is present.          I have reviewed the medications:  Scheduled Meds:    acetaminophen 240 mg Nasogastric TID   aspirin 81 mg Nasogastric Daily   Or      aspirin 300 mg Rectal Daily   clopidogrel 75 mg Nasogastric Daily   lidocaine 3 patch Transdermal Q24H   nebivolol 2.5 mg Oral Q24H   polyethylene glycol 17 g Nasogastric Daily   rosuvastatin 10 mg Nasogastric Nightly   sodium chloride 10 mL Intravenous Q12H   sodium chloride 10 mL Intravenous Q12H   torsemide 10 mg Oral Daily     Continuous Infusions:    dilTIAZem 5-15 mg/hr Last Rate: Stopped (07/15/20 0348)   sodium chloride 50 mL/hr Last Rate: 50 mL/hr (07/16/20 0531)     PRN Meds:.acetaminophen  •  acetaminophen  •  bisacodyl  •  HYDROcodone-acetaminophen  •  ipratropium  •  LORazepam  •  magnesium sulfate **OR** magnesium sulfate **OR** magnesium sulfate  •  metoprolol tartrate  •  ondansetron **OR** ondansetron  •  potassium chloride  •  sodium chloride  •  sodium chloride    Assessment/Plan   Assessment & Plan     Active Hospital Problems    Diagnosis  POA   • **Acute ischemic left MCA stroke (CMS/[facility]) [I63.512]  Yes   • Stroke (CMS/[facility]) [I63.9]  Yes   • Acute on chronic congestive heart failure (CMS/[facility]) [I50.9]  Yes   • NSTEMI (non-ST elevated myocardial infarction) (CMS/[facility]) [I21.4]  Yes   • Shortness of breath [R06.02]  Yes   • PAF (paroxysmal atrial fibrillation) (CMS/[facility]) [I48.0]  Yes   • Chronic back pain [M54.9, G89.29]  Yes   • CKD (chronic kidney disease) stage 3, GFR 30-59 ml/min (CMS/[facility]) [N18.3]  Yes   • Diastolic heart failure secondary to hypertrophic cardiomyopathy (CMS/[facility]) [I50.30, I42.2]  Yes   • Spinal stenosis, degenerative disc disease, back pain* [M48.00]  Yes   • COPD (chronic obstructive pulmonary disease) (CMS/[facility]) [J44.9]  Yes   • Essential hypertension [I10]  Yes   • CAD, status post  RCA and circumflex stents, in-stent restenosis of RCA requiring stenting 2017 Dr. Cash  [I25.10]  Yes   • Pacemaker* [Z95.0]  Yes      Resolved Hospital Problems   No resolved problems to display.        Brief Hospital Course to date:  Cici Veliz is a 89 y.o. female with numerous comorbidities (CKD3, CAD, recent stroke, COPD, D-CHF, dementia, etc) who presents in transfer from OSH for acute stroke like symptoms     Acute LT MCA distribution stroke  Severe cerebral vascular disease  Recent stroke ~1 month PTA  -Neurology has signed off.  -Echo: moderate concentric hypertrophy, moderate MVR, mild TVR, multiple hypokinetic segments, EF 41%, saline tests negative, severe MAC  -Unable to obtain MRI due to pacemaker   -, A1c 5.1  -Plan for ASA/Plavix for 90 days (May change Plavix to Eliquis 2.5mg BID per Cardiology pending GOC)  -Statin   -PT/OT following, recommends SNF. CM following  -SLP following, patient not safe for PO diet. Not able to perform MBS at this time because patient not able to cooperate with exam. Per family at bedside, she wouldn't want a feeding tube. Keofeed currently in place.  -Palliative care following, Hospice also following  -I had a long discussion with son and daughter at bedside. Patient with poor prognosis. Encouraged going towards comfort measures. They would like to try to have a discussion with their mother today and then will decide further treatment plan.     Acute metabolic encephalopathy  Underlying dementia  - related to stroke, dementia, acute illness     Acute hypoxic respiratory insufficiency  Acute on chronic diastolic CHF  Underlying COPD with wheezes  -Lasix changed to Torsemide per Cards  -IVF started overnight, will DC     NSTEMI, suspect type 2  Underlying CAD  - ASA  -Troponin trended down     Atrial fibrillation with RVR  [place] dual-chamber pacemaker  -CHADsVasc 8  -Continue Bystolic 2.5mg BID  -Cardiology may give 1x dose of IV Digoxin 250mcg today for  HR control  -Cardiology following, defer ECV, MPS, LHC  -May initiate Eliquis 2.5mg BID, if family decides to continue with aggressive care, will initiate   -Device interrogation to assess A fib burden      CKD stage 3  -Cr stable  -baseline Cr anywhere from 1.1-1.7; egfr 40-50's.  -monitor with diuresis     Mild hyperkalemia  - RESOLVED     History of hyperparathyroidism s/p parathyroidectomy  - per DTR she gets her calcium from her diet    L hip pain  -XR of hip/pelvis and femur shows severe degenerative change but no fracture   -Lidoderm patch   -Morphine DC'd at family request, continue Tylenol    Chronic Benzodiazepine use  -PRN Ativan      DVT prophylaxis:  mechanical       Daily Care Communication  Due to current limited visitation policies, an attempt will be made daily to update patient's identified best point-of-contact(s)   Contact: Daughter/Son present at bedside   Relation:    Type of communication (phone or televideo):    Time of communication:    Notes (if applicable):     Disposition: I expect the patient to be discharged pending GOC discussion.     CODE STATUS:   Code Status and Medical Interventions:   Ordered at: 07/12/20 0920     Level Of Support Discussed With:    Next of Kin (If No Surrogate)     Code Status:    No CPR     Medical Interventions (Level of Support Prior to Arrest):    Full         Electronically signed by Scarlett Vieyra DO, 07/16/20, 11:40.

## 2020-07-16 NOTE — PLAN OF CARE
"  Problem: Palliative Care (Adult)  Intervention: Support/Optimize Psychosocial Response  Note:   1224 Visit at patient bedside with patient's dtr Iisah then joined by patient's son Carson.  Pt not engaging very much - dtr feels she is declining - pt did nod \"yes\" when asked by dtr if she wanted keofeed out - pt appears uncomfortable.  Discussion with Isiah and Carson at bedside regarding comfort diet, comfort measures - both are receptive to information, active listening, symptom assessment, and supportive/empathic presence.     "

## 2020-07-16 NOTE — PLAN OF CARE
Problem: Patient Care Overview  Goal: Plan of Care Review  Outcome: Ongoing (interventions implemented as appropriate)  Flowsheets  Taken 7/16/2020 0553  Progress: declining  Taken 7/16/2020 0330  Plan of Care Reviewed With: son  Note:   At the start of shift pt. Was agitated and restless, trying to pull out her feeding tube. ОЛЬГА Quiles was contacted and a left mitt restraint was prescribed. Pt. NPO with continuous tube feeding. Pt. Had no urine output during shift thus far. Bladder scanned for 65 ml. ОЛЬГА Quiles was notified and a 250 ml bolus of NS with 50 ml/hr following was prescribed. Pt. Became agitated and restless with moaning, facial grimacing, and pulling at lines multiple times during shift. Scheduled and PRN Tylenol and PRN Ativan given. Family refused other PRN pain medications. Will continue to monitor.

## 2020-07-16 NOTE — NURSING NOTE
Call placed to Nurse practitioner patients still has no urine output, bladder scanner shows 67 ml, however, bladder is palpable.  Per nurse practitioner, in and out cath patient no matter the volume on scanner.725 ml of urine returned

## 2020-07-16 NOTE — PROGRESS NOTES
Cici DELONG [facility]  [id number]  9/7/1925   LOS: 4 days   Patient Care Team:  Edgar Urias MD as PCP - General (Family Medicine)  Santhosh Duron MD as PCP - Claims Attributed    Chief Complaint:  IHD / SSS / AFIB / ANEMIA / CVA / HTN / DEBILITY    Subjective     Uncomfortable, agitated, restless, and uncooperative last night requiring IV Ativan with subsequent in and out cath with significant amount of urine removed secondary to urinary retention.  Currently sedated and quiet with minimal response to verbal as well as tactile stimuli.  Tolerating nutritional support with Isosource 1.5 at 45 cc/hour with 30 cc/hour free water.  No posturing or seizure activity discernible.  Her son states her right foot moved yesterday.    Objective     Vital Sign Min/Max for last 24 hours  Temp  Min: 96.8 °F (36 °C)  Max: 97.9 °F (36.6 °C)   BP  Min: 83/66  Max: 114/68   Pulse  Min: 84  Max: 135   Resp  Min: 16  Max: 18   SpO2  Min: 99 %  Max: 100 %               07/12/20  1704 07/15/20  0530   Weight: 59 kg (130 lb) 60.1 kg (132 lb 9.6 oz)         Intake/Output Summary (Last 24 hours) at 7/16/2020 0744  Last data filed at 7/16/2020 0531  Gross per 24 hour   Intake 733 ml   Output 925 ml   Net -192 ml       Physical Exam:     General Appearance:   Sedated, aphasic, in no acute distress   Lungs:    Bibasilar crackles,respirations regular, even and                   unlabored    Heart:    Irregular and normal rate, normal S1 and S2, grade 1/6            murmur, no gallop, no rub, no click   Abdomen:  Extremities:   Soft, non-tender, bowel sounds audible x4    No edema, normal range of motion   Pulses:   Pulses palpable and equal bilaterally      Results Review:     Results from last 7 days   Lab Units 07/15/20  0559 07/14/20  0732 07/13/20  0534   SODIUM mmol/L 139 137 139   POTASSIUM mmol/L 4.1 4.4 4.8   CHLORIDE mmol/L 104 101 103   CO2 mmol/L 21.0* 20.0* 22.0   BUN mg/dL 52* 41* 29*   CREATININE mg/dL 1.57* 1.45* 1.34*   GLUCOSE  mg/dL 112* 114* 113*   CALCIUM mg/dL 8.3 8.9 8.7     Results from last 7 days   Lab Units 07/16/20  0637 07/15/20  0559 07/14/20  0732   WBC 10*3/mm3 7.64 8.85 7.90   HEMOGLOBIN g/dL 9.0* 8.9* 9.5*   HEMATOCRIT % 30.9* 30.0* 31.8*   PLATELETS 10*3/mm3 259 285 300     Results from last 7 days   Lab Units 07/15/20  0559 07/13/20  0534   CHOLESTEROL mg/dL 178 173   TRIGLYCERIDES mg/dL 92 70   HDL CHOL mg/dL  --  45   LDL CHOL mg/dL  --  114*     Results from last 7 days   Lab Units 07/13/20  0535   HEMOGLOBIN A1C % 5.10     Results from last 7 days   Lab Units 07/12/20  1434 07/12/20  0526   CK TOTAL U/L  --  43   TROPONIN T ng/mL 0.051* 0.065*     NO CXR / EKG.    Medication Review: REVIEWED; DRIP = normal saline 50 cc/hour.    Assessment/Plan     Afebrile with stable anemia and no follow-up assessment of GFR available currently.  Recent sustained atrial fibrillation for the past 7 days with associated stroke with additional significant LICA stenosis documented.  Would recommend consideration of apixaban 2.5 mg BID to replace dual antiplatelet therapy if neurology concurs.  Currently no requirement for MPS/LHC/ECV/SANDRA.  Would attempt to wean off normal saline drip and continue free water as well as nutritional support with NG feedings.  No significant improvement in neurologic status in my opinion and prognosis for recovery remains guarded.  Would recommend consideration of IV digoxin 250 mcg slowly x 1 today if GFR remaining stable.    Discussed with son in room.      Acute ischemic left MCA stroke (CMS/HCC)    Spinal stenosis, degenerative disc disease, back pain*    COPD (chronic obstructive pulmonary disease) (CMS/[facility])    Essential hypertension    CAD, status post RCA and circumflex stents, in-stent restenosis of RCA requiring stenting 2017 Dr. Cash     Pacemaker*    Chronic back pain    CKD (chronic kidney disease) stage 3, GFR 30-59 ml/min (CMS/HCC)    Diastolic heart failure secondary to hypertrophic  cardiomyopathy (CMS/[facility])    PAF (paroxysmal atrial fibrillation) (CMS/[facility])    Shortness of breath    NSTEMI (non-ST elevated myocardial infarction) (CMS/[facility])    Acute on chronic congestive heart failure (CMS/[facility])    Stroke (CMS/[facility])    07/16/20  07:44

## 2020-07-16 NOTE — PROGRESS NOTES
Continued Stay Note  [facility]     Patient Name: Cici Veliz  MRN: [id number]  Today's Date: 7/16/2020    Admit Date: 7/12/2020    Discharge Plan     Row Name 07/16/20 1615       Plan    Plan  Undetermined, possibly hospice    Plan Comments  Patient's daughter Xenia Mason present with her mom today and asked to meet me. She explained to me what has been happening  with her mother and tells me she and her brother are having understainding of her declining state she is in. She tells me they are considering removal of keofeed.     Final Discharge Disposition Code          Discharge Codes    No documentation.       Expected Discharge Date and Time     Expected Discharge Date Expected Discharge Time    Jul 17, 2020             Erika Vidales RN

## 2020-07-16 NOTE — NURSING NOTE
UPON ASSESSMENT THIS MORNING, PT'S EYES CLOSED AND NO VISUAL SIGNS OF PAIN OR DISTRESS. PT WOULD RESPOND TO LIGHT, PHYSICAL STIMULI PRESENTED ON LEFT SIDE OF BODY. NIH AND NEURO CHECKS PERFORMED, BUT TESTING IS SKEWED DUE TO PT'S DECREASED LOC, BEING NON-VERBAL, AND UNABLE TO ACCURATELY COMMUNICATE WITH STAFF BY ANSWERING QUESTIONS AND PERFORMING TASKS REQUIRED BY THESE TESTS. DURING MORNING ROUNDS, DISCUSSED DISCONTINUING THESE TASKS AND ORDER RECEIVED.  PT APPEARS TO BE TOLERATING KEOFEED, BUT FAMILY IS CONSIDERING PULLING THE TUBE EVEN AFTER DISCUSSION WITH MULTIPLE DISCIPLINES REGARDING PT'S INABILITY TO SWALLOW SAFELY. TALKED WITH FAMILY REGARDING COMFORT DIET AND WHAT THAT ENTAILS. CONTACTED SPEECH THERAPY PER FAMILY REQUEST REGARDING SWALLOWING/TUBE QUESTIONS.FAMILY IS VERY RELUCTANT TO ALLOW STAFF TO GIVE NARCOTIC PAIN MEDICATION DESPITE ENCOURAGEMENT TO DO SO. PT FACIAL GRIMACES AND MOANS, BUT IS UNABLE TO COMMUNICATE WHERE PAIN IS LOCATED. FAMILY STATES THEY BELIEVE IT'S DUE TO ARTHRITIS IN HIPS AND RIGHT KNEE. FAMILY DOESN'T WANT HEEL BOOTS OR SCUDS PLACED. CONTINUING TO PROVIDE EMOTIONAL SUPPORT AND ANSWERING QUESTIONS.

## 2020-07-16 NOTE — SIGNIFICANT NOTE
Notified by nurse that patient has not had any urine output this shift; last documented output was 200 ml @ 1800 per nurse report to me. SBP 90's. She is receiving tube feeding at 45 ml/hr and free water at 30 ml/hr. Will given  ml bolus over 1 hour and then begin NS @ 50 ml/hr for gentle hydration. We will obtain and review morning labs as well.

## 2020-07-16 NOTE — NURSING NOTE
Patient noted to have no urine output this shift, bladder scan shows little urine.  Call placed to nurse practitioner, orders received,
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
## 2020-07-16 NOTE — PLAN OF CARE
Problem: Patient Care Overview  Goal: Plan of Care Review  Outcome: Ongoing (interventions implemented as appropriate)  Flowsheets (Taken 7/16/2020 5991)  Plan of Care Reviewed With: patient; daughter; son  Note:   SLP re-evaluation completed. Will continue to address dysphagia via repeat clinical swallow evaluation and address communication in treatment. Please see note for further details and recommendations.

## 2020-07-16 NOTE — NURSING NOTE
NOTED PT'S RHYTHM CONTINUES TO BE AFIB AND PULSE IS TACHY RANGING BETWEEN 100-130'S, BUT NOT SUSTAINING A PARTICULAR RATE. CONTACTED DR SCHUMACHER AND AN EKG ORDER WAS PLACED. DR LOPEZ'S NOTE INDICATED A DOSE OF DIGOXIN 250MCG MAY BE CONSIDERED, BUT ISN'T ORDERED, SO WILL CONTACT HIM. MEANWHILE, PALLIATIVE APRN WAS TALKING WITH FAMILY AND THEY HAVE DECIDED TO MAKE THE PATIENT COMFORT MEASURES AND WILL MORE THAN LIKELY BE CONVERTING TO HOSPICE IN THE MORNING. THEY DO WANT THE PULSE RATE UNDER CONTROL, SO WILL GO AHEAD AND CONTACT MD.

## 2020-07-17 NOTE — PROGRESS NOTES
Cici DELONG [facility]  [id number]  9/7/1925   LOS: 5 days   Patient Care Team:  Edgar Urias MD as PCP - General (Family Medicine)  Santhosh Duron MD as PCP - Claims Attributed    Chief Complaint:  IHD / SSS / DEBILITY / CVA / CKD / HTN    Subjective     Resting this morning and generally comfortable off oxygen therapy.  Keofeed tube removed per family request and NG feedings discontinued.  Patient was uncomfortable with arthritis pain through the night and additionally had to be suctioned because of difficulty with secretions.  No apparent seizure activity or posturing.  Minimal response to verbal as well as tactile stimuli currently.    Objective     Vital Sign Min/Max for last 24 hours  Temp  Min: 97.5 °F (36.4 °C)  Max: 98 °F (36.7 °C)   BP  Min: 78/59  Max: 123/92   Pulse  Min: 92  Max: 129   Resp  Min: 14  Max: 18   SpO2  Min: 95 %  Max: 100 %               07/12/20  1704 07/15/20  0530   Weight: 59 kg (130 lb) 60.1 kg (132 lb 9.6 oz)         Intake/Output Summary (Last 24 hours) at 7/17/2020 0741  Last data filed at 7/16/2020 1130  Gross per 24 hour   Intake --   Output 200 ml   Net -200 ml       Physical Exam:     General Appearance:   Lethargic, in no acute distress   Lungs:    Scattered rhonchi and wheezes,respirations regular, even and  unlabored    Heart:    Irregular and normal rate, normal S1 and S2, grade 1/6            murmur, no gallop, no rub, no click   Abdomen:  Extremities:   Soft, non-tender, bowel sounds audible x4    No edema, normal range of motion   Pulses:   Pulses palpable and equal bilaterally      Results Review:   Results from last 7 days   Lab Units 07/16/20  0637 07/15/20  0559 07/14/20  0732   SODIUM mmol/L 142 139 137   POTASSIUM mmol/L 4.3 4.1 4.4   CHLORIDE mmol/L 108* 104 101   CO2 mmol/L 22.0 21.0* 20.0*   BUN mg/dL 58* 52* 41*   CREATININE mg/dL 1.45* 1.57* 1.45*   GLUCOSE mg/dL 119* 112* 114*   CALCIUM mg/dL 8.0* 8.3 8.9     Results from last 7 days   Lab Units 07/16/20  0687  07/15/20  0559 07/14/20  0732   WBC 10*3/mm3 7.64 8.85 7.90   HEMOGLOBIN g/dL 9.0* 8.9* 9.5*   HEMATOCRIT % 30.9* 30.0* 31.8*   PLATELETS 10*3/mm3 259 285 300     Results from last 7 days   Lab Units 07/15/20  0559 07/13/20  0534   CHOLESTEROL mg/dL 178 173   TRIGLYCERIDES mg/dL 92 70   HDL CHOL mg/dL  --  45   LDL CHOL mg/dL  --  114*     Results from last 7 days   Lab Units 07/13/20  0535   HEMOGLOBIN A1C % 5.10     Results from last 7 days   Lab Units 07/12/20  1434 07/12/20  0526   CK TOTAL U/L  --  43   TROPONIN T ng/mL 0.051* 0.065*     · NO CXR / EKG / NEW LAB.    · EKG(7/16/20):    Atrial fibrillation with rapid ventricular response  Marked ST abnormality, possible lateral subendocardial injury  Abnormal ECG  When compared with ECG of 16-JUL-2020 07:54,  Atrial fibrillation has replaced Electronic ventricular pacemaker    Medication Review: REVIEWED; NO DRIPS.    Assessment/Plan     Pharmacologic care has been de-escalated to aspirin 81 mg daily and as needed low-dose IV metoprolol tartrate every 12 hours.  Concur with Dr. Vieyra's excellent assessment and recommendations in yesterday's note.  Long discussion with patient's daughter in room on rounds this morning and apparently patient's wishes long-term have always been to defer heroic measures at the end of life.  Would strongly recommend palliative care involvement with either inpatient care or home care.  Would defer additional diagnostic or therapeutic intervention from a cardiology perspective at this time.  Without question she has has had a severe disabling stroke in the setting of her multiple comorbidities and long-term comfort should be the goal of care at this time.    We will standby and be available as needed. THANKS.      Acute ischemic left MCA stroke (CMS/HCC)    Spinal stenosis, degenerative disc disease, back pain*    COPD (chronic obstructive pulmonary disease) (CMS/HCC)    Essential hypertension    CAD, status post RCA and circumflex  stents, in-stent restenosis of RCA requiring stenting 2017 Dr. Cash     Pacemaker*    Chronic back pain    CKD (chronic kidney disease) stage 3, GFR 30-59 ml/min (CMS/[facility])    Diastolic heart failure secondary to hypertrophic cardiomyopathy (CMS/[facility])    PAF (paroxysmal atrial fibrillation) (CMS/[facility])    Shortness of breath    NSTEMI (non-ST elevated myocardial infarction) (CMS/[facility])    Acute on chronic congestive heart failure (CMS/[facility])    Stroke (CMS/[facility])    07/17/20  07:41

## 2020-07-17 NOTE — DISCHARGE PLACEMENT REQUEST
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 elevations  - rectal ASA as noted    Atrial fibrillation with fast response  - 110-120's while in the room, allowing for permissive HTN will hold off on diltiazem  - reported intolerance to lopressor (hallucinations and nightmares per dtr)  - will consider IV lopressor if HR continues to elevate  - no AC for stroke    CKD stage 3  - baseline Cr anywhere from 1.1-1.7; egfr 40-50's  - monitor with diuresis    Mild hyperkalemia  - 5.4, getting IV lasix - no peaked Twaves on EKG    History of hyperparathyroidism s/p parathyroidectomy  - per DTR she gets her calcium from her diet    DVT prophylaxis:  mechanical    CODE STATUS:  Patient is DNR/DNI, would do BIPAP if safe to do, daughter did briefly mention prior talks with hospice and if the patient declines in status they may opt for a more palliative course  Code Status and Medical Interventions:   Ordered at: 07/12/20 0920     Level Of Support Discussed With:    Next of Kin (If No Surrogate)     Code Status:    No CPR     Medical Interventions (Level of Support Prior to Arrest):    Full     Admission Status:  I believe this patient meets INPATIENT status due to acute stroke, AECHF, hypoxia,  etc.  I feel patient’s risk for adverse outcomes and need for care warrant INPATIENT evaluation and I predict the patient’s care encounter to likely last beyond 2 midnights.      Electronically signed by Toni Delaney DO, 07/12/20, 11:21 AM.      Electronically signed by Toni Delaney DO at 07/12/20 154

## 2020-07-17 NOTE — DISCHARGE SUMMARY
[facility] Medicine Services  DISCHARGE TO INPATIENT HOSPICE    Patient Name: Cici Veliz  : 1925  MRN: [id number]    Date of Admission: 2020  Date of Discharge:  2020  Primary Care Physician: Edgar Urias MD    Consults     Date and Time Order Name Status Description    2020 1442 Inpatient Cardiology Consult Completed     2020 0824 Inpatient Palliative Care MD Consult Completed     2020 1121 Inpatient Neurology Consult Stroke Completed         Hospital Course     Presenting Problem:   CVA (cerebrovascular accident) (CMS/Formerly [facility]) [I63.9]  Stroke (CMS/Formerly [facility]) [I63.9]    Active Hospital Problems    Diagnosis  POA   • **Acute ischemic left MCA stroke (CMS/HCC) [I63.512]  Yes   • Stroke (CMS/Formerly [facility]) [I63.9]  Yes   • Acute on chronic congestive heart failure (CMS/HCC) [I50.9]  Yes   • NSTEMI (non-ST elevated myocardial infarction) (CMS/HCC) [I21.4]  Yes   • Shortness of breath [R06.02]  Yes   • PAF (paroxysmal atrial fibrillation) (CMS/HCC) [I48.0]  Yes   • Chronic back pain [M54.9, G89.29]  Yes   • CKD (chronic kidney disease) stage 3, GFR 30-59 ml/min (CMS/HCC) [N18.3]  Yes   • Diastolic heart failure secondary to hypertrophic cardiomyopathy (CMS/HCC) [I50.30, I42.2]  Yes   • Spinal stenosis, degenerative disc disease, back pain* [M48.00]  Yes   • COPD (chronic obstructive pulmonary disease) (CMS/HCC) [J44.9]  Yes   • Essential hypertension [I10]  Yes   • CAD, status post RCA and circumflex stents, in-stent restenosis of RCA requiring stenting 2017 Dr. Cash  [I25.10]  Yes   • Pacemaker* [Z95.0]  Yes      Resolved Hospital Problems   No resolved problems to display.          Hospital Course:  Cici Veliz is a 89 y.o. female with numerous comorbidities (CKD3, CAD, recent stroke, COPD, D-CHF, dementia, etc) who presented in transfer from [facility] for acute stroke.  Patient was found to have acute left MCA distribution stroke.  She also had a prior stroke 1 month ago.   She has severe cerebrovascular disease.  Patient was seen in consult by neurology and placed patient on aspirin and Plavix for 90 days.  She was also seen in consult by cardiology due to A. fib RVR.  It was felt that patient needed anticoagulation but due to history of GI bleeding and multiple comorbidities, this was not initiated.  Patient failed her swallow evaluation on admission and has never been safe for diet.  She was initially placed on a Keofeed.  Patient was seen in consult by palliative care along with hospice.  After several days of long discussions, daughter and son have decided to change patient to inpatient hospice.  Discussed with him of hospice services today.  Further goals will be based on comfort.     Acute LT MCA distribution stroke  Severe cerebral vascular disease  Recent stroke ~1 month PTA  -Neurology was following and has signed off.  -Echo: moderate concentric hypertrophy, moderate MVR, mild TVR, multiple hypokinetic segments, EF 41%, saline tests negative, severe MAC  -Unable to obtain MRI due to pacemaker   -, A1c 5.1  -Plan for ASA/Plavix for 90 days ,Statin   -SLP following, patient not safe for PO diet. Not able to perform MBS at this time because patient not able to cooperate with exam. Per family at bedside, she wouldn't want a feeding tube. Keofeed was initially placed but has since been removed as GOC changed to comfort.   -Palliative care and Hospice following. Plan to transition to inpatient hospice today.     Acute metabolic encephalopathy  Underlying dementia  - related to stroke, dementia, acute illness     Acute hypoxic respiratory insufficiency  Acute on chronic diastolic CHF  Underlying COPD with wheezes  -Lasix was changed to Torsemide per Cards but has been stopped due to GOC     NSTEMI, suspect type 2  Underlying CAD  -Troponin trended down     Atrial fibrillation with RVR  [employer] dual-chamber pacemaker  -CHADsVasc 8  -Cardiology following, defer ECV, MPS,  LHC  -No AC due to GOC comfort  -PRN Metoprolol for HR control      CKD stage 3  -Cr stable  -baseline Cr anywhere from 1.1-1.7; egfr 40-50's.  -monitor with diuresis     Mild hyperkalemia  - RESOLVED     History of hyperparathyroidism s/p parathyroidectomy  - per DTR she gets her calcium from her diet     L hip pain  -XR of hip/pelvis and femur shows severe degenerative change but no fracture   -Lidoderm patch   -Further pain control at discretion of Palliative team and Hospice     Chronic Benzodiazepine use  -PRN Ativan     Day of Discharge     HPI:   Patient seen and examined.  Nursing notes reviewed.  Per daughter at bedside, she and her brother have decided that they are going to pursue more of a comfort care approach.  Plan to transition to inpatient hospice today.    ROS:  Unable to obtain, patient aphasic     Vital Signs:   Temp:  [98 °F (36.7 °C)] 98 °F (36.7 °C)  Heart Rate:  [109-129] 109  Resp:  [18] 18  BP: (123)/(92) 123/92     Physical Exam:  Constitutional: chronically ill appearing female, awake, lying in bed appears comfortable  HENT: NCAT, mucous membranes moist,   Respiratory: Clear to auscultation bilaterally, respiratory effort normal   Cardiovascular: IRR, no murmurs, rubs, or gallops, palpable pedal pulses bilaterally  Gastrointestinal: Positive bowel sounds, soft, nontender, nondistended  Musculoskeletal: No bilateral ankle edema  Psychiatric: Flat affect  Neurologic: Aphasic, unable to cooperate with neuro exam  Skin: No rashes    Discharge Details     Discharge Disposition:  Transfer care to inpatient Hospice at [facility]    Time Spent on Discharge:  I spent  40  minutes on this discharge activity which included: face-to-face encounter with the patient, reviewing the data in the system, coordination of the care with the nursing staff as well as consultants, documentation, and entering orders.      Electronically signed by Scarlett Vieyra DO, 07/17/20, 12:07 PM.

## 2020-07-17 NOTE — PLAN OF CARE
Problem: Patient Care Overview  Goal: Plan of Care Review  Outcome: Ongoing (interventions implemented as appropriate)  Flowsheets  Taken 7/16/2020 0513 by Zahra Moran RN  Progress: declining  Taken 7/16/2020 2000 by Samina Ferrara RN  Plan of Care Reviewed With: daughter  Note:   SLP re-evaluation and caregiver instruction completed. Will continue to address dysphagia as appropriate. Unable to recommend a safe diet consistency at this time. Patient changed to comfort measures with family consideration of Hospice. Please see note for further details and recommendations.
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
## 2020-07-17 NOTE — PROGRESS NOTES
On 7/12 at 0330 son had tried to help mom out of bed to bathroom and she was weak and had right sided facial droop and unable to talk.  EMS was called and took her to OSH.  She was transferred to [facility] for acute stroke.  Pt had another stroke about one month ago.  She also has a severe cerebrovascular disease, as well as other comorbidities.  She had a swallow study done and failed it.  Goals of care and decline of pt were discussed with family.  They wanted time to see if their mom would recover somewhat.  Palliative was consulted on 7/13.  Palliative also discussed with family [facility].  Family was not ready to make a decision, still wanted time to see what would happen.  She was placed on Keofeed and medical interventions were given to keep her comfortable.  7/17 Keofeed and fluids were discontinued.  Later on 7/17 hospice talked with daughter, Ramona and son, Carson.  Paperwork was signed and she was admitted to hospice for symptom management of pain, dyspnea and agitation.  Dr. Luz placed orders.  Pt will be moved to 5th floor when bed available.

## 2020-07-18 NOTE — PROGRESS NOTES
Hospice Progress Note    Patient Name: Ciic Veliz   : 1925  Gender: female    Code Status: comfort measures    Date of Admission: 2020    Subjective:    89yoF admitted inpt Hospice  for CVA    No acute events noted overnight; son spent the night; dtr at bedside today. Pt is not comfortable at time of visit: + facial grimacing even without touch. Nonverbal.     - PRNs: multiple or morphine, ativan, toradol    - Held: none    - BM: 7/18 x1      ROS:  Review of Systems   Unable to perform ROS: Patient nonverbal       Reviewed current scheduled and prn medications for route, type, dose and frequency.     •  acetaminophen  •  bisacodyl  •  diclofenac  •  glycopyrrolate  •  haloperidol lactate  •  ketorolac  •  LORazepam  •  [DISCONTINUED] Morphine **OR** Morphine  •  ondansetron  •  palliative care oral rinse  •  sodium chloride    Objective:   Resp 20   LMP  (LMP Unknown)    Intake & Output (last day)        0701 -  0700  07 -  0700    Urine 300     Total Output 300     Net -300           Stool Unmeasured Occurrence  1 x        Lab Results (last 24 hours)     ** No results found for the last 24 hours. **        Imaging Results (Last 24 Hours)     ** No results found for the last 24 hours. **          PPS: Palliative Performance Scale score as of 2020, 13:59 is 10% based on the following measures:   Ambulation: Totally bed bound  Activity and Evidence of Disease: Unable to do any work, extensive evidence of disease  Self-Care: Total care  Intake:  Mouth care only  LOC: Drowsy or coma      Physical Exam:  Physical Exam   Constitutional:   Appears younger than age of 89yo   HENT:   Head: Normocephalic and atraumatic.   Eyes:   closed   Neck: No tracheal deviation present.   Cardiovascular:   Tachy, 110HR  No edema  + radial pulses   Pulmonary/Chest:   Shallow, tachypneic at times; CTA, no audible congestion   Musculoskeletal: She exhibits no edema.   Neurological:   Does not  follow commands   Skin: Skin is dry. She is not diaphoretic. There is pallor.   Psychiatric:   + facial grimacing           Stroke (CMS/HCC)      Assessment/Plan:     89yoF admitted inpt Hospice 7/17 for CVA    Schedule morphine 2mg q6h    Schedule toradol 15mg q6h    Prns adjusted for comfort    Monitor for changing needs    Continue to support/reassure dtr>son through this difficult time    Discussion at bedside with dtr regarding goals of care, plan of care, s/s end of life, and symptom mgmt. Support provided. Hospice contact information shared.     Total Visit Time: 45min  Face to Face Time: 30min    Justification for care:  Patient meets criteria for acute in-patient care with required nursing assessment and interventions for symptoms with IV medications.      Mary Levine, ARNIE, MHA, APRN  [facility]  Hospice and Palliative Care Nurse Practitioner  07/18/20  13:46

## 2020-07-18 NOTE — PLAN OF CARE
Pt has required multiple prn meds for comfort. Lengthy conversations with son and daughter regarding symptom management at EOL. Pt now on scheduled medications which appear effective. Pt responds with moans, grimaces when turned. Requires premedication before turns. PPS 10%.

## 2020-07-18 NOTE — PROGRESS NOTES
Continued Stay Note  [facility]     Patient Name: Cici Veliz    Today's Date: 7/18/2020    Admit Date: 7/17/2020    Assessment Note:  7/18 Cici Veliz PPS 10 % admitted to Hospice on 7/18 post CVA. Patient is minimally responsive. She has required multiple prns in past 24 hours and is currently appearing to be in some discomfort. Grimacing tense and HR is tacky. Son and daughter at bedside. Daughter is having a difficult time with patient s rapid decline. Signs and symptoms of dying reviewed and Daughter seemed to grasp that patient was dying. Morphine increased and toradol scheduled.       Discharge plan:    Currently patient remains GIP for complications of EOL care requiring skilled nursing and medical management of symptoms. Discharge plan dependent on a decreased level of care and survival of this admission.      POC:  Family support and education R/T EOL care  Alteration in nutrition R/T Disease process  Pain R/T immobility      Hospice Criteria:  Hospice care provides support and care for persons and their families with a life limiting disease. Hospice is a Medicare benefit and requires   1) Patient prognosis is 6 months or less.less to live,   2) They are no longer going through curative therapies.   Hospice is a Level of care not a location and can be provided in various settings based on patient's needs.    Medicare guidelines state that hospice patients in the hospital require skilled nursing, medical interventions and a level of care that cannot be met in any other setting. This stay is most commonly less then two week.   A patient may be Hospice appropriate but not inpatient appropriate based on this criteria.  Once a Hospice referral is reviewed and  appears to be stable enough to be transferred to a lower level of care with hospice then that is the preference.     Leda GONZALEZ. RN. PN.  [facility] Navigators  Hospice Care  [phone]

## 2020-07-19 NOTE — NURSING NOTE
Pt remains 10% PPS. Family at side. She has had episodes of grimacing and moaning. Dtr has been receptive to education on symptom management and appears to understand pt has been uncomfortable and she now wishes to have scheduled medications for pt. Pt does open eyes at intervals and resists oral care. Cheyne Love respirations began this afternoon with periods of 20 seconds of apnea.

## 2020-07-19 NOTE — PROGRESS NOTES
Continued Stay Note  [facility]     Patient Name: Cici Veliz    Today's Date: 7/19/2020    Admit Date: 7/17/2020    Assessment Note:  Cici Veliz PPS 10% admitted to Hospice on 7/18 post CVA. Patient seen with abdominal effort to breathe grimacing and attempting to move. Repositioned and encouraged daughter to allow patient to be medicated as she had been refusing all night and early this morning. Patient is very warm to touch removed several blankets. Education provided on signs and symptoms and dying process. Will continue to support family and provide comfort care for patient.      Discharge plan:    Currently patient remains GIP for complications of EOL care requiring skilled nursing and medical management of symptoms. Discharge plan dependent on a decreased level of care and survival of this admission.      POC:  Family support and education R/T EOL care    Respiratory comfort R/T disease process  Hospice Criteria:  Hospice care provides support and care for persons and their families with a life limiting disease. Hospice is a Medicare benefit and requires   1) Patient prognosis is 6 months or less.less to live,   2) They are no longer going through curative therapies.   Hospice is a Level of care not a location and can be provided in various settings based on patient's needs.    Medicare guidelines state that hospice patients in the hospital require skilled nursing, medical interventions and a level of care that cannot be met in any other setting. This stay is most commonly less then two week.   A patient may be Hospice appropriate but not inpatient appropriate based on this criteria.  Once a Hospice referral is reviewed and  appears to be stable enough to be transferred to a lower level of care with hospice then that is the preference.     Leda GONZALEZ. RN. CHPN.  [facility] Navigators  Hospice Care  [phone]

## 2020-07-19 NOTE — PLAN OF CARE
Pt comfortable all shift, daughter thinks to sedated so 0200 morphine dose not given, will evaluate hourly

## 2020-07-19 NOTE — PROGRESS NOTES
Hospice Progress Note    Patient Name: Cici Veliz   : 1925  Gender: female    Code Status: comfort measures    Date of Admission: 2020    Subjective:    89yoF admitted in Hospice  for CVA    Overnight notes reviewed: Pt comfortable all shift, daughter thinks to sedated so 0200 morphine dose not given, will evaluate hourly     Pt uncomfortable this morning and early afternoon during visit: facial grimacing, moaning. Son and dtr at bedside.     - PRNs: required a few today for comfort    - Held: dose morphine overnight per dtr request    - BM:       ROS:  Review of Systems   Unable to perform ROS: Patient nonverbal       Reviewed current scheduled and prn medications for route, type, dose and frequency.     •  acetaminophen  •  bisacodyl  •  diclofenac  •  furosemide  •  glycopyrrolate  •  haloperidol lactate  •  ketorolac  •  LORazepam  •  [DISCONTINUED] Morphine **OR** Morphine  •  ondansetron  •  palliative care oral rinse  •  sodium chloride    Objective:   Resp 20   LMP  (LMP Unknown)    Intake & Output (last day)       701 -  07 -  0700    Urine 600 75    Stool 0     Total Output 600 75    Net -600 -75          Stool Unmeasured Occurrence 1 x         Lab Results (last 24 hours)     ** No results found for the last 24 hours. **        Imaging Results (Last 24 Hours)     ** No results found for the last 24 hours. **          PPS: Palliative Performance Scale score as of 2020, 14:28 is 10% based on the following measures:   Ambulation: Totally bed bound  Activity and Evidence of Disease: Unable to do any work, extensive evidence of disease  Self-Care: Total care  Intake:  Mouth care only  LOC: Drowsy or coma      Physical Exam:  Physical Exam   Constitutional:   Pt not initially comfortable (facial grimacing; moaning) but did become more comfortable after prns.    HENT:   + cheekbones prominent   Eyes:   Closed - eyes dry   Neck: No tracheal deviation  "present.   Cardiovascular: Normal rate and regular rhythm.   Pulmonary/Chest: Effort normal.   CTA, breathing more even once medicated and comfortable   Abdominal: Soft.   No accessory muscle use   Musculoskeletal: She exhibits no edema.   Neurological:   Responsive to pain/discomfort; does not follow commands   Skin: Skin is dry. There is pallor.   Psychiatric:   + facial grimacing  + moaning           Stroke (CMS/HCC)      Assessment/Plan:     89yoF admitted in Hospice 7/17 for CVA    Pain, back, nos  Dyspnea  Anorexia  End of Life    - Increase scheduled morphine to 2mg q4h - supportive discussion at bedside with son and dtr regarding dose vs frequency and benefit of scheduled medications to minimize the peaks/troughs of efficacy.     - prns adjusted for comfort - family understands may need to adjust scheduled medications based on prn usage    - eye gtts scheduled and prn    - monitor for changing needs    - informed staff of family's request for bath/shampoo for pt - dtr declined this last night as pt was \"comfortable and I didn't want her disturbed\".     Family aware pt is declining and uncomfortable this morning and upon my visit. We discussed at length plan of care, s/s of end of life, and symptom mgmt. Family aware pt's end of life is hours to days. Discussed goal of overall comfort at all times (minimizing periods of discomfort) so that the pt is comfortable whenever she takes her last breath. Reiterated to dtr/son there is always a last dose of medicine, a last turn, and last something and if pt's end of life is after they request medication/turn, etc then that is okay - not to feel guilty about anything - and to remind themselves the pt's comfort is paramount. They both have Hospice contact information and are encouraged to call at anytime, even in the days/weeks/months to follow if we can be of any assistance. Alerting Social Work regarding bereavement for dtr.     Coordinated care with " Nursing.    Total Visit Time: 50min  Face to Face Time: 35min    Justification for care:  Patient meets criteria for acute in-patient care with required nursing assessment and interventions for symptoms with IV medications.      Mary Levine DNP, MHA, ОЛЬГА  [facility] Care Navigators  Hospice and Palliative Care Nurse Practitioner  07/19/20  13:01

## 2020-07-20 NOTE — PROGRESS NOTES
Continued Stay Note  [facility]     Patient Name: Cici Veliz    Today's Date: 7/20/2020    Admit Date: 7/17/2020    Assessment Note:    Cici Veliz PPS 10% admitted to Hospice on 7/18 post CVA.  Patient remains unresponsive and appears very peaceful at this time daughter, son and  at bedside. Patient’s respirations are shallow and unlabored. Ua is draining to Bedside via elizabeth cath. Feet and hands are warm. Signs and symptoms of dying reviewed. Patient is on day 8 of no intake or IV fluids.    Discharge plan:    Currently patient remains GIP for complications of EOL care requiring skilled nursing and medical management of symptoms. Discharge plan dependent on a decreased level of care and survival of this admission.      POC:  Family support and education R/T EOL care  Pain R/T immobility    Hospice Criteria:  Hospice care provides support and care for persons and their families with a life limiting disease. Hospice is a Medicare benefit and requires   1) Patient prognosis is 6 months or less.less to live,   2) They are no longer going through curative therapies.   Hospice is a Level of care not a location and can be provided in various settings based on patient's needs.    Medicare guidelines state that hospice patients in the hospital require skilled nursing, medical interventions and a level of care that cannot be met in any other setting. This stay is most commonly less then two week.   A patient may be Hospice appropriate but not inpatient appropriate based on this criteria.  Once a Hospice referral is reviewed and  appears to be stable enough to be transferred to a lower level of care with hospice then that is the preference.     Leda GONZALEZ. RN. CHPN.  [facility] Navigators  Hospice Care  [phone]

## 2020-07-20 NOTE — PROGRESS NOTES
Hospice Progress Note    Patient Name: Cici Veliz   : 1925  Gender: female    Code Status: comfort measures    Date of Admission: 2020    Subjective:    89yoF admitted in Hospice  for CVA    Rested well overnight; today MUCH more comfortable upon exam. Dtr and son at bedside with . Family pleased with q4h dosing of morphine for symptom mgmt (pain).    - PRNs:  Ativan 0.5mg x2    - Held: none    - BM:       ROS:  Review of Systems   Unable to perform ROS: Patient unresponsive       Reviewed current scheduled and prn medications for route, type, dose and frequency.     •  acetaminophen  •  bisacodyl  •  diclofenac  •  furosemide  •  glycopyrrolate  •  haloperidol lactate  •  ketorolac  •  LORazepam  •  [DISCONTINUED] Morphine **OR** Morphine  •  Morphine  •  ondansetron  •  palliative care oral rinse  •  Polyvinyl Alcohol-Povidone PF  •  sodium chloride    Objective:   Resp 16   LMP  (LMP Unknown)    Intake & Output (last day)        0701 -  0700  07 -  0700    Urine 475     Stool      Total Output 475     Net -475               Lab Results (last 24 hours)     ** No results found for the last 24 hours. **        Imaging Results (Last 24 Hours)     ** No results found for the last 24 hours. **          PPS: Palliative Performance Scale score as of 2020, 15:05 is 10% based on the following measures:   Ambulation: Totally bed bound  Activity and Evidence of Disease: Unable to do any work, extensive evidence of disease  Self-Care: Total care  Intake:  Mouth care only  LOC: Drowsy or coma    Physical Exam:  Physical Exam   Constitutional: No distress.   Appears comfortable - no facial grimacing; no moaning   HENT:   Cheekbones prominent   Eyes:   closed   Neck: No tracheal deviation present.   Cardiovascular:   HR 70s  No edema  + radial pulses   Pulmonary/Chest: Effort normal and breath sounds normal.   Shallow, even, with periods of apnea   Abdominal: Soft.  Bowel sounds are normal. She exhibits no distension.   Genitourinary:   Genitourinary Comments: Steven with dark yellow output   Musculoskeletal: She exhibits no edema.   Neurological:   Does not follow commands   Skin: Skin is dry. She is not diaphoretic. There is pallor.   Psychiatric:   No facial grimacing; no moaning           Stroke (CMS/[facility])      Assessment/Plan:     89yoF admitted in Hospice 7/17 for CVA    Pain, back, nos  Dyspnea  Anorexia  End of Life    Continue morphine 2mg q4h    Continue palliative oral rinse and eye gtts    Continue toradol 15mg q6h     Prns adjusted for comfort    Monitor for changing needs    Total Visit Time: 20min  Face to Face Time: 10min    Justification for care:  Patient meets criteria for acute in-patient care with required nursing assessment and interventions for symptoms with IV medications.      Mary Levine, ARNIE, MHA, ОЛЬГА  [facility] Care Navigators  Hospice and Palliative Care Nurse Practitioner  07/20/20  11:30

## 2020-07-20 NOTE — PROGRESS NOTES
Clinical Nutrition       Patient Name: Cici Veliz  Date of Encounter: 07/20/20 13:52  MRN: [id number]  Admission date: 7/17/2020      Reason for visit: Follow up, pt previously followed by in patient Clinical Nutrition     Additional information obtained per EMR: RD notes pt has been admitted to in patient Hospice, comfort measures listed.     Current diet: NPO Diet NPO Except: Ice chips, Other; Other: sips, bites of ice     EMR reviewed     Intervention:  Follow treatment plan  Care plan reviewed  RD will sign off, remains available via consult as appropriate      Follow up:   Per protocol      Elenita Valadez RDN, LD  1:52 PM  Time: 10min

## 2020-07-21 NOTE — DISCHARGE SUMMARY
Date of Death: 7/21/2020  Time of Death:  0820    Presenting Problem/History of Present Illness    Stroke (CMS/[facility])      Hospital Course    Per Hospitalist Discharge Summary:    Cici Veliz is a 89 y.o. female with numerous comorbidities (CKD3, CAD, recent stroke, COPD, D-CHF, dementia, etc) who presented in transfer from [facility] for acute stroke.  Patient was found to have acute left MCA distribution stroke.  She also had a prior stroke 1 month ago.  She has severe cerebrovascular disease.  Patient was seen in consult by neurology and placed patient on aspirin and Plavix for 90 days.  She was also seen in consult by cardiology due to A. fib RVR.  It was felt that patient needed anticoagulation but due to history of GI bleeding and multiple comorbidities, this was not initiated.  Patient failed her swallow evaluation on admission and has never been safe for diet.  She was initially placed on a Keofeed.  Patient was seen in consult by palliative care along with hospice.  After several days of long discussions, daughter and son have decided to change patient to inpatient hospice.  Discussed with him of hospice services today.  Further goals will be based on comfort.    [end of copied text]    Ms. Veliz was admitted to in Hospice on 7/17/2020 for mgmt of acute symptoms 2/2 CVA.     Social History:  Social History     Tobacco Use   • Smoking status: Never Smoker   • Smokeless tobacco: Never Used   Substance Use Topics   • Alcohol use: No         Consults:   Consults     Date and Time Order Name Status Description    7/13/2020 1442 Inpatient Cardiology Consult Completed     7/13/2020 0824 Inpatient Palliative Care MD Consult Completed     7/12/2020 1121 Inpatient Neurology Consult Stroke Completed         Exam confirms with auscultation zero audible heart tones and zero audible respirations. Ms.Pauline BALJEET Veliz was pronounced dead at 0820.  MD notified by Patient's RN.     Catie Ndiaye, RN  Clinical House  Supervisor  7/21/2020 09:58      Mary Levine DNP, MHA, APRN  [facility] Care Navigators  Hospice and Palliative Care Nurse Practitioner  07/21/20  13:57

## 2020-07-21 NOTE — SIGNIFICANT NOTE
Exam confirms with auscultation zero audible heart tones and zero audible respirations. Ms.Pauline BALJEET Veliz was pronounced dead at 0820.  MD notified by Patient's RN.    Catie Ndiaye RN  Clinical House Supervisor  7/21/2020 09:58

## 2020-07-21 NOTE — PLAN OF CARE
Problem: Patient Care Overview  Goal: Plan of Care Review  Outcome: Ongoing (interventions implemented as appropriate)  Flowsheets (Taken 7/21/2020 8701)  Progress: declining  Plan of Care Reviewed With: daughter  Outcome Summary: comfort measures continued, pt unresponsive
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
## 2023-01-10 NOTE — PROGRESS NOTES
Discharge Planning Assessment   Fabrice     Patient Name: Cici Veliz  MRN: [id number]  Today's Date: 3/8/2017    Admit Date: 3/5/2017    Discharge Plan       03/08/17 1403    Case Management/Social Work Plan    Plan Pt lives at home alone and plans to return home at discharge. Pt does not utilize home health services. Pt utilizes a nebulizer, bed side commode, shower chair, standard walker, and a wheelchair. SS will continue to follow and assist as needed with discharge planning.    Patient/Family In Agreement With Plan yes     Josephine Storm     Adult

## 2023-05-24 NOTE — PLAN OF CARE
"  Problem: Patient Care Overview  Goal: Interprofessional Rounds/Family Conf  Outcome: Ongoing (interventions implemented as appropriate)     Problem: Palliative Care (Adult)  Goal: Maximized Comfort  Outcome: Ongoing (interventions implemented as appropriate)  Goal: Enhanced Quality of Life  Outcome: Ongoing (interventions implemented as appropriate)  Note:   Nurse (CALLI Barahona) reports pt is restless, grimacing, daughter refusing meds. On observation pt is restless, grimacing, daughter (Ramona) verbalized recognition of discomfort, suggested repositioning. This did seem to help restlessness some, encouraged notifying nurse if discomfort continues, needs reenforcement. Daughter states pt was restless and in pain last night and morphine and ativan helped. Wanted pt more alert this am for swallowing test, reports that it did not go well. Expressed interest in moving to \"hospice floor.\" Has meeting with hospice team this morning. Palliative will follow for continued symptom management and ongoing GOC discussion.     " [de-identified] : healthy eating,exercise [None] : None [Good understanding] : Patient has a good understanding of lifestyle changes and steps needed to achieve self management goal

## (undated) DEVICE — DRSNG SURESITE WNDW 4X4.5

## (undated) DEVICE — HI-TORQUE BALANCE MIDDLEWEIGHT GUIDE WIRE W/HYDROCOAT .014 STRAIGHT TIP 3.0 CM X 190 CM: Brand: HI-TORQUE BALANCE MIDDLEWEIGHT

## (undated) DEVICE — NC TREK CORONARY DILATATION CATHETER 3.0 MM X 20 MM / RAPID-EXCHANGE: Brand: NC TREK

## (undated) DEVICE — MINI TREK CORONARY DILATATION CATHETER 2.0 MM X 20 MM / RAPID-EXCHANGE: Brand: MINI TREK

## (undated) DEVICE — NC TREK CORONARY DILATATION CATHETER 3.5 MM X 20 MM / RAPID-EXCHANGE: Brand: NC TREK

## (undated) DEVICE — ADULT, RADIOTRANSPARENT ELEMENT, COMPATIBLE W/ ZOLL: Brand: DEFIBRILLATION ELECTRODES

## (undated) DEVICE — MICROSURGICAL DILATATION DEVICE: Brand: FLEXTOME® CUTTING BALLOON®

## (undated) DEVICE — ELECTRD DEFIB M/FUNC STATPADZ PK/2

## (undated) DEVICE — GW INQWIRE FC PTFE J/3MM .035 180

## (undated) DEVICE — GC 5F 056 JR 4 STAND TIP: Brand: BRITE TIP

## (undated) DEVICE — CATH F4 INF JL 4 100CM: Brand: INFINITI

## (undated) DEVICE — SHEATH INTRO SUPERSHEATH SHRT .035 4F 11CM

## (undated) DEVICE — GUIDELINER CATHETERS ARE INTENDED TO BE USED IN CONJUNCTION WITH GUIDE CATHETERS TO ACCESS DISCRETE REGIONS OF THE CORONARY AND/OR PERIPHERAL VASCULATURE, AND TO FACILITATE PLACEMENT OF INTERVENTIONAL DEVICES.: Brand: GUIDELINER® V3 CATHETER

## (undated) DEVICE — ASMBL SPK CONTRST CONTRL

## (undated) DEVICE — SHEATH INTRO SUPERSHEATH JWIRE .035 5F 11CM

## (undated) DEVICE — Device: Brand: MEDEX

## (undated) DEVICE — DEV INFL MONARCH 25W

## (undated) DEVICE — CATH F4 INF JR 4 100CM: Brand: INFINITI

## (undated) DEVICE — Device

## (undated) DEVICE — ST EXT IV SMARTSITE 2VLV SP M LL 5ML IV1

## (undated) DEVICE — ADULT DISPOSABLE SINGLE-PATIENT USE PULSE OXIMETER SENSOR: Brand: NONIN

## (undated) DEVICE — NC TREK CORONARY DILATATION CATHETER 3.5 MM X 15 MM / RAPID-EXCHANGE: Brand: NC TREK

## (undated) DEVICE — ST INF PRI SMRTSTE 20DRP 2VLV 24ML 117

## (undated) DEVICE — MINI TREK CORONARY DILATATION CATHETER 1.20 MM X 12 MM / RAPID-EXCHANGE: Brand: MINI TREK

## (undated) DEVICE — CATH F5 INF JR 4 100CM: Brand: INFINITI

## (undated) DEVICE — PK CATH CARD 70

## (undated) DEVICE — NC TREK CORONARY DILATATION CATHETER 4.5 MM X 12 MM / RAPID-EXCHANGE: Brand: NC TREK

## (undated) DEVICE — 6F .070 JR 4 100CM: Brand: CORDIS

## (undated) DEVICE — NC TREK CORONARY DILATATION CATHETER 2.5 MM X 20 MM / RAPID-EXCHANGE: Brand: NC TREK

## (undated) DEVICE — LN INJ CONTRST FLXCIL HP F/M LL 1200PSI10

## (undated) DEVICE — NC TREK CORONARY DILATATION CATHETER 4.0 MM X 12 MM / RAPID-EXCHANGE: Brand: NC TREK

## (undated) DEVICE — SHEATH INTRO SUPERSHEATH JWIRE .035 6F 11CM

## (undated) DEVICE — KT NOVA WTRANSD 60 IN

## (undated) DEVICE — NC TREK™ CORONARY DILATATION CATHETER 4.5 MM X 20 MM / RAPID-EXCHANGE: Brand: NC TREK™

## (undated) DEVICE — NC TREK CORONARY DILATATION CATHETER 2.0 MM X 20 MM / RAPID-EXCHANGE: Brand: NC TREK

## (undated) DEVICE — ANGIO-SEAL VIP VASCULAR CLOSURE DEVICE: Brand: ANGIO-SEAL

## (undated) DEVICE — CANNULA,OXY,ADULT,SUPER SOFT,W/14'TUB,UC: Brand: MEDLINE INDUSTRIES, INC.

## (undated) DEVICE — NC TREK CORONARY DILATATION CATHETER 3.25 MM X 20 MM / RAPID-EXCHANGE: Brand: NC TREK

## (undated) DEVICE — TREK CORONARY DILATATION CATHETER 2.50 MM X 15 MM / RAPID-EXCHANGE: Brand: TREK

## (undated) DEVICE — CATH F4 INF JL 4.5 100CM: Brand: INFINITI